# Patient Record
Sex: MALE | Race: WHITE | NOT HISPANIC OR LATINO | Employment: OTHER | URBAN - METROPOLITAN AREA
[De-identification: names, ages, dates, MRNs, and addresses within clinical notes are randomized per-mention and may not be internally consistent; named-entity substitution may affect disease eponyms.]

---

## 2017-03-06 ENCOUNTER — APPOINTMENT (OUTPATIENT)
Dept: CT IMAGING | Facility: HOSPITAL | Age: 72
End: 2017-03-06

## 2017-03-06 ENCOUNTER — APPOINTMENT (OUTPATIENT)
Dept: GENERAL RADIOLOGY | Facility: HOSPITAL | Age: 72
End: 2017-03-06

## 2017-03-06 ENCOUNTER — HOSPITAL ENCOUNTER (EMERGENCY)
Facility: HOSPITAL | Age: 72
Discharge: LEFT BEFORE DISCHARGE | End: 2017-03-06

## 2017-03-06 VITALS
SYSTOLIC BLOOD PRESSURE: 185 MMHG | RESPIRATION RATE: 16 BRPM | BODY MASS INDEX: 30.8 KG/M2 | WEIGHT: 220 LBS | TEMPERATURE: 98.2 F | OXYGEN SATURATION: 95 % | DIASTOLIC BLOOD PRESSURE: 85 MMHG | HEIGHT: 71 IN | HEART RATE: 85 BPM

## 2017-03-06 LAB
ABO GROUP BLD: NORMAL
ALBUMIN SERPL-MCNC: 4.1 G/DL (ref 3.5–5.2)
ALBUMIN/GLOB SERPL: 1.5 G/DL
ALP SERPL-CCNC: 46 U/L (ref 39–117)
ALT SERPL W P-5'-P-CCNC: 27 U/L (ref 1–41)
ANION GAP SERPL CALCULATED.3IONS-SCNC: 16.2 MMOL/L
AST SERPL-CCNC: 19 U/L (ref 1–40)
BASOPHILS # BLD AUTO: 0.06 10*3/MM3 (ref 0–0.2)
BASOPHILS NFR BLD AUTO: 0.5 % (ref 0–1.5)
BILIRUB SERPL-MCNC: 0.4 MG/DL (ref 0.1–1.2)
BLD GP AB SCN SERPL QL: NEGATIVE
BUN BLD-MCNC: 16 MG/DL (ref 8–23)
BUN/CREAT SERPL: 18 (ref 7–25)
CALCIUM SPEC-SCNC: 9.7 MG/DL (ref 8.6–10.5)
CHLORIDE SERPL-SCNC: 95 MMOL/L (ref 98–107)
CO2 SERPL-SCNC: 24.8 MMOL/L (ref 22–29)
CREAT BLD-MCNC: 0.89 MG/DL (ref 0.76–1.27)
DEPRECATED RDW RBC AUTO: 41.4 FL (ref 37–54)
EOSINOPHIL # BLD AUTO: 0.52 10*3/MM3 (ref 0–0.7)
EOSINOPHIL NFR BLD AUTO: 4.5 % (ref 0.3–6.2)
ERYTHROCYTE [DISTWIDTH] IN BLOOD BY AUTOMATED COUNT: 13 % (ref 11.5–14.5)
GFR SERPL CREATININE-BSD FRML MDRD: 84 ML/MIN/1.73
GLOBULIN UR ELPH-MCNC: 2.7 GM/DL
GLUCOSE BLD-MCNC: 248 MG/DL (ref 65–99)
HCT VFR BLD AUTO: 46.5 % (ref 40.4–52.2)
HGB BLD-MCNC: 16 G/DL (ref 13.7–17.6)
HOLD SPECIMEN: NORMAL
IMM GRANULOCYTES # BLD: 0.03 10*3/MM3 (ref 0–0.03)
IMM GRANULOCYTES NFR BLD: 0.3 % (ref 0–0.5)
INR PPP: 1.03 (ref 0.9–1.1)
LYMPHOCYTES # BLD AUTO: 3.85 10*3/MM3 (ref 0.9–4.8)
LYMPHOCYTES NFR BLD AUTO: 33.2 % (ref 19.6–45.3)
MCH RBC QN AUTO: 30 PG (ref 27–32.7)
MCHC RBC AUTO-ENTMCNC: 34.4 G/DL (ref 32.6–36.4)
MCV RBC AUTO: 87.2 FL (ref 79.8–96.2)
MONOCYTES # BLD AUTO: 0.69 10*3/MM3 (ref 0.2–1.2)
MONOCYTES NFR BLD AUTO: 6 % (ref 5–12)
NEUTROPHILS # BLD AUTO: 6.44 10*3/MM3 (ref 1.9–8.1)
NEUTROPHILS NFR BLD AUTO: 55.5 % (ref 42.7–76)
PLATELET # BLD AUTO: 298 10*3/MM3 (ref 140–500)
PMV BLD AUTO: 9.5 FL (ref 6–12)
POTASSIUM BLD-SCNC: 4.4 MMOL/L (ref 3.5–5.2)
PROT SERPL-MCNC: 6.8 G/DL (ref 6–8.5)
PROTHROMBIN TIME: 13.1 SECONDS (ref 11.7–14.2)
RBC # BLD AUTO: 5.33 10*6/MM3 (ref 4.6–6)
RH BLD: POSITIVE
SODIUM BLD-SCNC: 136 MMOL/L (ref 136–145)
WBC NRBC COR # BLD: 11.59 10*3/MM3 (ref 4.5–10.7)

## 2017-03-06 PROCEDURE — 85610 PROTHROMBIN TIME: CPT

## 2017-03-06 PROCEDURE — 71020 HC CHEST PA AND LATERAL: CPT

## 2017-03-06 PROCEDURE — 70450 CT HEAD/BRAIN W/O DYE: CPT

## 2017-03-06 PROCEDURE — 36415 COLL VENOUS BLD VENIPUNCTURE: CPT

## 2017-03-06 PROCEDURE — 93005 ELECTROCARDIOGRAM TRACING: CPT

## 2017-03-06 PROCEDURE — 99211 OFF/OP EST MAY X REQ PHY/QHP: CPT

## 2017-03-06 PROCEDURE — 86850 RBC ANTIBODY SCREEN: CPT

## 2017-03-06 PROCEDURE — 85025 COMPLETE CBC W/AUTO DIFF WBC: CPT

## 2017-03-06 PROCEDURE — 86901 BLOOD TYPING SEROLOGIC RH(D): CPT

## 2017-03-06 PROCEDURE — 86900 BLOOD TYPING SEROLOGIC ABO: CPT

## 2017-03-06 PROCEDURE — 80053 COMPREHEN METABOLIC PANEL: CPT

## 2017-03-06 RX ORDER — SODIUM CHLORIDE 0.9 % (FLUSH) 0.9 %
10 SYRINGE (ML) INJECTION AS NEEDED
Status: DISCONTINUED | OUTPATIENT
Start: 2017-03-06 | End: 2017-03-06 | Stop reason: HOSPADM

## 2017-03-06 NOTE — ED NOTES
"Wife states she noticed yesterday the patient was very agitated, was drooling. Pt states he hasn't felt right for about a week. \"Sometimes left side of face feels funny\".  Pt with strong  bilat. Equal movement of bilat legs. No noted facial asymmetry     Ney Zhu RN  03/06/17 1414       Ney Zhu RN  03/06/17 1427    "

## 2017-03-07 ENCOUNTER — OFFICE VISIT (OUTPATIENT)
Dept: CARDIOLOGY | Facility: CLINIC | Age: 72
End: 2017-03-07

## 2017-03-07 VITALS
HEART RATE: 73 BPM | BODY MASS INDEX: 31.86 KG/M2 | SYSTOLIC BLOOD PRESSURE: 152 MMHG | HEIGHT: 71 IN | WEIGHT: 227.6 LBS | DIASTOLIC BLOOD PRESSURE: 80 MMHG

## 2017-03-07 DIAGNOSIS — I25.10 CORONARY ARTERY DISEASE INVOLVING NATIVE CORONARY ARTERY OF NATIVE HEART WITHOUT ANGINA PECTORIS: Primary | ICD-10-CM

## 2017-03-07 DIAGNOSIS — E11.59 TYPE 2 DIABETES MELLITUS WITH OTHER CIRCULATORY COMPLICATION: ICD-10-CM

## 2017-03-07 DIAGNOSIS — I10 ESSENTIAL HYPERTENSION: ICD-10-CM

## 2017-03-07 DIAGNOSIS — E66.09 NON MORBID OBESITY DUE TO EXCESS CALORIES: ICD-10-CM

## 2017-03-07 DIAGNOSIS — E78.49 OTHER HYPERLIPIDEMIA: ICD-10-CM

## 2017-03-07 PROCEDURE — 99214 OFFICE O/P EST MOD 30 MIN: CPT | Performed by: INTERNAL MEDICINE

## 2017-03-07 PROCEDURE — 93000 ELECTROCARDIOGRAM COMPLETE: CPT | Performed by: INTERNAL MEDICINE

## 2017-03-07 NOTE — PROGRESS NOTES
Date of Office Visit: 2017  Encounter Provider: Anibal Wray MD  Place of Service: [facility] CARDIOLOGY  Patient Name: Anthony Vaughn  :[id number]  [id number]    Chief Complaint   Patient presents with   • Coronary Artery Disease   • Dizziness     left side of face felt funny, drooling   :     HPI: Anthony Vaughn is a 72 y.o. male   He walks in today saying he has an appointment but he did not in reality.  He just was over in the emergency room for seven hours yesterday because he thought he had a stroke.  He had a CT and some blood work done that did not really reveal much.  He saw his primary care doctor today who set him up for an MRI.  I talked with her.  She does not feel like he had a stroke.  He is on aspirin.  She would like to hold off on any further evaluation until she gets an MRI back.  He is having some indigestion.  He has a number of stents in all of his coronaries.  He has terrible risk modification (obesity, diabetes out of control, and hypertension).  He does not smoke.          Past Medical History   Diagnosis Date   • Coronary artery disease    • Hyperlipidemia    • Hypertension    • S/P angioplasty with stent    • Type 2 diabetes mellitus        Past Surgical History   Procedure Laterality Date   • Coronary angioplasty with stent placement       Dr. Wray   • Colonoscopy w/ polypectomy  2013     2 benign polyps.       Social History     Social History   • Marital status:      Spouse name: N/A   • Number of children: N/A   • Years of education: N/A     Occupational History   • Not on file.     Social History Main Topics   • Smoking status: Never Smoker   • Smokeless tobacco: Not on file   • Alcohol use No   • Drug use: No   • Sexual activity: Not on file     Other Topics Concern   • Not on file     Social History Narrative       Family History   Problem Relation Age of Onset   • Diabetes Mother    • Heart disease Sister    • Rheumatic fever  Sister        Review of Systems   Constitution: Negative for decreased appetite, fever, malaise/fatigue and weight loss.   HENT: Negative for nosebleeds.    Eyes: Negative for double vision.   Cardiovascular: Negative for chest pain, claudication, cyanosis, dyspnea on exertion, irregular heartbeat, leg swelling, near-syncope, orthopnea, palpitations, paroxysmal nocturnal dyspnea and syncope.   Respiratory: Negative for cough, hemoptysis and shortness of breath.    Hematologic/Lymphatic: Negative for bleeding problem.   Skin: Negative for rash.   Musculoskeletal: Negative for falls and myalgias.   Gastrointestinal: Negative for hematochezia, jaundice, melena, nausea and vomiting.   Genitourinary: Negative for hematuria.   Neurological: Negative for dizziness and seizures.   Psychiatric/Behavioral: Negative for altered mental status and memory loss.       No Known Allergies      Current Outpatient Prescriptions:   •  aspirin 81 MG EC tablet, Take 81 mg by mouth daily., Disp: , Rfl:   •  CARMEN MICROLET LANCETS lancets, , Disp: , Rfl:   •  brimonidine (ALPHAGAN) 0.15 % ophthalmic solution, Apply 1 drop to eye 2 (two) times a day., Disp: , Rfl:   •  clopidogrel (PLAVIX) 75 MG tablet, Take 75 mg by mouth daily., Disp: , Rfl:   •  Glucose Blood (CARMEN CONTOUR NEXT TEST VI), Carmen Contour Next Test In Vitro Strip; Patient Sig: Carmen Contour Next Test In Vitro Strip TEST 3 TIMES DAILY OR AS NEEDED; 3; 0; 20-Apr-2015; Active, Disp: , Rfl:   •  insulin aspart (NOVOLOG FLEXPEN) 100 UNIT/ML solution pen-injector sc pen, INJECT 65 UNITS IN THE MORNING 65 UNITS AT LUNCH AND 68 UNITS AT DINNER, Disp: , Rfl:   •  insulin detemir (LEVEMIR) 100 UNIT/ML injection, INJECT 95 UNITS AT NIGHT, Disp: , Rfl:   •  isosorbide mononitrate (IMDUR) 30 MG 24 hr tablet, Take 30 mg by mouth daily., Disp: , Rfl:   •  latanoprost (XALATAN) 0.005 % ophthalmic solution, Apply 1 drop to eye daily., Disp: , Rfl:   •  lisinopril (PRINIVIL,ZESTRIL) 20 MG  "tablet, TAKE 1 TABLET DAILY AS DIRECTED., Disp: 90 tablet, Rfl: 3  •  metFORMIN (FORTAMET) 500 MG (OSM) 24 hr tablet, Take 500 mg by mouth 2 (two) times a day with meals., Disp: , Rfl:   •  metoprolol tartrate (LOPRESSOR) 25 MG tablet, TAKE 1 TABLET BY MOUTH 2 TIMES DAILY, Disp: 180 tablet, Rfl: 1  •  nitroglycerin (NITROSTAT) 0.4 MG SL tablet, Place 0.4 mg under the tongue., Disp: , Rfl:   •  omega-3 acid ethyl esters (LOVAZA) 1 G capsule, Take 2 capsules by mouth 2 (two) times a day., Disp: , Rfl:   •  Omeprazole 20 MG tablet delayed-release, Take 1 tablet/day by mouth daily., Disp: , Rfl:   •  simvastatin (ZOCOR) 80 MG tablet, Take 40 mg by mouth every night., Disp: , Rfl:   •  timolol (TIMOPTIC) 0.5 % ophthalmic solution, 1 drop daily. INSTILL 1 DROP IN LEFT EYE EVERY MORNING AS DIRECTED, Disp: , Rfl: 6  No current facility-administered medications for this visit.      Objective:     Vitals:    03/07/17 1312   BP: 152/80   Pulse: 73   Weight: 227 lb 9.6 oz (103 kg)   Height: 71\" (180.3 cm)     Body mass index is 31.74 kg/(m^2).    Physical Exam   Constitutional: He is oriented to person, place, and time. He appears well-developed and well-nourished.   obese   HENT:   Head: Normocephalic.   Eyes: No scleral icterus.   Neck: No JVD present. No thyromegaly present.   Cardiovascular: Normal rate, regular rhythm and normal heart sounds.  Exam reveals no gallop and no friction rub.    No murmur heard.  Pulmonary/Chest: Effort normal and breath sounds normal. He has no wheezes. He has no rales.   Abdominal: Soft. There is no hepatosplenomegaly. There is no tenderness.   Musculoskeletal: Normal range of motion. He exhibits no edema.   Lymphadenopathy:     He has no cervical adenopathy.   Neurological: He is alert and oriented to person, place, and time.   Skin: Skin is warm and dry. No rash noted.   Psychiatric: He has a normal mood and affect.         ECG 12 Lead  Date/Time: 3/7/2017 1:45 PM  Performed by: LISA " NOY  Authorized by: NOY WRAY   Comparison: compared with previous ECG   Rhythm: sinus rhythm  Clinical impression: abnormal ECG  Comments: ns ST-T wave abnormality                 Assessment:       Diagnosis Plan   1. Coronary artery disease involving native coronary artery of native heart without angina pectoris     2. Other hyperlipidemia     3. Essential hypertension     4. Non morbid obesity due to excess calories     5. Type 2 diabetes mellitus with other circulatory complication            Plan:        I am not seeing any acute change on his electrocardiogram.  He has terrible risk modification, and I told him today it is not a question of if he is going to have a problem, it is when, if he does not make some kind of change in his lifestyle.  I have been talking to him about that for years and have seen no change.  I am not going to do any kind of a workup right now on his heart until we know his neurologic status is better.  I am going to have him come back and see me in a month and we will consider a stress test then.          As always, it has been a pleasure to participate in your patient's care.      Sincerely,       Noy Wray MD

## 2017-03-09 ENCOUNTER — TRANSCRIBE ORDERS (OUTPATIENT)
Dept: ADMINISTRATIVE | Facility: HOSPITAL | Age: 72
End: 2017-03-09

## 2017-03-09 DIAGNOSIS — R47.9 SPEECH DISTURBANCE, UNSPECIFIED TYPE: Primary | ICD-10-CM

## 2017-03-14 ENCOUNTER — HOSPITAL ENCOUNTER (OUTPATIENT)
Dept: MRI IMAGING | Facility: HOSPITAL | Age: 72
Discharge: HOME | End: 2017-03-14
Attending: INTERNAL MEDICINE | Admitting: INTERNAL MEDICINE

## 2017-03-14 DIAGNOSIS — R47.9 SPEECH DISTURBANCE, UNSPECIFIED TYPE: ICD-10-CM

## 2017-03-14 PROCEDURE — 0 GADOBENATE DIMEGLUMINE 529 MG/ML SOLUTION: Performed by: INTERNAL MEDICINE

## 2017-03-14 PROCEDURE — 70553 MRI BRAIN STEM W/O & W/DYE: CPT

## 2017-03-14 PROCEDURE — A9577 INJ MULTIHANCE: HCPCS | Performed by: INTERNAL MEDICINE

## 2017-03-14 PROCEDURE — 82565 ASSAY OF CREATININE: CPT

## 2017-03-14 RX ADMIN — GADOBENATE DIMEGLUMINE 20 ML: 529 INJECTION, SOLUTION INTRAVENOUS at 15:00

## 2017-03-15 LAB — CREAT BLDA-MCNC: 0.9 MG/DL (ref 0.6–1.3)

## 2017-03-17 ENCOUNTER — TRANSCRIBE ORDERS (OUTPATIENT)
Dept: ADMINISTRATIVE | Facility: HOSPITAL | Age: 72
End: 2017-03-17

## 2017-03-17 DIAGNOSIS — I63.9 STROKE DETERMINED BY CLINICAL ASSESSMENT (HCC): Primary | ICD-10-CM

## 2017-03-17 DIAGNOSIS — I63.9 ACUTE ISCHEMIC STROKE (HCC): Primary | ICD-10-CM

## 2017-03-21 ENCOUNTER — HOSPITAL ENCOUNTER (OUTPATIENT)
Dept: MRI IMAGING | Facility: HOSPITAL | Age: 72
Discharge: HOME | End: 2017-03-21
Attending: INTERNAL MEDICINE

## 2017-03-21 ENCOUNTER — HOSPITAL ENCOUNTER (OUTPATIENT)
Dept: MRI IMAGING | Facility: HOSPITAL | Age: 72
Discharge: HOME | End: 2017-03-21
Attending: INTERNAL MEDICINE | Admitting: INTERNAL MEDICINE

## 2017-03-21 ENCOUNTER — APPOINTMENT (OUTPATIENT)
Dept: MRI IMAGING | Facility: HOSPITAL | Age: 72
End: 2017-03-21

## 2017-03-21 DIAGNOSIS — I63.9 ACUTE ISCHEMIC STROKE (HCC): ICD-10-CM

## 2017-03-21 PROCEDURE — 0 GADOBENATE DIMEGLUMINE 529 MG/ML SOLUTION: Performed by: INTERNAL MEDICINE

## 2017-03-21 PROCEDURE — 82565 ASSAY OF CREATININE: CPT

## 2017-03-21 PROCEDURE — A9577 INJ MULTIHANCE: HCPCS | Performed by: INTERNAL MEDICINE

## 2017-03-21 PROCEDURE — 70549 MR ANGIOGRAPH NECK W/O&W/DYE: CPT

## 2017-03-21 PROCEDURE — 70544 MR ANGIOGRAPHY HEAD W/O DYE: CPT

## 2017-03-21 RX ADMIN — GADOBENATE DIMEGLUMINE 20 ML: 529 INJECTION, SOLUTION INTRAVENOUS at 11:45

## 2017-03-22 LAB — CREAT BLDA-MCNC: 0.8 MG/DL (ref 0.6–1.3)

## 2017-04-14 ENCOUNTER — HOSPITAL ENCOUNTER (INPATIENT)
Facility: HOSPITAL | Age: 72
LOS: 2 days | Discharge: HOME | End: 2017-04-16
Attending: HOSPITALIST | Admitting: HOSPITALIST

## 2017-04-14 ENCOUNTER — APPOINTMENT (OUTPATIENT)
Dept: GENERAL RADIOLOGY | Facility: HOSPITAL | Age: 72
End: 2017-04-14

## 2017-04-14 PROBLEM — J20.9 ACUTE BRONCHITIS: Status: ACTIVE | Noted: 2017-04-14

## 2017-04-14 PROBLEM — J18.9 PNEUMONIA: Status: ACTIVE | Noted: 2017-04-14

## 2017-04-14 PROBLEM — J96.01 ACUTE HYPOXEMIC RESPIRATORY FAILURE: Status: ACTIVE | Noted: 2017-04-14

## 2017-04-14 LAB
ALBUMIN SERPL-MCNC: 3.7 G/DL (ref 3.5–5.2)
ALBUMIN/GLOB SERPL: 1.2 G/DL
ALP SERPL-CCNC: 44 U/L (ref 39–117)
ALT SERPL W P-5'-P-CCNC: 24 U/L (ref 1–41)
ANION GAP SERPL CALCULATED.3IONS-SCNC: 15.9 MMOL/L
AST SERPL-CCNC: 28 U/L (ref 1–40)
B PERT DNA SPEC QL NAA+PROBE: NOT DETECTED
BASOPHILS # BLD AUTO: 0.03 10*3/MM3 (ref 0–0.2)
BASOPHILS NFR BLD AUTO: 0.3 % (ref 0–1.5)
BILIRUB SERPL-MCNC: 0.8 MG/DL (ref 0.1–1.2)
BUN BLD-MCNC: 10 MG/DL (ref 8–23)
BUN/CREAT SERPL: 11.5 (ref 7–25)
C PNEUM DNA NPH QL NAA+NON-PROBE: NOT DETECTED
CALCIUM SPEC-SCNC: 8.7 MG/DL (ref 8.6–10.5)
CHLORIDE SERPL-SCNC: 96 MMOL/L (ref 98–107)
CO2 SERPL-SCNC: 23.1 MMOL/L (ref 22–29)
CREAT BLD-MCNC: 0.87 MG/DL (ref 0.76–1.27)
D-LACTATE SERPL-SCNC: 1.5 MMOL/L (ref 0.5–2)
DEPRECATED RDW RBC AUTO: 42.4 FL (ref 37–54)
EOSINOPHIL # BLD AUTO: 0.21 10*3/MM3 (ref 0–0.7)
EOSINOPHIL NFR BLD AUTO: 2.4 % (ref 0.3–6.2)
ERYTHROCYTE [DISTWIDTH] IN BLOOD BY AUTOMATED COUNT: 13.1 % (ref 11.5–14.5)
FLUAV H1 2009 PAND RNA NPH QL NAA+PROBE: NOT DETECTED
FLUAV H1 HA GENE NPH QL NAA+PROBE: NOT DETECTED
FLUAV H3 RNA NPH QL NAA+PROBE: NOT DETECTED
FLUAV SUBTYP SPEC NAA+PROBE: NOT DETECTED
FLUBV RNA ISLT QL NAA+PROBE: NOT DETECTED
GFR SERPL CREATININE-BSD FRML MDRD: 86 ML/MIN/1.73
GLOBULIN UR ELPH-MCNC: 3 GM/DL
GLUCOSE BLD-MCNC: 219 MG/DL (ref 65–99)
GLUCOSE BLDC GLUCOMTR-MCNC: 138 MG/DL (ref 70–130)
GLUCOSE BLDC GLUCOMTR-MCNC: 218 MG/DL (ref 70–130)
GLUCOSE BLDC GLUCOMTR-MCNC: 221 MG/DL (ref 70–130)
HADV DNA SPEC NAA+PROBE: NOT DETECTED
HCOV 229E RNA SPEC QL NAA+PROBE: NOT DETECTED
HCOV HKU1 RNA SPEC QL NAA+PROBE: NOT DETECTED
HCOV NL63 RNA SPEC QL NAA+PROBE: NOT DETECTED
HCOV OC43 RNA SPEC QL NAA+PROBE: NOT DETECTED
HCT VFR BLD AUTO: 47 % (ref 40.4–52.2)
HGB BLD-MCNC: 15.9 G/DL (ref 13.7–17.6)
HMPV RNA NPH QL NAA+NON-PROBE: NOT DETECTED
HPIV1 RNA SPEC QL NAA+PROBE: NOT DETECTED
HPIV2 RNA SPEC QL NAA+PROBE: NOT DETECTED
HPIV3 RNA NPH QL NAA+PROBE: DETECTED
HPIV4 P GENE NPH QL NAA+PROBE: NOT DETECTED
IMM GRANULOCYTES # BLD: 0.02 10*3/MM3 (ref 0–0.03)
IMM GRANULOCYTES NFR BLD: 0.2 % (ref 0–0.5)
LYMPHOCYTES # BLD AUTO: 1.07 10*3/MM3 (ref 0.9–4.8)
LYMPHOCYTES NFR BLD AUTO: 12.5 % (ref 19.6–45.3)
M PNEUMO IGG SER IA-ACNC: NOT DETECTED
MCH RBC QN AUTO: 30.1 PG (ref 27–32.7)
MCHC RBC AUTO-ENTMCNC: 33.8 G/DL (ref 32.6–36.4)
MCV RBC AUTO: 88.8 FL (ref 79.8–96.2)
MONOCYTES # BLD AUTO: 0.89 10*3/MM3 (ref 0.2–1.2)
MONOCYTES NFR BLD AUTO: 10.4 % (ref 5–12)
NEUTROPHILS # BLD AUTO: 6.37 10*3/MM3 (ref 1.9–8.1)
NEUTROPHILS NFR BLD AUTO: 74.2 % (ref 42.7–76)
NT-PROBNP SERPL-MCNC: 66.2 PG/ML (ref 0–900)
PLATELET # BLD AUTO: 204 10*3/MM3 (ref 140–500)
PMV BLD AUTO: 9.2 FL (ref 6–12)
POTASSIUM BLD-SCNC: 4.1 MMOL/L (ref 3.5–5.2)
PROCALCITONIN SERPL-MCNC: 0.16 NG/ML (ref 0.1–0.25)
PROT SERPL-MCNC: 6.7 G/DL (ref 6–8.5)
RBC # BLD AUTO: 5.29 10*6/MM3 (ref 4.6–6)
RHINOVIRUS RNA SPEC NAA+PROBE: NOT DETECTED
RSV RNA NPH QL NAA+NON-PROBE: NOT DETECTED
SODIUM BLD-SCNC: 135 MMOL/L (ref 136–145)
TROPONIN T SERPL-MCNC: <0.01 NG/ML (ref 0–0.03)
WBC NRBC COR # BLD: 8.59 10*3/MM3 (ref 4.5–10.7)

## 2017-04-14 PROCEDURE — 71020 HC CHEST PA AND LATERAL: CPT

## 2017-04-14 PROCEDURE — 84484 ASSAY OF TROPONIN QUANT: CPT | Performed by: HOSPITALIST

## 2017-04-14 PROCEDURE — 87070 CULTURE OTHR SPECIMN AEROBIC: CPT | Performed by: HOSPITALIST

## 2017-04-14 PROCEDURE — 94640 AIRWAY INHALATION TREATMENT: CPT

## 2017-04-14 PROCEDURE — 87633 RESP VIRUS 12-25 TARGETS: CPT | Performed by: HOSPITALIST

## 2017-04-14 PROCEDURE — 85025 COMPLETE CBC W/AUTO DIFF WBC: CPT | Performed by: HOSPITALIST

## 2017-04-14 PROCEDURE — 87798 DETECT AGENT NOS DNA AMP: CPT | Performed by: HOSPITALIST

## 2017-04-14 PROCEDURE — 87486 CHLMYD PNEUM DNA AMP PROBE: CPT | Performed by: HOSPITALIST

## 2017-04-14 PROCEDURE — 80053 COMPREHEN METABOLIC PANEL: CPT | Performed by: HOSPITALIST

## 2017-04-14 PROCEDURE — 87581 M.PNEUMON DNA AMP PROBE: CPT | Performed by: HOSPITALIST

## 2017-04-14 PROCEDURE — 63710000001 PREDNISONE PER 1 MG: Performed by: HOSPITALIST

## 2017-04-14 PROCEDURE — 84145 PROCALCITONIN (PCT): CPT | Performed by: HOSPITALIST

## 2017-04-14 PROCEDURE — 93010 ELECTROCARDIOGRAM REPORT: CPT | Performed by: INTERNAL MEDICINE

## 2017-04-14 PROCEDURE — 63710000001 INSULIN ASPART PER 5 UNITS: Performed by: HOSPITALIST

## 2017-04-14 PROCEDURE — 83880 ASSAY OF NATRIURETIC PEPTIDE: CPT | Performed by: HOSPITALIST

## 2017-04-14 PROCEDURE — 25010000003 CEFTRIAXONE PER 250 MG: Performed by: HOSPITALIST

## 2017-04-14 PROCEDURE — 87040 BLOOD CULTURE FOR BACTERIA: CPT | Performed by: HOSPITALIST

## 2017-04-14 PROCEDURE — 82962 GLUCOSE BLOOD TEST: CPT

## 2017-04-14 PROCEDURE — 93005 ELECTROCARDIOGRAM TRACING: CPT | Performed by: HOSPITALIST

## 2017-04-14 PROCEDURE — 63710000001 INSULIN DETEMER PER 5 UNITS: Performed by: HOSPITALIST

## 2017-04-14 PROCEDURE — 83605 ASSAY OF LACTIC ACID: CPT | Performed by: HOSPITALIST

## 2017-04-14 PROCEDURE — 87205 SMEAR GRAM STAIN: CPT | Performed by: HOSPITALIST

## 2017-04-14 RX ORDER — ONDANSETRON 4 MG/1
4 TABLET, ORALLY DISINTEGRATING ORAL EVERY 6 HOURS PRN
Status: DISCONTINUED | OUTPATIENT
Start: 2017-04-14 | End: 2017-04-16 | Stop reason: HOSPADM

## 2017-04-14 RX ORDER — BRIMONIDINE TARTRATE 0.15 %
1 DROPS OPHTHALMIC (EYE) 2 TIMES DAILY
Status: DISCONTINUED | OUTPATIENT
Start: 2017-04-14 | End: 2017-04-16 | Stop reason: HOSPADM

## 2017-04-14 RX ORDER — NICOTINE POLACRILEX 4 MG
15 LOZENGE BUCCAL
Status: DISCONTINUED | OUTPATIENT
Start: 2017-04-14 | End: 2017-04-16 | Stop reason: HOSPADM

## 2017-04-14 RX ORDER — CLOPIDOGREL BISULFATE 75 MG/1
75 TABLET ORAL DAILY
Status: DISCONTINUED | OUTPATIENT
Start: 2017-04-14 | End: 2017-04-16 | Stop reason: HOSPADM

## 2017-04-14 RX ORDER — ASPIRIN 81 MG/1
81 TABLET ORAL DAILY
Status: DISCONTINUED | OUTPATIENT
Start: 2017-04-14 | End: 2017-04-16 | Stop reason: HOSPADM

## 2017-04-14 RX ORDER — ATORVASTATIN CALCIUM 40 MG/1
40 TABLET, FILM COATED ORAL DAILY
Status: DISCONTINUED | OUTPATIENT
Start: 2017-04-14 | End: 2017-04-14

## 2017-04-14 RX ORDER — CLOPIDOGREL BISULFATE 75 MG/1
75 TABLET ORAL DAILY
Status: DISCONTINUED | OUTPATIENT
Start: 2017-04-14 | End: 2017-04-14

## 2017-04-14 RX ORDER — BRIMONIDINE TARTRATE 0.15 %
1 DROPS OPHTHALMIC (EYE) 2 TIMES DAILY
Status: DISCONTINUED | OUTPATIENT
Start: 2017-04-14 | End: 2017-04-14

## 2017-04-14 RX ORDER — LISINOPRIL 20 MG/1
20 TABLET ORAL
Status: DISCONTINUED | OUTPATIENT
Start: 2017-04-14 | End: 2017-04-16 | Stop reason: HOSPADM

## 2017-04-14 RX ORDER — PREDNISONE 20 MG/1
20 TABLET ORAL 2 TIMES DAILY WITH MEALS
Status: DISCONTINUED | OUTPATIENT
Start: 2017-04-14 | End: 2017-04-15

## 2017-04-14 RX ORDER — LISINOPRIL 20 MG/1
20 TABLET ORAL
Status: DISCONTINUED | OUTPATIENT
Start: 2017-04-14 | End: 2017-04-14

## 2017-04-14 RX ORDER — SODIUM CHLORIDE 0.9 % (FLUSH) 0.9 %
1-10 SYRINGE (ML) INJECTION AS NEEDED
Status: DISCONTINUED | OUTPATIENT
Start: 2017-04-14 | End: 2017-04-16 | Stop reason: HOSPADM

## 2017-04-14 RX ORDER — PANTOPRAZOLE SODIUM 40 MG/1
40 TABLET, DELAYED RELEASE ORAL
Status: DISCONTINUED | OUTPATIENT
Start: 2017-04-15 | End: 2017-04-16 | Stop reason: HOSPADM

## 2017-04-14 RX ORDER — ONDANSETRON 4 MG/1
4 TABLET, FILM COATED ORAL EVERY 6 HOURS PRN
Status: DISCONTINUED | OUTPATIENT
Start: 2017-04-14 | End: 2017-04-16 | Stop reason: HOSPADM

## 2017-04-14 RX ORDER — CEFTRIAXONE SODIUM 2 G/50ML
2 INJECTION, SOLUTION INTRAVENOUS EVERY 24 HOURS
Status: DISCONTINUED | OUTPATIENT
Start: 2017-04-14 | End: 2017-04-16 | Stop reason: HOSPADM

## 2017-04-14 RX ORDER — ONDANSETRON 2 MG/ML
4 INJECTION INTRAMUSCULAR; INTRAVENOUS EVERY 6 HOURS PRN
Status: DISCONTINUED | OUTPATIENT
Start: 2017-04-14 | End: 2017-04-16 | Stop reason: HOSPADM

## 2017-04-14 RX ORDER — PILOCARPINE HYDROCHLORIDE 10 MG/ML
1 SOLUTION/ DROPS OPHTHALMIC 2 TIMES DAILY
Status: DISCONTINUED | OUTPATIENT
Start: 2017-04-14 | End: 2017-04-16 | Stop reason: HOSPADM

## 2017-04-14 RX ORDER — PREDNISONE 20 MG/1
20 TABLET ORAL 2 TIMES DAILY WITH MEALS
Status: DISCONTINUED | OUTPATIENT
Start: 2017-04-14 | End: 2017-04-14

## 2017-04-14 RX ORDER — ACETAMINOPHEN 325 MG/1
650 TABLET ORAL EVERY 4 HOURS PRN
Status: DISCONTINUED | OUTPATIENT
Start: 2017-04-14 | End: 2017-04-16 | Stop reason: HOSPADM

## 2017-04-14 RX ORDER — LATANOPROST 50 UG/ML
1 SOLUTION/ DROPS OPHTHALMIC DAILY
Status: DISCONTINUED | OUTPATIENT
Start: 2017-04-14 | End: 2017-04-14

## 2017-04-14 RX ORDER — PILOCARPINE HYDROCHLORIDE 10 MG/ML
1 SOLUTION/ DROPS OPHTHALMIC 2 TIMES DAILY
Status: DISCONTINUED | OUTPATIENT
Start: 2017-04-14 | End: 2017-04-14

## 2017-04-14 RX ORDER — TIMOLOL MALEATE 5 MG/ML
1 SOLUTION/ DROPS OPHTHALMIC DAILY
Status: DISCONTINUED | OUTPATIENT
Start: 2017-04-14 | End: 2017-04-16 | Stop reason: HOSPADM

## 2017-04-14 RX ORDER — ISOSORBIDE MONONITRATE 30 MG/1
30 TABLET, EXTENDED RELEASE ORAL DAILY
Status: DISCONTINUED | OUTPATIENT
Start: 2017-04-14 | End: 2017-04-16 | Stop reason: HOSPADM

## 2017-04-14 RX ORDER — HYDROCODONE BITARTRATE AND ACETAMINOPHEN 5; 325 MG/1; MG/1
1 TABLET ORAL EVERY 4 HOURS PRN
Status: DISCONTINUED | OUTPATIENT
Start: 2017-04-14 | End: 2017-04-16 | Stop reason: HOSPADM

## 2017-04-14 RX ORDER — LATANOPROST 50 UG/ML
1 SOLUTION/ DROPS OPHTHALMIC DAILY
Status: DISCONTINUED | OUTPATIENT
Start: 2017-04-14 | End: 2017-04-15

## 2017-04-14 RX ORDER — IPRATROPIUM BROMIDE AND ALBUTEROL SULFATE 2.5; .5 MG/3ML; MG/3ML
3 SOLUTION RESPIRATORY (INHALATION)
Status: DISCONTINUED | OUTPATIENT
Start: 2017-04-14 | End: 2017-04-16 | Stop reason: HOSPADM

## 2017-04-14 RX ORDER — ATORVASTATIN CALCIUM 40 MG/1
40 TABLET, FILM COATED ORAL DAILY
Status: DISCONTINUED | OUTPATIENT
Start: 2017-04-14 | End: 2017-04-16 | Stop reason: HOSPADM

## 2017-04-14 RX ORDER — DEXTROSE MONOHYDRATE 25 G/50ML
25 INJECTION, SOLUTION INTRAVENOUS
Status: DISCONTINUED | OUTPATIENT
Start: 2017-04-14 | End: 2017-04-16 | Stop reason: HOSPADM

## 2017-04-14 RX ORDER — ISOSORBIDE MONONITRATE 30 MG/1
30 TABLET, EXTENDED RELEASE ORAL DAILY
Status: DISCONTINUED | OUTPATIENT
Start: 2017-04-14 | End: 2017-04-14

## 2017-04-14 RX ORDER — TIMOLOL MALEATE 5 MG/ML
1 SOLUTION/ DROPS OPHTHALMIC DAILY
Status: DISCONTINUED | OUTPATIENT
Start: 2017-04-14 | End: 2017-04-14

## 2017-04-14 RX ORDER — ASPIRIN 81 MG/1
81 TABLET ORAL DAILY
Status: DISCONTINUED | OUTPATIENT
Start: 2017-04-14 | End: 2017-04-14

## 2017-04-14 RX ADMIN — INSULIN ASPART 40 UNITS: 100 INJECTION, SOLUTION INTRAVENOUS; SUBCUTANEOUS at 12:56

## 2017-04-14 RX ADMIN — ASPIRIN 81 MG: 81 TABLET ORAL at 22:16

## 2017-04-14 RX ADMIN — LISINOPRIL 20 MG: 20 TABLET ORAL at 22:14

## 2017-04-14 RX ADMIN — ATORVASTATIN CALCIUM 40 MG: 40 TABLET, FILM COATED ORAL at 22:13

## 2017-04-14 RX ADMIN — PREDNISONE 20 MG: 20 TABLET ORAL at 22:16

## 2017-04-14 RX ADMIN — IPRATROPIUM BROMIDE AND ALBUTEROL SULFATE 3 ML: .5; 3 SOLUTION RESPIRATORY (INHALATION) at 20:05

## 2017-04-14 RX ADMIN — LATANOPROST 1 DROP: 50 SOLUTION/ DROPS OPHTHALMIC at 23:09

## 2017-04-14 RX ADMIN — CEFTRIAXONE SODIUM 2 G: 2 INJECTION, SOLUTION INTRAVENOUS at 16:17

## 2017-04-14 RX ADMIN — PILOCARPINE HYDROCHLORIDE 1 DROP: 10 SOLUTION/ DROPS OPHTHALMIC at 22:14

## 2017-04-14 RX ADMIN — BRIMONIDINE TARTRATE 1 DROP: 1.5 SOLUTION/ DROPS OPHTHALMIC at 22:14

## 2017-04-14 RX ADMIN — METOPROLOL TARTRATE 25 MG: 25 TABLET ORAL at 22:14

## 2017-04-14 RX ADMIN — AZITHROMYCIN DIHYDRATE 500 MG: 500 INJECTION, POWDER, LYOPHILIZED, FOR SOLUTION INTRAVENOUS at 16:58

## 2017-04-14 RX ADMIN — INSULIN DETEMIR 90 UNITS: 100 INJECTION, SOLUTION SUBCUTANEOUS at 23:10

## 2017-04-14 RX ADMIN — ISOSORBIDE MONONITRATE 30 MG: 30 TABLET ORAL at 22:18

## 2017-04-14 NOTE — H&P
Patient Care Team:  Gricel Diaz MD as PCP - General  Gricel Diaz MD as PCP - Family Medicine  Anibal Wray MD as Consulting Physician (Cardiology)  Guillermo Live MD as Consulting Physician    Chief complaint cough, fevers    Subjective     History of Present Illness  This is a 72-year-old gentleman who has a history of coronary artery disease with approximately 10 stents previously as followed by Dr. Wray for this and who has type 2 diabetes and is on high doses of insulin for this.  The patient presented to his primary care doctor's office today and was found to have increased work of breathing respiratory rate approximately 24 as well as crackles on exam.  And the patient was sent to the hospital as a direct admission from the primary care doctor's office for treatment of presumed pneumonia.      Here on the floor patient has had a chest x-ray that shows increased interstitial changes possible pneumonia.  Patient's white blood cell count is normal.  His pro-calcitonin level is in the indeterminate range.  Patient's hypoxemic with O2 sat of approximately 90% on room air.  The patient feels extremely fatigued.  EKG and BNP levels are within normal limits.    The patient states that he had a stroke 3 weeks ago.  Says he came here to [facility] seen in the emergency room and had a head CT scan that was normal.  Symptoms at that time were somewhat vague just wasn't feeling right felt something in his head.  Didn't mention any weakness on either side of his body.  In the ER the wife told the admitting nurse that the patient was agitated and drooling he felt poorly for about a week.  Also the left side of his face felt funny.  Noted good  strength of sides moving his legs without problems and his face was symmetric.  The patient left the emergency room before being seen by physician.    His wife has had stuffy nose and cold like symptoms and required antibiotics just a few days  prior.      Review of Systems   Constitutional: Negative for activity change and appetite change.   HENT: Negative for dental problem, postnasal drip and rhinorrhea.    Respiratory: Negative for apnea and shortness of breath.    Cardiovascular: Negative for chest pain and palpitations.   Gastrointestinal: Negative for abdominal distention and abdominal pain.   Endocrine: Negative for cold intolerance and polydipsia.   Genitourinary: Negative for difficulty urinating and dysuria.   Musculoskeletal: Negative for arthralgias.   Neurological: Negative for dizziness and numbness.   Hematological: Negative for adenopathy.   Psychiatric/Behavioral: Negative for agitation and behavioral problems.        Past Medical History:   Diagnosis Date   • Atherosclerosis of native coronary artery without angina pectoris    • Chest pain    • Coronary artery disease    • Diabetes mellitus    • Difficulty breathing    • ED (erectile dysfunction)    • Hyperlipidemia    • Hypertension    • S/P angioplasty with stent    • Stroke    • Type 2 diabetes mellitus      Past Surgical History:   Procedure Laterality Date   • COLONOSCOPY W/ POLYPECTOMY  2013    2 benign polyps.   • CORONARY ANGIOPLASTY WITH STENT PLACEMENT      Dr. Wray   • CORONARY ANGIOPLASTY WITH STENT PLACEMENT  2015   • EYE SURGERY     • FRACTURE SURGERY       Family History   Problem Relation Age of Onset   • Diabetes Mother    • Heart disease Sister    • Rheumatic fever Sister      Social History   Substance Use Topics   • Smoking status: Never Smoker   • Smokeless tobacco: None   • Alcohol use No     Prescriptions Prior to Admission   Medication Sig Dispense Refill Last Dose   • aspirin 81 MG EC tablet Take 81 mg by mouth daily.   Taking   • SALIMA MICROLET LANCETS lancets    Taking   • brimonidine (ALPHAGAN) 0.15 % ophthalmic solution Apply 1 drop to eye 2 (two) times a day.   Taking   • clopidogrel (PLAVIX) 75 MG tablet Take 75 mg by mouth daily.   Taking   • Glucose  Blood (CARMEN CONTOUR NEXT TEST VI) Carmen Contour Next Test In Vitro Strip; Patient Sig: Carmen Contour Next Test In Vitro Strip TEST 3 TIMES DAILY OR AS NEEDED; 3; 0; 20-Apr-2015; Active   Taking   • glucose blood (CARMEN CONTOUR NEXT TEST) test strip TESTING BS 3 X DAY  DX CODE E11.21 300 each 0    • insulin aspart (NOVOLOG FLEXPEN) 100 UNIT/ML solution pen-injector sc pen INJECT 65 UNITS IN THE MORNING 65 UNITS AT LUNCH AND 68 UNITS AT DINNER      • insulin detemir (LEVEMIR) 100 UNIT/ML injection INJECT 95 UNITS AT NIGHT (Patient taking differently: 90 Units Every Night. INJECT 95 UNITS AT NIGHT )   Taking   • isosorbide mononitrate (IMDUR) 30 MG 24 hr tablet Take 30 mg by mouth daily.   Taking   • latanoprost (XALATAN) 0.005 % ophthalmic solution Apply 1 drop to eye daily.   Taking   • lisinopril (PRINIVIL,ZESTRIL) 20 MG tablet TAKE 1 TABLET DAILY AS DIRECTED. 90 tablet 3 Taking   • metFORMIN (FORTAMET) 500 MG (OSM) 24 hr tablet Take 500 mg by mouth 2 (two) times a day with meals.   Taking   • metoprolol tartrate (LOPRESSOR) 25 MG tablet TAKE 1 TABLET BY MOUTH 2 TIMES DAILY 180 tablet 1 Taking   • nitroglycerin (NITROSTAT) 0.4 MG SL tablet Place 0.4 mg under the tongue.   Taking   • Omeprazole 20 MG tablet delayed-release Take 1 tablet/day by mouth daily.   Taking   • simvastatin (ZOCOR) 80 MG tablet Take 40 mg by mouth every night.   Taking   • timolol (TIMOPTIC) 0.5 % ophthalmic solution 1 drop daily. INSTILL 1 DROP IN LEFT EYE EVERY MORNING AS DIRECTED  6 Taking   • insulin aspart (novoLOG FLEXPEN) 100 UNIT/ML solution pen-injector sc pen Inject 80 Units under the skin 3 (Three) Times a Day With Meals. 80 after breakfast , 80 after lunch , 80 after dinner   4/13/2017 at 1800   • omega-3 acid ethyl esters (LOVAZA) 1 G capsule Take 2 capsules by mouth 2 (two) times a day.   Taking     Allergies:  Review of patient's allergies indicates no known allergies.    Objective      Vital Signs  Temp:  [98.4 °F (36.9  °C)-99.1 °F (37.3 °C)] 98.4 °F (36.9 °C)  Heart Rate:  [85] 85  Resp:  [18] 18  BP: (150-163)/(69-80) 163/80    Physical Exam   Constitutional: He appears well-developed and well-nourished. No distress.   He clearly is not feeling his normal self but answers all questions appropriately. Sitting up and eating   HENT:   Head: Normocephalic and atraumatic.   Nose: Nose normal.   Mouth/Throat: Oropharynx is clear and moist. No oropharyngeal exudate.   Eyes: Conjunctivae and EOM are normal. No scleral icterus.   Neck: No JVD present. No tracheal deviation present. No thyromegaly present.   Cardiovascular: Normal rate, regular rhythm and normal heart sounds.  Exam reveals no gallop and no friction rub.    No murmur heard.  Pulmonary/Chest: Effort normal. No stridor. No respiratory distress. He has wheezes. He has no rales.   Abdominal: Soft. Bowel sounds are normal. He exhibits no distension and no mass. There is no tenderness. There is no rebound and no guarding.   Musculoskeletal: He exhibits no edema, tenderness or deformity.   Lymphadenopathy:     He has no cervical adenopathy.   Neurological: He is alert. No cranial nerve deficit.   Skin: Skin is warm and dry. No rash noted. He is not diaphoretic.   Psychiatric: He has a normal mood and affect. His behavior is normal.       Results Review:   I reviewed the patient's new clinical results.      Assessment/Plan     Principal Problem:    Pneumonia  Active Problems:    Type 2 diabetes with nephropathy    Coronary artery disease involving native coronary artery without angina pectoris    S/P angioplasty with stent    Essential hypertension    DM type 2 with diabetic peripheral neuropathy      Assessment & Plan  I discussed the case with Dr. Gonzalez    Prednisone 20 mg twice a day pulmonary may decide to increase this    Duo nebs    Rocephin and Zithromax    I doubt he has pulmonary embolism based upon fevers, yellow sputum production, severe cough, and likely preceding viral  illness    Addendum:Patient is positive for parainfluenza and likely has a secondary bacterial infection.    I discussed the patients findings and my recommendations with patient    Clive Chi MD  04/14/17  4:33 PM    Time:

## 2017-04-14 NOTE — PLAN OF CARE
Problem: Patient Care Overview (Adult)  Goal: Plan of Care Review  Outcome: Ongoing (interventions implemented as appropriate)    04/14/17 1054   Coping/Psychosocial Response Interventions   Plan Of Care Reviewed With patient;spouse   Patient Care Overview   Progress no change   Outcome Evaluation   Outcome Summary/Follow up Plan pt admit to  620 from Dr. Huitron with pneumonia dx, Dr. Chi to order admitting orders       Goal: Discharge Needs Assessment  Outcome: Ongoing (interventions implemented as appropriate)    04/14/17 1054   Discharge Needs Assessment   Concerns To Be Addressed no discharge needs identified   Equipment Needed After Discharge none   Discharge Disposition still a patient;home or self-care   Current Health   Anticipated Changes Related to Illness none   Self-Care   Equipment Currently Used at Home none         Problem: Pneumonia (Adult)  Goal: Signs and Symptoms of Listed Potential Problems Will be Absent or Manageable (Pneumonia)  Outcome: Ongoing (interventions implemented as appropriate)

## 2017-04-14 NOTE — CONSULTS
Group: [facility]         PULMONARY CONSULT NOTE    Patient Identification:  Anthony Vaughn  72 y.o.  male  1945  [id number]            Requesting physician: Dr. Clive Chi    Reason for Consultation:  Bronchopneumonia, hypoxia, wheezing    CC: Shortness of breath and cough    History of Present Illness:  72-year-old male patient who has been short of breath and coughing for the past 3-4 days.  About 10 days ago his wife had an upper respiratory tract infection and she took antibiotics and steroids.  This gentleman has no history of tobacco abuse.  He has no history of lung disease.  He works 6 days a week and is very active.  Today he could not go to work.  Yesterday he also could not go to work and he felt terrible.  His cough is constant.  He can barely exert himself.  His shortness of breath is exacerbated by any minimal exertion.  He's also had some fever and chills.  Temperature has not been quantified.  The patient went to his primary care physician's office today, Dr. Huitron, and he was referred to the hospital for direct admission.  Upon arrival nurses stated that he required a couple of liters of oxygen, but currently he is not connected to the oxygen saturation meter.  I visualized chest x-ray images and it is showing bronchopneumonia in the left lower lobe.  I discussed the case face-to-face with Dr. Clive Chi.    Review of Systems   Constitutional: Positive for diaphoresis, fatigue and fever.   HENT: Negative for ear pain and sore throat.    Eyes: Negative for pain and visual disturbance.   Respiratory: Positive for cough, shortness of breath and wheezing.    Cardiovascular: Negative for chest pain and leg swelling.   Gastrointestinal: Negative for abdominal pain and diarrhea.   Endocrine: Negative for cold intolerance and polyuria.   Genitourinary: Negative for dysuria and hematuria.   Musculoskeletal: Negative for joint swelling and myalgias.   Skin: Negative for rash and  wound.   Neurological: Positive for weakness. Negative for speech difficulty and numbness.   Hematological: Negative for adenopathy. Does not bruise/bleed easily.   Psychiatric/Behavioral: Negative for agitation and confusion.       Past Medical History:  Past Medical History:   Diagnosis Date   • Atherosclerosis of native coronary artery without angina pectoris    • Chest pain    • Coronary artery disease    • Diabetes mellitus    • Difficulty breathing    • ED (erectile dysfunction)    • Hyperlipidemia    • Hypertension    • S/P angioplasty with stent    • Stroke    • Type 2 diabetes mellitus        Past Surgical History:  Past Surgical History:   Procedure Laterality Date   • COLONOSCOPY W/ POLYPECTOMY  2013    2 benign polyps.   • CORONARY ANGIOPLASTY WITH STENT PLACEMENT      Dr. Wray   • CORONARY ANGIOPLASTY WITH STENT PLACEMENT  2015   • EYE SURGERY     • FRACTURE SURGERY          Home Meds:  Prescriptions Prior to Admission   Medication Sig Dispense Refill Last Dose   • aspirin 81 MG EC tablet Take 81 mg by mouth daily.   Taking   • SALIMA MICROLET LANCETS lancets    Taking   • brimonidine (ALPHAGAN) 0.15 % ophthalmic solution Apply 1 drop to eye 2 (two) times a day.   Taking   • clopidogrel (PLAVIX) 75 MG tablet Take 75 mg by mouth daily.   Taking   • insulin aspart (NOVOLOG FLEXPEN) 100 UNIT/ML solution pen-injector sc pen INJECT 65 UNITS IN THE MORNING 65 UNITS AT LUNCH AND 68 UNITS AT DINNER      • insulin detemir (LEVEMIR) 100 UNIT/ML injection INJECT 95 UNITS AT NIGHT (Patient taking differently: 90 Units Every Night. INJECT 95 UNITS AT NIGHT )   Taking   • isosorbide mononitrate (IMDUR) 30 MG 24 hr tablet Take 30 mg by mouth daily.   Taking   • latanoprost (XALATAN) 0.005 % ophthalmic solution Apply 1 drop to eye daily.   Taking   • lisinopril (PRINIVIL,ZESTRIL) 20 MG tablet TAKE 1 TABLET DAILY AS DIRECTED. 90 tablet 3 Taking   • metoprolol tartrate (LOPRESSOR) 25 MG tablet TAKE 1 TABLET BY MOUTH 2  "TIMES DAILY 180 tablet 1 Taking   • Omeprazole 20 MG tablet delayed-release Take 1 tablet/day by mouth daily.   Taking   • simvastatin (ZOCOR) 80 MG tablet Take 40 mg by mouth every night.   Taking   • timolol (TIMOPTIC) 0.5 % ophthalmic solution 1 drop daily. INSTILL 1 DROP IN LEFT EYE EVERY MORNING AS DIRECTED  6 Taking       Allergies:  No Known Allergies    Social History:   Social History     Social History   • Marital status:      Spouse name: N/A   • Number of children: N/A   • Years of education: N/A     Occupational History   • Not on file.     Social History Main Topics   • Smoking status: Never Smoker   • Smokeless tobacco: Not on file   • Alcohol use No   • Drug use: No   • Sexual activity: Defer     Other Topics Concern   • Not on file     Social History Narrative       Family History:  Family History   Problem Relation Age of Onset   • Diabetes Mother    • Heart disease Sister    • Rheumatic fever Sister        Physical Exam:  /80 (BP Location: Left arm, Patient Position: Lying)  Pulse 85  Temp 98.4 °F (36.9 °C) (Oral)   Resp 18  Ht 61.32\" (155.8 cm)  Wt 218 lb 4.8 oz (99 kg)  SpO2 93%  BMI 40.82 kg/m2 Body mass index is 40.82 kg/(m^2). 93% 218 lb 4.8 oz (99 kg)  Physical Exam   Constitutional: He is oriented to person, place, and time. No distress.   Morbidly obese   HENT:   Head: Normocephalic.   Nose: Nose normal.   Eyes: Conjunctivae are normal. No scleral icterus.   Neck: No tracheal deviation present. No thyromegaly present.   Cardiovascular: Normal rate, regular rhythm and normal heart sounds.    No murmur heard.  Pulmonary/Chest: No respiratory distress. He has wheezes.   Bilateral rhonchi and wheezes, but nonlabored breathing   Abdominal:   Obese, nontender rest of the exam is hampered by large body habitus   Musculoskeletal: He exhibits no deformity.   Neurological: He is alert and oriented to person, place, and time. No cranial nerve deficit. He exhibits normal muscle " tone.   Skin: Skin is warm. No rash noted. He is not diaphoretic. No erythema.   Psychiatric: He has a normal mood and affect. His behavior is normal.   Nursing note and vitals reviewed.      Lab Review:   LABS:  Lab Results   Component Value Date    CALCIUM 8.7 04/14/2017     Results from last 7 days  Lab Units 04/14/17  1210   SODIUM mmol/L 135*   POTASSIUM mmol/L 4.1   CHLORIDE mmol/L 96*   TOTAL CO2 mmol/L 23.1   BUN mg/dL 10   CREATININE mg/dL 0.87   GLUCOSE mg/dL 219*   CALCIUM mg/dL 8.7   WBC 10*3/mm3 8.59   HEMOGLOBIN g/dL 15.9   PLATELETS 10*3/mm3 204   ALT (SGPT) U/L 24   AST (SGOT) U/L 28         Lab Results   Component Value Date    TROPONINT <0.010 04/14/2017     Lab Results   Component Value Date    TSH 1.31 03/04/2016     Estimated Creatinine Clearance: 76.4 mL/min (by C-G formula based on Cr of 0.87).     Imaging: I personally visualized the images of scans/x-rays performed within last 3 days.  I visualized the images and the left lower lobe definitely has infiltrates with air bronchogram    Assessment:  Pneumonia  Hypoxia  Wheezing with bronchospasm    Recommendations:  Agree with hospital admission.  Use oxygen as needed to keep saturations above 90%.  Start intravenous antibiotics in the form of Rocephin and azithromycin.  Send respiratory secretions for bacterial and viral testing.  Provide bronchodilators to help ease the wheezing.  Recommend switching to nebulized steroids within 48 hours, to prevent further systemic effects of hyperglycemia due to steroids in this already diabetic patient with severe coronary disease.  Can add nebulized steroids in the form of Pulmicort.      Robert Gonzalez MD  4/14/2017  4:42 PM    EMR Dragon/Transcription disclaimer:     Much of this encounter note is an electronic transcription/translation of spoken language to printed text. The electronic translation of spoken language may permit erroneous, or at times, nonsensical words or phrases to be inadvertently  transcribed; Although I have reviewed the note for such errors, some may still exist.

## 2017-04-15 PROBLEM — B34.8 INFECTION DUE TO PARAINFLUENZA VIRUS 3: Status: ACTIVE | Noted: 2017-04-15

## 2017-04-15 LAB
ANION GAP SERPL CALCULATED.3IONS-SCNC: 19.9 MMOL/L
BASOPHILS # BLD AUTO: 0.04 10*3/MM3 (ref 0–0.2)
BASOPHILS NFR BLD AUTO: 0.4 % (ref 0–1.5)
BUN BLD-MCNC: 14 MG/DL (ref 8–23)
BUN/CREAT SERPL: 12.1 (ref 7–25)
CALCIUM SPEC-SCNC: 8.8 MG/DL (ref 8.6–10.5)
CHLORIDE SERPL-SCNC: 93 MMOL/L (ref 98–107)
CO2 SERPL-SCNC: 22.1 MMOL/L (ref 22–29)
CREAT BLD-MCNC: 1.16 MG/DL (ref 0.76–1.27)
DEPRECATED RDW RBC AUTO: 42.7 FL (ref 37–54)
EOSINOPHIL # BLD AUTO: 0 10*3/MM3 (ref 0–0.7)
EOSINOPHIL NFR BLD AUTO: 0 % (ref 0.3–6.2)
ERYTHROCYTE [DISTWIDTH] IN BLOOD BY AUTOMATED COUNT: 13.1 % (ref 11.5–14.5)
GFR SERPL CREATININE-BSD FRML MDRD: 62 ML/MIN/1.73
GLUCOSE BLD-MCNC: 316 MG/DL (ref 65–99)
GLUCOSE BLDC GLUCOMTR-MCNC: 277 MG/DL (ref 70–130)
GLUCOSE BLDC GLUCOMTR-MCNC: 330 MG/DL (ref 70–130)
GLUCOSE BLDC GLUCOMTR-MCNC: 425 MG/DL (ref 70–130)
GLUCOSE BLDC GLUCOMTR-MCNC: 467 MG/DL (ref 70–130)
HCT VFR BLD AUTO: 47.3 % (ref 40.4–52.2)
HGB BLD-MCNC: 15.8 G/DL (ref 13.7–17.6)
IMM GRANULOCYTES # BLD: 0.03 10*3/MM3 (ref 0–0.03)
IMM GRANULOCYTES NFR BLD: 0.3 % (ref 0–0.5)
LYMPHOCYTES # BLD AUTO: 1.56 10*3/MM3 (ref 0.9–4.8)
LYMPHOCYTES NFR BLD AUTO: 14.6 % (ref 19.6–45.3)
MCH RBC QN AUTO: 29.8 PG (ref 27–32.7)
MCHC RBC AUTO-ENTMCNC: 33.4 G/DL (ref 32.6–36.4)
MCV RBC AUTO: 89.2 FL (ref 79.8–96.2)
MONOCYTES # BLD AUTO: 0.54 10*3/MM3 (ref 0.2–1.2)
MONOCYTES NFR BLD AUTO: 5.1 % (ref 5–12)
NEUTROPHILS # BLD AUTO: 8.52 10*3/MM3 (ref 1.9–8.1)
NEUTROPHILS NFR BLD AUTO: 79.6 % (ref 42.7–76)
PLATELET # BLD AUTO: 242 10*3/MM3 (ref 140–500)
PMV BLD AUTO: 9.2 FL (ref 6–12)
POTASSIUM BLD-SCNC: 4.3 MMOL/L (ref 3.5–5.2)
RBC # BLD AUTO: 5.3 10*6/MM3 (ref 4.6–6)
S PNEUM AG SPEC QL LA: NEGATIVE
SODIUM BLD-SCNC: 135 MMOL/L (ref 136–145)
WBC NRBC COR # BLD: 10.69 10*3/MM3 (ref 4.5–10.7)

## 2017-04-15 PROCEDURE — 63710000001 INSULIN ASPART PER 5 UNITS: Performed by: HOSPITALIST

## 2017-04-15 PROCEDURE — 94799 UNLISTED PULMONARY SVC/PX: CPT

## 2017-04-15 PROCEDURE — 80048 BASIC METABOLIC PNL TOTAL CA: CPT | Performed by: HOSPITALIST

## 2017-04-15 PROCEDURE — 87899 AGENT NOS ASSAY W/OPTIC: CPT | Performed by: HOSPITALIST

## 2017-04-15 PROCEDURE — 63710000001 INSULIN DETEMER PER 5 UNITS: Performed by: HOSPITALIST

## 2017-04-15 PROCEDURE — 87147 CULTURE TYPE IMMUNOLOGIC: CPT | Performed by: HOSPITALIST

## 2017-04-15 PROCEDURE — 87081 CULTURE SCREEN ONLY: CPT | Performed by: HOSPITALIST

## 2017-04-15 PROCEDURE — 25010000003 CEFTRIAXONE PER 250 MG: Performed by: HOSPITALIST

## 2017-04-15 PROCEDURE — 63710000001 PREDNISONE PER 1 MG: Performed by: HOSPITALIST

## 2017-04-15 PROCEDURE — 87181 SC STD AGAR DILUTION PER AGT: CPT | Performed by: HOSPITALIST

## 2017-04-15 PROCEDURE — 85025 COMPLETE CBC W/AUTO DIFF WBC: CPT | Performed by: HOSPITALIST

## 2017-04-15 PROCEDURE — 82962 GLUCOSE BLOOD TEST: CPT

## 2017-04-15 RX ORDER — LATANOPROST 50 UG/ML
1 SOLUTION/ DROPS OPHTHALMIC NIGHTLY
Status: DISCONTINUED | OUTPATIENT
Start: 2017-04-15 | End: 2017-04-16 | Stop reason: HOSPADM

## 2017-04-15 RX ORDER — BUDESONIDE 0.5 MG/2ML
0.5 INHALANT ORAL
Status: DISCONTINUED | OUTPATIENT
Start: 2017-04-15 | End: 2017-04-16 | Stop reason: HOSPADM

## 2017-04-15 RX ADMIN — IPRATROPIUM BROMIDE AND ALBUTEROL SULFATE 3 ML: .5; 3 SOLUTION RESPIRATORY (INHALATION) at 15:30

## 2017-04-15 RX ADMIN — METOPROLOL TARTRATE 25 MG: 25 TABLET ORAL at 20:55

## 2017-04-15 RX ADMIN — INSULIN ASPART 50 UNITS: 100 INJECTION, SOLUTION INTRAVENOUS; SUBCUTANEOUS at 17:08

## 2017-04-15 RX ADMIN — INSULIN ASPART 7 UNITS: 100 INJECTION, SOLUTION INTRAVENOUS; SUBCUTANEOUS at 17:09

## 2017-04-15 RX ADMIN — METOPROLOL TARTRATE 25 MG: 25 TABLET ORAL at 09:12

## 2017-04-15 RX ADMIN — BUDESONIDE 0.5 MG: 0.5 INHALANT RESPIRATORY (INHALATION) at 20:26

## 2017-04-15 RX ADMIN — ASPIRIN 81 MG: 81 TABLET ORAL at 23:03

## 2017-04-15 RX ADMIN — CLOPIDOGREL 75 MG: 75 TABLET, FILM COATED ORAL at 23:03

## 2017-04-15 RX ADMIN — INSULIN ASPART 4 UNITS: 100 INJECTION, SOLUTION INTRAVENOUS; SUBCUTANEOUS at 09:12

## 2017-04-15 RX ADMIN — BUDESONIDE 0.5 MG: 0.5 INHALANT RESPIRATORY (INHALATION) at 11:32

## 2017-04-15 RX ADMIN — IPRATROPIUM BROMIDE AND ALBUTEROL SULFATE 3 ML: .5; 3 SOLUTION RESPIRATORY (INHALATION) at 11:32

## 2017-04-15 RX ADMIN — INSULIN ASPART 5 UNITS: 100 INJECTION, SOLUTION INTRAVENOUS; SUBCUTANEOUS at 20:58

## 2017-04-15 RX ADMIN — INSULIN ASPART 50 UNITS: 100 INJECTION, SOLUTION INTRAVENOUS; SUBCUTANEOUS at 09:11

## 2017-04-15 RX ADMIN — INSULIN ASPART 7 UNITS: 100 INJECTION, SOLUTION INTRAVENOUS; SUBCUTANEOUS at 18:48

## 2017-04-15 RX ADMIN — CEFTRIAXONE SODIUM 2 G: 2 INJECTION, SOLUTION INTRAVENOUS at 15:53

## 2017-04-15 RX ADMIN — INSULIN ASPART 50 UNITS: 100 INJECTION, SOLUTION INTRAVENOUS; SUBCUTANEOUS at 12:12

## 2017-04-15 RX ADMIN — BRIMONIDINE TARTRATE 1 DROP: 1.5 SOLUTION/ DROPS OPHTHALMIC at 09:12

## 2017-04-15 RX ADMIN — PILOCARPINE HYDROCHLORIDE 1 DROP: 10 SOLUTION/ DROPS OPHTHALMIC at 09:12

## 2017-04-15 RX ADMIN — LATANOPROST 1 DROP: 50 SOLUTION OPHTHALMIC at 20:55

## 2017-04-15 RX ADMIN — PREDNISONE 20 MG: 20 TABLET ORAL at 09:12

## 2017-04-15 RX ADMIN — IPRATROPIUM BROMIDE AND ALBUTEROL SULFATE 3 ML: .5; 3 SOLUTION RESPIRATORY (INHALATION) at 20:26

## 2017-04-15 RX ADMIN — IPRATROPIUM BROMIDE AND ALBUTEROL SULFATE 3 ML: .5; 3 SOLUTION RESPIRATORY (INHALATION) at 08:42

## 2017-04-15 RX ADMIN — PANTOPRAZOLE SODIUM 40 MG: 40 TABLET, DELAYED RELEASE ORAL at 06:15

## 2017-04-15 RX ADMIN — INSULIN ASPART 7 UNITS: 100 INJECTION, SOLUTION INTRAVENOUS; SUBCUTANEOUS at 20:56

## 2017-04-15 RX ADMIN — PILOCARPINE HYDROCHLORIDE 1 DROP: 10 SOLUTION/ DROPS OPHTHALMIC at 17:08

## 2017-04-15 RX ADMIN — ATORVASTATIN CALCIUM 40 MG: 40 TABLET, FILM COATED ORAL at 23:03

## 2017-04-15 RX ADMIN — BRIMONIDINE TARTRATE 1 DROP: 1.5 SOLUTION/ DROPS OPHTHALMIC at 17:08

## 2017-04-15 RX ADMIN — INSULIN DETEMIR 90 UNITS: 100 INJECTION, SOLUTION SUBCUTANEOUS at 20:57

## 2017-04-15 RX ADMIN — AZITHROMYCIN DIHYDRATE 500 MG: 500 INJECTION, POWDER, LYOPHILIZED, FOR SOLUTION INTRAVENOUS at 17:08

## 2017-04-15 RX ADMIN — INSULIN ASPART 5 UNITS: 100 INJECTION, SOLUTION INTRAVENOUS; SUBCUTANEOUS at 12:12

## 2017-04-15 NOTE — PROGRESS NOTES
"DAILY PROGRESS NOTE  [facility]    Patient Identification:  Name: Anthony Vaughn  Age: 72 y.o.  Sex: male  :  1945  MRN: [id number]         Primary Care Physician: Gricel Diaz MD    Subjective:  Interval History:He feels better today. Still coughing up some yellow sputum.    Objective:    Scheduled Meds:  aspirin 81 mg Oral Daily   atorvastatin 40 mg Oral Daily   ceftriaxone 2 g Intravenous Q24H   And      azithromycin 500 mg Intravenous Q24H   brimonidine 1 drop Left Eye BID   budesonide 0.5 mg Nebulization BID - RT   clopidogrel 75 mg Oral Daily   insulin aspart 0-7 Units Subcutaneous 4x Daily With Meals & Nightly   insulin aspart 50 Units Subcutaneous TID With Meals   insulin detemir 90 Units Subcutaneous Nightly   ipratropium-albuterol 3 mL Nebulization 4x Daily - RT   isosorbide mononitrate 30 mg Oral Daily   latanoprost 1 drop Both Eyes Nightly   lisinopril 20 mg Oral Q24H   metoprolol tartrate 25 mg Oral Q12H   pantoprazole 40 mg Oral Q AM   pilocarpine 1 drop Left Eye BID   timolol 1 drop Left Eye Daily     Continuous Infusions:     Vital signs in last 24 hours:  Temp:  [97.7 °F (36.5 °C)-100.3 °F (37.9 °C)] 97.8 °F (36.6 °C)  Heart Rate:  [] 92  Resp:  [18-20] 18  BP: (156-169)/(70-78) 156/77    Intake/Output:    Intake/Output Summary (Last 24 hours) at 04/15/17 1504  Last data filed at 04/15/17 1300   Gross per 24 hour   Intake              300 ml   Output              430 ml   Net             -130 ml       Exam:  /77 (BP Location: Left arm, Patient Position: Lying)  Pulse 92  Temp 97.8 °F (36.6 °C) (Oral)   Resp 18  Ht 61.32\" (155.8 cm)  Wt 218 lb 4.8 oz (99 kg)  SpO2 92%  BMI 40.82 kg/m2    General Appearance:    Alert, cooperative, no distress   Head:    Normocephalic, without obvious abnormality, atraumatic   Eyes:       Throat:   Lips, tongue, gums normal   Neck:   Supple, symmetrical, trachea midline, no JVD   Lungs:     Wheezes and rhonchi " bilaterally, respirations unlabored   Chest Wall:    No tenderness or deformity    Heart:    Regular rate and rhythm, S1 and S2 normal, no murmur,no  Rub or gallop   Abdomen:     Soft, non-tender, bowel sounds active, no masses, no organomegaly    Extremities:   Extremities normal, atraumatic, no cyanosis or edema   Pulses:      Skin:   Skin is warm and dry,  no rashes or palpable lesions   Neurologic:   no focal deficits noted      [unfilled]  Data Review:    Results from last 7 days  Lab Units 04/15/17  0641 04/14/17  1210   SODIUM mmol/L 135* 135*   POTASSIUM mmol/L 4.3 4.1   CHLORIDE mmol/L 93* 96*   TOTAL CO2 mmol/L 22.1 23.1   BUN mg/dL 14 10   CREATININE mg/dL 1.16 0.87   GLUCOSE mg/dL 316* 219*   CALCIUM mg/dL 8.8 8.7       Results from last 7 days  Lab Units 04/15/17  0641 04/14/17  1210   WBC 10*3/mm3 10.69 8.59   HEMOGLOBIN g/dL 15.8 15.9   HEMATOCRIT % 47.3 47.0   PLATELETS 10*3/mm3 242 204               0  Lab Value Date/Time   TROPONINT <0.010 04/14/2017 1210           Results from last 7 days  Lab Units 04/14/17  1210   ALK PHOS U/L 44   BILIRUBIN mg/dL 0.8   ALT (SGPT) U/L 24   AST (SGOT) U/L 28             Glucose   Date/Time Value Ref Range Status   04/15/2017 1156 330 (H) 70 - 130 mg/dL Final   04/15/2017 0724 277 (H) 70 - 130 mg/dL Final   04/14/2017 2127 218 (H) 70 - 130 mg/dL Final   04/14/2017 1703 138 (H) 70 - 130 mg/dL Final   04/14/2017 1016 221 (H) 70 - 130 mg/dL Final           Patient Active Problem List   Diagnosis Code   • Coronary artery disease involving native coronary artery without angina pectoris I25.10   • S/P angioplasty with stent Z95.9   • Hyperlipidemia E78.5   • Essential hypertension I10   • Type 2 diabetes with nephropathy E11.21   • Microalbuminuria R80.9   • DM type 2 with diabetic peripheral neuropathy E11.42   • Non morbid obesity due to excess calories E66.09   • Pneumonia J18.9   • Acute bronchitis J20.9   • Acute hypoxemic respiratory failure J96.01   • Infection  due to parainfluenza virus 3 B34.8       Assessment:  Principal Problem:    Pneumonia  Active Problems:    Coronary artery disease involving native coronary artery without angina pectoris    S/P angioplasty with stent    Essential hypertension    Type 2 diabetes with nephropathy    DM type 2 with diabetic peripheral neuropathy    Acute bronchitis    Acute hypoxemic respiratory failure    Infection due to parainfluenza virus 3      Plan:  Continue antibiotics and , bronchodilators. Off steroids    Max Reyes MD  4/15/2017  3:04 PM

## 2017-04-15 NOTE — PLAN OF CARE
Problem: Patient Care Overview (Adult)  Goal: Plan of Care Review  Outcome: Ongoing (interventions implemented as appropriate)    04/15/17 1844   Coping/Psychosocial Response Interventions   Plan Of Care Reviewed With patient   Patient Care Overview   Progress no change   Outcome Evaluation   Outcome Summary/Follow up Plan pt stable; IV ABX; pt ambulates well; BG running high 425 this afternoon- given extra insulin;        Goal: Adult Individualization and Mutuality  Outcome: Ongoing (interventions implemented as appropriate)  Goal: Discharge Needs Assessment  Outcome: Ongoing (interventions implemented as appropriate)    Problem: Pneumonia (Adult)  Goal: Signs and Symptoms of Listed Potential Problems Will be Absent or Manageable (Pneumonia)  Outcome: Ongoing (interventions implemented as appropriate)    04/15/17 1844   Pneumonia   Problems Assessed (Pneumonia) all   Problems Present (Pneumonia) progression of infection;respiratory compromise

## 2017-04-15 NOTE — PROGRESS NOTES
"  Daily Progress Note.   [address]  4/15/2017    Patient:  Name:  Anthony Vaughn  MRN:  [id number]  1945  72 y.o.  male         Interval History:  Sig hyperglycemia.  Breathing better  Reports still feeling some wheeze  RVP + parainfluenza    Physical Exam:  /70 (BP Location: Left arm, Patient Position: Lying)  Pulse 93  Temp 97.7 °F (36.5 °C) (Oral)   Resp 18  Ht 61.32\" (155.8 cm)  Wt 218 lb 4.8 oz (99 kg)  SpO2 92%  BMI 40.82 kg/m2  Body mass index is 40.82 kg/(m^2).    Intake/Output Summary (Last 24 hours) at 04/15/17 0951  Last data filed at 04/15/17 0600   Gross per 24 hour   Intake              300 ml   Output              430 ml   Net             -130 ml     GENERAL:  NAD, Aox3  HEENT:  EOMI, nonicteric sclera, no JVD  PULMONARY:   Unlabored resp effort, wheeze bilateral,symmetric air entry  CARDIAC:  rrr  ABD: obese SNTND BS+  EXT: no c/c/e, pulses symmetric 2+ bilaterally  NEURO:  CNs II-XII intact, no focal deficits  SKIN: no lesions, no rash  PSYCH: appropriate mood  LYMPH: no cervical LAD    Scheduled meds:    aspirin 81 mg Oral Daily   atorvastatin 40 mg Oral Daily   ceftriaxone 2 g Intravenous Q24H   And      azithromycin 500 mg Intravenous Q24H   brimonidine 1 drop Left Eye BID   budesonide 0.5 mg Nebulization BID - RT   clopidogrel 75 mg Oral Daily   insulin aspart 0-7 Units Subcutaneous 4x Daily With Meals & Nightly   insulin aspart 50 Units Subcutaneous TID With Meals   insulin detemir 90 Units Subcutaneous Nightly   ipratropium-albuterol 3 mL Nebulization 4x Daily - RT   isosorbide mononitrate 30 mg Oral Daily   latanoprost 1 drop Both Eyes Nightly   lisinopril 20 mg Oral Q24H   metoprolol tartrate 25 mg Oral Q12H   pantoprazole 40 mg Oral Q AM   pilocarpine 1 drop Left Eye BID   timolol 1 drop Left Eye Daily       Data Review:   I reviewed the patient's medications, imaging and new clinical results.    Results from last 7 days  Lab Units 04/15/17  0641 " 04/14/17  1210   WBC 10*3/mm3 10.69 8.59   HEMOGLOBIN g/dL 15.8 15.9   PLATELETS 10*3/mm3 242 204     Results from last 7 days  Lab Units 04/15/17  0641 04/14/17  1210   SODIUM mmol/L 135* 135*   POTASSIUM mmol/L 4.3 4.1   CHLORIDE mmol/L 93* 96*   TOTAL CO2 mmol/L 22.1 23.1   BUN mg/dL 14 10   CREATININE mg/dL 1.16 0.87   GLUCOSE mg/dL 316* 219*   CALCIUM mg/dL 8.8 8.7   Estimated Creatinine Clearance: 57.3 mL/min (by C-G formula based on Cr of 1.16).    Imaging Results (most recent)     Procedure Component Value Units Date/Time    XR Chest PA & Lateral [[id number]] Collected:  04/14/17 1304     Updated:  04/14/17 1309    Narrative:       XR CHEST PA AND LATERAL-     HISTORY: Male who is 72 years-old,  pneumonia     TECHNIQUE: Frontal and lateral views of the chest     COMPARISON: 03/06/2017     FINDINGS: Heart, mediastinum and pulmonary vasculature are unremarkable.  Interval increase of interstitial markings in the mid to lower lungs may  suggest developing interstitial infiltrate, follow-up recommended. No  pleural effusion or pneumothorax. No acute osseous process.       Impression:       Possible developing interstitial infiltrates in the mid to  lower lungs, follow-up recommended.     This report was finalized on 4/14/2017 1:06 PM by Dr. Irwin Jacobo MD.           Micro: prainflu +  Procal 0.27  Strep urine antigen neg  bc no growth    ASSESSMENT  /  PLAN:  Viral Pneumonia  Hypoxia  Wheezing with bronchospasm  DMII  CAD    Hyperglycemia overnight with systemic steroids, will transition to inhaled budesonide to avoid systemic side effects.  Would watch cultures for another 24 hours but will likely be able to transition off abx given viral +  Continue karyn Mann MD  [facility]  04/15/17  9:51 AM

## 2017-04-16 VITALS
OXYGEN SATURATION: 92 % | TEMPERATURE: 98.2 F | HEART RATE: 83 BPM | BODY MASS INDEX: 41.22 KG/M2 | DIASTOLIC BLOOD PRESSURE: 76 MMHG | SYSTOLIC BLOOD PRESSURE: 156 MMHG | HEIGHT: 61 IN | RESPIRATION RATE: 20 BRPM | WEIGHT: 218.3 LBS

## 2017-04-16 LAB
ANION GAP SERPL CALCULATED.3IONS-SCNC: 13.2 MMOL/L
BACTERIA SPEC RESP CULT: NORMAL
BASOPHILS # BLD AUTO: 0.04 10*3/MM3 (ref 0–0.2)
BASOPHILS NFR BLD AUTO: 0.4 % (ref 0–1.5)
BUN BLD-MCNC: 16 MG/DL (ref 8–23)
BUN/CREAT SERPL: 15.1 (ref 7–25)
CALCIUM SPEC-SCNC: 8.4 MG/DL (ref 8.6–10.5)
CHLORIDE SERPL-SCNC: 99 MMOL/L (ref 98–107)
CO2 SERPL-SCNC: 25.8 MMOL/L (ref 22–29)
CREAT BLD-MCNC: 1.06 MG/DL (ref 0.76–1.27)
DEPRECATED RDW RBC AUTO: 42 FL (ref 37–54)
EOSINOPHIL # BLD AUTO: 0.24 10*3/MM3 (ref 0–0.7)
EOSINOPHIL NFR BLD AUTO: 2.2 % (ref 0.3–6.2)
ERYTHROCYTE [DISTWIDTH] IN BLOOD BY AUTOMATED COUNT: 13 % (ref 11.5–14.5)
GFR SERPL CREATININE-BSD FRML MDRD: 68 ML/MIN/1.73
GLUCOSE BLD-MCNC: 214 MG/DL (ref 65–99)
GLUCOSE BLDC GLUCOMTR-MCNC: 202 MG/DL (ref 70–130)
GLUCOSE BLDC GLUCOMTR-MCNC: 239 MG/DL (ref 70–130)
GRAM STN SPEC: NORMAL
HCT VFR BLD AUTO: 43.7 % (ref 40.4–52.2)
HGB BLD-MCNC: 14.8 G/DL (ref 13.7–17.6)
IMM GRANULOCYTES # BLD: 0 10*3/MM3 (ref 0–0.03)
IMM GRANULOCYTES NFR BLD: 0 % (ref 0–0.5)
LYMPHOCYTES # BLD AUTO: 3.5 10*3/MM3 (ref 0.9–4.8)
LYMPHOCYTES NFR BLD AUTO: 32.4 % (ref 19.6–45.3)
MCH RBC QN AUTO: 30 PG (ref 27–32.7)
MCHC RBC AUTO-ENTMCNC: 33.9 G/DL (ref 32.6–36.4)
MCV RBC AUTO: 88.6 FL (ref 79.8–96.2)
MONOCYTES # BLD AUTO: 1.61 10*3/MM3 (ref 0.2–1.2)
MONOCYTES NFR BLD AUTO: 14.9 % (ref 5–12)
NEUTROPHILS # BLD AUTO: 5.41 10*3/MM3 (ref 1.9–8.1)
NEUTROPHILS NFR BLD AUTO: 50.1 % (ref 42.7–76)
PLATELET # BLD AUTO: 234 10*3/MM3 (ref 140–500)
PMV BLD AUTO: 9 FL (ref 6–12)
POTASSIUM BLD-SCNC: 3.7 MMOL/L (ref 3.5–5.2)
RBC # BLD AUTO: 4.93 10*6/MM3 (ref 4.6–6)
SODIUM BLD-SCNC: 138 MMOL/L (ref 136–145)
WBC NRBC COR # BLD: 10.8 10*3/MM3 (ref 4.5–10.7)

## 2017-04-16 PROCEDURE — 63710000001 INSULIN ASPART PER 5 UNITS: Performed by: HOSPITALIST

## 2017-04-16 PROCEDURE — 85025 COMPLETE CBC W/AUTO DIFF WBC: CPT | Performed by: HOSPITALIST

## 2017-04-16 PROCEDURE — 94799 UNLISTED PULMONARY SVC/PX: CPT

## 2017-04-16 PROCEDURE — 82962 GLUCOSE BLOOD TEST: CPT

## 2017-04-16 PROCEDURE — 25010000003 CEFTRIAXONE PER 250 MG: Performed by: HOSPITALIST

## 2017-04-16 PROCEDURE — 80048 BASIC METABOLIC PNL TOTAL CA: CPT | Performed by: HOSPITALIST

## 2017-04-16 RX ORDER — BUDESONIDE AND FORMOTEROL FUMARATE DIHYDRATE 80; 4.5 UG/1; UG/1
2 AEROSOL RESPIRATORY (INHALATION) 2 TIMES DAILY
Qty: 1 INHALER | Refills: 0 | Status: SHIPPED | OUTPATIENT
Start: 2017-04-16 | End: 2017-05-31

## 2017-04-16 RX ORDER — ALBUTEROL SULFATE 90 UG/1
2 AEROSOL, METERED RESPIRATORY (INHALATION) EVERY 4 HOURS PRN
Qty: 1 INHALER | Refills: 0 | Status: SHIPPED | OUTPATIENT
Start: 2017-04-16 | End: 2017-05-31

## 2017-04-16 RX ORDER — DOXYCYCLINE HYCLATE 100 MG/1
100 TABLET, DELAYED RELEASE ORAL 2 TIMES DAILY
Qty: 8 TABLET | Refills: 0 | Status: SHIPPED | OUTPATIENT
Start: 2017-04-16 | End: 2017-04-20

## 2017-04-16 RX ADMIN — INSULIN ASPART 3 UNITS: 100 INJECTION, SOLUTION INTRAVENOUS; SUBCUTANEOUS at 08:05

## 2017-04-16 RX ADMIN — METOPROLOL TARTRATE 25 MG: 25 TABLET ORAL at 08:04

## 2017-04-16 RX ADMIN — BRIMONIDINE TARTRATE 1 DROP: 1.5 SOLUTION/ DROPS OPHTHALMIC at 08:05

## 2017-04-16 RX ADMIN — PANTOPRAZOLE SODIUM 40 MG: 40 TABLET, DELAYED RELEASE ORAL at 07:46

## 2017-04-16 RX ADMIN — IPRATROPIUM BROMIDE AND ALBUTEROL SULFATE 3 ML: .5; 3 SOLUTION RESPIRATORY (INHALATION) at 17:04

## 2017-04-16 RX ADMIN — PILOCARPINE HYDROCHLORIDE 1 DROP: 10 SOLUTION/ DROPS OPHTHALMIC at 09:09

## 2017-04-16 RX ADMIN — INSULIN ASPART 3 UNITS: 100 INJECTION, SOLUTION INTRAVENOUS; SUBCUTANEOUS at 12:51

## 2017-04-16 RX ADMIN — INSULIN ASPART 50 UNITS: 100 INJECTION, SOLUTION INTRAVENOUS; SUBCUTANEOUS at 12:51

## 2017-04-16 RX ADMIN — ISOSORBIDE MONONITRATE 30 MG: 30 TABLET ORAL at 08:04

## 2017-04-16 RX ADMIN — INSULIN ASPART 50 UNITS: 100 INJECTION, SOLUTION INTRAVENOUS; SUBCUTANEOUS at 08:05

## 2017-04-16 RX ADMIN — AZITHROMYCIN DIHYDRATE 500 MG: 500 INJECTION, POWDER, LYOPHILIZED, FOR SOLUTION INTRAVENOUS at 15:59

## 2017-04-16 RX ADMIN — BUDESONIDE 0.5 MG: 0.5 INHALANT RESPIRATORY (INHALATION) at 08:16

## 2017-04-16 RX ADMIN — TIMOLOL MALEATE 1 DROP: 5 SOLUTION/ DROPS OPHTHALMIC at 09:09

## 2017-04-16 RX ADMIN — IPRATROPIUM BROMIDE AND ALBUTEROL SULFATE 3 ML: .5; 3 SOLUTION RESPIRATORY (INHALATION) at 12:10

## 2017-04-16 RX ADMIN — CEFTRIAXONE SODIUM 2 G: 2 INJECTION, SOLUTION INTRAVENOUS at 15:24

## 2017-04-16 RX ADMIN — LISINOPRIL 20 MG: 20 TABLET ORAL at 08:04

## 2017-04-16 RX ADMIN — IPRATROPIUM BROMIDE AND ALBUTEROL SULFATE 3 ML: .5; 3 SOLUTION RESPIRATORY (INHALATION) at 08:14

## 2017-04-16 NOTE — PROGRESS NOTES
"DAILY PROGRESS NOTE  [facility]    Patient Identification:  Name: Anthony Vaughn  Age: 72 y.o.  Sex: male  :  1945  MRN: [id number]         Primary Care Physician: Gricel Diaz MD    Subjective:  Interval History:He feels better today. Still coughing up some white sputum.  Had some high sugars last night. Better today.    Objective:    Scheduled Meds:    aspirin 81 mg Oral Daily   atorvastatin 40 mg Oral Daily   ceftriaxone 2 g Intravenous Q24H   And      azithromycin 500 mg Intravenous Q24H   brimonidine 1 drop Left Eye BID   budesonide 0.5 mg Nebulization BID - RT   clopidogrel 75 mg Oral Daily   insulin aspart 0-7 Units Subcutaneous 4x Daily With Meals & Nightly   insulin aspart 50 Units Subcutaneous TID With Meals   insulin detemir 90 Units Subcutaneous Nightly   ipratropium-albuterol 3 mL Nebulization 4x Daily - RT   isosorbide mononitrate 30 mg Oral Daily   latanoprost 1 drop Both Eyes Nightly   lisinopril 20 mg Oral Q24H   metoprolol tartrate 25 mg Oral Q12H   pantoprazole 40 mg Oral Q AM   pilocarpine 1 drop Left Eye BID   timolol 1 drop Left Eye Daily     Continuous Infusions:     Vital signs in last 24 hours:  Temp:  [97.8 °F (36.6 °C)-98.3 °F (36.8 °C)] 98 °F (36.7 °C)  Heart Rate:  [75-94] 75  Resp:  [17-20] 20  BP: (154-177)/(62-90) 169/90    Intake/Output:    Intake/Output Summary (Last 24 hours) at 17 1205  Last data filed at 17 0900   Gross per 24 hour   Intake              690 ml   Output                0 ml   Net              690 ml       Exam:  /90 (BP Location: Left arm, Patient Position: Lying)  Pulse 75  Temp 98 °F (36.7 °C) (Oral)   Resp 20  Ht 61.32\" (155.8 cm)  Wt 218 lb 4.8 oz (99 kg)  SpO2 96%  BMI 40.82 kg/m2    General Appearance:    Alert, cooperative, no distress   Head:    Normocephalic, without obvious abnormality, atraumatic   Eyes:       Throat:   Lips, tongue, gums normal   Neck:   Supple, symmetrical, trachea midline, no JVD "   Lungs:     Wheezes and rhonchi bilaterally, respirations unlabored   Chest Wall:    No tenderness or deformity    Heart:    Regular rate and rhythm, S1 and S2 normal, no murmur,no  Rub or gallop   Abdomen:     Soft, non-tender, bowel sounds active, no masses, no organomegaly    Extremities:   Extremities normal, atraumatic, no cyanosis or edema   Pulses:      Skin:   Skin is warm and dry,  no rashes or palpable lesions   Neurologic:   no focal deficits noted      [unfilled]  Data Review:    Results from last 7 days  Lab Units 04/16/17  0533 04/15/17  0641 04/14/17  1210   SODIUM mmol/L 138 135* 135*   POTASSIUM mmol/L 3.7 4.3 4.1   CHLORIDE mmol/L 99 93* 96*   TOTAL CO2 mmol/L 25.8 22.1 23.1   BUN mg/dL 16 14 10   CREATININE mg/dL 1.06 1.16 0.87   GLUCOSE mg/dL 214* 316* 219*   CALCIUM mg/dL 8.4* 8.8 8.7       Results from last 7 days  Lab Units 04/16/17  0533 04/15/17  0641 04/14/17  1210   WBC 10*3/mm3 10.80* 10.69 8.59   HEMOGLOBIN g/dL 14.8 15.8 15.9   HEMATOCRIT % 43.7 47.3 47.0   PLATELETS 10*3/mm3 234 242 204               0  Lab Value Date/Time   TROPONINT <0.010 04/14/2017 1210           Results from last 7 days  Lab Units 04/14/17  1210   ALK PHOS U/L 44   BILIRUBIN mg/dL 0.8   ALT (SGPT) U/L 24   AST (SGOT) U/L 28             Glucose   Date/Time Value Ref Range Status   04/16/2017 0743 202 (H) 70 - 130 mg/dL Final   04/15/2017 2019 467 (C) 70 - 130 mg/dL Final   04/15/2017 1647 425 (H) 70 - 130 mg/dL Final   04/15/2017 1156 330 (H) 70 - 130 mg/dL Final   04/15/2017 0724 277 (H) 70 - 130 mg/dL Final   04/14/2017 2127 218 (H) 70 - 130 mg/dL Final   04/14/2017 1703 138 (H) 70 - 130 mg/dL Final   04/14/2017 1016 221 (H) 70 - 130 mg/dL Final           Patient Active Problem List   Diagnosis Code   • Coronary artery disease involving native coronary artery without angina pectoris I25.10   • S/P angioplasty with stent Z95.9   • Hyperlipidemia E78.5   • Essential hypertension I10   • Type 2 diabetes with  nephropathy E11.21   • Microalbuminuria R80.9   • DM type 2 with diabetic peripheral neuropathy E11.42   • Non morbid obesity due to excess calories E66.09   • Pneumonia J18.9   • Acute bronchitis J20.9   • Acute hypoxemic respiratory failure J96.01   • Infection due to parainfluenza virus 3 B34.8       Assessment:  Principal Problem:    Pneumonia  Active Problems:    Coronary artery disease involving native coronary artery without angina pectoris    S/P angioplasty with stent    Essential hypertension    Type 2 diabetes with nephropathy    DM type 2 with diabetic peripheral neuropathy    Acute bronchitis    Acute hypoxemic respiratory failure    Infection due to parainfluenza virus 3      Plan:  Continue antibiotics and , bronchodilators. Off systemic steroids. As per pulmonary probably home soon.    Max Reyes MD  4/16/2017  12:05 PM

## 2017-04-16 NOTE — PROGRESS NOTES
"  Daily Progress Note.   [address]  4/16/2017    Patient:  Name:  Anthony Vaughn  MRN:  [id number]  1945  72 y.o.  male         Interval History:  Breathing better.   Energy better.  Less soa.  Feels like he could walk \"a mile.\"  RVP + parainfluenza      Physical Exam:  /76 (BP Location: Left arm)  Pulse 76  Temp 98.2 °F (36.8 °C) (Oral)   Resp 18  Ht 61.32\" (155.8 cm)  Wt 218 lb 4.8 oz (99 kg)  SpO2 92%  BMI 40.82 kg/m2  Body mass index is 40.82 kg/(m^2).    Intake/Output Summary (Last 24 hours) at 04/16/17 1600  Last data filed at 04/16/17 0900   Gross per 24 hour   Intake              450 ml   Output                0 ml   Net              450 ml     GENERAL:  NAD, Aox3  HEENT:  EOMI, nonicteric sclera, no JVD  PULMONARY:   Unlabored resp effort, wheeze gone ,symmetric air entry  CARDIAC:  rrr  ABD: obese SNTND BS+  EXT: no c/c/e, pulses symmetric 2+ bilaterally  NEURO:  CNs II-XII intact, no focal deficits  SKIN: no lesions, no rash  PSYCH: appropriate mood  LYMPH: no cervical LAD    Scheduled meds:      aspirin 81 mg Oral Daily   atorvastatin 40 mg Oral Daily   ceftriaxone 2 g Intravenous Q24H   And      azithromycin 500 mg Intravenous Q24H   brimonidine 1 drop Left Eye BID   budesonide 0.5 mg Nebulization BID - RT   clopidogrel 75 mg Oral Daily   insulin aspart 0-7 Units Subcutaneous 4x Daily With Meals & Nightly   insulin aspart 50 Units Subcutaneous TID With Meals   insulin detemir 90 Units Subcutaneous Nightly   ipratropium-albuterol 3 mL Nebulization 4x Daily - RT   isosorbide mononitrate 30 mg Oral Daily   latanoprost 1 drop Both Eyes Nightly   lisinopril 20 mg Oral Q24H   metoprolol tartrate 25 mg Oral Q12H   pantoprazole 40 mg Oral Q AM   pilocarpine 1 drop Left Eye BID   timolol 1 drop Left Eye Daily       Data Review:   I reviewed the patient's medications, imaging and new clinical results.    Results from last 7 days  Lab Units 04/16/17  0533 04/15/17  0641 " 04/14/17  1210   WBC 10*3/mm3 10.80* 10.69 8.59   HEMOGLOBIN g/dL 14.8 15.8 15.9   PLATELETS 10*3/mm3 234 242 204       Results from last 7 days  Lab Units 04/16/17  0533 04/15/17  0641 04/14/17  1210   SODIUM mmol/L 138 135* 135*   POTASSIUM mmol/L 3.7 4.3 4.1   CHLORIDE mmol/L 99 93* 96*   TOTAL CO2 mmol/L 25.8 22.1 23.1   BUN mg/dL 16 14 10   CREATININE mg/dL 1.06 1.16 0.87   GLUCOSE mg/dL 214* 316* 219*   CALCIUM mg/dL 8.4* 8.8 8.7   Estimated Creatinine Clearance: 62.7 mL/min (by C-G formula based on Cr of 1.06).    Imaging Results (most recent)     Procedure Component Value Units Date/Time    XR Chest PA & Lateral [[id number]] Collected:  04/14/17 1304     Updated:  04/14/17 1309    Narrative:       XR CHEST PA AND LATERAL-     HISTORY: Male who is 72 years-old,  pneumonia     TECHNIQUE: Frontal and lateral views of the chest     COMPARISON: 03/06/2017     FINDINGS: Heart, mediastinum and pulmonary vasculature are unremarkable.  Interval increase of interstitial markings in the mid to lower lungs may  suggest developing interstitial infiltrate, follow-up recommended. No  pleural effusion or pneumothorax. No acute osseous process.       Impression:       Possible developing interstitial infiltrates in the mid to  lower lungs, follow-up recommended.     This report was finalized on 4/14/2017 1:06 PM by Dr. Irwin Jacobo MD.           Micro: prainflu +  Procal 0.27  Strep urine antigen neg  bc no growth    ASSESSMENT  /  PLAN:  Viral Pneumonia  Hypoxia  Wheezing with bronchospasm  DMII  CAD    procal neg  +parainflu  Has had 3 days of abx  Would send home on 4 days doxy 100mg bid  symbicort 80 as he still is fairly tight - ? Underlying asthma that has prediposed him to severity of parainflu ?  Follow up in 6 weeks for PFTs with me card given      Yayo Mann MD  [facility]  04/16/17  9:51 AM

## 2017-04-16 NOTE — DISCHARGE SUMMARY
PHYSICIAN DISCHARGE SUMMARY                                                                        [facility]    Patient Identification:  Name: Anthony Vaughn  Age: 72 y.o.  Sex: male  :  1945  MRN: [id number]  Primary Care Physician: Gricel Diaz MD    Admit date: 2017  Discharge date and time:2017  Discharged Condition: good    Discharge Diagnoses:Principal Problem:    Pneumonia  Active Problems:    Coronary artery disease involving native coronary artery without angina pectoris    S/P angioplasty with stent    Essential hypertension    Type 2 diabetes with nephropathy    DM type 2 with diabetic peripheral neuropathy    Acute bronchitis    Acute hypoxemic respiratory failure    Infection due to parainfluenza virus 3     Patient Active Problem List   Diagnosis Code   • Coronary artery disease involving native coronary artery without angina pectoris I25.10   • S/P angioplasty with stent Z95.9   • Hyperlipidemia E78.5   • Essential hypertension I10   • Type 2 diabetes with nephropathy E11.21   • Microalbuminuria R80.9   • DM type 2 with diabetic peripheral neuropathy E11.42   • Non morbid obesity due to excess calories E66.09   • Pneumonia J18.9   • Acute bronchitis J20.9   • Acute hypoxemic respiratory failure J96.01   • Infection due to parainfluenza virus 3 B34.8       PMHX:   Past Medical History:   Diagnosis Date   • Atherosclerosis of native coronary artery without angina pectoris    • Chest pain    • Coronary artery disease    • Diabetes mellitus    • Difficulty breathing    • ED (erectile dysfunction)    • Hyperlipidemia    • Hypertension    • S/P angioplasty with stent    • Stroke    • Type 2 diabetes mellitus      PSHX:   Past Surgical History:   Procedure Laterality Date   • COLONOSCOPY W/ POLYPECTOMY      2 benign polyps.   • CORONARY ANGIOPLASTY WITH STENT PLACEMENT      Dr. Wray   • CORONARY  ANGIOPLASTY WITH STENT PLACEMENT  2015   • EYE SURGERY     • FRACTURE SURGERY         Hospital Course: Anthony Vaughn  is a 72-year-old gentleman who has a history of coronary artery disease with approximately 10 stents previously as followed by Dr. Wray for this and who has type 2 diabetes and is on high doses of insulin for this.  The patient presented to his primary care doctor's office today and was found to have increased work of breathing respiratory rate approximately 24 as well as crackles on exam.  And the patient was sent to the hospital as a direct admission from the primary care doctor's office for treatment of presumed pneumonia.         The patient was admitted to hospital and treated with IV antibiotics, steroids, bronchodilators and oxygen.  He was seen by pulmonary critical care medicine.  His respiratory PCR was positive for parainfluenza 3 virus.  He was feeling better after a few days and looked well enough to go home.  He'll be on a Symbicort inhaler albuterol inhaler and some oral doxycycline for a few more days.  He'll follow-up his primary care doctor in 1 week for continuing care.  Consults:     Consults     Date and Time Order Name Status Description    4/14/2017 1539 Inpatient Consult to Pulmonology Completed           Results from last 7 days  Lab Units 04/16/17  0533   WBC 10*3/mm3 10.80*   HEMOGLOBIN g/dL 14.8   HEMATOCRIT % 43.7   PLATELETS 10*3/mm3 234       Results from last 7 days  Lab Units 04/16/17  0533   SODIUM mmol/L 138   POTASSIUM mmol/L 3.7   CHLORIDE mmol/L 99   TOTAL CO2 mmol/L 25.8   BUN mg/dL 16   CREATININE mg/dL 1.06   GLUCOSE mg/dL 214*   CALCIUM mg/dL 8.4*     Significant Diagnostic Studies:   Lab Results   Component Value Date    WBC 10.80 (H) 04/16/2017    HGB 14.8 04/16/2017    HCT 43.7 04/16/2017     04/16/2017     Lab Results   Component Value Date     04/16/2017    K 3.7 04/16/2017    CL 99 04/16/2017    CO2 25.8 04/16/2017    BUN 16 04/16/2017     CREATININE 1.06 04/16/2017    GLUCOSE 214 (H) 04/16/2017     Lab Results   Component Value Date    CALCIUM 8.4 (L) 04/16/2017     Lab Results   Component Value Date    AST 28 04/14/2017    ALT 24 04/14/2017    ALKPHOS 44 04/14/2017     No results found for: APTT, INR  No results found for: COLORU, CLARITYU, SPECGRAV, PHUR, PROTEINUR, GLUCOSEU, KETONESU, BLOODU, NITRITE, LEUKOCYTESUR, BILIRUBINUR, UROBILINOGEN, RBCUA, WBCUA, BACTERIA  Lab Results   Component Value Date    TROPONINT <0.010 04/14/2017     No components found for: HGBA1C;2  No components found for: TSH;2  Imaging Results (all)     Procedure Component Value Units Date/Time    XR Chest PA & Lateral [[id number]] Collected:  04/14/17 1304     Updated:  04/14/17 1309    Narrative:       XR CHEST PA AND LATERAL-     HISTORY: Male who is 72 years-old,  pneumonia     TECHNIQUE: Frontal and lateral views of the chest     COMPARISON: 03/06/2017     FINDINGS: Heart, mediastinum and pulmonary vasculature are unremarkable.  Interval increase of interstitial markings in the mid to lower lungs may  suggest developing interstitial infiltrate, follow-up recommended. No  pleural effusion or pneumothorax. No acute osseous process.       Impression:       Possible developing interstitial infiltrates in the mid to  lower lungs, follow-up recommended.     This report was finalized on 4/14/2017 1:06 PM by Dr. Irwin Jacobo MD.           Lab Results (last 7 days)     Procedure Component Value Units Date/Time    POC Glucose Fingerstick [[id number]]  (Abnormal) Collected:  04/14/17 1016    Specimen:  Blood Updated:  04/14/17 1017     Glucose 221 (H) mg/dL     Narrative:       Meter: [id number] : [id number] Melly Gaytan    CBC Auto Differential [[id number]]  (Abnormal) Collected:  04/14/17 1210    Specimen:  Blood Updated:  04/14/17 1232     WBC 8.59 10*3/mm3      RBC 5.29 10*6/mm3      Hemoglobin 15.9 g/dL      Hematocrit 47.0 %      MCV 88.8 fL      MCH 30.1 pg      MCHC  33.8 g/dL      RDW 13.1 %      RDW-SD 42.4 fl      MPV 9.2 fL      Platelets 204 10*3/mm3      Neutrophil % 74.2 %      Lymphocyte % 12.5 (L) %      Monocyte % 10.4 %      Eosinophil % 2.4 %      Basophil % 0.3 %      Immature Grans % 0.2 %      Neutrophils, Absolute 6.37 10*3/mm3      Lymphocytes, Absolute 1.07 10*3/mm3      Monocytes, Absolute 0.89 10*3/mm3      Eosinophils, Absolute 0.21 10*3/mm3      Basophils, Absolute 0.03 10*3/mm3      Immature Grans, Absolute 0.02 10*3/mm3     Lactic Acid, Plasma [[id number]]  (Normal) Collected:  04/14/17 1210    Specimen:  Blood Updated:  04/14/17 1244     Lactate 1.5 mmol/L     Comprehensive Metabolic Panel [[id number]]  (Abnormal) Collected:  04/14/17 1210    Specimen:  Blood Updated:  04/14/17 1258     Glucose 219 (H) mg/dL      BUN 10 mg/dL      Creatinine 0.87 mg/dL      Sodium 135 (L) mmol/L      Potassium 4.1 mmol/L      Chloride 96 (L) mmol/L      CO2 23.1 mmol/L      Calcium 8.7 mg/dL      Total Protein 6.7 g/dL      Albumin 3.70 g/dL      ALT (SGPT) 24 U/L      AST (SGOT) 28 U/L      Alkaline Phosphatase 44 U/L      Total Bilirubin 0.8 mg/dL      eGFR Non African Amer 86 mL/min/1.73      Globulin 3.0 gm/dL      A/G Ratio 1.2 g/dL      BUN/Creatinine Ratio 11.5     Anion Gap 15.9 mmol/L     Narrative:       The MDRD GFR formula is only valid for adults with stable renal function between ages 18 and 70.    Procalcitonin [[id number]]  (Normal) Collected:  04/14/17 1210    Specimen:  Blood Updated:  04/14/17 1303     Procalcitonin 0.16 ng/mL     Narrative:       As a Marker for Sepsis (Non-Neonates):   1. <0.5 ng/mL represents a low risk of severe sepsis and/or septic shock.  1. >2 ng/mL represents a high risk of severe sepsis and/or septic shock.    As a Marker for Lower Respiratory Tract Infections that require antibiotic therapy:  PCT on Admission     Antibiotic Therapy             6-12 Hrs later  > 0.5                Strongly Recommended            >0.25 - <0.5          "Recommended  0.1 - 0.25           Discouraged                   Remeasure/reassess PCT  <0.1                 Strongly Discouraged          Remeasure/reassess PCT      As 28 day mortality risk marker: \"Change in Procalcitonin Result\" (> 80 % or <=80 %) if Day 0 (or Day 1) and Day 4 values are available. Refer to http://[email or web address]pct[facility][email or web address]/   Change in PCT <=80 %   A decrease of PCT levels below or equal to 80 % defines a positive change in PCT test result representing a higher risk for 28-day all-cause mortality of patients diagnosed with severe sepsis or septic shock.  Change in PCT > 80 %   A decrease of PCT levels of more than 80 % defines a negative change in PCT result representing a lower risk for 28-day all-cause mortality of patients diagnosed with severe sepsis or septic shock.                BNP [[id number]]  (Normal) Collected:  04/14/17 1210    Specimen:  Blood Updated:  04/14/17 1303     proBNP 66.2 pg/mL     Narrative:       Among patients with dyspnea, NT-proBNP is highly sensitive for the detection of acute congestive heart failure. In addition NT-proBNP of <300 pg/ml effectively rules out acute congestive heart failure with 99% negative predictive value.    Troponin [[id number]]  (Normal) Collected:  04/14/17 1210    Specimen:  Blood Updated:  04/14/17 1303     Troponin T <0.010 ng/mL     Narrative:       Troponin T Reference Ranges:  Less than 0.03 ng/mL:    Negative for AMI  0.03 to 0.09 ng/mL:      Indeterminant for AMI  Greater than 0.09 ng/mL: Positive for AMI    POC Glucose Fingerstick [[id number]]  (Abnormal) Collected:  04/14/17 1703    Specimen:  Blood Updated:  04/14/17 1704     Glucose 138 (H) mg/dL     Narrative:       Meter: [id number] : [id number] Melly Gaytan    Respiratory Panel, PCR [[id number]]  (Abnormal) Collected:  04/14/17 1742    Specimen:  Swab from Nares Updated:  04/14/17 2054     ADENOVIRUS, PCR Not Detected     Coronavirus 229E Not Detected     Coronavirus HKU1 Not " Detected     Coronavirus NL63 Not Detected     Coronavirus OC43 Not Detected     Human Metapneumovirus Not Detected     Human Rhinovirus/Enterovirus Not Detected     Influenza B PCR Not Detected     Parainfluenza Virus 1 Not Detected     Parainfluenza Virus 2 Not Detected     Parainfluenza Virus 3 Detected (A)     Parainfluenza Virus 4 Not Detected     Bordetella pertussis pcr Not Detected     Influenza 2009 H1N1 by PCR Not Detected     Chlamydophila pneumoniae PCR Not Detected     Mycoplasma pneumo by PCR Not Detected     Influenza A PCR Not Detected     Influenza A H3 Not Detected     Influenza A H1 Not Detected     RSV, PCR Not Detected    POC Glucose Fingerstick [[id number]]  (Abnormal) Collected:  04/14/17 2127    Specimen:  Blood Updated:  04/14/17 2128     Glucose 218 (H) mg/dL     Narrative:       Meter: [id number] : [id number] Anjel Ailyn    CBC & Differential [[id number]] Collected:  04/15/17 0641    Specimen:  Blood Updated:  04/15/17 0707    Narrative:       The following orders were created for panel order CBC & Differential.  Procedure                               Abnormality         Status                     ---------                               -----------         ------                     CBC Auto Differential[[id number]]         Abnormal            Final result                 Please view results for these tests on the individual orders.    CBC Auto Differential [[id number]]  (Abnormal) Collected:  04/15/17 0641    Specimen:  Blood Updated:  04/15/17 0707     WBC 10.69 10*3/mm3      RBC 5.30 10*6/mm3      Hemoglobin 15.8 g/dL      Hematocrit 47.3 %      MCV 89.2 fL      MCH 29.8 pg      MCHC 33.4 g/dL      RDW 13.1 %      RDW-SD 42.7 fl      MPV 9.2 fL      Platelets 242 10*3/mm3      Neutrophil % 79.6 (H) %      Lymphocyte % 14.6 (L) %      Monocyte % 5.1 %      Eosinophil % 0.0 (L) %      Basophil % 0.4 %      Immature Grans % 0.3 %      Neutrophils, Absolute 8.52 (H) 10*3/mm3       Lymphocytes, Absolute 1.56 10*3/mm3      Monocytes, Absolute 0.54 10*3/mm3      Eosinophils, Absolute 0.00 10*3/mm3      Basophils, Absolute 0.04 10*3/mm3      Immature Grans, Absolute 0.03 10*3/mm3     POC Glucose Fingerstick [[id number]]  (Abnormal) Collected:  04/15/17 0724    Specimen:  Blood Updated:  04/15/17 0727     Glucose 277 (H) mg/dL     Narrative:       Meter: [id number] : [id number] Erendira Cheryl    Basic Metabolic Panel [[id number]]  (Abnormal) Collected:  04/15/17 0641    Specimen:  Blood Updated:  04/15/17 0732     Glucose 316 (H) mg/dL      BUN 14 mg/dL      Creatinine 1.16 mg/dL      Sodium 135 (L) mmol/L      Potassium 4.3 mmol/L      Chloride 93 (L) mmol/L      CO2 22.1 mmol/L      Calcium 8.8 mg/dL      eGFR Non African Amer 62 mL/min/1.73      BUN/Creatinine Ratio 12.1     Anion Gap 19.9 mmol/L     Narrative:       The MDRD GFR formula is only valid for adults with stable renal function between ages 18 and 70.    S. Pneumo Ag Urine or CSF [[id number]]  (Normal) Collected:  04/15/17 0740    Specimen:  Urine from Urine, Clean Catch Updated:  04/15/17 0743     Strep Pneumo Ag Negative    POC Glucose Fingerstick [[id number]]  (Abnormal) Collected:  04/15/17 1156    Specimen:  Blood Updated:  04/15/17 1204     Glucose 330 (H) mg/dL     Narrative:       Meter: [id number] : [id number] Erendira Cheryl    POC Glucose Fingerstick [[id number]]  (Abnormal) Collected:  04/15/17 1647    Specimen:  Blood Updated:  04/15/17 1648     Glucose 425 (H) mg/dL     Narrative:       Meter: [id number] : [id number] Gabrielle Rosario    POC Glucose Fingerstick [[id number]]  (Abnormal) Collected:  04/15/17 2019    Specimen:  Blood Updated:  04/15/17 2020     Glucose 467 (C) mg/dL     Narrative:       Meter: [id number] : [id number] Natty Olson    CBC & Differential [[id number]] Collected:  04/16/17 0533    Specimen:  Blood Updated:  04/16/17 0615    Narrative:       The following orders were created for panel  order CBC & Differential.  Procedure                               Abnormality         Status                     ---------                               -----------         ------                     CBC Auto Differential[[id number]]         Abnormal            Final result                 Please view results for these tests on the individual orders.    CBC Auto Differential [[id number]]  (Abnormal) Collected:  04/16/17 0533    Specimen:  Blood Updated:  04/16/17 0615     WBC 10.80 (H) 10*3/mm3      RBC 4.93 10*6/mm3      Hemoglobin 14.8 g/dL      Hematocrit 43.7 %      MCV 88.6 fL      MCH 30.0 pg      MCHC 33.9 g/dL      RDW 13.0 %      RDW-SD 42.0 fl      MPV 9.0 fL      Platelets 234 10*3/mm3      Neutrophil % 50.1 %      Lymphocyte % 32.4 %      Monocyte % 14.9 (H) %      Eosinophil % 2.2 %      Basophil % 0.4 %      Immature Grans % 0.0 %      Neutrophils, Absolute 5.41 10*3/mm3      Lymphocytes, Absolute 3.50 10*3/mm3      Monocytes, Absolute 1.61 (H) 10*3/mm3      Eosinophils, Absolute 0.24 10*3/mm3      Basophils, Absolute 0.04 10*3/mm3      Immature Grans, Absolute 0.00 10*3/mm3     Basic Metabolic Panel [[id number]]  (Abnormal) Collected:  04/16/17 0533    Specimen:  Blood Updated:  04/16/17 0624     Glucose 214 (H) mg/dL      BUN 16 mg/dL      Creatinine 1.06 mg/dL      Sodium 138 mmol/L      Potassium 3.7 mmol/L      Chloride 99 mmol/L      CO2 25.8 mmol/L      Calcium 8.4 (L) mg/dL      eGFR Non African Amer 68 mL/min/1.73      BUN/Creatinine Ratio 15.1     Anion Gap 13.2 mmol/L     Narrative:       The MDRD GFR formula is only valid for adults with stable renal function between ages 18 and 70.    POC Glucose Fingerstick [[id number]]  (Abnormal) Collected:  04/16/17 0743    Specimen:  Blood Updated:  04/16/17 0745     Glucose 202 (H) mg/dL     Narrative:       Meter: [id number] : [id number] Gabrielle Rosario    Respiratory Culture [[id number]] Collected:  04/14/17 1742    Specimen:  Sputum from Cough  "Updated:  04/16/17 0939     Respiratory Culture Light growth (2+) Normal Respiratory Maria Antonia     Gram Stain Result No WBCs seen      Rare (1+) Epithelial cells per low power field      Mixed bacterial morphotypes seen on Gram Stain    MRSA Screen Culture [[id number]]  (Normal) Collected:  04/15/17 0616    Specimen:  Swab from Nares Updated:  04/16/17 0949     MRSA SCREEN CX No Methicillin Resistant Staphylococcus aureus Isolated at 24 hours    POC Glucose Fingerstick [[id number]]  (Abnormal) Collected:  04/16/17 1221    Specimen:  Blood Updated:  04/16/17 1223     Glucose 239 (H) mg/dL     Narrative:       Meter: [id number] : [id number] Gabrielle Rosario    Blood Culture [[id number]]  (Normal) Collected:  04/14/17 1215    Specimen:  Blood from Arm, Left Updated:  04/16/17 1301     Blood Culture No growth at 2 days    Blood Culture [[id number]]  (Normal) Collected:  04/14/17 1401    Specimen:  Blood from Arm, Right Updated:  04/16/17 1501     Blood Culture No growth at 2 days        /76 (BP Location: Left arm)  Pulse 76  Temp 98.2 °F (36.8 °C) (Oral)   Resp 18  Ht 61.32\" (155.8 cm)  Wt 218 lb 4.8 oz (99 kg)  SpO2 92%  BMI 40.82 kg/m2    Discharge Exam:  General Appearance:    Alert, cooperative, no distress                          Head:    Normocephalic, without obvious abnormality, atraumatic                          Eyes:                            Throat:   Lips, tongue, gums normal                          Neck:   Supple, symmetrical, trachea midline, no JVD                        Lungs:     Clear to auscultation bilaterally, respirations unlabored                Chest Wall:    No tenderness or deformity                        Heart:    Regular rate and rhythm, S1 and S2 normal, no murmur,no  Rub or gallop                  Abdomen:     Soft, non-tender, bowel sounds active, no masses, no organomegaly                  Extremities:   Extremities normal, atraumatic, no cyanosis or edema                        "      Skin:   Skin is warm and dry,  no rashes or palpable lesions                  Neurologic:   no focal deficits noted     Disposition:  Home    Patient Instructions:    RoderickAnthony AARON   Home Medication Instructions LESLY:[id number]    Printed on:04/16/17 1640   Medication Information                      albuterol (PROVENTIL HFA;VENTOLIN HFA) 108 (90 BASE) MCG/ACT inhaler  Inhale 2 puffs Every 4 (Four) Hours As Needed for Wheezing.             aspirin 81 MG EC tablet  Take 81 mg by mouth daily.             SALIMA MICROLET LANCETS lancets               brimonidine (ALPHAGAN) 0.15 % ophthalmic solution  Apply 1 drop to eye 2 (two) times a day.             budesonide-formoterol (SYMBICORT) 80-4.5 MCG/ACT inhaler  Inhale 2 puffs 2 (Two) Times a Day.             clopidogrel (PLAVIX) 75 MG tablet  Take 75 mg by mouth daily.             doxycycline (DORYX) 100 MG enteric coated tablet  Take 1 tablet by mouth 2 (Two) Times a Day for 8 doses.             insulin aspart (NOVOLOG FLEXPEN) 100 UNIT/ML solution pen-injector sc pen  INJECT 65 UNITS IN THE MORNING 65 UNITS AT LUNCH AND 68 UNITS AT DINNER             insulin detemir (LEVEMIR) 100 UNIT/ML injection  INJECT 95 UNITS AT NIGHT             isosorbide mononitrate (IMDUR) 30 MG 24 hr tablet  Take 30 mg by mouth daily.             latanoprost (XALATAN) 0.005 % ophthalmic solution  Apply 1 drop to eye daily.             lisinopril (PRINIVIL,ZESTRIL) 20 MG tablet  TAKE 1 TABLET DAILY AS DIRECTED.             metoprolol tartrate (LOPRESSOR) 25 MG tablet  TAKE 1 TABLET BY MOUTH 2 TIMES DAILY             Omeprazole 20 MG tablet delayed-release  Take 1 tablet/day by mouth daily.             simvastatin (ZOCOR) 80 MG tablet  Take 40 mg by mouth every night.             timolol (TIMOPTIC) 0.5 % ophthalmic solution  1 drop daily. INSTILL 1 DROP IN LEFT EYE EVERY MORNING AS DIRECTED               Future Appointments  Date Time Provider Department Center   4/18/2017 2:40 PM  Anibal Wray MD [facility] None   5/31/2017 11:30 AM Cristino Mckeon MD [facility] None     Follow-up Information     Follow up with Yayo Mann MD Follow up in 6 week(s).    Specialty:  Pulmonary Disease    Contact information:    [facility] [facility] [address]  [place] [address]  [phone]          Follow up with Gricel Diaz MD Follow up in 1 week(s).    Specialty:  Internal Medicine    Contact information:    [address] [facility] [address]  [place] [address]  [phone]          Discharge Order     None          Total time spent discharging patient including evaluation,post hospitalization follow up,  medication and post hospitalization instructions and education total time exceeds 30 minutes.    Signed:  Max Reyes MD  4/16/2017  4:40 PM

## 2017-04-16 NOTE — PLAN OF CARE
Problem: Patient Care Overview (Adult)  Goal: Plan of Care Review  Outcome: Ongoing (interventions implemented as appropriate)    04/16/17 7796   Coping/Psychosocial Response Interventions   Plan Of Care Reviewed With patient;spouse   Patient Care Overview   Progress improving   Outcome Evaluation   Outcome Summary/Follow up Plan Mild wheezing this morning, but have resolved so far this afternoon. Still getting IV antibiotics, last doses today. Blood glucose still high, continue to cover per orders. IV fluids, maintain juárez. Continue to monitor.       Goal: Adult Individualization and Mutuality  Outcome: Ongoing (interventions implemented as appropriate)  Goal: Discharge Needs Assessment  Outcome: Ongoing (interventions implemented as appropriate)    Problem: Pneumonia (Adult)  Goal: Signs and Symptoms of Listed Potential Problems Will be Absent or Manageable (Pneumonia)  Outcome: Ongoing (interventions implemented as appropriate)

## 2017-04-17 LAB — MRSA SPEC QL CULT: ABNORMAL

## 2017-04-17 NOTE — PROGRESS NOTES
Continued Stay Note  [facility]     Patient Name: Anthony Vaughn  MRN: [id number]  Today's Date: 4/17/2017    Admit Date: 4/14/2017          Discharge Plan       04/17/17 0816    Case Management/Social Work Plan    Plan Return home    Patient/Family In Agreement With Plan yes    Final Note    Final Note Patient was DC'd home 4/16              Discharge Codes       04/17/17 0816    Discharge Codes    Discharge Codes 01  Discharge to home        Expected Discharge Date and Time     Expected Discharge Date Expected Discharge Time    Apr 16, 2017             Becky S. [place], RN

## 2017-04-18 ENCOUNTER — OFFICE VISIT (OUTPATIENT)
Dept: CARDIOLOGY | Facility: CLINIC | Age: 72
End: 2017-04-18

## 2017-04-18 VITALS
HEART RATE: 78 BPM | SYSTOLIC BLOOD PRESSURE: 142 MMHG | DIASTOLIC BLOOD PRESSURE: 86 MMHG | WEIGHT: 222 LBS | HEIGHT: 61 IN | BODY MASS INDEX: 41.91 KG/M2

## 2017-04-18 DIAGNOSIS — I10 ESSENTIAL HYPERTENSION: ICD-10-CM

## 2017-04-18 DIAGNOSIS — E11.21 TYPE 2 DIABETES WITH NEPHROPATHY (HCC): ICD-10-CM

## 2017-04-18 DIAGNOSIS — E66.09 NON MORBID OBESITY DUE TO EXCESS CALORIES: ICD-10-CM

## 2017-04-18 DIAGNOSIS — E78.49 OTHER HYPERLIPIDEMIA: ICD-10-CM

## 2017-04-18 DIAGNOSIS — I25.10 CORONARY ARTERY DISEASE INVOLVING NATIVE CORONARY ARTERY OF NATIVE HEART WITHOUT ANGINA PECTORIS: Primary | ICD-10-CM

## 2017-04-18 PROCEDURE — 93000 ELECTROCARDIOGRAM COMPLETE: CPT | Performed by: INTERNAL MEDICINE

## 2017-04-18 PROCEDURE — 99213 OFFICE O/P EST LOW 20 MIN: CPT | Performed by: INTERNAL MEDICINE

## 2017-04-18 RX ORDER — NITROGLYCERIN 0.4 MG/1
0.4 TABLET SUBLINGUAL
COMMUNITY
End: 2018-03-03 | Stop reason: HOSPADM

## 2017-04-18 NOTE — PROGRESS NOTES
Date of Office Visit: 2017  Encounter Provider: Anibal Wray MD  Place of Service: [facility] CARDIOLOGY  Patient Name: Anthony Vaughn  :1945  [id number]    Chief Complaint   Patient presents with   • Coronary Artery Disease     1 mnth follow up   :     HPI: Anthony Vaughn is a 72 y.o. male  he is here for follow-up he was having some strokelike symptoms about a month ago we did get an MRI of his head and it didn't really reveal much I think he has a lacunar issue is in no other stent symptoms he's not having chest pain shortness of breath he just did get diagnosed with pneumonia so I guess he is can continue getting over that he has a number of stents in his coronary arteries he has diabetes obesity hypertension he does not smoke is a car  he's not had heart failure symptoms no arrhythmia symptoms    Past Medical History:   Diagnosis Date   • Atherosclerosis of native coronary artery without angina pectoris    • Chest pain    • Coronary artery disease    • Diabetes mellitus    • Difficulty breathing    • Dizziness    • ED (erectile dysfunction)    • Hyperlipidemia    • Hypertension    • Obesity    • S/P angioplasty with stent    • Stroke    • Type 2 diabetes mellitus        Past Surgical History:   Procedure Laterality Date   • COLONOSCOPY W/ POLYPECTOMY  2013    2 benign polyps.   • CORONARY ANGIOPLASTY WITH STENT PLACEMENT      Dr. Wray   • CORONARY ANGIOPLASTY WITH STENT PLACEMENT     • EYE SURGERY     • FRACTURE SURGERY         Social History     Social History   • Marital status:      Spouse name: N/A   • Number of children: N/A   • Years of education: N/A     Occupational History   • Not on file.     Social History Main Topics   • Smoking status: Never Smoker   • Smokeless tobacco: Not on file   • Alcohol use No   • Drug use: No   • Sexual activity: Defer     Other Topics Concern   • Not on file     Social History Narrative       Family  History   Problem Relation Age of Onset   • Diabetes Mother    • Heart disease Sister    • Rheumatic fever Sister        Review of Systems   Constitution: Negative for decreased appetite, fever, malaise/fatigue and weight loss.   HENT: Negative for nosebleeds.    Eyes: Negative for double vision.   Cardiovascular: Negative for chest pain, claudication, cyanosis, dyspnea on exertion, irregular heartbeat, leg swelling, near-syncope, orthopnea, palpitations, paroxysmal nocturnal dyspnea and syncope.   Respiratory: Negative for cough, hemoptysis and shortness of breath.    Hematologic/Lymphatic: Negative for bleeding problem.   Skin: Negative for rash.   Musculoskeletal: Negative for falls and myalgias.   Gastrointestinal: Negative for hematochezia, jaundice, melena, nausea and vomiting.   Genitourinary: Negative for hematuria.   Neurological: Negative for dizziness and seizures.   Psychiatric/Behavioral: Negative for altered mental status and memory loss.       No Known Allergies      Current Outpatient Prescriptions:   •  albuterol (PROVENTIL HFA;VENTOLIN HFA) 108 (90 BASE) MCG/ACT inhaler, Inhale 2 puffs Every 4 (Four) Hours As Needed for Wheezing., Disp: 1 inhaler, Rfl: 0  •  aspirin 81 MG EC tablet, Take 81 mg by mouth daily., Disp: , Rfl:   •  SALIMA MICROLET LANCETS lancets, , Disp: , Rfl:   •  brimonidine (ALPHAGAN) 0.15 % ophthalmic solution, Apply 1 drop to eye 2 (two) times a day., Disp: , Rfl:   •  clopidogrel (PLAVIX) 75 MG tablet, Take 75 mg by mouth daily., Disp: , Rfl:   •  doxycycline (DORYX) 100 MG enteric coated tablet, Take 1 tablet by mouth 2 (Two) Times a Day for 8 doses., Disp: 8 tablet, Rfl: 0  •  insulin aspart (NOVOLOG FLEXPEN) 100 UNIT/ML solution pen-injector sc pen, INJECT 65 UNITS IN THE MORNING 65 UNITS AT LUNCH AND 68 UNITS AT DINNER, Disp: , Rfl:   •  insulin detemir (LEVEMIR) 100 UNIT/ML injection, INJECT 95 UNITS AT NIGHT (Patient taking differently: 90 Units Every Night. INJECT 95  "UNITS AT NIGHT ), Disp: , Rfl:   •  isosorbide mononitrate (IMDUR) 30 MG 24 hr tablet, Take 30 mg by mouth daily., Disp: , Rfl:   •  latanoprost (XALATAN) 0.005 % ophthalmic solution, Apply 1 drop to eye daily., Disp: , Rfl:   •  lisinopril (PRINIVIL,ZESTRIL) 20 MG tablet, TAKE 1 TABLET DAILY AS DIRECTED., Disp: 90 tablet, Rfl: 3  •  metFORMIN (GLUCOPHAGE) 500 MG tablet, Take 500 mg by mouth., Disp: , Rfl:   •  metoprolol tartrate (LOPRESSOR) 25 MG tablet, TAKE 1 TABLET BY MOUTH 2 TIMES DAILY, Disp: 180 tablet, Rfl: 1  •  nitroglycerin (NITROSTAT) 0.4 MG SL tablet, Place 0.4 mg under the tongue., Disp: , Rfl:   •  Omeprazole 20 MG tablet delayed-release, Take 1 tablet/day by mouth daily., Disp: , Rfl:   •  simvastatin (ZOCOR) 80 MG tablet, Take 40 mg by mouth every night., Disp: , Rfl:   •  timolol (TIMOPTIC) 0.5 % ophthalmic solution, 1 drop daily. INSTILL 1 DROP IN LEFT EYE EVERY MORNING AS DIRECTED, Disp: , Rfl: 6  •  budesonide-formoterol (SYMBICORT) 80-4.5 MCG/ACT inhaler, Inhale 2 puffs 2 (Two) Times a Day., Disp: 1 inhaler, Rfl: 0     Objective:     Vitals:    04/18/17 1440   BP: 142/86   Pulse: 78   Weight: 222 lb (101 kg)   Height: 61.3\" (155.7 cm)     Body mass index is 41.54 kg/(m^2).    Physical Exam   Constitutional: He is oriented to person, place, and time. He appears well-developed and well-nourished.   overweight   HENT:   Head: Normocephalic.   Eyes: No scleral icterus.   Neck: No JVD present. No thyromegaly present.   Cardiovascular: Normal rate, regular rhythm and normal heart sounds.  Exam reveals no gallop and no friction rub.    No murmur heard.  Pulmonary/Chest: Effort normal and breath sounds normal. He has no wheezes. He has no rales.   Abdominal: Soft. There is no hepatosplenomegaly. There is no tenderness.   Musculoskeletal: Normal range of motion. He exhibits no edema.   Lymphadenopathy:     He has no cervical adenopathy.   Neurological: He is alert and oriented to person, place, and " time.   Skin: Skin is warm and dry. No rash noted.   Psychiatric: He has a normal mood and affect.         ECG 12 Lead  Date/Time: 4/18/2017 3:46 PM  Performed by: NOY WRAY  Authorized by: NOY WRAY   Comparison: compared with previous ECG   Similar to previous ECG  Rhythm: sinus rhythm  Clinical impression: abnormal ECG  Comments: ns ST-T wave abnormality                 Assessment:       Diagnosis Plan   1. Coronary artery disease involving native coronary artery of native heart without angina pectoris     2. Other hyperlipidemia     3. Essential hypertension     4. Non morbid obesity due to excess calories     5. Type 2 diabetes with nephropathy            Plan:        The patient seems to be stable at this point.  He has lost about five or six pounds and says he is down an inch off of his belt, so that is good and is certainly a positive.   He has not had any more stroke symptoms, so that is good.  His coronary disease seems to be quiescent.   He does not have heart failure symptoms.  He does not have any arrhythmia issues. I am going to have him come back in and see Sienna in six months, and see me in a year.      Coronary Artery Disease  Assessment  • The patient has no angina    Plan  • Lifestyle modifications discussed include adhering to a heart healthy diet, maintenance of a healthy weight, medication compliance, regular exercise and regular monitoring of cholesterol and blood pressure    Subjective - Objective  • There is a history of past MI  • There has been a previous stent procedure using AMARI  • Current antiplatelet therapy includes aspirin 81 mg        As always, it has been a pleasure to participate in your patient's care.      Sincerely,       Noy Wray MD

## 2017-04-19 LAB
BACTERIA SPEC AEROBE CULT: NORMAL
BACTERIA SPEC AEROBE CULT: NORMAL

## 2017-05-31 ENCOUNTER — OFFICE VISIT (OUTPATIENT)
Dept: ENDOCRINOLOGY | Age: 72
End: 2017-05-31

## 2017-05-31 VITALS
DIASTOLIC BLOOD PRESSURE: 64 MMHG | WEIGHT: 223 LBS | HEART RATE: 73 BPM | SYSTOLIC BLOOD PRESSURE: 114 MMHG | OXYGEN SATURATION: 93 % | HEIGHT: 61 IN | BODY MASS INDEX: 42.1 KG/M2

## 2017-05-31 DIAGNOSIS — E11.42 DM TYPE 2 WITH DIABETIC PERIPHERAL NEUROPATHY (HCC): Primary | ICD-10-CM

## 2017-05-31 DIAGNOSIS — R80.9 MICROALBUMINURIA: ICD-10-CM

## 2017-05-31 DIAGNOSIS — E66.01 MORBID OBESITY DUE TO EXCESS CALORIES (HCC): ICD-10-CM

## 2017-05-31 DIAGNOSIS — E78.5 HYPERLIPIDEMIA, UNSPECIFIED HYPERLIPIDEMIA TYPE: ICD-10-CM

## 2017-05-31 DIAGNOSIS — Z95.820 S/P ANGIOPLASTY WITH STENT: ICD-10-CM

## 2017-05-31 DIAGNOSIS — I10 ESSENTIAL HYPERTENSION: ICD-10-CM

## 2017-05-31 DIAGNOSIS — I25.10 CORONARY ARTERY DISEASE INVOLVING NATIVE CORONARY ARTERY OF NATIVE HEART WITHOUT ANGINA PECTORIS: ICD-10-CM

## 2017-05-31 DIAGNOSIS — E11.21 TYPE 2 DIABETES WITH NEPHROPATHY (HCC): ICD-10-CM

## 2017-05-31 PROBLEM — J20.9 ACUTE BRONCHITIS: Status: RESOLVED | Noted: 2017-04-14 | Resolved: 2017-05-31

## 2017-05-31 PROBLEM — B34.8 INFECTION DUE TO PARAINFLUENZA VIRUS 3: Status: RESOLVED | Noted: 2017-04-15 | Resolved: 2017-05-31

## 2017-05-31 PROBLEM — J18.9 PNEUMONIA: Status: RESOLVED | Noted: 2017-04-14 | Resolved: 2017-05-31

## 2017-05-31 PROBLEM — J96.01 ACUTE HYPOXEMIC RESPIRATORY FAILURE: Status: RESOLVED | Noted: 2017-04-14 | Resolved: 2017-05-31

## 2017-05-31 PROCEDURE — 99214 OFFICE O/P EST MOD 30 MIN: CPT | Performed by: INTERNAL MEDICINE

## 2017-05-31 RX ORDER — OMEGA-3 FATTY ACIDS/FISH OIL 300-1000MG
1 CAPSULE ORAL 2 TIMES DAILY
COMMUNITY

## 2017-05-31 RX ORDER — ACYCLOVIR 400 MG/1
400 TABLET ORAL AS NEEDED
Status: ON HOLD | COMMUNITY
End: 2018-03-01

## 2017-05-31 RX ORDER — PILOCARPINE HYDROCHLORIDE 10 MG/ML
1 SOLUTION/ DROPS OPHTHALMIC 2 TIMES DAILY
COMMUNITY

## 2017-06-01 LAB
ALBUMIN/CREAT UR: 42.6 MG/G CREAT (ref 0–30)
CREAT UR-MCNC: 208.7 MG/DL
MICROALBUMIN UR-MCNC: 88.9 UG/ML
T4 FREE SERPL-MCNC: 0.96 NG/DL (ref 0.93–1.7)
TSH SERPL DL<=0.005 MIU/L-ACNC: 1.53 MIU/ML (ref 0.27–4.2)

## 2017-07-10 RX ORDER — LISINOPRIL 20 MG/1
TABLET ORAL
Qty: 90 TABLET | Refills: 3 | Status: SHIPPED | OUTPATIENT
Start: 2017-07-10 | End: 2018-06-29 | Stop reason: SDUPTHER

## 2017-09-28 ENCOUNTER — TRANSCRIBE ORDERS (OUTPATIENT)
Dept: ADMINISTRATIVE | Facility: HOSPITAL | Age: 72
End: 2017-09-28

## 2017-09-28 DIAGNOSIS — R47.02 DYSPHASIA: Primary | ICD-10-CM

## 2017-10-03 ENCOUNTER — HOSPITAL ENCOUNTER (OUTPATIENT)
Dept: GENERAL RADIOLOGY | Facility: HOSPITAL | Age: 72
Discharge: HOME | End: 2017-10-03
Attending: OTOLARYNGOLOGY | Admitting: OTOLARYNGOLOGY

## 2017-10-03 DIAGNOSIS — R47.02 DYSPHASIA: ICD-10-CM

## 2017-10-03 PROCEDURE — 74220 X-RAY XM ESOPHAGUS 1CNTRST: CPT

## 2017-10-09 ENCOUNTER — OFFICE VISIT (OUTPATIENT)
Dept: ENDOCRINOLOGY | Age: 72
End: 2017-10-09

## 2017-10-09 VITALS
HEIGHT: 61 IN | SYSTOLIC BLOOD PRESSURE: 126 MMHG | BODY MASS INDEX: 42.67 KG/M2 | HEART RATE: 69 BPM | DIASTOLIC BLOOD PRESSURE: 62 MMHG | OXYGEN SATURATION: 96 % | WEIGHT: 226 LBS

## 2017-10-09 DIAGNOSIS — E11.21 TYPE 2 DIABETES WITH NEPHROPATHY (HCC): Primary | ICD-10-CM

## 2017-10-09 DIAGNOSIS — Z95.820 S/P ANGIOPLASTY WITH STENT: ICD-10-CM

## 2017-10-09 DIAGNOSIS — I10 ESSENTIAL HYPERTENSION: ICD-10-CM

## 2017-10-09 DIAGNOSIS — I25.10 CORONARY ARTERY DISEASE INVOLVING NATIVE CORONARY ARTERY OF NATIVE HEART WITHOUT ANGINA PECTORIS: ICD-10-CM

## 2017-10-09 DIAGNOSIS — E78.5 HYPERLIPIDEMIA, UNSPECIFIED HYPERLIPIDEMIA TYPE: ICD-10-CM

## 2017-10-09 DIAGNOSIS — R80.9 MICROALBUMINURIA: ICD-10-CM

## 2017-10-09 LAB
ALBUMIN SERPL-MCNC: 4.5 G/DL (ref 3.5–5.2)
ALBUMIN/GLOB SERPL: 1.7 G/DL
ALP SERPL-CCNC: 44 U/L (ref 39–117)
ALT SERPL-CCNC: 19 U/L (ref 1–41)
AST SERPL-CCNC: 12 U/L (ref 1–40)
BILIRUB SERPL-MCNC: 0.4 MG/DL (ref 0.1–1.2)
BUN SERPL-MCNC: 17 MG/DL (ref 8–23)
BUN/CREAT SERPL: 14.8 (ref 7–25)
CALCIUM SERPL-MCNC: 10.3 MG/DL (ref 8.6–10.5)
CHLORIDE SERPL-SCNC: 100 MMOL/L (ref 98–107)
CHOLEST SERPL-MCNC: 130 MG/DL (ref 0–200)
CO2 SERPL-SCNC: 26 MMOL/L (ref 22–29)
CREAT SERPL-MCNC: 1.15 MG/DL (ref 0.76–1.27)
GLOBULIN SER CALC-MCNC: 2.6 GM/DL
GLUCOSE SERPL-MCNC: 86 MG/DL (ref 65–99)
HBA1C MFR BLD: 7.6 % (ref 4.8–5.6)
HDLC SERPL-MCNC: 30 MG/DL (ref 40–60)
LDLC SERPL CALC-MCNC: 29 MG/DL (ref 0–100)
POTASSIUM SERPL-SCNC: 4.8 MMOL/L (ref 3.5–5.2)
PROT SERPL-MCNC: 7.1 G/DL (ref 6–8.5)
SODIUM SERPL-SCNC: 139 MMOL/L (ref 136–145)
TRIGL SERPL-MCNC: 354 MG/DL (ref 0–150)
VLDLC SERPL CALC-MCNC: 70.8 MG/DL (ref 5–40)

## 2017-10-09 PROCEDURE — 99214 OFFICE O/P EST MOD 30 MIN: CPT | Performed by: INTERNAL MEDICINE

## 2017-10-09 NOTE — PROGRESS NOTES
Subjective   Anthony Vaughn is a 72 y.o. male.     HPI Comments: F/u for dm 2,hyperlipidemia, cad/ testing bs 2-3 x day / last dm eye exam 11/2016/ last dm foot exam 531/17    Diabetes     Hyperlipidemia     Coronary Artery Disease   Symptoms include leg swelling ( right ankle ). Risk factors include hyperlipidemia.      Patient has known diabetes mellitus since 2006 and started on insulin in 2010. He is on Levemir 100 units every evening, NovoLog 80 units 3 times a day with meals and metformin 500 mg twice a day.  He has not used metformin 1000 mg before.  He has checking his blood sugar 2-3 times per day. -180. Ac lunch .  Ac supper 150-220. He has few hypoglycemic episodes.  His smallest meal is breakfast.   He has gained 3 pounds since 5/17.  He eats out half of the time.   His last meal was 8 AM      His last eye examination was in 11/16 and there is no retinopathy. He has microalbuminuria on urine sample taken last 5/17. He is on lisinopril. He has occasional numbness and stinging sensation in his toes but is not on medication.      He has known coronary artery disease and had multiple angioplasty and stents in the past. He denies any previous history of myocardial infarction. He has not had a coronary artery bypass surgery. He denies any chest pains or exertional dyspnea or PND.  He follows with Dr. Wray. He is on Imdur, lisinopril, metoprolol tartrate and nitroglycerin sublingual as needed.      He has hyperlipidemia and has been on Zocor 80 mg 1/2 tab once a day and Lovaza 1 capsule BID. He denies any myalgia.    He had a flu vaccine at the pharmacy.      The following portions of the patient's history were reviewed and updated as appropriate: allergies, current medications, past family history, past medical history, past social history, past surgical history and problem list.    Review of Systems   Constitutional: Negative.    HENT: Negative.    Eyes: Negative.    Respiratory: Negative.   "  Cardiovascular: Positive for leg swelling ( right ankle ).   Gastrointestinal: Negative.    Endocrine: Negative.    Genitourinary: Negative.    Musculoskeletal: Negative.    Skin: Negative.    Allergic/Immunologic: Negative.    Neurological: Negative.    Hematological: Bruises/bleeds easily ( on aspirin and plavix ).   Psychiatric/Behavioral: Negative.        Objective      Vitals:    10/09/17 1003   BP: 126/62   BP Location: Right arm   Patient Position: Sitting   Cuff Size: Large Adult   Pulse: 69   SpO2: 96%   Weight: 226 lb (103 kg)   Height: 61.3\" (155.7 cm)     Physical Exam   Constitutional: He is oriented to person, place, and time. He appears well-developed and well-nourished. No distress.   HENT:   Head: Normocephalic.   Nose: Nose normal.   Mouth/Throat: No oropharyngeal exudate.   Eyes: Conjunctivae and EOM are normal. Right eye exhibits no discharge. Left eye exhibits no discharge. No scleral icterus.   Neck: Neck supple. No JVD present. No tracheal deviation present. No thyromegaly present.   Cardiovascular: Normal rate, regular rhythm, normal heart sounds and intact distal pulses.  Exam reveals no gallop and no friction rub.    No murmur heard.  Pulmonary/Chest: Effort normal and breath sounds normal. No respiratory distress. He has no wheezes. He has no rales.   Abdominal: Soft. Bowel sounds are normal. He exhibits no distension and no mass. There is no tenderness.   Musculoskeletal: Normal range of motion. He exhibits no edema, tenderness or deformity.   Lymphadenopathy:     He has no cervical adenopathy.   Neurological: He is alert and oriented to person, place, and time. He has normal reflexes. He displays normal reflexes. Coordination normal.   Skin: Skin is warm and dry. No rash noted. No erythema. No pallor.   Psychiatric: He has a normal mood and affect. His behavior is normal.     Office Visit on 05/31/2017   Component Date Value Ref Range Status   • Creatinine, Urine 05/31/2017 208.7  Not " Estab. mg/dL Final   • Microalbumin, Urine 05/31/2017 88.9  Not Estab. ug/mL Final   • Microalbumin/Creatinine Ratio Urine 05/31/2017 42.6* 0.0 - 30.0 mg/g creat Final   • TSH 05/31/2017 1.530  0.270 - 4.200 mIU/mL Final   • Free T4 05/31/2017 0.96  0.93 - 1.70 ng/dL Final     Assessment/Plan   Anthony was seen today for diabetes, hyperlipidemia and coronary artery disease.    Diagnoses and all orders for this visit:    Type 2 diabetes with nephropathy  -     Comprehensive Metabolic Panel  -     Hemoglobin A1c    Microalbuminuria    Coronary artery disease involving native coronary artery of native heart without angina pectoris    S/P angioplasty with stent    Hyperlipidemia, unspecified hyperlipidemia type  -     Lipid Panel    Essential hypertension      Continue Levemir, NovoLog and metformin.  Continue lisinopril, Imdur, metoprolol tartrate and nitroglycerin as needed.  Continue Zocor 40 mg once a day.  Eye exam next month    Send copy of my notes and labs to Dr. Diaz    RTC 4 mos.

## 2017-10-23 ENCOUNTER — TELEPHONE (OUTPATIENT)
Dept: CARDIOLOGY | Facility: CLINIC | Age: 72
End: 2017-10-23

## 2017-10-23 NOTE — TELEPHONE ENCOUNTER
Jessica calling from Dr. Potter Office for surgery clearance for pt to have EGD performed due to pt having difficulty swallowing. Procedure will be performed on 11/21/17. Jessica requesting pt to hold Plavix and Aspirin 3 days prior to procedure. Please advise. ThanksFelipe

## 2017-11-17 ENCOUNTER — OFFICE VISIT (OUTPATIENT)
Dept: CARDIOLOGY | Facility: CLINIC | Age: 72
End: 2017-11-17

## 2017-11-17 VITALS
HEIGHT: 71 IN | BODY MASS INDEX: 31.64 KG/M2 | SYSTOLIC BLOOD PRESSURE: 146 MMHG | HEART RATE: 70 BPM | WEIGHT: 226 LBS | DIASTOLIC BLOOD PRESSURE: 76 MMHG

## 2017-11-17 DIAGNOSIS — E11.21 TYPE 2 DIABETES WITH NEPHROPATHY (HCC): ICD-10-CM

## 2017-11-17 DIAGNOSIS — I25.10 CORONARY ARTERY DISEASE INVOLVING NATIVE CORONARY ARTERY OF NATIVE HEART WITHOUT ANGINA PECTORIS: Primary | ICD-10-CM

## 2017-11-17 DIAGNOSIS — E78.5 HYPERLIPIDEMIA, UNSPECIFIED HYPERLIPIDEMIA TYPE: ICD-10-CM

## 2017-11-17 DIAGNOSIS — E66.01 MORBID OBESITY DUE TO EXCESS CALORIES (HCC): ICD-10-CM

## 2017-11-17 DIAGNOSIS — I10 ESSENTIAL HYPERTENSION: ICD-10-CM

## 2017-11-17 PROCEDURE — 99214 OFFICE O/P EST MOD 30 MIN: CPT | Performed by: NURSE PRACTITIONER

## 2017-11-17 PROCEDURE — 93000 ELECTROCARDIOGRAM COMPLETE: CPT | Performed by: NURSE PRACTITIONER

## 2017-11-17 NOTE — PROGRESS NOTES
Date of Office Visit: 2017  Encounter Provider: ELBA Phoenix  Place of Service: [facility] CARDIOLOGY  Patient Name: Anthony Vaughn  :1945    Chief Complaint   Patient presents with   • Coronary Artery Disease     6 mos follow up   :     HPI: Anhtony Vaughn is a 72 y.o. male comes in today for 6 month follow-up.  He is a patient of Dr. Wray.  I'm seeing him for the first time today and have reviewed his records.  He has a history of coronary disease,, diabetes, hyperlipidemia, hypertension and stroke.    As documented that he had an inferior MI in 2007.  He has multiple stents placed throughout in the LAD, RCA, proximal circumflex and JADEN.  The patient reports he thinks he has 10-11 stents.  The latest was placed in .    2017, he was admitted to the hospital with respiratory distress.  He had parainfluenza 3 virus.    He reportedly had strokelike symptoms in 2017    Today, he comes in for followup.  He denies palpitations or tachycardia.  He denies shortness of breath, edema or dizziness.  He denies any chest pain, chest pressure or angina-like pain.  He denies any orthopnea or PND.  He has really felt well but he is having some issues with a hiatal hernia and is scheduled to have an EGD next week.  He has also had difficulty swallowing.  He also feels he may have an issue with his rotator cuff.          Past Medical History:   Diagnosis Date   • Atherosclerosis of native coronary artery without angina pectoris    • Chest pain    • Coronary artery disease    • Diabetes mellitus    • Difficulty breathing    • Dizziness    • ED (erectile dysfunction)    • Hyperlipidemia    • Hypertension    • Obesity    • S/P angioplasty with stent    • Stroke    • Type 2 diabetes mellitus        Past Surgical History:   Procedure Laterality Date   • COLONOSCOPY W/ POLYPECTOMY      2 benign polyps.   • CORONARY ANGIOPLASTY WITH STENT PLACEMENT        "[facility]   • CORONARY ANGIOPLASTY WITH STENT PLACEMENT  2015   • EYE SURGERY     • FRACTURE SURGERY             Review of Systems   Constitution: Negative for fever and malaise/fatigue.   HENT: Negative for ear pain, hearing loss, nosebleeds and sore throat.    Eyes: Negative for double vision, pain, vision loss in left eye, vision loss in right eye and visual disturbance.   Cardiovascular: Negative for claudication and leg swelling.   Respiratory: Negative for cough, snoring and wheezing.    Endocrine: Negative for cold intolerance, heat intolerance and polyuria.   Skin: Negative for color change, itching and rash.   Musculoskeletal: Negative for joint pain, joint swelling and muscle cramps.   Gastrointestinal: Negative for abdominal pain, diarrhea, melena, nausea and vomiting.   Genitourinary: Negative for bladder incontinence and hematuria.   Neurological: Negative for excessive daytime sleepiness, dizziness, light-headedness, paresthesias and seizures.   Psychiatric/Behavioral: Negative for depression. The patient is not nervous/anxious.    All other systems reviewed and are negative.    All other systems reviewed and are negative    No Known Allergies    All aspects of family and social history reviewed.          Objective:     Vitals:    11/17/17 0807 11/17/17 0828   BP: 144/92 146/76   BP Location: Left arm Left arm   Pulse: 70    Weight: 226 lb (103 kg)    Height: 71\" (180.3 cm)      Body mass index is 31.52 kg/(m^2).    PHYSICAL EXAM:  Physical Exam   Constitutional: He is oriented to person, place, and time. He appears well-developed and well-nourished.   obese   HENT:   Head: Normocephalic and atraumatic.   Neck: Neck supple. No JVD present.   Cardiovascular: Normal rate, regular rhythm, normal heart sounds and intact distal pulses.    Pulses:       Carotid pulses are 2+ on the right side, and 2+ on the left side.       Radial pulses are 2+ on the right side, and 2+ on the left side.        Dorsalis pedis " pulses are 2+ on the right side, and 2+ on the left side.   Pulmonary/Chest: Effort normal and breath sounds normal. No accessory muscle usage. No respiratory distress. He has no rales.   Abdominal: Soft. Normal appearance and bowel sounds are normal. There is no tenderness.   Musculoskeletal: Normal range of motion. He exhibits no edema.   Neurological: He is alert and oriented to person, place, and time.   Skin: Skin is warm, dry and intact. He is not diaphoretic.   Psychiatric: He has a normal mood and affect. His speech is normal and behavior is normal. Judgment and thought content normal. Cognition and memory are normal.         ECG 12 Lead  Date/Time: 11/17/2017 8:39 AM  Performed by: EVONNE JACOBS  Authorized by: EVONNE JACOBS   Comparison: compared with previous ECG   Rhythm: sinus rhythm  BPM: 70  Conduction: conduction normal  T wave depression noted on lead: Nonspecific T wave changes.  Clinical impression: abnormal ECG  Comments: Indication: Coronary disease                Assessment:       Diagnosis Plan   1. Coronary artery disease involving native coronary artery of native heart without angina pectoris     2. Hyperlipidemia, unspecified hyperlipidemia type     3. Essential hypertension     4. Morbid obesity due to excess calories     5. Type 2 diabetes with nephropathy          Orders Placed This Encounter   Procedures   • ECG 12 Lead     This order was created via procedure documentation       Current Outpatient Prescriptions   Medication Sig Dispense Refill   • acyclovir (ZOVIRAX) 400 MG tablet Take 400 mg by mouth As Needed. Take no more than 5 doses a day.     • aspirin 81 MG EC tablet Take 81 mg by mouth daily.     • SALIMA MICROLET LANCETS lancets      • brimonidine (ALPHAGAN) 0.15 % ophthalmic solution Apply 1 drop to eye 2 (two) times a day.     • clopidogrel (PLAVIX) 75 MG tablet Take 75 mg by mouth daily.     • insulin aspart (NOVOLOG FLEXPEN) 100 UNIT/ML solution pen-injector sc pen  INJECT 65 UNITS IN THE MORNING 65 UNITS AT LUNCH AND 68 UNITS AT DINNER (Patient taking differently: INJECT 80 UNITS IN THE MORNING 80 UNITS AT LUNCH AND 80 UNITS AT DINNER)     • insulin detemir (LEVEMIR) 100 UNIT/ML injection INJECT 110 UNITS AT NIGHT (Patient taking differently: INJECT 100 UNITS AT NIGHT)     • isosorbide mononitrate (IMDUR) 30 MG 24 hr tablet Take 30 mg by mouth daily.     • latanoprost (XALATAN) 0.005 % ophthalmic solution Apply 1 drop to eye daily.     • lisinopril (PRINIVIL,ZESTRIL) 20 MG tablet TAKE 1 TABLET DAILY AS DIRECTED. 90 tablet 3   • metoprolol tartrate (LOPRESSOR) 25 MG tablet TAKE 1 TABLET BY MOUTH 2 TIMES DAILY 180 tablet 1   • nitroglycerin (NITROSTAT) 0.4 MG SL tablet Place 0.4 mg under the tongue.     • Omega 3 1000 MG capsule Take 1 capsule by mouth 2 (Two) Times a Day.     • Omeprazole 20 MG tablet delayed-release Take 1 tablet/day by mouth daily.     • pilocarpine (PILOCAR) 1 % ophthalmic solution 1 drop 4 (Four) Times a Day.     • simvastatin (ZOCOR) 80 MG tablet Take 40 mg by mouth every night.     • timolol (TIMOPTIC) 0.5 % ophthalmic solution 1 drop daily. INSTILL 1 DROP IN LEFT EYE EVERY MORNING AS DIRECTED  6   • metFORMIN (GLUCOPHAGE) 1000 MG tablet Take 1 tablet by mouth 2 (Two) Times a Day With Meals. (Patient taking differently: Take 500 mg by mouth 2 (Two) Times a Day With Meals.) 180 tablet 1     No current facility-administered medications for this visit.             Plan:       1. Coronary disease; status post multiple angioplasties with stenting in the past.  History of myocardial infarction in the past.  He currently remains on aspirin and clopidogrel.  Needs weight loss.  He denies angina at this time.  EKG is stable.    2. Hyperlipidemia; followed by Dr. Diaz.  Last checked in 10/2017 with his total cholesterol 130, HDL 30, and LDL 29.  He currently takes simvastatin.    3. Hypertension; his blood pressure is slightly high here today but his readings  throughout the year have been stable.  The patient is to monitor his blood pressures and call if he has readings greater than 130/80.  4. Morbid obesity.  Advised weight loss.   5. Diabetes; follows with primary care provider.  Hemoglobin A1c is improved.      Followup with Dr. Wray in six months.       As always, it has been a pleasure to participate in this patient's care.      Sincerely,      ELBA Phoenix

## 2017-11-21 ENCOUNTER — ON CAMPUS - OUTPATIENT (OUTPATIENT)
Dept: URBAN - METROPOLITAN AREA HOSPITAL 114 | Facility: HOSPITAL | Age: 72
End: 2017-11-21
Payer: COMMERCIAL

## 2017-11-21 ENCOUNTER — ANESTHESIA (OUTPATIENT)
Dept: GASTROENTEROLOGY | Facility: HOSPITAL | Age: 72
End: 2017-11-21

## 2017-11-21 ENCOUNTER — ANESTHESIA EVENT (OUTPATIENT)
Dept: GASTROENTEROLOGY | Facility: HOSPITAL | Age: 72
End: 2017-11-21

## 2017-11-21 ENCOUNTER — HOSPITAL ENCOUNTER (OUTPATIENT)
Facility: HOSPITAL | Age: 72
Setting detail: HOSPITAL OUTPATIENT SURGERY
Discharge: HOME | End: 2017-11-21
Attending: INTERNAL MEDICINE | Admitting: INTERNAL MEDICINE

## 2017-11-21 VITALS
TEMPERATURE: 98.1 F | SYSTOLIC BLOOD PRESSURE: 155 MMHG | OXYGEN SATURATION: 96 % | WEIGHT: 223.9 LBS | HEIGHT: 71 IN | DIASTOLIC BLOOD PRESSURE: 90 MMHG | RESPIRATION RATE: 14 BRPM | BODY MASS INDEX: 31.35 KG/M2 | HEART RATE: 73 BPM

## 2017-11-21 DIAGNOSIS — R13.14 DYSPHAGIA, PHARYNGOESOPHAGEAL PHASE: ICD-10-CM

## 2017-11-21 DIAGNOSIS — F45.8 OTHER SOMATOFORM DISORDERS: ICD-10-CM

## 2017-11-21 DIAGNOSIS — K22.2 ESOPHAGEAL OBSTRUCTION: ICD-10-CM

## 2017-11-21 DIAGNOSIS — K21.0 GASTRO-ESOPHAGEAL REFLUX DISEASE WITH ESOPHAGITIS: ICD-10-CM

## 2017-11-21 DIAGNOSIS — R09.89 GLOBUS SENSATION: ICD-10-CM

## 2017-11-21 LAB — GLUCOSE BLDC GLUCOMTR-MCNC: 145 MG/DL (ref 70–130)

## 2017-11-21 PROCEDURE — 88305 TISSUE EXAM BY PATHOLOGIST: CPT | Performed by: INTERNAL MEDICINE

## 2017-11-21 PROCEDURE — 25010000002 PROPOFOL 1000 MG/ML EMULSION: Performed by: ANESTHESIOLOGY

## 2017-11-21 PROCEDURE — 88312 SPECIAL STAINS GROUP 1: CPT | Performed by: INTERNAL MEDICINE

## 2017-11-21 PROCEDURE — 43239 EGD BIOPSY SINGLE/MULTIPLE: CPT | Performed by: INTERNAL MEDICINE

## 2017-11-21 PROCEDURE — 43450 DILATE ESOPHAGUS 1/MULT PASS: CPT | Performed by: INTERNAL MEDICINE

## 2017-11-21 PROCEDURE — 25010000002 PROPOFOL 10 MG/ML EMULSION: Performed by: ANESTHESIOLOGY

## 2017-11-21 PROCEDURE — 82962 GLUCOSE BLOOD TEST: CPT

## 2017-11-21 RX ORDER — LIDOCAINE HYDROCHLORIDE 20 MG/ML
INJECTION, SOLUTION INFILTRATION; PERINEURAL AS NEEDED
Status: DISCONTINUED | OUTPATIENT
Start: 2017-11-21 | End: 2017-11-21 | Stop reason: SURG

## 2017-11-21 RX ORDER — SODIUM CHLORIDE, SODIUM LACTATE, POTASSIUM CHLORIDE, CALCIUM CHLORIDE 600; 310; 30; 20 MG/100ML; MG/100ML; MG/100ML; MG/100ML
1000 INJECTION, SOLUTION INTRAVENOUS CONTINUOUS PRN
Status: DISCONTINUED | OUTPATIENT
Start: 2017-11-21 | End: 2017-11-21 | Stop reason: HOSPADM

## 2017-11-21 RX ORDER — PROPOFOL 10 MG/ML
VIAL (ML) INTRAVENOUS AS NEEDED
Status: DISCONTINUED | OUTPATIENT
Start: 2017-11-21 | End: 2017-11-21 | Stop reason: SURG

## 2017-11-21 RX ADMIN — PROPOFOL 160 MG: 10 INJECTION, EMULSION INTRAVENOUS at 09:40

## 2017-11-21 RX ADMIN — LIDOCAINE HYDROCHLORIDE 50 MG: 20 INJECTION, SOLUTION INFILTRATION; PERINEURAL at 09:40

## 2017-11-21 RX ADMIN — PROPOFOL 160 MCG/KG/MIN: 10 INJECTION, EMULSION INTRAVENOUS at 09:40

## 2017-11-21 RX ADMIN — SODIUM CHLORIDE, POTASSIUM CHLORIDE, SODIUM LACTATE AND CALCIUM CHLORIDE 1000 ML: 600; 310; 30; 20 INJECTION, SOLUTION INTRAVENOUS at 09:22

## 2017-11-21 NOTE — ANESTHESIA PREPROCEDURE EVALUATION
Anesthesia Evaluation     Patient summary reviewed   NPO Solid Status: > 8 hours  NPO Liquid Status: > 8 hours     Airway   Mallampati: II  TM distance: >3 FB  Dental      Pulmonary    Cardiovascular     ECG reviewed  PT is on anticoagulation therapy  Rhythm: regular  Rate: normal    (+) hypertension, CAD, cardiac stents hyperlipidemia      Neuro/Psych  (+) CVA, dizziness/light headedness, numbness,    GI/Hepatic/Renal/Endo    (+) obesity,  diabetes mellitus using insulin,     Musculoskeletal     Abdominal    Substance History      OB/GYN          Other                                        Anesthesia Plan    ASA 4     MAC   total IV anesthesia  Anesthetic plan and risks discussed with patient.

## 2017-11-21 NOTE — H&P
Patient Care Team:  Gricel Diaz MD as PCP - General  Gricel Diaz MD as PCP - Family Medicine  Anibal rWay MD as Consulting Physician (Cardiology)  Guillermo Live MD as Consulting Physician  Norman Mahmood MD as Consulting Physician (Ophthalmology)    Chief complaint  Globus ,  dysphagia    Subjective     History of Present Illness  Some food catches, feels lump at times.   Feels like food hesitates in esophagus,  Has to be cautious with meat ingestion, pills sometimes catch in the esophagus   Review of Systems   HENT: Positive for trouble swallowing.    All other systems reviewed and are negative.       Past Medical History:   Diagnosis Date   • Atherosclerosis of native coronary artery without angina pectoris    • Chest pain    • Coronary artery disease    • Diabetes mellitus    • Difficulty breathing    • Dizziness    • ED (erectile dysfunction)    • Hyperlipidemia    • Hypertension    • Obesity    • S/P angioplasty with stent    • Stroke    • Type 2 diabetes mellitus      Past Surgical History:   Procedure Laterality Date   • COLONOSCOPY W/ POLYPECTOMY  2013    2 benign polyps.   • CORONARY ANGIOPLASTY WITH STENT PLACEMENT      Dr. Wray   • CORONARY ANGIOPLASTY WITH STENT PLACEMENT  2015   • EYE SURGERY     • FRACTURE SURGERY       Family History   Problem Relation Age of Onset   • Diabetes Mother    • Heart disease Sister    • Rheumatic fever Sister      Social History   Substance Use Topics   • Smoking status: Never Smoker   • Smokeless tobacco: Never Used      Comment: no caffeine   • Alcohol use No     Prescriptions Prior to Admission   Medication Sig Dispense Refill Last Dose   • aspirin 81 MG EC tablet Take 81 mg by mouth daily.   11/20/2017 at Unknown time   • SALIMA MICROLET LANCETS lancets    11/20/2017 at Unknown time   • brimonidine (ALPHAGAN) 0.15 % ophthalmic solution Apply 1 drop to eye 2 (two) times a day.   11/20/2017 at Unknown time   • insulin aspart (NOVOLOG FLEXPEN)  100 UNIT/ML solution pen-injector sc pen INJECT 65 UNITS IN THE MORNING 65 UNITS AT LUNCH AND 68 UNITS AT DINNER (Patient taking differently: INJECT 80 UNITS IN THE MORNING 80 UNITS AT LUNCH AND 80 UNITS AT DINNER)   11/20/2017 at Unknown time   • insulin detemir (LEVEMIR) 100 UNIT/ML injection INJECT 110 UNITS AT NIGHT (Patient taking differently: INJECT 100 UNITS AT NIGHT)   11/20/2017 at Unknown time   • isosorbide mononitrate (IMDUR) 30 MG 24 hr tablet Take 30 mg by mouth daily.   11/20/2017 at Unknown time   • latanoprost (XALATAN) 0.005 % ophthalmic solution Apply 1 drop to eye daily.   11/20/2017 at Unknown time   • lisinopril (PRINIVIL,ZESTRIL) 20 MG tablet TAKE 1 TABLET DAILY AS DIRECTED. 90 tablet 3 11/20/2017 at Unknown time   • metFORMIN (GLUCOPHAGE) 1000 MG tablet Take 1 tablet by mouth 2 (Two) Times a Day With Meals. (Patient taking differently: Take 500 mg by mouth 2 (Two) Times a Day With Meals.) 180 tablet 1 11/20/2017 at Unknown time   • metoprolol tartrate (LOPRESSOR) 25 MG tablet TAKE 1 TABLET BY MOUTH 2 TIMES DAILY 180 tablet 1 11/20/2017 at Unknown time   • Omega 3 1000 MG capsule Take 1 capsule by mouth 2 (Two) Times a Day.   11/20/2017 at Unknown time   • Omeprazole 20 MG tablet delayed-release Take 1 tablet/day by mouth daily.   11/20/2017 at Unknown time   • pilocarpine (PILOCAR) 1 % ophthalmic solution 1 drop 4 (Four) Times a Day.   11/20/2017 at Unknown time   • simvastatin (ZOCOR) 80 MG tablet Take 40 mg by mouth every night.   11/20/2017 at Unknown time   • timolol (TIMOPTIC) 0.5 % ophthalmic solution 1 drop daily. INSTILL 1 DROP IN LEFT EYE EVERY MORNING AS DIRECTED  6 11/20/2017 at Unknown time   • acyclovir (ZOVIRAX) 400 MG tablet Take 400 mg by mouth As Needed. Take no more than 5 doses a day.   Unknown at Unknown time   • clopidogrel (PLAVIX) 75 MG tablet Take 75 mg by mouth daily.   11/17/2017   • nitroglycerin (NITROSTAT) 0.4 MG SL tablet Place 0.4 mg under the tongue.   Unknown  at Unknown time     Allergies:  Review of patient's allergies indicates no known allergies.    Objective      Vital Signs  Temp:  [98.7 °F (37.1 °C)] 98.7 °F (37.1 °C)  Heart Rate:  [69] 69  Resp:  [18] 18  BP: (166)/(84) 166/84    Physical Exam   Constitutional: He is oriented to person, place, and time. He appears well-developed and well-nourished.   HENT:   Mouth/Throat: Oropharynx is clear and moist.   Eyes: Conjunctivae are normal.   Neck: Neck supple.   Cardiovascular: Normal rate and regular rhythm.    Pulmonary/Chest: Effort normal and breath sounds normal.   Abdominal: Soft. Bowel sounds are normal.   Neurological: He is alert and oriented to person, place, and time.   Skin: Skin is warm and dry.       Results Review:   I reviewed the patient's new clinical results.  I reviewed the patient's other test results and agree with the interpretation      Assessment/Plan     Active Problems:    * No active hospital problems. *      Assessment:  (Globus sensation  Dysphagia   Suspected esophagitis ).     Plan:   (Upper tract endoscopy with dilation , risks, alternatives and benefits discussed with patient and patient is agreeable to proceed).       I discussed the patients findings and my recommendations with patient and nursing staff    Wisam Potter MD  11/21/17  9:39 AM

## 2017-11-21 NOTE — PLAN OF CARE
Problem: Patient Care Overview (Adult)  Goal: Plan of Care Review  Outcome: Ongoing (interventions implemented as appropriate)    11/21/17 0853   Coping/Psychosocial Response Interventions   Plan Of Care Reviewed With patient   Patient Care Overview   Progress no change       Goal: Adult Individualization and Mutuality  Outcome: Ongoing (interventions implemented as appropriate)  Goal: Discharge Needs Assessment  Outcome: Ongoing (interventions implemented as appropriate)    11/21/17 0853   Discharge Needs Assessment   Concerns To Be Addressed no discharge needs identified   Discharge Disposition home or self-care   Living Environment   Transportation Available family or friend will provide;car         Problem: GI Endoscopy (Adult)  Goal: Signs and Symptoms of Listed Potential Problems Will be Absent or Manageable (GI Endoscopy)    11/21/17 0853   GI Endoscopy   Problems Assessed (GI Endoscopy) all   Problems Present (GI Endoscopy) none

## 2017-11-21 NOTE — ANESTHESIA POSTPROCEDURE EVALUATION
"Patient: Anthony Vaughn    Procedure Summary     Date Anesthesia Start Anesthesia Stop Room / Location    11/21/17 0935 0952  [facility]       Procedure Diagnosis Surgeon Provider    ESOPHAGOGASTRODUODENOSCOPY WITH COLD BIOPSIES AND 53 F MICHAEL DILATION (N/A Esophagus) No diagnosis on file. MD Galilea Marroquin MD          Anesthesia Type: MAC  Last vitals  BP   131/66 (11/21/17 0956)   Temp   37.1 °C (98.7 °F) (11/21/17 0912)   Pulse   73 (11/21/17 0956)   Resp   14 (11/21/17 0956)     SpO2   97 % (11/21/17 0956)     Post Anesthesia Care and Evaluation    Patient location during evaluation: bedside  Patient participation: complete - patient participated  Level of consciousness: awake and alert  Pain management: adequate  Airway patency: patent  Anesthetic complications: No anesthetic complications  PONV Status: none  Cardiovascular status: acceptable  Respiratory status: acceptable  Hydration status: acceptable    Comments: /66 (BP Location: Left arm, Patient Position: Lying)  Pulse 73  Temp 37.1 °C (98.7 °F) (Oral)   Resp 14  Ht 71\" (180.3 cm)  Wt 223 lb 14.4 oz (102 kg)  SpO2 97%  BMI 31.23 kg/m2        "

## 2017-11-22 LAB
CYTO UR: NORMAL
LAB AP CASE REPORT: NORMAL
Lab: NORMAL
PATH REPORT.FINAL DX SPEC: NORMAL
PATH REPORT.GROSS SPEC: NORMAL

## 2018-01-14 ENCOUNTER — APPOINTMENT (OUTPATIENT)
Dept: GENERAL RADIOLOGY | Facility: HOSPITAL | Age: 73
End: 2018-01-14

## 2018-01-14 ENCOUNTER — HOSPITAL ENCOUNTER (EMERGENCY)
Facility: HOSPITAL | Age: 73
Discharge: HOME | End: 2018-01-14
Attending: FAMILY MEDICINE | Admitting: FAMILY MEDICINE

## 2018-01-14 VITALS
SYSTOLIC BLOOD PRESSURE: 142 MMHG | WEIGHT: 223 LBS | HEIGHT: 72 IN | DIASTOLIC BLOOD PRESSURE: 80 MMHG | TEMPERATURE: 99.1 F | OXYGEN SATURATION: 93 % | BODY MASS INDEX: 30.2 KG/M2 | RESPIRATION RATE: 18 BRPM | HEART RATE: 94 BPM

## 2018-01-14 DIAGNOSIS — J10.1 INFLUENZA A: Primary | ICD-10-CM

## 2018-01-14 LAB
ALBUMIN SERPL-MCNC: 4.2 G/DL (ref 3.5–5.2)
ALBUMIN/GLOB SERPL: 1.4 G/DL
ALP SERPL-CCNC: 45 U/L (ref 39–117)
ALT SERPL W P-5'-P-CCNC: 16 U/L (ref 1–41)
ANION GAP SERPL CALCULATED.3IONS-SCNC: 20.2 MMOL/L
AST SERPL-CCNC: 15 U/L (ref 1–40)
BASOPHILS # BLD AUTO: 0.05 10*3/MM3 (ref 0–0.2)
BASOPHILS NFR BLD AUTO: 0.3 % (ref 0–1.5)
BILIRUB SERPL-MCNC: 0.8 MG/DL (ref 0.1–1.2)
BUN BLD-MCNC: 14 MG/DL (ref 8–23)
BUN/CREAT SERPL: 13.5 (ref 7–25)
CALCIUM SPEC-SCNC: 8.7 MG/DL (ref 8.6–10.5)
CHLORIDE SERPL-SCNC: 90 MMOL/L (ref 98–107)
CO2 SERPL-SCNC: 20.8 MMOL/L (ref 22–29)
CREAT BLD-MCNC: 1.04 MG/DL (ref 0.76–1.27)
D-LACTATE SERPL-SCNC: 1.6 MMOL/L (ref 0.5–2)
DEPRECATED RDW RBC AUTO: 41.3 FL (ref 37–54)
EOSINOPHIL # BLD AUTO: 0.07 10*3/MM3 (ref 0–0.7)
EOSINOPHIL NFR BLD AUTO: 0.4 % (ref 0.3–6.2)
ERYTHROCYTE [DISTWIDTH] IN BLOOD BY AUTOMATED COUNT: 13 % (ref 11.5–14.5)
FLUAV AG NPH QL: POSITIVE
FLUBV AG NPH QL IA: NEGATIVE
GFR SERPL CREATININE-BSD FRML MDRD: 70 ML/MIN/1.73
GLOBULIN UR ELPH-MCNC: 3 GM/DL
GLUCOSE BLD-MCNC: 297 MG/DL (ref 65–99)
GLUCOSE BLDC GLUCOMTR-MCNC: 283 MG/DL (ref 70–130)
HCT VFR BLD AUTO: 48.4 % (ref 40.4–52.2)
HGB BLD-MCNC: 16.7 G/DL (ref 13.7–17.6)
IMM GRANULOCYTES # BLD: 0.03 10*3/MM3 (ref 0–0.03)
IMM GRANULOCYTES NFR BLD: 0.2 % (ref 0–0.5)
LYMPHOCYTES # BLD AUTO: 0.88 10*3/MM3 (ref 0.9–4.8)
LYMPHOCYTES NFR BLD AUTO: 5.1 % (ref 19.6–45.3)
MCH RBC QN AUTO: 30.6 PG (ref 27–32.7)
MCHC RBC AUTO-ENTMCNC: 34.5 G/DL (ref 32.6–36.4)
MCV RBC AUTO: 88.6 FL (ref 79.8–96.2)
MONOCYTES # BLD AUTO: 1.13 10*3/MM3 (ref 0.2–1.2)
MONOCYTES NFR BLD AUTO: 6.5 % (ref 5–12)
NEUTROPHILS # BLD AUTO: 15.24 10*3/MM3 (ref 1.9–8.1)
NEUTROPHILS NFR BLD AUTO: 87.5 % (ref 42.7–76)
PLATELET # BLD AUTO: 265 10*3/MM3 (ref 140–500)
PMV BLD AUTO: 9.4 FL (ref 6–12)
POTASSIUM BLD-SCNC: 4 MMOL/L (ref 3.5–5.2)
PROCALCITONIN SERPL-MCNC: 0.16 NG/ML (ref 0.1–0.25)
PROT SERPL-MCNC: 7.2 G/DL (ref 6–8.5)
RBC # BLD AUTO: 5.46 10*6/MM3 (ref 4.6–6)
SODIUM BLD-SCNC: 131 MMOL/L (ref 136–145)
WBC NRBC COR # BLD: 17.4 10*3/MM3 (ref 4.5–10.7)

## 2018-01-14 PROCEDURE — 71046 X-RAY EXAM CHEST 2 VIEWS: CPT

## 2018-01-14 PROCEDURE — 84145 PROCALCITONIN (PCT): CPT | Performed by: FAMILY MEDICINE

## 2018-01-14 PROCEDURE — 85025 COMPLETE CBC W/AUTO DIFF WBC: CPT | Performed by: FAMILY MEDICINE

## 2018-01-14 PROCEDURE — 99284 EMERGENCY DEPT VISIT MOD MDM: CPT

## 2018-01-14 PROCEDURE — 87804 INFLUENZA ASSAY W/OPTIC: CPT | Performed by: FAMILY MEDICINE

## 2018-01-14 PROCEDURE — 80053 COMPREHEN METABOLIC PANEL: CPT | Performed by: FAMILY MEDICINE

## 2018-01-14 PROCEDURE — 82962 GLUCOSE BLOOD TEST: CPT

## 2018-01-14 PROCEDURE — 83605 ASSAY OF LACTIC ACID: CPT | Performed by: FAMILY MEDICINE

## 2018-01-14 RX ORDER — ACETAMINOPHEN 500 MG
1000 TABLET ORAL ONCE
Status: DISCONTINUED | OUTPATIENT
Start: 2018-01-14 | End: 2018-01-14

## 2018-01-14 RX ORDER — ACETAMINOPHEN 500 MG
1000 TABLET ORAL ONCE
Status: COMPLETED | OUTPATIENT
Start: 2018-01-14 | End: 2018-01-14

## 2018-01-14 RX ADMIN — ACETAMINOPHEN 1000 MG: 500 TABLET ORAL at 18:46

## 2018-01-14 NOTE — ED NOTES
Pt c/o cough, congestion, fever, and body aches since yesterday. Pt has been taking his wife's cefdinir since last night.      Josseline Oliva RN  01/14/18 5519

## 2018-01-15 NOTE — ED PROVIDER NOTES
EMERGENCY DEPARTMENT ENCOUNTER    CHIEF COMPLAINT  Chief Complaint: Flu-like symptoms  History given by: Patient  History limited by: Nothing  Room Number: 49/49  PMD: Gricel Diaz MD      HPI:  Pt is a 73 y.o. male who presents complaining of flu-like symptoms onset 5 days ago. Pt reports rhinorrhea, sore throat, productive cough with yellow sputum, intermittent SOA, nausea, and vomiting. Pt denies diarrhea and CP.    Duration: 5 days  Onset: Gradual  Timing: Constant  Radiation: None  Quality: Flu-like symptoms  Intensity/Severity: Moderate  Progression: Unchanged  Associated Symptoms: Rhinorrhea, sore throat, productive cough with yellow sputum, intermittent SOA, nausea, and vomiting  Aggravating Factors: None stated  Alleviating Factors: None stated  Previous Episodes: None  Treatment before arrival: Nothing    PAST MEDICAL HISTORY  Active Ambulatory Problems     Diagnosis Date Noted   • Coronary artery disease involving native coronary artery without angina pectoris    • S/P angioplasty with stent    • Hyperlipidemia    • Essential hypertension    • Type 2 diabetes with nephropathy 03/04/2016   • Microalbuminuria 03/04/2016   • DM type 2 with diabetic peripheral neuropathy 03/04/2016   • Morbid obesity due to excess calories 03/07/2017     Resolved Ambulatory Problems     Diagnosis Date Noted   • Pneumonia 04/14/2017   • Acute bronchitis 04/14/2017   • Acute hypoxemic respiratory failure 04/14/2017   • Infection due to parainfluenza virus 3 04/15/2017     Past Medical History:   Diagnosis Date   • Atherosclerosis of native coronary artery without angina pectoris    • Chest pain    • Coronary artery disease    • Diabetes mellitus    • Difficulty breathing    • Dizziness    • ED (erectile dysfunction)    • Hyperlipidemia    • Hypertension    • Obesity    • S/P angioplasty with stent    • Stroke    • Type 2 diabetes mellitus        PAST SURGICAL HISTORY  Past Surgical History:   Procedure Laterality Date   •  COLONOSCOPY W/ POLYPECTOMY  2013    2 benign polyps.   • CORONARY ANGIOPLASTY WITH STENT PLACEMENT      Dr. Wray   • CORONARY ANGIOPLASTY WITH STENT PLACEMENT  2015   • ENDOSCOPY N/A 11/21/2017    Procedure: ESOPHAGOGASTRODUODENOSCOPY WITH COLD BIOPSIES AND 53 F MICHAEL DILATION;  Surgeon: Wisam Potter MD;  Location: [facility] ENDOSCOPY;  Service:    • EYE SURGERY     • FRACTURE SURGERY         FAMILY HISTORY  Family History   Problem Relation Age of Onset   • Diabetes Mother    • Heart disease Sister    • Rheumatic fever Sister        SOCIAL HISTORY  Social History     Social History   • Marital status:      Spouse name: N/A   • Number of children: N/A   • Years of education: N/A     Occupational History   • Not on file.     Social History Main Topics   • Smoking status: Never Smoker   • Smokeless tobacco: Never Used      Comment: no caffeine   • Alcohol use No   • Drug use: No   • Sexual activity: Defer     Other Topics Concern   • Not on file     Social History Narrative       ALLERGIES  Review of patient's allergies indicates no known allergies.    REVIEW OF SYSTEMS  Review of Systems   Constitutional: Negative for activity change, appetite change and fever.   HENT: Positive for rhinorrhea and sore throat. Negative for congestion.    Eyes: Negative.    Respiratory: Positive for cough (Productive with yellow sputum) and shortness of breath (Intermittent).    Cardiovascular: Negative for chest pain and leg swelling.   Gastrointestinal: Positive for nausea and vomiting. Negative for abdominal pain and diarrhea.   Endocrine: Negative.    Genitourinary: Negative for decreased urine volume and dysuria.   Musculoskeletal: Negative for neck pain.   Skin: Negative for rash and wound.   Allergic/Immunologic: Negative.    Neurological: Negative for weakness, numbness and headaches.   Hematological: Negative.    Psychiatric/Behavioral: Negative.    All other systems reviewed and are negative.      PHYSICAL EXAM  ED  Triage Vitals   Temp Heart Rate Resp BP SpO2   01/14/18 1835 01/14/18 1835 01/14/18 1835 01/14/18 1841 01/14/18 1835   101.9 °F (38.8 °C) 114 18 176/80 92 %      Temp src Heart Rate Source Patient Position BP Location FiO2 (%)   01/14/18 1835 01/14/18 1835 01/14/18 1841 01/14/18 1841 --   Tympanic Monitor Sitting Left arm        Physical Exam   Constitutional: He is oriented to person, place, and time and well-developed, well-nourished, and in no distress.   HENT:   Head: Normocephalic and atraumatic.   Eyes: EOM are normal. Pupils are equal, round, and reactive to light.   Neck: Normal range of motion. Neck supple.   Cardiovascular: Normal rate, regular rhythm and normal heart sounds.    Pulmonary/Chest: Effort normal. No respiratory distress. He has rales (Bilateral).   Abdominal: Soft. There is no tenderness. There is no rebound and no guarding.   Musculoskeletal: Normal range of motion. He exhibits no edema.   Neurological: He is alert and oriented to person, place, and time. He has normal sensation and normal strength.   Skin: Skin is warm and dry.   Psychiatric: Mood and affect normal.   Nursing note and vitals reviewed.      LAB RESULTS  Lab Results (last 24 hours)     Procedure Component Value Units Date/Time    Influenza Antigen, Rapid - Swab, Nasopharynx [[id number]]  (Abnormal) Collected:  01/14/18 1850    Specimen:  Swab from Nasopharynx Updated:  01/14/18 1917     Influenza A Ag, EIA Positive (A)     Influenza B Ag, EIA Negative    CBC & Differential [[id number]] Collected:  01/14/18 2059    Specimen:  Blood Updated:  01/14/18 2114    Narrative:       The following orders were created for panel order CBC & Differential.  Procedure                               Abnormality         Status                     ---------                               -----------         ------                     CBC Auto Differential[[id number]]        Abnormal            Final result                 Please view results for  these tests on the individual orders.    Comprehensive Metabolic Panel [[id number]]  (Abnormal) Collected:  01/14/18 2059    Specimen:  Blood Updated:  01/14/18 2138     Glucose 297 (H) mg/dL      BUN 14 mg/dL      Creatinine 1.04 mg/dL      Sodium 131 (L) mmol/L      Potassium 4.0 mmol/L      Chloride 90 (L) mmol/L      CO2 20.8 (L) mmol/L      Calcium 8.7 mg/dL      Total Protein 7.2 g/dL      Albumin 4.20 g/dL      ALT (SGPT) 16 U/L      AST (SGOT) 15 U/L      Alkaline Phosphatase 45 U/L      Total Bilirubin 0.8 mg/dL      eGFR Non African Amer 70 mL/min/1.73      Globulin 3.0 gm/dL      A/G Ratio 1.4 g/dL      BUN/Creatinine Ratio 13.5     Anion Gap 20.2 mmol/L     Narrative:       The MDRD GFR formula is only valid for adults with stable renal function between ages 18 and 70.    CBC Auto Differential [[id number]]  (Abnormal) Collected:  01/14/18 2059    Specimen:  Blood Updated:  01/14/18 2114     WBC 17.40 (H) 10*3/mm3      RBC 5.46 10*6/mm3      Hemoglobin 16.7 g/dL      Hematocrit 48.4 %      MCV 88.6 fL      MCH 30.6 pg      MCHC 34.5 g/dL      RDW 13.0 %      RDW-SD 41.3 fl      MPV 9.4 fL      Platelets 265 10*3/mm3      Neutrophil % 87.5 (H) %      Lymphocyte % 5.1 (L) %      Monocyte % 6.5 %      Eosinophil % 0.4 %      Basophil % 0.3 %      Immature Grans % 0.2 %      Neutrophils, Absolute 15.24 (H) 10*3/mm3      Lymphocytes, Absolute 0.88 (L) 10*3/mm3      Monocytes, Absolute 1.13 10*3/mm3      Eosinophils, Absolute 0.07 10*3/mm3      Basophils, Absolute 0.05 10*3/mm3      Immature Grans, Absolute 0.03 10*3/mm3     Procalcitonin [[id number]]  (Normal) Collected:  01/14/18 2059    Specimen:  Blood Updated:  01/14/18 2204     Procalcitonin 0.16 ng/mL     Narrative:       As a Marker for Sepsis (Non-Neonates):   1. <0.5 ng/mL represents a low risk of severe sepsis and/or septic shock.  1. >2 ng/mL represents a high risk of severe sepsis and/or septic shock.    As a Marker for Lower Respiratory Tract  "Infections that require antibiotic therapy:  PCT on Admission     Antibiotic Therapy             6-12 Hrs later  > 0.5                Strongly Recommended            >0.25 - <0.5         Recommended  0.1 - 0.25           Discouraged                   Remeasure/reassess PCT  <0.1                 Strongly Discouraged          Remeasure/reassess PCT      As 28 day mortality risk marker: \"Change in Procalcitonin Result\" (> 80 % or <=80 %) if Day 0 (or Day 1) and Day 4 values are available. Refer to http://www.[facility]ct[facility]calculator.com/   Change in PCT <=80 %   A decrease of PCT levels below or equal to 80 % defines a positive change in PCT test result representing a higher risk for 28-day all-cause mortality of patients diagnosed with severe sepsis or septic shock.  Change in PCT > 80 %   A decrease of PCT levels of more than 80 % defines a negative change in PCT result representing a lower risk for 28-day all-cause mortality of patients diagnosed with severe sepsis or septic shock.                POC Glucose Once [[id number]]  (Abnormal) Collected:  01/14/18 2121    Specimen:  Blood Updated:  01/14/18 2122     Glucose 283 (H) mg/dL     Narrative:       Meter: [id number] : [id number] uCrt Gerard    Lactic Acid, Plasma [[id number]]  (Normal) Collected:  01/14/18 2145    Specimen:  Blood Updated:  01/14/18 2203     Lactate 1.6 mmol/L           I ordered the above labs and reviewed the results    RADIOLOGY  XR Chest 2 View   Preliminary Result   No acute findings.                   I ordered the above noted radiological studies. Interpreted by radiologist. Reviewed by me in PACS.       PROCEDURES  Procedures      PROGRESS AND CONSULTS  ED Course     1842  Labs ordered for further evaluation. Tylenol ordered.    2017  Upon initial encounter, discussed the lab results with the pt that show he is positive for influenza A. Discussed the plan to order a CXR for further evaluation. The pt understands and agrees with the " plan.    2018  CXR and labs ordered for further evaluation.    2137  BP- 142/80 HR- 94 Temp- 100.5 °F (38.1 °C) (Oral) O2 sat- 92%    Rechecked pt, he is resting comfortably. Discussed the XR results with the pt. Discussed the plan to order additional labs for further evaluation. The pt understands and agree with the plan.  Labs ordered for further evaluation. Tylenol ordered.    2217  BP- 142/80 HR- 94 Temp- 100.5 °F (38.1 °C) (Oral) O2 sat- 92%    Rechecked pt, he is resting comfortably. His WBC was up but his lactate and PCT were negative.  His sats on roomair are 93% and he has no respiratory distress.  I informed the pt that his additional labs were negative. Discussed the plan to discharge the pt. I advised the pt to take it easy over the next several days. The pt understands and agrees with the plan and the absolute need to return if he worsens at all..    MEDICAL DECISION MAKING  Results were reviewed/discussed with the patient and they were also made aware of online access. Pt also made aware that some labs, such as cultures, will not be resulted during ER visit and follow up with PMD is necessary.     MDM  Number of Diagnoses or Management Options  Influenza A:      Amount and/or Complexity of Data Reviewed  Clinical lab tests: ordered and reviewed (Influenza A - Positive  Lactic Acid - 1.6)  Tests in the radiology section of CPT®: ordered and reviewed (CXR - Negative acute)    Patient Progress  Patient progress: stable         DIAGNOSIS  Final diagnoses:   Influenza A       DISPOSITION  DISCHARGE    Patient discharged in stable condition.    Reviewed implications of results, diagnosis, meds, responsibility to follow up, warning signs and symptoms of possible worsening, potential complications and reasons to return to ER.    Patient/Family voiced understanding of above instructions.    Discussed plan for discharge, as there is no emergent indication for admission.  Pt/family is agreeable and understands  need for follow up and repeat testing.  Pt is aware that discharge does not mean that nothing is wrong but it indicates no emergency is present that requires admission and they must continue care with follow-up as given below or physician of their choice.     FOLLOW-UP  Gricel Diaz MD  [id number]  [place] [id number] [phone]      If not clearly improving over 48-72 hours, we'd like you rechecked by Dr Diaz in the office.         Medication List      Changed          insulin aspart 100 UNIT/ML solution pen-injector sc pen   Commonly known as:  novoLOG FLEXPEN   INJECT 65 UNITS IN THE MORNING 65 UNITS AT LUNCH AND 68 UNITS AT DINNER   What changed:  additional instructions       insulin detemir 100 UNIT/ML injection   Commonly known as:  LEVEMIR   INJECT 110 UNITS AT NIGHT   What changed:    - how much to take  - how to take this  - when to take this  - additional instructions       metFORMIN 1000 MG tablet   Commonly known as:  GLUCOPHAGE   Take 1 tablet by mouth 2 (Two) Times a Day With Meals.   What changed:  how much to take               Latest Documented Vital Signs:  As of 10:22 PM  BP- 142/80 HR- 94 Temp- 100.5 °F (38.1 °C) (Oral) O2 sat- 92%    --  Documentation assistance provided by miladys Peters for Dr. Harmon.  Information recorded by the scribe was done at my direction and has been verified and validated by me.       Maged Peters  01/14/18 2223       Isidoro Harmon MD  01/14/18 2223

## 2018-03-01 ENCOUNTER — APPOINTMENT (OUTPATIENT)
Dept: MRI IMAGING | Facility: HOSPITAL | Age: 73
End: 2018-03-01

## 2018-03-01 ENCOUNTER — APPOINTMENT (OUTPATIENT)
Dept: CT IMAGING | Facility: HOSPITAL | Age: 73
End: 2018-03-01

## 2018-03-01 ENCOUNTER — APPOINTMENT (OUTPATIENT)
Dept: GENERAL RADIOLOGY | Facility: HOSPITAL | Age: 73
End: 2018-03-01

## 2018-03-01 ENCOUNTER — HOSPITAL ENCOUNTER (OUTPATIENT)
Facility: HOSPITAL | Age: 73
Setting detail: OBSERVATION
LOS: 2 days | Discharge: HOME | End: 2018-03-03
Attending: EMERGENCY MEDICINE | Admitting: HOSPITALIST

## 2018-03-01 DIAGNOSIS — Z95.820 S/P ANGIOPLASTY WITH STENT: ICD-10-CM

## 2018-03-01 DIAGNOSIS — E78.5 HYPERLIPIDEMIA: ICD-10-CM

## 2018-03-01 DIAGNOSIS — E11.42 DM TYPE 2 WITH DIABETIC PERIPHERAL NEUROPATHY (HCC): ICD-10-CM

## 2018-03-01 DIAGNOSIS — R80.9 MICROALBUMINURIA: ICD-10-CM

## 2018-03-01 DIAGNOSIS — I63.9 ACUTE CVA (CEREBROVASCULAR ACCIDENT) (HCC): Primary | ICD-10-CM

## 2018-03-01 DIAGNOSIS — E11.21 TYPE 2 DIABETES WITH NEPHROPATHY (HCC): ICD-10-CM

## 2018-03-01 LAB
ABO GROUP BLD: NORMAL
ALBUMIN SERPL-MCNC: 4.3 G/DL (ref 3.5–5.2)
ALBUMIN/GLOB SERPL: 1.7 G/DL
ALP SERPL-CCNC: 41 U/L (ref 39–117)
ALT SERPL W P-5'-P-CCNC: 26 U/L (ref 1–41)
ANION GAP SERPL CALCULATED.3IONS-SCNC: 13.1 MMOL/L
APTT PPP: 28.5 SECONDS (ref 22.7–35.4)
AST SERPL-CCNC: 24 U/L (ref 1–40)
BASOPHILS # BLD AUTO: 0.06 10*3/MM3 (ref 0–0.2)
BASOPHILS NFR BLD AUTO: 0.6 % (ref 0–1.5)
BILIRUB SERPL-MCNC: 0.5 MG/DL (ref 0.1–1.2)
BLD GP AB SCN SERPL QL: NEGATIVE
BUN BLD-MCNC: 13 MG/DL (ref 8–23)
BUN/CREAT SERPL: 12.9 (ref 7–25)
CALCIUM SPEC-SCNC: 9.3 MG/DL (ref 8.6–10.5)
CHLORIDE SERPL-SCNC: 101 MMOL/L (ref 98–107)
CO2 SERPL-SCNC: 25.9 MMOL/L (ref 22–29)
CREAT BLD-MCNC: 1.01 MG/DL (ref 0.76–1.27)
DEPRECATED RDW RBC AUTO: 41.7 FL (ref 37–54)
EOSINOPHIL # BLD AUTO: 0.46 10*3/MM3 (ref 0–0.7)
EOSINOPHIL NFR BLD AUTO: 4.2 % (ref 0.3–6.2)
ERYTHROCYTE [DISTWIDTH] IN BLOOD BY AUTOMATED COUNT: 13.1 % (ref 11.5–14.5)
GFR SERPL CREATININE-BSD FRML MDRD: 72 ML/MIN/1.73
GLOBULIN UR ELPH-MCNC: 2.5 GM/DL
GLUCOSE BLD-MCNC: 120 MG/DL (ref 65–99)
GLUCOSE BLDC GLUCOMTR-MCNC: 126 MG/DL (ref 70–130)
GLUCOSE BLDC GLUCOMTR-MCNC: 167 MG/DL (ref 70–130)
HCT VFR BLD AUTO: 45.9 % (ref 40.4–52.2)
HGB BLD-MCNC: 15.8 G/DL (ref 13.7–17.6)
HOLD SPECIMEN: NORMAL
HOLD SPECIMEN: NORMAL
IMM GRANULOCYTES # BLD: 0.03 10*3/MM3 (ref 0–0.03)
IMM GRANULOCYTES NFR BLD: 0.3 % (ref 0–0.5)
INR PPP: 1.03 (ref 0.9–1.1)
LYMPHOCYTES # BLD AUTO: 2.7 10*3/MM3 (ref 0.9–4.8)
LYMPHOCYTES NFR BLD AUTO: 24.9 % (ref 19.6–45.3)
MCH RBC QN AUTO: 30.2 PG (ref 27–32.7)
MCHC RBC AUTO-ENTMCNC: 34.4 G/DL (ref 32.6–36.4)
MCV RBC AUTO: 87.8 FL (ref 79.8–96.2)
MONOCYTES # BLD AUTO: 0.85 10*3/MM3 (ref 0.2–1.2)
MONOCYTES NFR BLD AUTO: 7.8 % (ref 5–12)
NEUTROPHILS # BLD AUTO: 6.75 10*3/MM3 (ref 1.9–8.1)
NEUTROPHILS NFR BLD AUTO: 62.2 % (ref 42.7–76)
PLATELET # BLD AUTO: 319 10*3/MM3 (ref 140–500)
PMV BLD AUTO: 9.3 FL (ref 6–12)
POTASSIUM BLD-SCNC: 4.2 MMOL/L (ref 3.5–5.2)
PROT SERPL-MCNC: 6.8 G/DL (ref 6–8.5)
PROTHROMBIN TIME: 13.3 SECONDS (ref 11.7–14.2)
RBC # BLD AUTO: 5.23 10*6/MM3 (ref 4.6–6)
RH BLD: POSITIVE
SODIUM BLD-SCNC: 140 MMOL/L (ref 136–145)
TROPONIN T SERPL-MCNC: <0.01 NG/ML (ref 0–0.03)
WBC NRBC COR # BLD: 10.85 10*3/MM3 (ref 4.5–10.7)
WHOLE BLOOD HOLD SPECIMEN: NORMAL
WHOLE BLOOD HOLD SPECIMEN: NORMAL

## 2018-03-01 PROCEDURE — 93010 ELECTROCARDIOGRAM REPORT: CPT | Performed by: INTERNAL MEDICINE

## 2018-03-01 PROCEDURE — 63710000001 INSULIN DETEMER PER 5 UNITS: Performed by: HOSPITALIST

## 2018-03-01 PROCEDURE — 70553 MRI BRAIN STEM W/O & W/DYE: CPT

## 2018-03-01 PROCEDURE — 0042T HC CT CEREBRAL PERFUSION W/WO CONTRAST: CPT

## 2018-03-01 PROCEDURE — 63710000001 INSULIN ASPART PER 5 UNITS: Performed by: HOSPITALIST

## 2018-03-01 PROCEDURE — 85025 COMPLETE CBC W/AUTO DIFF WBC: CPT | Performed by: EMERGENCY MEDICINE

## 2018-03-01 PROCEDURE — 0 IOPAMIDOL PER 1 ML: Performed by: EMERGENCY MEDICINE

## 2018-03-01 PROCEDURE — 85610 PROTHROMBIN TIME: CPT | Performed by: EMERGENCY MEDICINE

## 2018-03-01 PROCEDURE — 82962 GLUCOSE BLOOD TEST: CPT

## 2018-03-01 PROCEDURE — 70544 MR ANGIOGRAPHY HEAD W/O DYE: CPT

## 2018-03-01 PROCEDURE — 84484 ASSAY OF TROPONIN QUANT: CPT | Performed by: EMERGENCY MEDICINE

## 2018-03-01 PROCEDURE — A9577 INJ MULTIHANCE: HCPCS | Performed by: HOSPITALIST

## 2018-03-01 PROCEDURE — 80053 COMPREHEN METABOLIC PANEL: CPT | Performed by: EMERGENCY MEDICINE

## 2018-03-01 PROCEDURE — 71045 X-RAY EXAM CHEST 1 VIEW: CPT

## 2018-03-01 PROCEDURE — 82565 ASSAY OF CREATININE: CPT

## 2018-03-01 PROCEDURE — 70549 MR ANGIOGRAPH NECK W/O&W/DYE: CPT

## 2018-03-01 PROCEDURE — 99223 1ST HOSP IP/OBS HIGH 75: CPT | Performed by: NEUROLOGICAL SURGERY

## 2018-03-01 PROCEDURE — 86901 BLOOD TYPING SEROLOGIC RH(D): CPT | Performed by: EMERGENCY MEDICINE

## 2018-03-01 PROCEDURE — 99285 EMERGENCY DEPT VISIT HI MDM: CPT

## 2018-03-01 PROCEDURE — 86850 RBC ANTIBODY SCREEN: CPT | Performed by: EMERGENCY MEDICINE

## 2018-03-01 PROCEDURE — 70496 CT ANGIOGRAPHY HEAD: CPT

## 2018-03-01 PROCEDURE — 86900 BLOOD TYPING SEROLOGIC ABO: CPT | Performed by: EMERGENCY MEDICINE

## 2018-03-01 PROCEDURE — 0 GADOBENATE DIMEGLUMINE 529 MG/ML SOLUTION: Performed by: HOSPITALIST

## 2018-03-01 PROCEDURE — 99223 1ST HOSP IP/OBS HIGH 75: CPT | Performed by: RADIOLOGY

## 2018-03-01 PROCEDURE — 85730 THROMBOPLASTIN TIME PARTIAL: CPT | Performed by: EMERGENCY MEDICINE

## 2018-03-01 PROCEDURE — 70498 CT ANGIOGRAPHY NECK: CPT

## 2018-03-01 PROCEDURE — 93005 ELECTROCARDIOGRAM TRACING: CPT | Performed by: EMERGENCY MEDICINE

## 2018-03-01 RX ORDER — ASPIRIN 81 MG/1
81 TABLET ORAL DAILY
Status: DISCONTINUED | OUTPATIENT
Start: 2018-03-01 | End: 2018-03-01

## 2018-03-01 RX ORDER — SODIUM CHLORIDE 0.9 % (FLUSH) 0.9 %
10 SYRINGE (ML) INJECTION AS NEEDED
Status: DISCONTINUED | OUTPATIENT
Start: 2018-03-01 | End: 2018-03-03 | Stop reason: HOSPADM

## 2018-03-01 RX ORDER — ACETAMINOPHEN 325 MG/1
650 TABLET ORAL EVERY 4 HOURS PRN
Status: DISCONTINUED | OUTPATIENT
Start: 2018-03-01 | End: 2018-03-03 | Stop reason: HOSPADM

## 2018-03-01 RX ORDER — ASPIRIN 300 MG/1
300 SUPPOSITORY RECTAL DAILY
Status: DISCONTINUED | OUTPATIENT
Start: 2018-03-01 | End: 2018-03-01

## 2018-03-01 RX ORDER — SODIUM CHLORIDE 0.9 % (FLUSH) 0.9 %
1-10 SYRINGE (ML) INJECTION AS NEEDED
Status: DISCONTINUED | OUTPATIENT
Start: 2018-03-01 | End: 2018-03-03 | Stop reason: HOSPADM

## 2018-03-01 RX ORDER — CLOPIDOGREL BISULFATE 75 MG/1
75 TABLET ORAL DAILY
Status: DISCONTINUED | OUTPATIENT
Start: 2018-03-01 | End: 2018-03-01

## 2018-03-01 RX ORDER — ASPIRIN 81 MG/1
81 TABLET ORAL DAILY
Status: DISCONTINUED | OUTPATIENT
Start: 2018-03-02 | End: 2018-03-03 | Stop reason: HOSPADM

## 2018-03-01 RX ORDER — HYDROCODONE BITARTRATE AND ACETAMINOPHEN 5; 325 MG/1; MG/1
1 TABLET ORAL EVERY 4 HOURS PRN
Status: DISCONTINUED | OUTPATIENT
Start: 2018-03-01 | End: 2018-03-03 | Stop reason: HOSPADM

## 2018-03-01 RX ORDER — LATANOPROST 50 UG/ML
1 SOLUTION/ DROPS OPHTHALMIC DAILY
Status: DISCONTINUED | OUTPATIENT
Start: 2018-03-01 | End: 2018-03-03 | Stop reason: HOSPADM

## 2018-03-01 RX ORDER — SODIUM CHLORIDE 9 MG/ML
75 INJECTION, SOLUTION INTRAVENOUS CONTINUOUS
Status: DISCONTINUED | OUTPATIENT
Start: 2018-03-01 | End: 2018-03-02

## 2018-03-01 RX ORDER — TIMOLOL MALEATE 5 MG/ML
1 SOLUTION/ DROPS OPHTHALMIC DAILY
Status: DISCONTINUED | OUTPATIENT
Start: 2018-03-01 | End: 2018-03-03 | Stop reason: HOSPADM

## 2018-03-01 RX ORDER — ASPIRIN 81 MG/1
81 TABLET, CHEWABLE ORAL DAILY
Status: DISCONTINUED | OUTPATIENT
Start: 2018-03-01 | End: 2018-03-01 | Stop reason: SDUPTHER

## 2018-03-01 RX ORDER — CLOPIDOGREL BISULFATE 75 MG/1
75 TABLET ORAL DAILY
Status: DISCONTINUED | OUTPATIENT
Start: 2018-03-01 | End: 2018-03-01 | Stop reason: SDUPTHER

## 2018-03-01 RX ORDER — ATORVASTATIN CALCIUM 80 MG/1
80 TABLET, FILM COATED ORAL NIGHTLY
Status: DISCONTINUED | OUTPATIENT
Start: 2018-03-01 | End: 2018-03-03 | Stop reason: HOSPADM

## 2018-03-01 RX ORDER — PILOCARPINE HYDROCHLORIDE 10 MG/ML
1 SOLUTION/ DROPS OPHTHALMIC 4 TIMES DAILY
Status: DISCONTINUED | OUTPATIENT
Start: 2018-03-01 | End: 2018-03-03 | Stop reason: HOSPADM

## 2018-03-01 RX ORDER — PANTOPRAZOLE SODIUM 40 MG/1
40 TABLET, DELAYED RELEASE ORAL EVERY MORNING
Status: DISCONTINUED | OUTPATIENT
Start: 2018-03-02 | End: 2018-03-03

## 2018-03-01 RX ORDER — NICOTINE POLACRILEX 4 MG
15 LOZENGE BUCCAL
Status: DISCONTINUED | OUTPATIENT
Start: 2018-03-01 | End: 2018-03-03 | Stop reason: HOSPADM

## 2018-03-01 RX ORDER — BRIMONIDINE TARTRATE 0.15 %
1 DROPS OPHTHALMIC (EYE) 2 TIMES DAILY
Status: DISCONTINUED | OUTPATIENT
Start: 2018-03-01 | End: 2018-03-03 | Stop reason: HOSPADM

## 2018-03-01 RX ORDER — CLOPIDOGREL BISULFATE 75 MG/1
75 TABLET ORAL DAILY
Status: DISCONTINUED | OUTPATIENT
Start: 2018-03-02 | End: 2018-03-03 | Stop reason: HOSPADM

## 2018-03-01 RX ORDER — DEXTROSE MONOHYDRATE 25 G/50ML
25 INJECTION, SOLUTION INTRAVENOUS
Status: DISCONTINUED | OUTPATIENT
Start: 2018-03-01 | End: 2018-03-03 | Stop reason: HOSPADM

## 2018-03-01 RX ADMIN — IOPAMIDOL 85 ML: 755 INJECTION, SOLUTION INTRAVENOUS at 10:11

## 2018-03-01 RX ADMIN — METOPROLOL TARTRATE 25 MG: 25 TABLET ORAL at 20:28

## 2018-03-01 RX ADMIN — LATANOPROST 1 DROP: 50 SOLUTION OPHTHALMIC at 22:12

## 2018-03-01 RX ADMIN — PILOCARPINE HYDROCHLORIDE 1 DROP: 10 SOLUTION/ DROPS OPHTHALMIC at 22:12

## 2018-03-01 RX ADMIN — INSULIN ASPART 15 UNITS: 100 INJECTION, SOLUTION INTRAVENOUS; SUBCUTANEOUS at 20:29

## 2018-03-01 RX ADMIN — IOPAMIDOL 50 ML: 755 INJECTION, SOLUTION INTRAVENOUS at 10:11

## 2018-03-01 RX ADMIN — TIMOLOL MALEATE 1 DROP: 5 SOLUTION/ DROPS OPHTHALMIC at 22:12

## 2018-03-01 RX ADMIN — GADOBENATE DIMEGLUMINE 20 ML: 529 INJECTION, SOLUTION INTRAVENOUS at 21:46

## 2018-03-01 RX ADMIN — INSULIN ASPART 2 UNITS: 100 INJECTION, SOLUTION INTRAVENOUS; SUBCUTANEOUS at 20:29

## 2018-03-01 RX ADMIN — ATORVASTATIN CALCIUM 80 MG: 80 TABLET, FILM COATED ORAL at 20:28

## 2018-03-01 RX ADMIN — SODIUM CHLORIDE 75 ML/HR: 9 INJECTION, SOLUTION INTRAVENOUS at 22:14

## 2018-03-01 RX ADMIN — INSULIN DETEMIR 30 UNITS: 100 INJECTION, SOLUTION SUBCUTANEOUS at 22:12

## 2018-03-01 RX ADMIN — BRIMONIDINE TARTRATE 1 DROP: 1.5 SOLUTION OPHTHALMIC at 22:12

## 2018-03-02 ENCOUNTER — APPOINTMENT (OUTPATIENT)
Dept: CARDIOLOGY | Facility: HOSPITAL | Age: 73
End: 2018-03-02
Attending: HOSPITALIST

## 2018-03-02 LAB
ANION GAP SERPL CALCULATED.3IONS-SCNC: 16.3 MMOL/L
ASA PLATELET INHIBITION: 474 ARU
ASCENDING AORTA: 3.6 CM
BASOPHILS # BLD AUTO: 0.04 10*3/MM3 (ref 0–0.2)
BASOPHILS NFR BLD AUTO: 0.3 % (ref 0–1.5)
BH CV ECHO MEAS - ACS: 2.3 CM
BH CV ECHO MEAS - AO MAX PG: 4 MMHG
BH CV ECHO MEAS - AO MEAN PG (FULL): 0 MMHG
BH CV ECHO MEAS - AO MEAN PG: 2 MMHG
BH CV ECHO MEAS - AO ROOT AREA (BSA CORRECTED): 1.4
BH CV ECHO MEAS - AO ROOT AREA: 8.6 CM^2
BH CV ECHO MEAS - AO ROOT DIAM: 3.3 CM
BH CV ECHO MEAS - AO V2 MAX: 103 CM/SEC
BH CV ECHO MEAS - AO V2 MEAN: 70.1 CM/SEC
BH CV ECHO MEAS - AO V2 VTI: 23 CM
BH CV ECHO MEAS - ASC AORTA: 3.6 CM
BH CV ECHO MEAS - AVA(I,A): 3.2 CM^2
BH CV ECHO MEAS - AVA(I,D): 3.2 CM^2
BH CV ECHO MEAS - BSA(HAYCOCK): 2.5 M^2
BH CV ECHO MEAS - BSA: 2.3 M^2
BH CV ECHO MEAS - BZI_BMI: 37.6 KILOGRAMS/M^2
BH CV ECHO MEAS - BZI_METRIC_HEIGHT: 177.8 CM
BH CV ECHO MEAS - BZI_METRIC_WEIGHT: 118.8 KG
BH CV ECHO MEAS - CONTRAST EF (2CH): 62.6 ML/M^2
BH CV ECHO MEAS - CONTRAST EF 4CH: 63.1 ML/M^2
BH CV ECHO MEAS - EDV(CUBED): 157.5 ML
BH CV ECHO MEAS - EDV(MOD-SP2): 139 ML
BH CV ECHO MEAS - EDV(MOD-SP4): 141 ML
BH CV ECHO MEAS - EDV(TEICH): 141.3 ML
BH CV ECHO MEAS - EF(CUBED): 77.2 %
BH CV ECHO MEAS - EF(MOD-SP2): 62.6 %
BH CV ECHO MEAS - EF(MOD-SP4): 63.1 %
BH CV ECHO MEAS - EF(TEICH): 68.8 %
BH CV ECHO MEAS - ESV(CUBED): 35.9 ML
BH CV ECHO MEAS - ESV(MOD-SP2): 52 ML
BH CV ECHO MEAS - ESV(MOD-SP4): 52 ML
BH CV ECHO MEAS - ESV(TEICH): 44.1 ML
BH CV ECHO MEAS - FS: 38.9 %
BH CV ECHO MEAS - IVS/LVPW: 1
BH CV ECHO MEAS - IVSD: 1.2 CM
BH CV ECHO MEAS - LAT PEAK E' VEL: 7 CM/SEC
BH CV ECHO MEAS - LV DIASTOLIC VOL/BSA (35-75): 60.2 ML/M^2
BH CV ECHO MEAS - LV MASS(C)D: 264.4 GRAMS
BH CV ECHO MEAS - LV MASS(C)DI: 112.9 GRAMS/M^2
BH CV ECHO MEAS - LV MEAN PG: 2 MMHG
BH CV ECHO MEAS - LV SYSTOLIC VOL/BSA (12-30): 22.2 ML/M^2
BH CV ECHO MEAS - LV V1 MAX: 96 CM/SEC
BH CV ECHO MEAS - LV V1 MEAN: 62.5 CM/SEC
BH CV ECHO MEAS - LV V1 VTI: 21.5 CM
BH CV ECHO MEAS - LVIDD: 5.4 CM
BH CV ECHO MEAS - LVIDS: 3.3 CM
BH CV ECHO MEAS - LVLD AP2: 8.8 CM
BH CV ECHO MEAS - LVLD AP4: 8.6 CM
BH CV ECHO MEAS - LVLS AP2: 7.3 CM
BH CV ECHO MEAS - LVLS AP4: 7.5 CM
BH CV ECHO MEAS - LVOT AREA (M): 3.5 CM^2
BH CV ECHO MEAS - LVOT AREA: 3.5 CM^2
BH CV ECHO MEAS - LVOT DIAM: 2.1 CM
BH CV ECHO MEAS - LVPWD: 1.2 CM
BH CV ECHO MEAS - MED PEAK E' VEL: 6 CM/SEC
BH CV ECHO MEAS - MV A DUR: 0.14 SEC
BH CV ECHO MEAS - MV A MAX VEL: 87.9 CM/SEC
BH CV ECHO MEAS - MV DEC SLOPE: 214 CM/SEC^2
BH CV ECHO MEAS - MV DEC TIME: 0.24 SEC
BH CV ECHO MEAS - MV E MAX VEL: 75 CM/SEC
BH CV ECHO MEAS - MV E/A: 0.85
BH CV ECHO MEAS - MV MEAN PG: 1 MMHG
BH CV ECHO MEAS - MV P1/2T MAX VEL: 63.5 CM/SEC
BH CV ECHO MEAS - MV P1/2T: 86.9 MSEC
BH CV ECHO MEAS - MV V2 MEAN: 44.6 CM/SEC
BH CV ECHO MEAS - MV V2 VTI: 22.1 CM
BH CV ECHO MEAS - MVA P1/2T LCG: 3.5 CM^2
BH CV ECHO MEAS - MVA(P1/2T): 2.5 CM^2
BH CV ECHO MEAS - MVA(VTI): 3.4 CM^2
BH CV ECHO MEAS - PA ACC SLOPE: 0 CM/SEC^2
BH CV ECHO MEAS - PA ACC TIME: 0.11 SEC
BH CV ECHO MEAS - PA MAX PG: 3.1 MMHG
BH CV ECHO MEAS - PA PR(ACCEL): 31.3 MMHG
BH CV ECHO MEAS - PA V2 MAX: 88.2 CM/SEC
BH CV ECHO MEAS - PULM A REVS DUR: 0.14 SEC
BH CV ECHO MEAS - PULM A REVS VEL: 25.1 CM/SEC
BH CV ECHO MEAS - PULM DIAS VEL: 34.6 CM/SEC
BH CV ECHO MEAS - PULM S/D: 1.6
BH CV ECHO MEAS - PULM SYS VEL: 56.3 CM/SEC
BH CV ECHO MEAS - QP/QS: 0.8
BH CV ECHO MEAS - RAP SYSTOLE: 3 MMHG
BH CV ECHO MEAS - RV MEAN PG: 1 MMHG
BH CV ECHO MEAS - RV V1 MEAN: 39.3 CM/SEC
BH CV ECHO MEAS - RV V1 VTI: 14.4 CM
BH CV ECHO MEAS - RVOT AREA: 4.2 CM^2
BH CV ECHO MEAS - RVOT DIAM: 2.3 CM
BH CV ECHO MEAS - SI(AO): 84 ML/M^2
BH CV ECHO MEAS - SI(CUBED): 51.9 ML/M^2
BH CV ECHO MEAS - SI(LVOT): 31.8 ML/M^2
BH CV ECHO MEAS - SI(MOD-SP2): 37.2 ML/M^2
BH CV ECHO MEAS - SI(MOD-SP4): 38 ML/M^2
BH CV ECHO MEAS - SI(TEICH): 41.5 ML/M^2
BH CV ECHO MEAS - SUP REN AO DIAM: 2.3 CM
BH CV ECHO MEAS - SV(AO): 196.7 ML
BH CV ECHO MEAS - SV(CUBED): 121.5 ML
BH CV ECHO MEAS - SV(LVOT): 74.5 ML
BH CV ECHO MEAS - SV(MOD-SP2): 87 ML
BH CV ECHO MEAS - SV(MOD-SP4): 89 ML
BH CV ECHO MEAS - SV(RVOT): 59.8 ML
BH CV ECHO MEAS - SV(TEICH): 97.2 ML
BH CV ECHO MEAS - TAPSE (>1.6): 2.4 CM2
BH CV VAS BP RIGHT ARM: NORMAL MMHG
BH CV XLRA - RV BASE: 2.4 CM
BH CV XLRA - TDI S': 8 CM/SEC
BUN BLD-MCNC: 11 MG/DL (ref 8–23)
BUN/CREAT SERPL: 12.4 (ref 7–25)
CALCIUM SPEC-SCNC: 8.5 MG/DL (ref 8.6–10.5)
CHLORIDE SERPL-SCNC: 102 MMOL/L (ref 98–107)
CHOLEST SERPL-MCNC: 95 MG/DL (ref 0–200)
CO2 SERPL-SCNC: 20.7 MMOL/L (ref 22–29)
CREAT BLD-MCNC: 0.89 MG/DL (ref 0.76–1.27)
CRP SERPL-MCNC: 0.09 MG/DL (ref 0–0.5)
DEPRECATED RDW RBC AUTO: 42.7 FL (ref 37–54)
E/E' RATIO: 12
EOSINOPHIL # BLD AUTO: 0.47 10*3/MM3 (ref 0–0.7)
EOSINOPHIL NFR BLD AUTO: 4 % (ref 0.3–6.2)
ERYTHROCYTE [DISTWIDTH] IN BLOOD BY AUTOMATED COUNT: 13.1 % (ref 11.5–14.5)
ERYTHROCYTE [SEDIMENTATION RATE] IN BLOOD: 1 MM/HR (ref 0–20)
FIBRINOGEN PPP-MCNC: 314 MG/DL (ref 219–464)
GFR SERPL CREATININE-BSD FRML MDRD: 84 ML/MIN/1.73
GLUCOSE BLD-MCNC: 192 MG/DL (ref 65–99)
GLUCOSE BLDC GLUCOMTR-MCNC: 174 MG/DL (ref 70–130)
GLUCOSE BLDC GLUCOMTR-MCNC: 184 MG/DL (ref 70–130)
GLUCOSE BLDC GLUCOMTR-MCNC: 189 MG/DL (ref 70–130)
GLUCOSE BLDC GLUCOMTR-MCNC: 210 MG/DL (ref 70–130)
HBA1C MFR BLD: 6.6 % (ref 4.8–5.6)
HCT VFR BLD AUTO: 46.1 % (ref 40.4–52.2)
HDLC SERPL-MCNC: 27 MG/DL (ref 40–60)
HGB BLD-MCNC: 15 G/DL (ref 13.7–17.6)
IMM GRANULOCYTES # BLD: 0.03 10*3/MM3 (ref 0–0.03)
IMM GRANULOCYTES NFR BLD: 0.3 % (ref 0–0.5)
LDLC SERPL CALC-MCNC: 34 MG/DL (ref 0–100)
LDLC/HDLC SERPL: 1.27 {RATIO}
LEFT ATRIUM VOLUME INDEX: 26 ML/M2
LV EF 2D ECHO EST: 63 %
LYMPHOCYTES # BLD AUTO: 3.86 10*3/MM3 (ref 0.9–4.8)
LYMPHOCYTES NFR BLD AUTO: 33 % (ref 19.6–45.3)
MAXIMAL PREDICTED HEART RATE: 146 BPM
MCH RBC QN AUTO: 29.1 PG (ref 27–32.7)
MCHC RBC AUTO-ENTMCNC: 32.5 G/DL (ref 32.6–36.4)
MCV RBC AUTO: 89.3 FL (ref 79.8–96.2)
MONOCYTES # BLD AUTO: 0.85 10*3/MM3 (ref 0.2–1.2)
MONOCYTES NFR BLD AUTO: 7.3 % (ref 5–12)
NEUTROPHILS # BLD AUTO: 6.43 10*3/MM3 (ref 1.9–8.1)
NEUTROPHILS NFR BLD AUTO: 55.1 % (ref 42.7–76)
PA ADP PRP-ACNC: 124 PRU (ref 194–418)
PLATELET # BLD AUTO: 260 10*3/MM3 (ref 140–500)
PMV BLD AUTO: 9.3 FL (ref 6–12)
POTASSIUM BLD-SCNC: 4.2 MMOL/L (ref 3.5–5.2)
RBC # BLD AUTO: 5.16 10*6/MM3 (ref 4.6–6)
SODIUM BLD-SCNC: 139 MMOL/L (ref 136–145)
STRESS TARGET HR: 124 BPM
TRIGL SERPL-MCNC: 169 MG/DL (ref 0–150)
VLDLC SERPL-MCNC: 33.8 MG/DL (ref 5–40)
WBC NRBC COR # BLD: 11.68 10*3/MM3 (ref 4.5–10.7)

## 2018-03-02 PROCEDURE — 85025 COMPLETE CBC W/AUTO DIFF WBC: CPT | Performed by: HOSPITALIST

## 2018-03-02 PROCEDURE — 85576 BLOOD PLATELET AGGREGATION: CPT | Performed by: RADIOLOGY

## 2018-03-02 PROCEDURE — 93306 TTE W/DOPPLER COMPLETE: CPT | Performed by: INTERNAL MEDICINE

## 2018-03-02 PROCEDURE — 93886 INTRACRANIAL COMPLETE STUDY: CPT

## 2018-03-02 PROCEDURE — 85384 FIBRINOGEN ACTIVITY: CPT | Performed by: RADIOLOGY

## 2018-03-02 PROCEDURE — 25010000002 PERFLUTREN (DEFINITY) 8.476 MG IN SODIUM CHLORIDE 0.9 % 10 ML INJECTION: Performed by: HOSPITALIST

## 2018-03-02 PROCEDURE — 80048 BASIC METABOLIC PNL TOTAL CA: CPT | Performed by: HOSPITALIST

## 2018-03-02 PROCEDURE — 86140 C-REACTIVE PROTEIN: CPT | Performed by: RADIOLOGY

## 2018-03-02 PROCEDURE — 36415 COLL VENOUS BLD VENIPUNCTURE: CPT | Performed by: NURSE PRACTITIONER

## 2018-03-02 PROCEDURE — 63710000001 INSULIN ASPART PER 5 UNITS: Performed by: HOSPITALIST

## 2018-03-02 PROCEDURE — 93306 TTE W/DOPPLER COMPLETE: CPT

## 2018-03-02 PROCEDURE — 82962 GLUCOSE BLOOD TEST: CPT

## 2018-03-02 PROCEDURE — 83036 HEMOGLOBIN GLYCOSYLATED A1C: CPT | Performed by: NURSE PRACTITIONER

## 2018-03-02 PROCEDURE — 85652 RBC SED RATE AUTOMATED: CPT | Performed by: RADIOLOGY

## 2018-03-02 PROCEDURE — 80061 LIPID PANEL: CPT | Performed by: NURSE PRACTITIONER

## 2018-03-02 PROCEDURE — 63710000001 INSULIN DETEMER PER 5 UNITS: Performed by: HOSPITALIST

## 2018-03-02 RX ADMIN — INSULIN ASPART 3 UNITS: 100 INJECTION, SOLUTION INTRAVENOUS; SUBCUTANEOUS at 21:30

## 2018-03-02 RX ADMIN — PERFLUTREN 3 ML: 6.52 INJECTION, SUSPENSION INTRAVENOUS at 08:58

## 2018-03-02 RX ADMIN — INSULIN DETEMIR 30 UNITS: 100 INJECTION, SOLUTION SUBCUTANEOUS at 21:27

## 2018-03-02 RX ADMIN — INSULIN ASPART 2 UNITS: 100 INJECTION, SOLUTION INTRAVENOUS; SUBCUTANEOUS at 13:05

## 2018-03-02 RX ADMIN — ASPIRIN 81 MG: 81 TABLET ORAL at 10:47

## 2018-03-02 RX ADMIN — INSULIN ASPART 15 UNITS: 100 INJECTION, SOLUTION INTRAVENOUS; SUBCUTANEOUS at 18:11

## 2018-03-02 RX ADMIN — METOPROLOL TARTRATE 25 MG: 25 TABLET ORAL at 10:48

## 2018-03-02 RX ADMIN — PILOCARPINE HYDROCHLORIDE 1 DROP: 10 SOLUTION/ DROPS OPHTHALMIC at 18:11

## 2018-03-02 RX ADMIN — PILOCARPINE HYDROCHLORIDE 1 DROP: 10 SOLUTION/ DROPS OPHTHALMIC at 10:48

## 2018-03-02 RX ADMIN — CLOPIDOGREL 75 MG: 75 TABLET, FILM COATED ORAL at 10:47

## 2018-03-02 RX ADMIN — BRIMONIDINE TARTRATE 1 DROP: 1.5 SOLUTION OPHTHALMIC at 13:05

## 2018-03-02 RX ADMIN — BRIMONIDINE TARTRATE 1 DROP: 1.5 SOLUTION OPHTHALMIC at 21:29

## 2018-03-02 RX ADMIN — INSULIN ASPART 15 UNITS: 100 INJECTION, SOLUTION INTRAVENOUS; SUBCUTANEOUS at 13:06

## 2018-03-02 RX ADMIN — PANTOPRAZOLE SODIUM 40 MG: 40 TABLET, DELAYED RELEASE ORAL at 06:36

## 2018-03-02 RX ADMIN — INSULIN ASPART 2 UNITS: 100 INJECTION, SOLUTION INTRAVENOUS; SUBCUTANEOUS at 18:11

## 2018-03-02 RX ADMIN — PILOCARPINE HYDROCHLORIDE 1 DROP: 10 SOLUTION/ DROPS OPHTHALMIC at 21:29

## 2018-03-02 RX ADMIN — METOPROLOL TARTRATE 25 MG: 25 TABLET ORAL at 21:29

## 2018-03-02 RX ADMIN — PILOCARPINE HYDROCHLORIDE 1 DROP: 10 SOLUTION/ DROPS OPHTHALMIC at 13:08

## 2018-03-02 RX ADMIN — ATORVASTATIN CALCIUM 80 MG: 80 TABLET, FILM COATED ORAL at 21:29

## 2018-03-03 ENCOUNTER — APPOINTMENT (OUTPATIENT)
Dept: CARDIOLOGY | Facility: HOSPITAL | Age: 73
End: 2018-03-03

## 2018-03-03 VITALS
HEIGHT: 71 IN | TEMPERATURE: 98.3 F | WEIGHT: 222 LBS | SYSTOLIC BLOOD PRESSURE: 174 MMHG | DIASTOLIC BLOOD PRESSURE: 86 MMHG | RESPIRATION RATE: 16 BRPM | BODY MASS INDEX: 31.08 KG/M2 | OXYGEN SATURATION: 94 % | HEART RATE: 63 BPM

## 2018-03-03 LAB
ANION GAP SERPL CALCULATED.3IONS-SCNC: 12.8 MMOL/L
BASOPHILS # BLD AUTO: 0.05 10*3/MM3 (ref 0–0.2)
BASOPHILS NFR BLD AUTO: 0.4 % (ref 0–1.5)
BUN BLD-MCNC: 10 MG/DL (ref 8–23)
BUN/CREAT SERPL: 12.7 (ref 7–25)
CALCIUM SPEC-SCNC: 8.6 MG/DL (ref 8.6–10.5)
CHLORIDE SERPL-SCNC: 102 MMOL/L (ref 98–107)
CO2 SERPL-SCNC: 23.2 MMOL/L (ref 22–29)
CREAT BLD-MCNC: 0.79 MG/DL (ref 0.76–1.27)
DEPRECATED RDW RBC AUTO: 42.3 FL (ref 37–54)
EOSINOPHIL # BLD AUTO: 0.51 10*3/MM3 (ref 0–0.7)
EOSINOPHIL NFR BLD AUTO: 4.2 % (ref 0.3–6.2)
ERYTHROCYTE [DISTWIDTH] IN BLOOD BY AUTOMATED COUNT: 13 % (ref 11.5–14.5)
GFR SERPL CREATININE-BSD FRML MDRD: 96 ML/MIN/1.73
GLUCOSE BLD-MCNC: 231 MG/DL (ref 65–99)
GLUCOSE BLDC GLUCOMTR-MCNC: 229 MG/DL (ref 70–130)
HCT VFR BLD AUTO: 46.8 % (ref 40.4–52.2)
HGB BLD-MCNC: 15.5 G/DL (ref 13.7–17.6)
IMM GRANULOCYTES # BLD: 0.03 10*3/MM3 (ref 0–0.03)
IMM GRANULOCYTES NFR BLD: 0.2 % (ref 0–0.5)
LYMPHOCYTES # BLD AUTO: 3.92 10*3/MM3 (ref 0.9–4.8)
LYMPHOCYTES NFR BLD AUTO: 31.9 % (ref 19.6–45.3)
MCH RBC QN AUTO: 29.6 PG (ref 27–32.7)
MCHC RBC AUTO-ENTMCNC: 33.1 G/DL (ref 32.6–36.4)
MCV RBC AUTO: 89.5 FL (ref 79.8–96.2)
MONOCYTES # BLD AUTO: 1.03 10*3/MM3 (ref 0.2–1.2)
MONOCYTES NFR BLD AUTO: 8.4 % (ref 5–12)
NEUTROPHILS # BLD AUTO: 6.74 10*3/MM3 (ref 1.9–8.1)
NEUTROPHILS NFR BLD AUTO: 54.9 % (ref 42.7–76)
PLATELET # BLD AUTO: 265 10*3/MM3 (ref 140–500)
PMV BLD AUTO: 9.4 FL (ref 6–12)
POTASSIUM BLD-SCNC: 4 MMOL/L (ref 3.5–5.2)
RBC # BLD AUTO: 5.23 10*6/MM3 (ref 4.6–6)
SODIUM BLD-SCNC: 138 MMOL/L (ref 136–145)
WBC NRBC COR # BLD: 12.28 10*3/MM3 (ref 4.5–10.7)

## 2018-03-03 PROCEDURE — 63710000001 INSULIN ASPART PER 5 UNITS: Performed by: HOSPITALIST

## 2018-03-03 PROCEDURE — 82962 GLUCOSE BLOOD TEST: CPT

## 2018-03-03 PROCEDURE — 85025 COMPLETE CBC W/AUTO DIFF WBC: CPT | Performed by: HOSPITALIST

## 2018-03-03 PROCEDURE — G0378 HOSPITAL OBSERVATION PER HR: HCPCS

## 2018-03-03 PROCEDURE — 99233 SBSQ HOSP IP/OBS HIGH 50: CPT | Performed by: NURSE PRACTITIONER

## 2018-03-03 PROCEDURE — 86592 SYPHILIS TEST NON-TREP QUAL: CPT | Performed by: RADIOLOGY

## 2018-03-03 PROCEDURE — 0296T HC EXT ECG > 48HR TO 21 DAY RCRD W/CONECT INTL RCRD: CPT

## 2018-03-03 PROCEDURE — 80048 BASIC METABOLIC PNL TOTAL CA: CPT | Performed by: HOSPITALIST

## 2018-03-03 RX ORDER — FAMOTIDINE 20 MG/1
20 TABLET, FILM COATED ORAL 2 TIMES DAILY
Status: DISCONTINUED | OUTPATIENT
Start: 2018-03-03 | End: 2018-03-03 | Stop reason: HOSPADM

## 2018-03-03 RX ORDER — ACETAMINOPHEN 325 MG/1
650 TABLET ORAL EVERY 4 HOURS PRN
Start: 2018-03-03 | End: 2019-04-30

## 2018-03-03 RX ORDER — FAMOTIDINE 20 MG/1
20 TABLET, FILM COATED ORAL 2 TIMES DAILY
Start: 2018-03-03 | End: 2018-05-29 | Stop reason: ALTCHOICE

## 2018-03-03 RX ORDER — ATORVASTATIN CALCIUM 80 MG/1
80 TABLET, FILM COATED ORAL NIGHTLY
Qty: 30 TABLET | Refills: 0 | Status: SHIPPED | OUTPATIENT
Start: 2018-03-03 | End: 2018-04-02

## 2018-03-03 RX ADMIN — INSULIN ASPART 3 UNITS: 100 INJECTION, SOLUTION INTRAVENOUS; SUBCUTANEOUS at 08:35

## 2018-03-03 RX ADMIN — METOPROLOL TARTRATE 25 MG: 25 TABLET ORAL at 08:36

## 2018-03-03 RX ADMIN — PILOCARPINE HYDROCHLORIDE 1 DROP: 10 SOLUTION/ DROPS OPHTHALMIC at 08:36

## 2018-03-03 RX ADMIN — INSULIN ASPART 15 UNITS: 100 INJECTION, SOLUTION INTRAVENOUS; SUBCUTANEOUS at 08:35

## 2018-03-03 RX ADMIN — PANTOPRAZOLE SODIUM 40 MG: 40 TABLET, DELAYED RELEASE ORAL at 06:31

## 2018-03-03 RX ADMIN — TIMOLOL MALEATE 1 DROP: 5 SOLUTION/ DROPS OPHTHALMIC at 08:36

## 2018-03-03 RX ADMIN — BRIMONIDINE TARTRATE 1 DROP: 1.5 SOLUTION OPHTHALMIC at 08:36

## 2018-03-03 RX ADMIN — LATANOPROST 1 DROP: 50 SOLUTION OPHTHALMIC at 08:36

## 2018-03-03 RX ADMIN — ASPIRIN 81 MG: 81 TABLET ORAL at 08:36

## 2018-03-03 RX ADMIN — CLOPIDOGREL 75 MG: 75 TABLET, FILM COATED ORAL at 08:36

## 2018-03-05 LAB — RPR SER QL: NORMAL

## 2018-03-06 LAB — CREAT BLDA-MCNC: 1 MG/DL (ref 0.6–1.3)

## 2018-03-22 PROCEDURE — 0298T HOLTER MONITOR - 72 HOUR UP TO 21 DAY: CPT | Performed by: INTERNAL MEDICINE

## 2018-03-23 ENCOUNTER — TRANSCRIBE ORDERS (OUTPATIENT)
Dept: ADMINISTRATIVE | Facility: HOSPITAL | Age: 73
End: 2018-03-23

## 2018-03-23 ENCOUNTER — HOSPITAL ENCOUNTER (OUTPATIENT)
Dept: CT IMAGING | Facility: HOSPITAL | Age: 73
Discharge: HOME | End: 2018-03-23
Attending: INTERNAL MEDICINE | Admitting: INTERNAL MEDICINE

## 2018-03-23 DIAGNOSIS — K57.33 DIVERTICULITIS LARGE INTESTINE W/O PERFORATION OR ABSCESS W/BLEEDING: Primary | ICD-10-CM

## 2018-03-23 DIAGNOSIS — K57.33 DIVERTICULITIS LARGE INTESTINE W/O PERFORATION OR ABSCESS W/BLEEDING: ICD-10-CM

## 2018-03-23 PROCEDURE — 74176 CT ABD & PELVIS W/O CONTRAST: CPT

## 2018-03-26 ENCOUNTER — TELEPHONE (OUTPATIENT)
Dept: NEUROLOGY | Facility: CLINIC | Age: 73
End: 2018-03-26

## 2018-03-26 NOTE — TELEPHONE ENCOUNTER
I called and spoke with Mr. Vaughn.  I let him know the ZIO cardiac monitor patch was normal.  He acknowledged he understood this.  JUAN MIGUEL Gleason RN

## 2018-03-26 NOTE — TELEPHONE ENCOUNTER
----- Message from ELBA Saucedo sent at 3/25/2018  4:01 PM EDT -----  Please notify patient that his ZIO patch results were normal.

## 2018-04-09 DIAGNOSIS — I63.9 ACUTE CVA (CEREBROVASCULAR ACCIDENT) (HCC): ICD-10-CM

## 2018-04-10 RX ORDER — ATORVASTATIN CALCIUM 80 MG/1
80 TABLET, FILM COATED ORAL NIGHTLY
Qty: 30 TABLET | Refills: 0 | OUTPATIENT
Start: 2018-04-10 | End: 2018-05-10

## 2018-04-12 ENCOUNTER — OFFICE VISIT (OUTPATIENT)
Dept: SURGERY | Facility: CLINIC | Age: 73
End: 2018-04-12

## 2018-04-12 VITALS — HEIGHT: 71 IN | HEART RATE: 88 BPM | OXYGEN SATURATION: 96 % | WEIGHT: 208 LBS | BODY MASS INDEX: 29.12 KG/M2

## 2018-04-12 DIAGNOSIS — R10.11 RIGHT UPPER QUADRANT ABDOMINAL PAIN: Primary | ICD-10-CM

## 2018-04-12 PROCEDURE — 99204 OFFICE O/P NEW MOD 45 MIN: CPT | Performed by: SURGERY

## 2018-04-12 RX ORDER — ACYCLOVIR 400 MG/1
400 TABLET ORAL AS NEEDED
COMMUNITY

## 2018-04-12 RX ORDER — SIMVASTATIN 40 MG
40 TABLET ORAL NIGHTLY
COMMUNITY
End: 2021-01-04

## 2018-04-12 RX ORDER — NITROGLYCERIN 0.4 MG/1
0.4 TABLET SUBLINGUAL
COMMUNITY

## 2018-04-12 RX ORDER — OMEPRAZOLE 20 MG/1
20 CAPSULE, DELAYED RELEASE ORAL DAILY
COMMUNITY

## 2018-04-12 NOTE — PROGRESS NOTES
SUMMARY (A/P):    73-year-old gentleman with right upper quadrant abdominal pain and history of diabetes.  I've recommended further workup in the form of ultrasound of the gallbladder to be followed by HIDA scan with Kinevac stimulation if no gallstones are seen on ultrasound.      CC:  Right upper quadrant pain    HPI:  73-year-old gentleman presents with 1 month history of moderate right upper quadrant abdominal pain.  No associated symptoms, specifically negative for nausea, vomiting, fever, chills, weight weight loss.  Pain does appear to be related to position to some degree and there is not appear to be exacerbation with medial intake.    PHYSICAL EXAM:   Constitutional: Well-developed well-nourished, no acute distress  Vital signs: Heart rate 88, weight 208 pounds, height 71 inches, BMI 29  Eyes: Conjunctiva normal, sclera nonicteric  ENMT: Hearing grossly normal, oral mucosa moist  Neck: Supple, no palpable mass, normal thyroid, trachea midline  Respiratory: Clear to auscultation, normal inspiratory effort  Cardiovascular: Regular rate, no murmur, no carotid bruit, no peripheral edema, no jugular venous distention  Gastrointestinal: Soft, nontender, no palpable mass, no hepatosplenomegaly, negative for hernia, bowel sounds normal  Lymphatics (palpable nodes):  cervical-negative, axillary-negative  Skin:  Warm, dry, no rash on visualized skin surfaces  Musculoskeletal: Symmetric strength, normal gait  Psychiatric: Alert and oriented ×3, normal affect     ALLERGIES: reviewed, in Epic    MEDICATIONS: reviewed, in Epic    PMH:    Cerebrovascular accident 2017, mild, no residual deficit  Coronary artery disease  Hypertension  Diabetes mellitus    PSH:    Colonoscopy 2014 Dr. Sanon  EGD 2017 Dr. Potter  Coronary stent 2007    FAMILY HISTORY:    Negative for colorectal cancer  Question of gallbladder disease in mother    SOCIAL HISTORY:   Denies tobacco use  Denies alcohol use    ROS:  No chest pain or shortness of  air.  All other systems reviewed and negative other than presenting complaints.    RADIOLOGY/ENDOSCOPY:    -CT abdomen pelvis 3/23/2018 questioned changes of chronic pancreatitis but otherwise was unremarkable (on my review of images he has 3-4 small pancreatic calcifications but otherwise very normal-appearing pancreas)    LABS:    3/3/2018  BMP with glucose of 231  WBC 12.28, CBC otherwise normal    EDWARD REYNA M.D.

## 2018-04-16 ENCOUNTER — HOSPITAL ENCOUNTER (OUTPATIENT)
Dept: NUCLEAR MEDICINE | Facility: HOSPITAL | Age: 73
Discharge: HOME | End: 2018-04-16
Attending: SURGERY

## 2018-04-16 ENCOUNTER — HOSPITAL ENCOUNTER (OUTPATIENT)
Dept: ULTRASOUND IMAGING | Facility: HOSPITAL | Age: 73
Discharge: HOME | End: 2018-04-16
Attending: SURGERY | Admitting: SURGERY

## 2018-04-16 DIAGNOSIS — R10.11 RIGHT UPPER QUADRANT ABDOMINAL PAIN: ICD-10-CM

## 2018-04-16 PROCEDURE — 76705 ECHO EXAM OF ABDOMEN: CPT

## 2018-04-16 PROCEDURE — 25010000002 SINCALIDE PER 5 MCG: Performed by: SURGERY

## 2018-04-16 PROCEDURE — 0 TECHNETIUM TC 99M MEBROFENIN KIT: Performed by: SURGERY

## 2018-04-16 PROCEDURE — 78227 HEPATOBIL SYST IMAGE W/DRUG: CPT

## 2018-04-16 PROCEDURE — A9537 TC99M MEBROFENIN: HCPCS | Performed by: SURGERY

## 2018-04-16 RX ORDER — KIT FOR THE PREPARATION OF TECHNETIUM TC 99M MEBROFENIN 45 MG/10ML
1 INJECTION, POWDER, LYOPHILIZED, FOR SOLUTION INTRAVENOUS
Status: COMPLETED | OUTPATIENT
Start: 2018-04-16 | End: 2018-04-16

## 2018-04-16 RX ADMIN — SINCALIDE 1.9 MCG: 5 INJECTION, POWDER, LYOPHILIZED, FOR SOLUTION INTRAVENOUS at 09:03

## 2018-04-16 RX ADMIN — MEBROFENIN 1 DOSE: 45 INJECTION, POWDER, LYOPHILIZED, FOR SOLUTION INTRAVENOUS at 07:50

## 2018-04-19 ENCOUNTER — TELEPHONE (OUTPATIENT)
Dept: SURGERY | Facility: CLINIC | Age: 73
End: 2018-04-19

## 2018-04-23 ENCOUNTER — OFFICE VISIT (OUTPATIENT)
Dept: CARDIOLOGY | Facility: CLINIC | Age: 73
End: 2018-04-23

## 2018-04-23 VITALS
WEIGHT: 210.4 LBS | BODY MASS INDEX: 29.46 KG/M2 | HEART RATE: 72 BPM | DIASTOLIC BLOOD PRESSURE: 64 MMHG | SYSTOLIC BLOOD PRESSURE: 110 MMHG | HEIGHT: 71 IN

## 2018-04-23 DIAGNOSIS — I25.10 CORONARY ARTERY DISEASE INVOLVING NATIVE CORONARY ARTERY OF NATIVE HEART WITHOUT ANGINA PECTORIS: Primary | ICD-10-CM

## 2018-04-23 DIAGNOSIS — E11.42 DM TYPE 2 WITH DIABETIC PERIPHERAL NEUROPATHY (HCC): ICD-10-CM

## 2018-04-23 DIAGNOSIS — E78.5 HYPERLIPIDEMIA, UNSPECIFIED HYPERLIPIDEMIA TYPE: ICD-10-CM

## 2018-04-23 DIAGNOSIS — I10 ESSENTIAL HYPERTENSION: ICD-10-CM

## 2018-04-23 PROCEDURE — 93000 ELECTROCARDIOGRAM COMPLETE: CPT | Performed by: INTERNAL MEDICINE

## 2018-04-23 PROCEDURE — 99214 OFFICE O/P EST MOD 30 MIN: CPT | Performed by: INTERNAL MEDICINE

## 2018-04-23 NOTE — PROGRESS NOTES
Date of Office Visit: 2017  Encounter Provider: Anibal Wray MD  Place of Service: [facility] CARDIOLOGY  Patient Name: Anthony Vaughn  :1945  [id number]    Chief Complaint   Patient presents with   • Coronary Artery Disease   :     HPI: Anthony Vaughn is a 73 y.o. male  he is here for follow-up He has a history of coronary disease and angioplasty and stenting in the remote past he had another episode potentially of the TIA in March echo was normal Holter was normal he doesn't really think he had that he's been doing well otherwise no chest pain shortness of breath PND orthopnea edema syncope or palpitations no bleeding difficulty but the great news is he's lost 15 pounds    Past Medical History:   Diagnosis Date   • Atherosclerosis of native coronary artery without angina pectoris    • Chest pain    • Coronary artery disease    • Diabetes mellitus    • Difficulty breathing    • Diverticulosis    • Dizziness    • ED (erectile dysfunction)    • GERD (gastroesophageal reflux disease)    • Hyperlipidemia    • Hypertension    • Kidney stones    • Obesity    • S/P angioplasty with stent    • Stroke    • Type 2 diabetes mellitus        Past Surgical History:   Procedure Laterality Date   • CARDIAC CATHETERIZATION N/A 02/10/2011    Normal LV systolic function; Kind of diffuse, small vessel, distal disease. Most of it is not amenable to PCI, but his JADEN lesion is and it is a large vessel; All of his prior durg-eluting stents are widely patent & look good, Dr. Anibal Oliveira   • CATARACT EXTRACTION Bilateral    • COLONOSCOPY N/A 2004    Normal, Repeat in 5-8 years, Dr. Davion Sanon   • COLONOSCOPY N/A 2009    Mutiple small & large-mouthed diverticual were found in the entire colon; Exam otherwise normal, Dr. Davion Sanon   • COLONOSCOPY W/ POLYPECTOMY N/A [id number]    2 benign polyps, [facility], Dr. Davion Sanon   • CORONARY ANGIOPLASTY WITH STENT  PLACEMENT N/A 11/20/2007    Successful multivessel drug eluting stent implantation for restenosis above his prior stenting, Dr. Anibal Wray   • CORONARY ANGIOPLASTY WITH STENT PLACEMENT N/A 06/05/2007    Successful multivessel drug eluting stent implantation in all 3 of the major coronary arteries, Dr. Anibal Wray   • CORONARY ANGIOPLASTY WITH STENT PLACEMENT N/A 02/10/2011    Successful drug-stenting of the JADEN, Dr. Anibal Oliveira   • ENDOSCOPY N/A 11/21/2017    Procedure: ESOPHAGOGASTRODUODENOSCOPY WITH COLD BIOPSIES AND 53 F MICHAEL DILATION;  Surgeon: Wisam Potter MD;  Location: [facility] ENDOSCOPY;  Service:    • HYDROCELECTOMY Left 08/19/2011    Dr. Ellie Stein   • INCISION AND DRAINAGE PERIRECTAL ABSCESS N/A 11/30/2007    Dr. Davion Sanon   • LUMBAR EPIDURAL INJECTION N/A 2012-2013    x 3   • NEUROMA SURGERY Right 07/19/2005    Excision Neuroma Right Arm; PATH: CONSISTENT W/ LIPOMA,  Dr. Davion Sanon   • UPPER GASTROINTESTINAL ENDOSCOPY N/A 09/06/2012    Normal esophagus; Normal Stomach, Normal examinded duodenum, Dr. Bruno Turk   • WRIST FRACTURE SURGERY Right 06/03/2003    ORIF Right Distal Radius, Dr. Ryan Bravo       Social History     Social History   • Marital status:      Spouse name: N/A   • Number of children: N/A   • Years of education: N/A     Occupational History   • Not on file.     Social History Main Topics   • Smoking status: Never Smoker   • Smokeless tobacco: Never Used      Comment: no caffeine   • Alcohol use No   • Drug use: No   • Sexual activity: Defer     Other Topics Concern   • Not on file     Social History Narrative   • No narrative on file       Family History   Problem Relation Age of Onset   • Heart disease Mother    • Prostate cancer Father    • Heart disease Father        Review of Systems   Constitution: Negative for decreased appetite, fever, malaise/fatigue and weight loss.   HENT: Negative for nosebleeds.    Eyes: Negative for double vision.    Cardiovascular: Negative for chest pain, claudication, cyanosis, dyspnea on exertion, irregular heartbeat, leg swelling, near-syncope, orthopnea, palpitations, paroxysmal nocturnal dyspnea and syncope.   Respiratory: Negative for cough, hemoptysis and shortness of breath.    Hematologic/Lymphatic: Negative for bleeding problem.   Skin: Negative for rash.   Musculoskeletal: Negative for falls and myalgias.   Gastrointestinal: Negative for hematochezia, jaundice, melena, nausea and vomiting.   Genitourinary: Negative for hematuria.   Neurological: Negative for dizziness and seizures.   Psychiatric/Behavioral: Negative for altered mental status and memory loss.       No Known Allergies      Current Outpatient Prescriptions:   •  acetaminophen (TYLENOL) 325 MG tablet, Take 2 tablets by mouth Every 4 (Four) Hours As Needed for Mild Pain ., Disp: , Rfl:   •  acyclovir (ZOVIRAX) 400 MG tablet, Take 400 mg by mouth As Needed. Take no more than 5 doses a day., Disp: , Rfl:   •  aspirin 81 MG EC tablet, Take 81 mg by mouth daily., Disp: , Rfl:   •  [employer] MICROLET LANCETS lancets, , Disp: , Rfl:   •  brimonidine (ALPHAGAN) 0.15 % ophthalmic solution, Apply 1 drop to eye 2 (two) times a day., Disp: , Rfl:   •  clopidogrel (PLAVIX) 75 MG tablet, Take 75 mg by mouth daily., Disp: , Rfl:   •  Dulaglutide (TRULICITY) 0.75 MG/0.5ML solution pen-injector, Inject  under the skin 1 (One) Time., Disp: , Rfl:   •  famotidine (PEPCID) 20 MG tablet, Take 1 tablet by mouth 2 (Two) Times a Day. (Patient taking differently: Take 20 mg by mouth As Needed.), Disp: , Rfl:   •  insulin aspart (novoLOG FLEXPEN) 100 UNIT/ML solution pen-injector sc pen, INJECT 20 UNITS IN THE MORNING 20 UNITS AT LUNCH AND 20 UNITS AT DINNER (Patient taking differently: Inject 80 Units under the skin 3 (Three) Times a Day With Meals. INJECT 20 UNITS IN THE MORNING 20 UNITS AT LUNCH AND 20 UNITS AT DINNER), Disp: , Rfl:   •  insulin detemir (LEVEMIR) 100 UNIT/ML  "injection, INJECT 60 UNITS AT NIGHT (Patient taking differently: Inject 100 Units under the skin Every Night.), Disp: , Rfl: 12  •  isosorbide mononitrate (IMDUR) 30 MG 24 hr tablet, Take 30 mg by mouth daily., Disp: , Rfl:   •  latanoprost (XALATAN) 0.005 % ophthalmic solution, Apply 1 drop to eye daily., Disp: , Rfl:   •  lisinopril (PRINIVIL,ZESTRIL) 20 MG tablet, TAKE 1 TABLET DAILY AS DIRECTED., Disp: 90 tablet, Rfl: 3  •  metFORMIN (GLUCOPHAGE) 500 MG tablet, Take 1 tablet by mouth 2 (Two) Times a Day With Meals., Disp: 180 tablet, Rfl: 0  •  metoprolol tartrate (LOPRESSOR) 25 MG tablet, TAKE 1 TABLET BY MOUTH 2 TIMES DAILY, Disp: 180 tablet, Rfl: 1  •  nitroglycerin (NITROSTAT) 0.4 MG SL tablet, Place 0.4 mg under the tongue Every 5 (Five) Minutes As Needed for Chest Pain. Take no more than 3 doses in 15 minutes., Disp: , Rfl:   •  Omega 3 1000 MG capsule, Take 1 capsule by mouth 2 (Two) Times a Day., Disp: , Rfl:   •  omeprazole (priLOSEC) 20 MG capsule, Take 20 mg by mouth Daily., Disp: , Rfl:   •  pilocarpine (PILOCAR) 1 % ophthalmic solution, 1 drop 4 (Four) Times a Day., Disp: , Rfl:   •  simvastatin (ZOCOR) 40 MG tablet, Take 40 mg by mouth Every Night., Disp: , Rfl:   •  timolol (TIMOPTIC) 0.5 % ophthalmic solution, 1 drop daily. INSTILL 1 DROP IN LEFT EYE EVERY MORNING AS DIRECTED, Disp: , Rfl: 6     Objective:     Vitals:    04/23/18 1102   BP: 110/64   Pulse: 72   Weight: 95.4 kg (210 lb 6.4 oz)   Height: 180.3 cm (71\")     Body mass index is 29.34 kg/m².    Physical Exam   Constitutional: He is oriented to person, place, and time. He appears well-developed and well-nourished.   overweight   HENT:   Head: Normocephalic.   Eyes: No scleral icterus.   Neck: No JVD present. No thyromegaly present.   Cardiovascular: Normal rate, regular rhythm and normal heart sounds.  Exam reveals no gallop and no friction rub.    No murmur heard.  Pulmonary/Chest: Effort normal and breath sounds normal. He has no " wheezes. He has no rales.   Abdominal: Soft. There is no hepatosplenomegaly. There is no tenderness.   Musculoskeletal: Normal range of motion. He exhibits no edema.   Lymphadenopathy:     He has no cervical adenopathy.   Neurological: He is alert and oriented to person, place, and time.   Skin: Skin is warm and dry. No rash noted.   Psychiatric: He has a normal mood and affect.         ECG 12 Lead  Date/Time: 4/23/2018 12:05 PM  Performed by: NOY WRAY  Authorized by: NOY WRAY   Comparison: compared with previous ECG   Similar to previous ECG  Rhythm: sinus rhythm  Clinical impression: abnormal ECG  Comments: ns ST-T wave abnormality                 Assessment:       Diagnosis Plan   1. Coronary artery disease involving native coronary artery of native heart without angina pectoris     2. Hyperlipidemia, unspecified hyperlipidemia type     3. Essential hypertension     4. DM type 2 with diabetic peripheral neuropathy            Plan:        The patient seems to be stable at this point.  He is doing well he has normal LV function he's finally losing weight he doesn't have any symptoms of coronary disease ECG is unchanged blood pressures good he is on good medical therapy I'm not can make any changes to his regimen does gram come back and see me in a year    Coronary Artery Disease  Assessment  • The patient has no angina    Plan  • Lifestyle modifications discussed include adhering to a heart healthy diet, maintenance of a healthy weight, medication compliance, regular exercise and regular monitoring of cholesterol and blood pressure    Subjective - Objective  • There is a history of past MI  • There has been a previous stent procedure using AMARI  • Current antiplatelet therapy includes aspirin 81 mg        As always, it has been a pleasure to participate in your patient's care.      Sincerely,       Noy Wray MD

## 2018-05-14 ENCOUNTER — TELEPHONE (OUTPATIENT)
Dept: NEUROLOGY | Facility: CLINIC | Age: 73
End: 2018-05-14

## 2018-05-14 NOTE — TELEPHONE ENCOUNTER
I called and spoke with Mrs. Vaughn.  Her  was at work and assuming full activities of daily living.  We reviewed signs and symptoms of stroke and to call 911 immediately. I let her know we are making a follow up appointment with Jeannie ARREOLA for 3 months.  Home medications include Lopressor 25 mg 2 times daily, Verapamil 120 mg at night with blood pressure checks, Lisinopril 20 mg daily,Metformin 500 mg 2 times a day.  She said he has been checking his blood glucose regularly.  Novolog Insulin 20 units every morning, Levemir 60 mg at night, Lipitor 80 mg at night, ASA 81 mg daily and Plavix 75 mg daily.  mRS 1.  JUAN MIGUEL Gleason RN

## 2018-05-16 ENCOUNTER — TELEPHONE (OUTPATIENT)
Dept: NEUROLOGY | Facility: CLINIC | Age: 73
End: 2018-05-16

## 2018-05-16 NOTE — TELEPHONE ENCOUNTER
I s/w patient's wife and scheduled his 3 month hospital f/u to see Jeannie on 6/5/18 at 3pm. Mailed packet as well.

## 2018-05-29 ENCOUNTER — OFFICE VISIT (OUTPATIENT)
Dept: ENDOCRINOLOGY | Age: 73
End: 2018-05-29

## 2018-05-29 VITALS
WEIGHT: 213.4 LBS | DIASTOLIC BLOOD PRESSURE: 66 MMHG | SYSTOLIC BLOOD PRESSURE: 132 MMHG | OXYGEN SATURATION: 97 % | HEART RATE: 75 BPM | BODY MASS INDEX: 29.88 KG/M2 | HEIGHT: 71 IN

## 2018-05-29 DIAGNOSIS — I25.10 CORONARY ARTERY DISEASE INVOLVING NATIVE CORONARY ARTERY OF NATIVE HEART WITHOUT ANGINA PECTORIS: ICD-10-CM

## 2018-05-29 DIAGNOSIS — R80.9 MICROALBUMINURIA: ICD-10-CM

## 2018-05-29 DIAGNOSIS — Z95.820 S/P ANGIOPLASTY WITH STENT: ICD-10-CM

## 2018-05-29 DIAGNOSIS — E78.5 HYPERLIPIDEMIA, UNSPECIFIED HYPERLIPIDEMIA TYPE: ICD-10-CM

## 2018-05-29 DIAGNOSIS — I10 ESSENTIAL HYPERTENSION: ICD-10-CM

## 2018-05-29 DIAGNOSIS — E11.21 TYPE 2 DIABETES WITH NEPHROPATHY (HCC): ICD-10-CM

## 2018-05-29 DIAGNOSIS — E11.42 DM TYPE 2 WITH DIABETIC PERIPHERAL NEUROPATHY (HCC): Primary | ICD-10-CM

## 2018-05-29 PROCEDURE — 99214 OFFICE O/P EST MOD 30 MIN: CPT | Performed by: INTERNAL MEDICINE

## 2018-05-29 NOTE — PROGRESS NOTES
Subjective   Anthony Vaughn is a 73 y.o. male.     F/u for dm 2,hyperlipidemia, CAD/ testing bs 2-3 x day / last dm eye exam 11/2016/ last dm foot exam today with dr Elizabeth       Diabetes     Hyperlipidemia     Coronary Artery Disease   Risk factors include hyperlipidemia.      Patient has known diabetes mellitus since 2006 and started on insulin in 2010. He is on Levemir 70 units every evening, NovoLog 60 units 3 times a day with meals and metformin 500 mg twice a day.  [facility]  He has checking his blood sugar 4 times a week because he has to travel for his work.    FBS .  Lunchtime blood sugar runs between 82 142.  Suppertime blood sugar runs between .  Bedtime blood sugar runs between . He has few hypoglycemic episodes. He has lost 13 pounds since 10/17.  He is intermittently noncompliant with his diet. His last meal was 8 AM     His last eye examination was in 4/18 and he has retinopathy. He has microalbuminuria on urine sample taken last 5/17. He is on lisinopril. He has numbness and stinging sensation in his toes but is not on medication.     He has known coronary artery disease and had multiple angioplasty and stents in the past. He denies any previous history of myocardial infarction. He has not had a coronary artery bypass surgery. He denies any chest pains, exertional dyspnea or PND.   He follows with Dr. Wray. He is on Imdur, lisinopril, metoprolol tartrate and nitroglycerin sublingual as needed.     He has hyperlipidemia and has been on Zocor 80 mg 1/2 tab once a day and Lovaza 1 capsule BID. He denies any myalgia.     The following portions of the patient's history were reviewed and updated as appropriate: allergies, current medications, past family history, past medical history, past social history, past surgical history and problem list.    Review of Systems   Constitutional: Negative.    HENT: Negative.    Eyes: Negative.   "  Respiratory: Negative.    Cardiovascular: Negative.    Gastrointestinal: Negative.    Endocrine: Negative.    Genitourinary: Negative.    Musculoskeletal: Negative.    Skin: Negative.    Allergic/Immunologic: Negative.    Neurological: Negative.    Hematological: Bruises/bleeds easily (on aspirin ).   Psychiatric/Behavioral: Negative.        Objective      Vitals:    05/29/18 1103   BP: 132/66   BP Location: Right arm   Patient Position: Sitting   Cuff Size: Large Adult   Pulse: 75   SpO2: 97%   Weight: 96.8 kg (213 lb 6.4 oz)   Height: 180.3 cm (70.98\")     Physical Exam   Constitutional: He is oriented to person, place, and time. He appears well-developed and well-nourished. No distress.   HENT:   Head: Normocephalic.   Nose: Nose normal.   Mouth/Throat: No oropharyngeal exudate.   Eyes: Conjunctivae and EOM are normal. Right eye exhibits no discharge. Left eye exhibits no discharge. No scleral icterus.   Neck: Neck supple. No JVD present. No tracheal deviation present. No thyromegaly present.   Cardiovascular: Normal rate, regular rhythm, normal heart sounds and intact distal pulses.  Exam reveals no gallop and no friction rub.    No murmur heard.  Pulmonary/Chest: Effort normal and breath sounds normal. No respiratory distress. He has no wheezes. He has no rales.   Abdominal: Soft. Bowel sounds are normal. He exhibits no distension and no mass. There is no tenderness. There is no guarding.   Musculoskeletal: Normal range of motion. He exhibits no edema, tenderness or deformity.   Lymphadenopathy:     He has no cervical adenopathy.   Neurological: He is alert and oriented to person, place, and time. No cranial nerve deficit. Coordination normal.   Skin: Skin is warm and dry. No rash noted. No erythema. No pallor.   Psychiatric: He has a normal mood and affect. His behavior is normal.     Admission on 03/01/2018, Discharged on 03/03/2018   No results displayed because visit has over 200 results.    "     Assessment/Plan   Anthony was seen today for diabetes, hyperlipidemia and coronary artery disease.    Diagnoses and all orders for this visit:    DM type 2 with diabetic peripheral neuropathy  -     Comprehensive Metabolic Panel  -     Lipid Panel  -     Hemoglobin A1c  -     Microalbumin / Creatinine Urine Ratio - Urine, Clean Catch    Type 2 diabetes with nephropathy  -     Comprehensive Metabolic Panel  -     Lipid Panel  -     Microalbumin / Creatinine Urine Ratio - Urine, Clean Catch    Microalbuminuria    Coronary artery disease involving native coronary artery of native heart without angina pectoris  -     Comprehensive Metabolic Panel    S/P angioplasty with stent    Hyperlipidemia, unspecified hyperlipidemia type  -     Comprehensive Metabolic Panel  -     Lipid Panel  -     TSH  -     T4, Free    Essential hypertension  -     Comprehensive Metabolic Panel       Continue Levemir, NovoLog, Trulicity and metformin  Check hemoglobin A1c and urine microalbumin.  Continue simvastatin and Lovaza.  Continue lisinopril, metoprolol tartrate, Imdur, and nitroglycerin sublingual as needed.  Follow-up with Dr. Wray as scheduled.    RTC 4 mos.    Send copy of my note to Dr. Diaz and Nils

## 2018-05-30 LAB
ALBUMIN SERPL-MCNC: 4.4 G/DL (ref 3.5–5.2)
ALBUMIN/CREAT UR: 19.1 MG/G CREAT (ref 0–30)
ALBUMIN/GLOB SERPL: 2.2 G/DL
ALP SERPL-CCNC: 41 U/L (ref 39–117)
ALT SERPL-CCNC: 23 U/L (ref 1–41)
AST SERPL-CCNC: 15 U/L (ref 1–40)
BILIRUB SERPL-MCNC: 0.4 MG/DL (ref 0.1–1.2)
BUN SERPL-MCNC: 16 MG/DL (ref 8–23)
BUN/CREAT SERPL: 15.5 (ref 7–25)
CALCIUM SERPL-MCNC: 9.7 MG/DL (ref 8.6–10.5)
CHLORIDE SERPL-SCNC: 100 MMOL/L (ref 98–107)
CHOLEST SERPL-MCNC: 135 MG/DL (ref 0–200)
CO2 SERPL-SCNC: 24.4 MMOL/L (ref 22–29)
CREAT SERPL-MCNC: 1.03 MG/DL (ref 0.76–1.27)
CREAT UR-MCNC: 103.6 MG/DL
GFR SERPLBLD CREATININE-BSD FMLA CKD-EPI: 71 ML/MIN/1.73
GFR SERPLBLD CREATININE-BSD FMLA CKD-EPI: 86 ML/MIN/1.73
GLOBULIN SER CALC-MCNC: 2 GM/DL
GLUCOSE SERPL-MCNC: 107 MG/DL (ref 65–99)
HBA1C MFR BLD: 7.12 % (ref 4.8–5.6)
HDLC SERPL-MCNC: 36 MG/DL (ref 40–60)
INTERPRETATION: NORMAL
LDLC SERPL CALC-MCNC: 55 MG/DL (ref 0–100)
Lab: NORMAL
MICROALBUMIN UR-MCNC: 19.8 UG/ML
POTASSIUM SERPL-SCNC: 4.6 MMOL/L (ref 3.5–5.2)
PROT SERPL-MCNC: 6.4 G/DL (ref 6–8.5)
SODIUM SERPL-SCNC: 140 MMOL/L (ref 136–145)
T4 FREE SERPL-MCNC: 1.14 NG/DL (ref 0.93–1.7)
TRIGL SERPL-MCNC: 222 MG/DL (ref 0–150)
TSH SERPL DL<=0.005 MIU/L-ACNC: 1.97 MIU/ML (ref 0.27–4.2)
VLDLC SERPL CALC-MCNC: 44.4 MG/DL (ref 5–40)

## 2018-06-01 ENCOUNTER — TELEPHONE (OUTPATIENT)
Dept: ENDOCRINOLOGY | Age: 73
End: 2018-06-01

## 2018-06-05 ENCOUNTER — OFFICE VISIT (OUTPATIENT)
Dept: NEUROLOGY | Facility: CLINIC | Age: 73
End: 2018-06-05

## 2018-06-05 VITALS
SYSTOLIC BLOOD PRESSURE: 148 MMHG | BODY MASS INDEX: 29.68 KG/M2 | HEART RATE: 75 BPM | DIASTOLIC BLOOD PRESSURE: 72 MMHG | OXYGEN SATURATION: 94 % | WEIGHT: 212 LBS | HEIGHT: 71 IN

## 2018-06-05 DIAGNOSIS — I10 ESSENTIAL HYPERTENSION: ICD-10-CM

## 2018-06-05 DIAGNOSIS — E11.21 TYPE 2 DIABETES WITH NEPHROPATHY (HCC): ICD-10-CM

## 2018-06-05 DIAGNOSIS — I25.10 CORONARY ARTERY DISEASE INVOLVING NATIVE CORONARY ARTERY OF NATIVE HEART WITHOUT ANGINA PECTORIS: ICD-10-CM

## 2018-06-05 DIAGNOSIS — E78.5 HYPERLIPIDEMIA, UNSPECIFIED HYPERLIPIDEMIA TYPE: ICD-10-CM

## 2018-06-05 DIAGNOSIS — I63.9 ACUTE CVA (CEREBROVASCULAR ACCIDENT) (HCC): Primary | Chronic | ICD-10-CM

## 2018-06-05 PROCEDURE — 99213 OFFICE O/P EST LOW 20 MIN: CPT | Performed by: NURSE PRACTITIONER

## 2018-06-05 RX ORDER — MELOXICAM 15 MG/1
15 TABLET ORAL DAILY
COMMUNITY
Start: 2018-05-29 | End: 2019-04-29

## 2018-06-05 NOTE — PROGRESS NOTES
"DOS: 2018  NAME: Anthony Vaughn   : 1945  PCP: Gricel Diaz MD    Chief Complaint   Patient presents with   • Stroke      Neurological Problem and Interval History:  73 y.o. RHW male with CAD (mulltiple stents), HTN, hypertriglyceridemia, DM and stroke who presents today to follow-up for stroke. He is accompanied by his spouse.    Mr. Vaughn presented on 3/1/18 with Sx of recurrent blurring of vision out of the left eye and unsteadiness of gait.  His imaging was negative for an acute stroke; however he has a history of stroke (2017) and symptoms were concerning for TIA. He was on ASA and Plavix and showed good response to both. However, he was on omeprazole which has a known interaction with Plavix and decreases its effectiveness. Due to concerns for transient retinal ischemia a TCD with emboli monitoring was done that was negative for HITs.  Verapamil XR was added for possible cluster migraine. He was discharged on ASA, Plavix, Lipitor and prolonged cardiac monitoring.     He presents today on ASA, Plavix and statin and is tolerating well. He denies any new vision changes, weakness, headaches or any other stroke/TIA symptoms. He has remained at his neurologic baseline and participates in all of his normal activities. He does have decreased movement in his left shoulder which needs replacement; however, the patient has no desire to have this done. He is a long-standing patient of Dr. Wray and has followed up with him since discharge. He does not take his BP regularly but states it is well controlled; however, today his BP is 148/72 which he states is \"about normal\". He denies smoking or alcohol use.    Review of Systems:        Review of Systems   Constitutional: Positive for activity change and fatigue. Negative for chills and fever.   HENT: Positive for hearing loss. Negative for tinnitus and trouble swallowing.    Eyes: Positive for redness. Negative for pain, itching and visual " disturbance.   Respiratory: Negative for cough, shortness of breath and wheezing.    Cardiovascular: Negative for chest pain, palpitations and leg swelling.   Gastrointestinal: Positive for abdominal pain, constipation and diarrhea. Negative for nausea and vomiting.   Endocrine: Negative for cold intolerance, heat intolerance and polydipsia.   Genitourinary: Negative for decreased urine volume, difficulty urinating, frequency and urgency.   Musculoskeletal: Positive for arthralgias, back pain, myalgias, neck pain and neck stiffness. Negative for gait problem.   Skin: Negative for color change, rash and wound.   Allergic/Immunologic: Negative for environmental allergies, food allergies and immunocompromised state.   Neurological: Positive for numbness (sometimes feet). Negative for dizziness, tremors, seizures, syncope, facial asymmetry, speech difficulty, weakness, light-headedness and headaches.   Hematological: Negative for adenopathy. Bruises/bleeds easily.   Psychiatric/Behavioral: Negative for agitation, behavioral problems, confusion, decreased concentration, dysphoric mood, hallucinations, self-injury, sleep disturbance and suicidal ideas. The patient is not nervous/anxious and is not hyperactive.        Current Outpatient Prescriptions on File Prior to Visit   Medication Sig Dispense Refill   • acetaminophen (TYLENOL) 325 MG tablet Take 2 tablets by mouth Every 4 (Four) Hours As Needed for Mild Pain . (Patient taking differently: Take 650 mg by mouth Daily As Needed for Mild Pain .)     • acyclovir (ZOVIRAX) 400 MG tablet Take 400 mg by mouth As Needed. Take no more than 5 doses a day.     • aspirin 81 MG EC tablet Take 81 mg by mouth daily.     • brimonidine (ALPHAGAN) 0.15 % ophthalmic solution Apply 1 drop to eye 2 (two) times a day.     • clopidogrel (PLAVIX) 75 MG tablet Take 75 mg by mouth daily.     • Dulaglutide (TRULICITY) 0.75 MG/0.5ML solution pen-injector Inject  under the skin 1 (One) Time.    "  • insulin aspart (novoLOG FLEXPEN) 100 UNIT/ML solution pen-injector sc pen INJECT 20 UNITS IN THE MORNING 20 UNITS AT LUNCH AND 20 UNITS AT DINNER (Patient taking differently: Inject 80 Units under the skin 3 (Three) Times a Day With Meals. 60 units tid with meals)     • insulin detemir (LEVEMIR) 100 UNIT/ML injection INJECT 60 UNITS AT NIGHT (Patient taking differently: Inject 70 Units under the skin Every Night.)  12   • isosorbide mononitrate (IMDUR) 30 MG 24 hr tablet Take 30 mg by mouth daily.     • latanoprost (XALATAN) 0.005 % ophthalmic solution Apply 1 drop to eye daily.     • lisinopril (PRINIVIL,ZESTRIL) 20 MG tablet TAKE 1 TABLET DAILY AS DIRECTED. 90 tablet 3   • metFORMIN (GLUCOPHAGE) 500 MG tablet Take 1 tablet by mouth 2 (Two) Times a Day With Meals. 180 tablet 0   • metoprolol tartrate (LOPRESSOR) 25 MG tablet TAKE 1 TABLET BY MOUTH 2 TIMES DAILY 180 tablet 1   • nitroglycerin (NITROSTAT) 0.4 MG SL tablet Place 0.4 mg under the tongue Every 5 (Five) Minutes As Needed for Chest Pain. Take no more than 3 doses in 15 minutes.     • Omega 3 1000 MG capsule Take 1 capsule by mouth 2 (Two) Times a Day.     • omeprazole (priLOSEC) 20 MG capsule Take 20 mg by mouth Daily.     • pilocarpine (PILOCAR) 1 % ophthalmic solution 1 drop 4 (Four) Times a Day.     • simvastatin (ZOCOR) 40 MG tablet Take 40 mg by mouth Every Night.     • timolol (TIMOPTIC) 0.5 % ophthalmic solution 1 drop daily. INSTILL 1 DROP IN LEFT EYE EVERY MORNING AS DIRECTED  6   • SALIMA MICROLET LANCETS lancets        No current facility-administered medications on file prior to visit.       \"The following portions of the patient's history were reviewed and updated as appropriate: allergies, current medications, past family history, past medical history, past social history, past surgical history and problem list.\"  Review and Interpretation of Imaging: Per Dr. Hardy,  MRI brain 3/1/18: DWI negative, flair shows a lesion in the " posterior right basal ganglia, GRE are negative, postcontrast images negative  MRA aortic arch: Image quality is suboptimal but there may be a severe stenosis of the origin of the left vertebral artery which has diminished flow related enhancement, right vertebral artery origin is not well visualized and may have a moderate to severe stenosis, there is a moderate stenosis of the origin of the left internal carotid artery  CTA head and neck 3/1/18: Severe stenosis of the origin of the left vertebral artery, mild stenosis origin right vertebral artery, severe calcification with hypodense plaque origin left internal carotid artery with an approximately 50% eccentric stenosis, mild stenosis right ICA intracranial vessels show severe tandem stenoses of the left V4 segment possibly occluded at the V3 V4 junctions, there is a mild stenosis of the right V4, there is calcification of the intracranial internal carotid arteries, the middle cerebral arteries appear to be patent although irregular, there is a 20% stenosis of the posterior wall of the basilar artery, the right A1 segment is absent     MRI brain 3/14/17:   IMPRESSION  There are findings compatible with a subacute infarct within the right  lentiform nucleus extending into the corona radiata. This is within  right lateral lenticulostriate distribution and probably represents a  lacunar type event.  There is a question of an abnormal flow-void within the intracranial  segment of the left vertebral artery.    TTE: No cardiac source of embolism  EKG: Sinus rhythm  TCD with emboli monitoring 4/2/18: No HITS.    ZIO patch 3/3/2018:The patient was monitored for 13 days 19 hours. A normal study    Laboratory Results:             Lab Results   Component Value Date    HGBA1C 7.12 (H) 05/29/2018         Lab Results   Component Value Date    CHOL 95 03/02/2018         Lab Results   Component Value Date    HDL 36 (L) 05/29/2018    HDL 27 (L) 03/02/2018    HDL 30 (L) 10/09/2017          Lab Results   Component Value Date    LDL 55 05/29/2018    LDL 34 03/02/2018    LDL 29 10/09/2017         Lab Results   Component Value Date    TRIG 222 (H) 05/29/2018    TRIG 169 (H) 03/02/2018    TRIG 354 (H) 10/09/2017     Lab Results   Component Value Date    RPR Non-Reactive 03/03/2018     Lab Results   Component Value Date    TSH 1.970 05/29/2018     No results found for: [id number]    Physical Examination: NIHSS: 0 mRS: 0  General Appearance:   Well developed, well nourished, well groomed, alert, and cooperative.  HEENT: Normocephalic.    Neck and Spine: Normal range of motion.  Normal alignment. No mass or tenderness.   Cardiac: Regular rate and rhythm.  Peripheral Vasculature: Radial pulses are equal and symmetric. No signs of distal embolization.  Extremities:    No edema or deformities. Decreased ROM of left shoulder.  Skin:    No rashes or birth marks. One inch anterior midline wrist surgical scar.     Neurological examination:  Higher Integrative  Function: Oriented to time, place and person. Normal registration, recall, attention span and concentration. Normal language including comprehension, spontaneous speech, repetition, reading, writing, naming and vocabulary. No neglect with normal visual-spatial function and construction. Normal fund of knowledge and higher integrative function.  CN II: Pupils are equal, round, and reactive to light. Normal visual acuity and visual fields.    CN III IV VI: Extraocular movements are full without nystagmus.   CN V: Normal facial sensation and strength of muscles of mastication.  CN VII: Facial movements are symmetric. No weakness.  CN VIII:   Auditory acuity is normal.  CN IX & X:   Symmetric palatal movement.  CN XI: Sternocleidomastoid and trapezius are normal.  No weakness.  CN XII:   The tongue is midline.  No atrophy or fasciculations.  Motor: Normal muscle strength, bulk and tone in upper and lower extremities except for mild left downward drift.  No  fasciculations, rigidity, spasticity, or abnormal movements.  Sensation: Normal to light touch,  temperature, and proprioception in arms and legs. Normal graphesthesia and no extinction on DSS.  Station and Gait: Normal gait and station.    Coordination: Finger to nose test shows no dysmetria. Heel to shin normal.    Diagnoses / Discussion:  Mr. Vaughn is doing well following his admission in March of this year with vision changes. His imaging was negative for acute stroke; however, symptoms were concerning for possible TIA. He has intracranial atherosclerotic disease and is on the appropriate medical management with DAPT and statin. He has been instructed to avoid omeprazole due to its interaction with Plavix. His TTE was unrevealing and his prolonged cardiac monitoring was normal. Recommend continuing current medication regimen. We discussed importance of risk factor control for stroke prevention, including BP, blood sugar and cholesterol control. We also discussed getting regular exercise and following an AHA diet. We discussed importance of calling 911 for any signs/symptoms of stroke. Follow-up in 9 months, sooner if symptoms warrant.     Plan:   Continue current medication regimen   Avoid omeprazole d/t interaction with Plavix   Monitor BP regularly and record   Blood pressure control to <130/80   Goal LDL <70-recommend high dose statins-    Serum glucose < 140     Call 911 for stroke any stroke symptoms   Follow-up in 9 months  Anthony was seen today for stroke.    Diagnoses and all orders for this visit:    Acute CVA (cerebrovascular accident)    Hyperlipidemia, unspecified hyperlipidemia type    Essential hypertension    Type 2 diabetes with nephropathy    Coronary artery disease involving native coronary artery of native heart without angina pectoris        Coding

## 2018-06-29 RX ORDER — LISINOPRIL 20 MG/1
TABLET ORAL
Qty: 90 TABLET | Refills: 3 | Status: SHIPPED | OUTPATIENT
Start: 2018-06-29 | End: 2019-07-10 | Stop reason: SDUPTHER

## 2018-07-05 ENCOUNTER — LAB (OUTPATIENT)
Dept: LAB | Facility: HOSPITAL | Age: 73
End: 2018-07-05

## 2018-07-05 ENCOUNTER — CONSULT (OUTPATIENT)
Dept: ONCOLOGY | Facility: CLINIC | Age: 73
End: 2018-07-05

## 2018-07-05 VITALS
DIASTOLIC BLOOD PRESSURE: 72 MMHG | HEIGHT: 70 IN | HEART RATE: 78 BPM | BODY MASS INDEX: 31.61 KG/M2 | SYSTOLIC BLOOD PRESSURE: 122 MMHG | TEMPERATURE: 98.6 F | OXYGEN SATURATION: 94 % | WEIGHT: 220.8 LBS | RESPIRATION RATE: 16 BRPM

## 2018-07-05 DIAGNOSIS — D75.1 ERYTHROCYTOSIS: ICD-10-CM

## 2018-07-05 DIAGNOSIS — D72.825 BANDEMIA: ICD-10-CM

## 2018-07-05 DIAGNOSIS — R79.89 ABNORMAL CBC: Primary | ICD-10-CM

## 2018-07-05 DIAGNOSIS — D72.825 BANDEMIA: Primary | ICD-10-CM

## 2018-07-05 LAB
ALBUMIN SERPL-MCNC: 4.4 G/DL (ref 3.5–5.2)
ALBUMIN/GLOB SERPL: 1.5 G/DL (ref 1.1–2.4)
ALP SERPL-CCNC: 53 U/L (ref 38–116)
ALT SERPL W P-5'-P-CCNC: 18 U/L (ref 0–41)
ANION GAP SERPL CALCULATED.3IONS-SCNC: 12.6 MMOL/L
AST SERPL-CCNC: 18 U/L (ref 0–40)
BASOPHILS # BLD AUTO: 0.07 10*3/MM3 (ref 0–0.1)
BASOPHILS NFR BLD AUTO: 0.5 % (ref 0–1.1)
BILIRUB SERPL-MCNC: 0.4 MG/DL (ref 0.1–1.2)
BUN BLD-MCNC: 13 MG/DL (ref 6–20)
BUN/CREAT SERPL: 12.1 (ref 7.3–30)
CALCIUM SPEC-SCNC: 9.3 MG/DL (ref 8.5–10.2)
CHLORIDE SERPL-SCNC: 100 MMOL/L (ref 98–107)
CO2 SERPL-SCNC: 28.4 MMOL/L (ref 22–29)
CREAT BLD-MCNC: 1.07 MG/DL (ref 0.7–1.3)
CRP SERPL-MCNC: 0.13 MG/DL (ref 0–0.5)
DEPRECATED RDW RBC AUTO: 38.5 FL (ref 37–49)
EOSINOPHIL # BLD AUTO: 0.24 10*3/MM3 (ref 0–0.36)
EOSINOPHIL NFR BLD AUTO: 1.7 % (ref 1–5)
ERYTHROCYTE [DISTWIDTH] IN BLOOD BY AUTOMATED COUNT: 12.3 % (ref 11.7–14.5)
ERYTHROCYTE [SEDIMENTATION RATE] IN BLOOD: 1 MM/HR (ref 0–20)
GFR SERPL CREATININE-BSD FRML MDRD: 68 ML/MIN/1.73
GLOBULIN UR ELPH-MCNC: 2.9 GM/DL (ref 1.8–3.5)
GLUCOSE BLD-MCNC: 198 MG/DL (ref 74–124)
HCT VFR BLD AUTO: 47.3 % (ref 40–49)
HGB BLD-MCNC: 15.9 G/DL (ref 13.5–16.5)
HGB RETIC QN: 33.3 PG (ref 29.8–36.1)
IMM GRANULOCYTES # BLD: 0.06 10*3/MM3 (ref 0–0.03)
IMM GRANULOCYTES NFR BLD: 0.4 % (ref 0–0.5)
IMM RETICS NFR: 13.2 % (ref 3–15.8)
LDH SERPL-CCNC: 147 U/L (ref 99–259)
LYMPHOCYTES # BLD AUTO: 2.97 10*3/MM3 (ref 1–3.5)
LYMPHOCYTES NFR BLD AUTO: 21.3 % (ref 20–49)
MCH RBC QN AUTO: 29.1 PG (ref 27–33)
MCHC RBC AUTO-ENTMCNC: 33.6 G/DL (ref 32–35)
MCV RBC AUTO: 86.6 FL (ref 83–97)
MONOCYTES # BLD AUTO: 1.05 10*3/MM3 (ref 0.25–0.8)
MONOCYTES NFR BLD AUTO: 7.5 % (ref 4–12)
NEUTROPHILS # BLD AUTO: 9.56 10*3/MM3 (ref 1.5–7)
NEUTROPHILS NFR BLD AUTO: 68.6 % (ref 39–75)
NRBC BLD MANUAL-RTO: 0 /100 WBC (ref 0–0)
PLATELET # BLD AUTO: 309 10*3/MM3 (ref 150–375)
PMV BLD AUTO: 8.7 FL (ref 8.9–12.1)
POTASSIUM BLD-SCNC: 4.5 MMOL/L (ref 3.5–4.7)
PROT SERPL-MCNC: 7.3 G/DL (ref 6.3–8)
RBC # BLD AUTO: 5.46 10*6/MM3 (ref 4.3–5.5)
RETICS/RBC NFR AUTO: 1.45 % (ref 0.6–2)
SODIUM BLD-SCNC: 141 MMOL/L (ref 134–145)
WBC NRBC COR # BLD: 13.95 10*3/MM3 (ref 4–10)

## 2018-07-05 PROCEDURE — 80053 COMPREHEN METABOLIC PANEL: CPT | Performed by: INTERNAL MEDICINE

## 2018-07-05 PROCEDURE — 85046 RETICYTE/HGB CONCENTRATE: CPT | Performed by: INTERNAL MEDICINE

## 2018-07-05 PROCEDURE — 36415 COLL VENOUS BLD VENIPUNCTURE: CPT | Performed by: INTERNAL MEDICINE

## 2018-07-05 PROCEDURE — 86140 C-REACTIVE PROTEIN: CPT | Performed by: INTERNAL MEDICINE

## 2018-07-05 PROCEDURE — 85025 COMPLETE CBC W/AUTO DIFF WBC: CPT | Performed by: INTERNAL MEDICINE

## 2018-07-05 PROCEDURE — 83615 LACTATE (LD) (LDH) ENZYME: CPT | Performed by: INTERNAL MEDICINE

## 2018-07-05 PROCEDURE — 85652 RBC SED RATE AUTOMATED: CPT | Performed by: INTERNAL MEDICINE

## 2018-07-05 PROCEDURE — 99205 OFFICE O/P NEW HI 60 MIN: CPT | Performed by: INTERNAL MEDICINE

## 2018-07-05 NOTE — PROGRESS NOTES
Subjective     REASON FOR CONSULTATION:  Leukocytosis for 4 years, minimal erythrocytosis, normal platelet count, no splenomegaly by physical exam or ct abdomen.  Provide an opinion on any further workup or treatment                             REQUESTING PHYSICIAN:  Dr Gricel Diaz MD    RECORDS OBTAINED:  Records of the patients history including those obtained from the referring provider were reviewed and summarized in detail.        History of Present Illness This patient has been referred to our office by Gricel Diaz MD, because it has been noticed that the patient has a leukocytosis as far as I can go in Epic since 2014. Associated with this is minimal erythrocytosis and over all these 4 years, the numbers have not changed too much. The patient has no symptoms related to this. It has been noticed that he has a normal platelet count and it has been noticed today on examination and by CT scan criteria in a CT scan that was done in 03/2018 that he had no splenomegaly. Obviously raises the question about the nature of this and what needs to be done. Specifically, the patient has no symptoms related to this and actually, he feels terrific. His appetite is very good. He was trying to lose weight to control his diabetes better but he lost track of his calories and he is back into a lot of eating and a lot of weight gaining. His sugar control is not the best. He realizes that this is a problem. He denies any fevers or chills or infections of any nature. No sinus infection. No tooth decay or infection in the oral mucosa. No respiratory infection of any nature. His bowel function is appropriate. No abdominal pain. No distention. No jaundice. No heartburn. No indigestion. No history of liver abnormalities. He denies any difficulty with urination with the exception of decrease in regard to the pressure of the stream. Otherwise, no hematuria or infections in the urinary tract. He denies any alterations in the skin. He  has skeletal pain associated with osteoarthritis, especially shoulders and knees but no obvious synovitis or ongoing inflammatory arthritis. He denies any neurological symptomatology with the exception of some numbness in his feet. He also has minimal decrease of vision given cataract surgery and some complication of this that will require laser surgery by Ophthalmology in the next couple of weeks or so. The patient is not aware of any retinal damage from diabetes.     The patient denies any other problems at this time.        Past Medical History:   Diagnosis Date   • Atherosclerosis of native coronary artery without angina pectoris    • Chest pain    • Coronary artery disease    • Diabetes mellitus (CMS/HCC)    • Difficulty breathing    • Diverticulosis    • Dizziness    • ED (erectile dysfunction)    • GERD (gastroesophageal reflux disease)    • Hyperlipidemia    • Hypertension    • Kidney stones    • Measles    • Mumps    • Obesity    • Polio    • S/P angioplasty with stent    • Stroke (CMS/HCC)    • Type 2 diabetes mellitus (CMS/HCC)         Past Surgical History:   Procedure Laterality Date   • CARDIAC CATHETERIZATION N/A 02/10/2011    Normal LV systolic function; Kind of diffuse, small vessel, distal disease. Most of it is not amenable to PCI, but his JADEN lesion is and it is a large vessel; All of his prior durg-eluting stents are widely patent & look good, Dr. Anibal Oliveira   • CATARACT EXTRACTION Bilateral    • COLONOSCOPY N/A 09/13/2004    Normal, Repeat in 5-8 years, Dr. Davion Sanon   • COLONOSCOPY N/A 09/04/2009    Mutiple small & large-mouthed diverticual were found in the entire colon; Exam otherwise normal, Dr. Davion Sanon   • COLONOSCOPY W/ POLYPECTOMY N/A [id number]    2 benign polyps, [facility], Dr. Davion Sanon   • CORONARY ANGIOPLASTY WITH STENT PLACEMENT N/A 11/20/2007    Successful multivessel drug eluting stent implantation for restenosis above his prior stenting,   Anibal Wray   • CORONARY ANGIOPLASTY WITH STENT PLACEMENT N/A 06/05/2007    Successful multivessel drug eluting stent implantation in all 3 of the major coronary arteries, Dr. Anibal Wray   • CORONARY ANGIOPLASTY WITH STENT PLACEMENT N/A 02/10/2011    Successful drug-stenting of the JADEN, Dr. Anibal Oliveira   • ENDOSCOPY N/A 11/21/2017    Procedure: ESOPHAGOGASTRODUODENOSCOPY WITH COLD BIOPSIES AND 53 F MICHAEL DILATION;  Surgeon: Wisam Potter MD;  Location: [facility] ENDOSCOPY;  Service:    • HYDROCELECTOMY Left 08/19/2011    Dr. Ellie Stein   • INCISION AND DRAINAGE PERIRECTAL ABSCESS N/A 11/30/2007    Dr. Davion Sanon   • LUMBAR EPIDURAL INJECTION N/A 2012-2013    x 3   • NEUROMA SURGERY Right 07/19/2005    Excision Neuroma Right Arm; PATH: CONSISTENT W/ LIPOMA,  Dr. Davion Sanon   • ROTATOR CUFF REPAIR Right 2006   • ROTATOR CUFF REPAIR Left 2000   • UPPER GASTROINTESTINAL ENDOSCOPY N/A 09/06/2012    Normal esophagus; Normal Stomach, Normal examinded duodenum, Dr. Bruno Turk   • WRIST FRACTURE SURGERY Right 06/03/2003    ORIF Right Distal Radius, Dr. Ryan Bravo        Current Outpatient Prescriptions on File Prior to Visit   Medication Sig Dispense Refill   • aspirin 81 MG EC tablet Take 81 mg by mouth daily.     • brimonidine (ALPHAGAN) 0.15 % ophthalmic solution Apply 1 drop to eye 2 (two) times a day.     • clopidogrel (PLAVIX) 75 MG tablet Take 75 mg by mouth daily.     • Dulaglutide (TRULICITY) 0.75 MG/0.5ML solution pen-injector Inject  under the skin 1 (One) Time.     • insulin aspart (novoLOG FLEXPEN) 100 UNIT/ML solution pen-injector sc pen INJECT 20 UNITS IN THE MORNING 20 UNITS AT LUNCH AND 20 UNITS AT DINNER (Patient taking differently: Inject 80 Units under the skin 3 (Three) Times a Day With Meals. 60 units tid with meals)     • insulin detemir (LEVEMIR) 100 UNIT/ML injection INJECT 60 UNITS AT NIGHT (Patient taking differently: Inject 70 Units under the skin Every Night.)  12    • isosorbide mononitrate (IMDUR) 30 MG 24 hr tablet Take 30 mg by mouth daily.     • latanoprost (XALATAN) 0.005 % ophthalmic solution Apply 1 drop to eye daily.     • lisinopril (PRINIVIL,ZESTRIL) 20 MG tablet TAKE 1 TABLET DAILY AS DIRECTED. 90 tablet 3   • metFORMIN (GLUCOPHAGE) 500 MG tablet Take 1 tablet by mouth 2 (Two) Times a Day With Meals. 180 tablet 0   • nitroglycerin (NITROSTAT) 0.4 MG SL tablet Place 0.4 mg under the tongue Every 5 (Five) Minutes As Needed for Chest Pain. Take no more than 3 doses in 15 minutes.     • Omega 3 1000 MG capsule Take 1 capsule by mouth 2 (Two) Times a Day.     • omeprazole (priLOSEC) 20 MG capsule Take 20 mg by mouth Daily.     • pilocarpine (PILOCAR) 1 % ophthalmic solution 1 drop 4 (Four) Times a Day.     • simvastatin (ZOCOR) 40 MG tablet Take 40 mg by mouth Every Night.     • timolol (TIMOPTIC) 0.5 % ophthalmic solution 1 drop daily. INSTILL 1 DROP IN LEFT EYE EVERY MORNING AS DIRECTED  6   • acetaminophen (TYLENOL) 325 MG tablet Take 2 tablets by mouth Every 4 (Four) Hours As Needed for Mild Pain . (Patient taking differently: Take 650 mg by mouth Daily As Needed for Mild Pain .)     • acyclovir (ZOVIRAX) 400 MG tablet Take 400 mg by mouth As Needed. Take no more than 5 doses a day.     • SALIMA MICROLET LANCETS lancets      • meloxicam (MOBIC) 15 MG tablet Take 15 mg by mouth Daily.     • [DISCONTINUED] metoprolol tartrate (LOPRESSOR) 25 MG tablet TAKE 1 TABLET BY MOUTH 2 TIMES DAILY 180 tablet 1     No current facility-administered medications on file prior to visit.         ALLERGIES:  No Known Allergies     Social History     Social History   • Marital status:      Spouse name: Kerry     Occupational History   •       Self-employed     Social History Main Topics   • Smoking status: Never Smoker   • Smokeless tobacco: Never Used      Comment: no caffeine   • Alcohol use No   • Drug use: No   • Sexual activity: Defer     Other Topics Concern   • Not on file         Family History   Problem Relation Age of Onset   • Heart disease Mother    • Prostate cancer Father    • Heart disease Father    • Colon polyps Daughter    • Colon polyps Daughter         Review of Systems   General: no fever, chills, fatigue, weight changes, or lack of appetite.  Eyes: no epiphora, xerophthalmia,conjunctivitis, pain, glaucoma, blurred vision, blindness, secretion, photophobia, proptosis, diplopia.  Ears: no otorrhea, tinnitus, otorrhagia, deafness, pain, vertigo.  Nose: no rhinorrhea, epistaxis, alteration in perception of odors, sinuses pressure.  Mouth: no alteration in gums or teeth,  ulcers, no difficulty with mastication or deglut ion, no odynophagia.  Neck: no masses or pain, no thyroid alterations, no pain in muscles or arteries, no carotid odynia, no crepitation.  Respiratory: no cough, sputum production, dyspnea, trepopnea, pleuritic pain, hemoptysis.  Heart: no syncope, irregularity, palpitations, angina, orthopnea, paroxysmal nocturnal dyspnea.  Vascular Venous: no tenderness,edema, palpable cords, postphlebitic syndrome, skin changes or ulcerations.  Vascular Arterial: no distal ischemia, claudication, gangrene, neuropathic ischemic pain, skin ulcers, paleness or cyanosis.  GI: no dysphagia, odynophagia, no regurgitation, no heartburn,no indigestion,no nausea,no vomiting,no hematemesis ,no melena,no jaundice,no distention, no obstipation,no enterorrhagia,no proctalgia,no anal  lesions, nochanges in bowel habits.  : no frequency, hesitancy, hematuria, discharge, pain.  Musculoskeletal: STATED muscle or tendon pain or inflammation, joint pain, edema,NO functional limitation, fasciculations, mass.  Neurologic: no headache, seizures, alterations on Craneal nerves, no motor deficit, normal coordination, no alteration in memory, orientation, calculation,writting, verbal or written language.NEUROPATHY IN FEET  Skin: no rashes, pruritus or localized lesions.  Psychiatric: no anxiety,  "depression, agitation, delusions, proper insight.    Objective     Vitals:    07/05/18 1504   BP: 122/72   Pulse: 78   Resp: 16   Temp: 98.6 °F (37 °C)   SpO2: 94%  Comment: at rest   Weight: 100 kg (220 lb 12.8 oz)   Height: 177 cm (69.69\")   PainSc: 0-No pain     No flowsheet data found.    Physical Exam    GENERAL:  Well-developed, well-nourished  Patient  in no acute distress.   SKIN:  Warm, dry without rashes, purpura or petechiae.MULTIPLE SEBORRHEIC KERATOSIS IN BACK  HEENT:  Pupils were equal and reactive to light and accomodation, conjunctivas non injected, no pterigion, normal extraocular movements, normal visual acuity.   Mouth mucosa was moist, no exudates in oropharynx, normal gum line, normal roof of the mouth and pillars, normal papillations of the tongue.Ear canals were normal, as well tympanic membranes, normal hearing acuity.No pain in mastoid area or erythema.  NECK:  Supple with good range of motion; no thyromegaly or masses, no JVD or bruits, no cervical adenopathies.No carotid arteries pain, no carotid abnormal pulsation or arterial dance.  LYMPHATICS:  No cervical, supraclavicular, axillary, epitrochlear or inguinal adenopathy.  CHEST:  Normal excursion of both yaquelin thoraces, normal voice fremitus, no subcutaneous emphysema, normal axillas, no rashes or acanthosis nigricans. Lungs clear to percussion and auscultation, normal breath sounds bilaterally, no wheezing, crackles or ronchi, no stridor, no rubs.  CARDIAC AND VASCULAR: PMI not displaced,no thrills, normal rate and regular rhythm, without murmurs, rubs or S3 or S4 right or left sided gallops. Normal femoral, popliteal, pedis, brachial and carotid pulses.  ABDOMEN:  Soft, nontender with no organomegaly or masses, no ascites, no collateral circulation,no distention,no Aquilino sign, no abdominal pain, no inguinal hernias,no umbilical hernias, no abdominal bruits. Normal bowel sounds.  GENITAL: Not  Performed.  EXTREMITIES  AND SPINE:  No " clubbing, cyanosis or edema, no deformities or pain .No kyphosis, scoliosis, deformities or pain in spine, ribs or pelvic bone.  NEUROLOGICAL:  Patient was awake, alert, oriented to time, person and place,normal gait and coordination, negative Romberg; cranial nerves were normal, motor strength in upper and lower extremities was 5/5 proximally and distally, reflexes were symmetric, toes were down going, normal sensory modalities to touch, pinprick, temperature discrimination,LOST  BOTH LE vibratory sensation, no meningeal signs with supple neck.        RECENT LABS:  Hematology WBC   Date Value Ref Range Status   07/05/2018 13.95 (H) 4.00 - 10.00 10*3/mm3 Final     RBC   Date Value Ref Range Status   07/05/2018 5.46 4.30 - 5.50 10*6/mm3 Final     Hemoglobin   Date Value Ref Range Status   07/05/2018 15.9 13.5 - 16.5 g/dL Final     Hematocrit   Date Value Ref Range Status   07/05/2018 47.3 40.0 - 49.0 % Final     Platelets   Date Value Ref Range Status   07/05/2018 309 150 - 375 10*3/mm3 Final      CT ABDOMEN AND PELVIS WITHOUT IV CONTRAST     HISTORY: 73-year-old male with abdominal pain. Evaluate for  diverticulitis.     TECHNIQUE: Radiation dose reduction techniques were utilized, including  automated exposure control and exposure modulation based on body size.   3 mm images were obtained through the abdomen and pelvis without the  administration of IV contrast. Compared with previous CT from 1/8/2016.     FINDINGS: Noncontrasted liver, gallbladder, spleen, adrenals, and  kidneys appear unremarkable other than a 1.1 cm right renal cyst. There  are several pancreatic calcifications which suggests chronic  pancreatitis. There is no evidence for acute pancreatitis. No acute  bowel abnormality is seen. There is mild diverticulosis scattered  throughout the colon without evidence for diverticulitis. The appendix  appears normal. The stomach is nearly completely filled with food  debris. There is an average volume of  formed stool within the colon.     IMPRESSION:  1. There is no evidence for acute gallbladder disease or appendicitis.  No acute abnormality is seen.  2. There are changes of chronic pancreatitis, but there is no evidence  for acute pancreatitis.  3. Mild diverticulosis throughout the colon without evidence for  diverticulitis.     Discussed with Dr. Diaz.      This report was finalized on 3/26/2018 3:56 PM by Dr. Jessica Ley MD.     Component      Latest Ref Rng & Units 12/29/2014 12/30/2014 3/6/2017 4/14/2017           4:50 PM  3:52 PM  3:19 PM 12:10 PM   WBC      4.00 - 10.00 10*3/mm3 13.06 (H) 10.27 11.59 (H) 8.59   RBC      4.30 - 5.50 10*6/mm3 6.19 (H) 5.89 5.33 5.29   Hemoglobin      13.5 - 16.5 g/dL 17.9 (H) 16.7 16.0 15.9   Hematocrit      40.0 - 49.0 % 52.5 (H) 49.0 46.5 47.0   MCV      83.0 - 97.0 fL 84.8 83.2 87.2 88.8   MCH      27.0 - 33.0 pg 28.9 28.4 30.0 30.1   MCHC      32.0 - 35.0 g/dL 34.1 34.1 34.4 33.8   RDW      11.7 - 14.5 % 13.3 13.2 13.0 13.1   RDW-SD      37.0 - 49.0 fl   41.4 42.4   MPV      8.9 - 12.1 fL   9.5 9.2   Platelets      150 - 375 10*3/mm3 226 234 298 204   Neutrophil %      39.0 - 75.0 %   55.5 74.2   Lymphocyte %      20.0 - 49.0 %   33.2 12.5 (L)   Monocyte %      4.0 - 12.0 %   6.0 10.4   Eosinophil %      1.0 - 5.0 %   4.5 2.4   Basophil %      0.0 - 1.1 %   0.5 0.3   Immature Grans %      0.0 - 0.5 %   0.3 0.2   Neutrophils, Absolute      1.50 - 7.00 10*3/mm3   6.44 6.37   Lymphocytes, Absolute      1.00 - 3.50 10*3/mm3   3.85 1.07   Monocytes, Absolute      0.25 - 0.80 10*3/mm3   0.69 0.89   Eosinophils, Absolute      0.00 - 0.36 10*3/mm3   0.52 0.21   Basophils, Absolute      0.00 - 0.10 10*3/mm3   0.06 0.03   Immature Grans, Absolute      0.00 - 0.03 10*3/mm3   0.03 0.02   nRBC      0.0 - 0.0 /100 WBC         Component      Latest Ref Rng & Units 4/15/2017 4/16/2017 1/14/2018 3/1/2018           6:41 AM  5:33 AM  8:59 PM  9:59 AM   WBC      4.00 - 10.00 10*3/mm3 10.69  10.80 (H) 17.40 (H) 10.85 (H)   RBC      4.30 - 5.50 10*6/mm3 5.30 4.93 5.46 5.23   Hemoglobin      13.5 - 16.5 g/dL 15.8 14.8 16.7 15.8   Hematocrit      40.0 - 49.0 % 47.3 43.7 48.4 45.9   MCV      83.0 - 97.0 fL 89.2 88.6 88.6 87.8   MCH      27.0 - 33.0 pg 29.8 30.0 30.6 30.2   MCHC      32.0 - 35.0 g/dL 33.4 33.9 34.5 34.4   RDW      11.7 - 14.5 % 13.1 13.0 13.0 13.1   RDW-SD      37.0 - 49.0 fl 42.7 42.0 41.3 41.7   MPV      8.9 - 12.1 fL 9.2 9.0 9.4 9.3   Platelets      150 - 375 10*3/mm3 242 234 265 319   Neutrophil %      39.0 - 75.0 % 79.6 (H) 50.1 87.5 (H) 62.2   Lymphocyte %      20.0 - 49.0 % 14.6 (L) 32.4 5.1 (L) 24.9   Monocyte %      4.0 - 12.0 % 5.1 14.9 (H) 6.5 7.8   Eosinophil %      1.0 - 5.0 % 0.0 (L) 2.2 0.4 4.2   Basophil %      0.0 - 1.1 % 0.4 0.4 0.3 0.6   Immature Grans %      0.0 - 0.5 % 0.3 0.0 0.2 0.3   Neutrophils, Absolute      1.50 - 7.00 10*3/mm3 8.52 (H) 5.41 15.24 (H) 6.75   Lymphocytes, Absolute      1.00 - 3.50 10*3/mm3 1.56 3.50 0.88 (L) 2.70   Monocytes, Absolute      0.25 - 0.80 10*3/mm3 0.54 1.61 (H) 1.13 0.85   Eosinophils, Absolute      0.00 - 0.36 10*3/mm3 0.00 0.24 0.07 0.46   Basophils, Absolute      0.00 - 0.10 10*3/mm3 0.04 0.04 0.05 0.06   Immature Grans, Absolute      0.00 - 0.03 10*3/mm3 0.03 0.00 0.03 0.03   nRBC      0.0 - 0.0 /100 WBC         Component      Latest Ref Rng & Units 3/2/2018 3/3/2018 7/5/2018           5:34 AM  4:31 AM  2:22 PM   WBC      4.00 - 10.00 10*3/mm3 11.68 (H) 12.28 (H) 13.95 (H)   RBC      4.30 - 5.50 10*6/mm3 5.16 5.23 5.46   Hemoglobin      13.5 - 16.5 g/dL 15.0 15.5 15.9   Hematocrit      40.0 - 49.0 % 46.1 46.8 47.3   MCV      83.0 - 97.0 fL 89.3 89.5 86.6   MCH      27.0 - 33.0 pg 29.1 29.6 29.1   MCHC      32.0 - 35.0 g/dL 32.5 (L) 33.1 33.6   RDW      11.7 - 14.5 % 13.1 13.0 12.3   RDW-SD      37.0 - 49.0 fl 42.7 42.3 38.5   MPV      8.9 - 12.1 fL 9.3 9.4 8.7 (L)   Platelets      150 - 375 10*3/mm3 260 265 309   Neutrophil %       39.0 - 75.0 % 55.1 54.9 68.6   Lymphocyte %      20.0 - 49.0 % 33.0 31.9 21.3   Monocyte %      4.0 - 12.0 % 7.3 8.4 7.5   Eosinophil %      1.0 - 5.0 % 4.0 4.2 1.7   Basophil %      0.0 - 1.1 % 0.3 0.4 0.5   Immature Grans %      0.0 - 0.5 % 0.3 0.2 0.4   Neutrophils, Absolute      1.50 - 7.00 10*3/mm3 6.43 6.74 9.56 (H)   Lymphocytes, Absolute      1.00 - 3.50 10*3/mm3 3.86 3.92 2.97   Monocytes, Absolute      0.25 - 0.80 10*3/mm3 0.85 1.03 1.05 (H)   Eosinophils, Absolute      0.00 - 0.36 10*3/mm3 0.47 0.51 0.24   Basophils, Absolute      0.00 - 0.10 10*3/mm3 0.04 0.05 0.07   Immature Grans, Absolute      0.00 - 0.03 10*3/mm3 0.03 0.03 0.06 (H)   nRBC      0.0 - 0.0 /100 WBC   0.0       Assessment/Plan  In summary, this patient has been referred because he has had leukocytosis for almost 4 years along with minimal erythrocytosis and a normal platelet count, normal white count differential in the background of lack of splenomegaly on clinical examination and lack of splenomegaly radiologically speaking. He has changes in his CT scan of the abdomen that I personally reviewed that suggest an element of chronic pancreatitis. He never has had this issue per se in the past or been aware of this whatsoever. He never has been a drinker. He never has had triglyceride levels that are high enough to trigger pancreatitis and again on clinical grounds, this never has been an issue. In any event, the question that we have is the leukocytosis and the minimal polycythemia evade to a primary bone marrow disorder or evade to a secondary process like chronic inflammation associated with diabetes mellitus. The patient has significant morbid obesity. It is very likely that he has metabolic syndrome and he has had vascular complications of diabetes including stroke and coronary artery disease. He has peripheral neuropathy in his feet and he has very likely an element of retinopathy. Therefore, maybe inflammatory reaction to diabetes  and vascular events is part of the process that we are seeing here. We compared the blood counts since 2014. He has had swinging blood counts but more or less the white count has remained around 13,000. The hemoglobin has remained relatively stable as well. The platelet count again has been normal. Therefore, what we have advised the patient and wife today after a lengthy discussion is that we need to make a differential diagnosis from a reactive phenomenon versus a proliferative phenomenon in the bone marrow. For this reason, I have sent him back to the lab to obtain a complete metabolic profile, sedimentation rate and C-reactive protein and we are going to perform also analysis in peripheral blood for BCR-ABL and JAK2 mutation. I want for the patient to come back and see us in 3 or 4 weeks, review these laboratory parameters with him and make recommendations. Right now, I have not suggested for him to proceed with bone marrow testing.     I discussed all these facts with the patient and his wife and they were ready to proceed.     I also discussed with him the fact that he is too heavy. I think he should be watching his weight much better if he wants to live a longer life. I pointed out to him in regard to his weight control that he is very much aware of and I advised him to follow up with his internal medicine physician as a followup as well in the close future.     I have not prescribed any medication for him and neither given him any new medication related to this possible problem.

## 2018-07-06 LAB — ETHNIC BACKGROUND STATED: 16.7 MIU/ML (ref 2.6–18.5)

## 2018-07-11 LAB — REF LAB TEST METHOD: NORMAL

## 2018-07-24 LAB — REF LAB TEST METHOD: NORMAL

## 2018-07-31 ENCOUNTER — LAB (OUTPATIENT)
Dept: LAB | Facility: HOSPITAL | Age: 73
End: 2018-07-31

## 2018-07-31 ENCOUNTER — OFFICE VISIT (OUTPATIENT)
Dept: ONCOLOGY | Facility: CLINIC | Age: 73
End: 2018-07-31

## 2018-07-31 ENCOUNTER — HOSPITAL ENCOUNTER (OUTPATIENT)
Dept: PET IMAGING | Facility: HOSPITAL | Age: 73
Discharge: HOME | End: 2018-07-31
Admitting: INTERNAL MEDICINE

## 2018-07-31 VITALS
WEIGHT: 214.8 LBS | DIASTOLIC BLOOD PRESSURE: 60 MMHG | TEMPERATURE: 98.2 F | HEART RATE: 78 BPM | HEIGHT: 70 IN | BODY MASS INDEX: 30.75 KG/M2 | RESPIRATION RATE: 16 BRPM | OXYGEN SATURATION: 96 % | SYSTOLIC BLOOD PRESSURE: 132 MMHG

## 2018-07-31 DIAGNOSIS — D75.1 ERYTHROCYTOSIS: ICD-10-CM

## 2018-07-31 DIAGNOSIS — D75.1 ERYTHROCYTOSIS: Primary | ICD-10-CM

## 2018-07-31 DIAGNOSIS — K57.32 DIVERTICULITIS OF LARGE INTESTINE WITHOUT PERFORATION OR ABSCESS WITHOUT BLEEDING: ICD-10-CM

## 2018-07-31 DIAGNOSIS — D72.825 BANDEMIA: Primary | ICD-10-CM

## 2018-07-31 LAB
BASOPHILS # BLD AUTO: 0.08 10*3/MM3 (ref 0–0.1)
BASOPHILS NFR BLD AUTO: 0.5 % (ref 0–1.1)
CREAT BLDA-MCNC: 1 MG/DL (ref 0.6–1.3)
DEPRECATED RDW RBC AUTO: 37.4 FL (ref 37–49)
EOSINOPHIL # BLD AUTO: 0.37 10*3/MM3 (ref 0–0.36)
EOSINOPHIL NFR BLD AUTO: 2.4 % (ref 1–5)
ERYTHROCYTE [DISTWIDTH] IN BLOOD BY AUTOMATED COUNT: 11.9 % (ref 11.7–14.5)
HCT VFR BLD AUTO: 48.3 % (ref 40–49)
HGB BLD-MCNC: 16.2 G/DL (ref 13.5–16.5)
IMM GRANULOCYTES # BLD: 0.08 10*3/MM3 (ref 0–0.03)
IMM GRANULOCYTES NFR BLD: 0.5 % (ref 0–0.5)
LYMPHOCYTES # BLD AUTO: 3.52 10*3/MM3 (ref 1–3.5)
LYMPHOCYTES NFR BLD AUTO: 22.4 % (ref 20–49)
MCH RBC QN AUTO: 29 PG (ref 27–33)
MCHC RBC AUTO-ENTMCNC: 33.5 G/DL (ref 32–35)
MCV RBC AUTO: 86.4 FL (ref 83–97)
MONOCYTES # BLD AUTO: 1.15 10*3/MM3 (ref 0.25–0.8)
MONOCYTES NFR BLD AUTO: 7.3 % (ref 4–12)
NEUTROPHILS # BLD AUTO: 10.49 10*3/MM3 (ref 1.5–7)
NEUTROPHILS NFR BLD AUTO: 66.9 % (ref 39–75)
NRBC BLD MANUAL-RTO: 0 /100 WBC (ref 0–0)
PLATELET # BLD AUTO: 379 10*3/MM3 (ref 150–375)
PMV BLD AUTO: 8.8 FL (ref 8.9–12.1)
RBC # BLD AUTO: 5.59 10*6/MM3 (ref 4.3–5.5)
WBC NRBC COR # BLD: 15.69 10*3/MM3 (ref 4–10)

## 2018-07-31 PROCEDURE — 82565 ASSAY OF CREATININE: CPT

## 2018-07-31 PROCEDURE — 36415 COLL VENOUS BLD VENIPUNCTURE: CPT | Performed by: INTERNAL MEDICINE

## 2018-07-31 PROCEDURE — 85025 COMPLETE CBC W/AUTO DIFF WBC: CPT | Performed by: INTERNAL MEDICINE

## 2018-07-31 PROCEDURE — 74177 CT ABD & PELVIS W/CONTRAST: CPT

## 2018-07-31 PROCEDURE — 25010000002 IOPAMIDOL 61 % SOLUTION: Performed by: INTERNAL MEDICINE

## 2018-07-31 PROCEDURE — 0 DIATRIZOATE MEGLUMINE & SODIUM PER 1 ML: Performed by: INTERNAL MEDICINE

## 2018-07-31 PROCEDURE — 99214 OFFICE O/P EST MOD 30 MIN: CPT | Performed by: INTERNAL MEDICINE

## 2018-07-31 RX ORDER — METRONIDAZOLE 250 MG/1
250 TABLET ORAL 3 TIMES DAILY
Qty: 21 TABLET | Refills: 0 | Status: ON HOLD | OUTPATIENT
Start: 2018-07-31 | End: 2018-12-26

## 2018-07-31 RX ORDER — LEVOFLOXACIN 500 MG/1
500 TABLET, FILM COATED ORAL DAILY
Qty: 7 TABLET | Refills: 0 | Status: SHIPPED | OUTPATIENT
Start: 2018-07-31 | End: 2018-08-07

## 2018-07-31 RX ADMIN — DIATRIZOATE MEGLUMINE AND DIATRIZOATE SODIUM 30 ML: 660; 100 LIQUID ORAL; RECTAL at 13:45

## 2018-07-31 RX ADMIN — IOPAMIDOL 85 ML: 612 INJECTION, SOLUTION INTRAVENOUS at 14:44

## 2018-08-02 ENCOUNTER — TELEPHONE (OUTPATIENT)
Dept: ONCOLOGY | Facility: HOSPITAL | Age: 73
End: 2018-08-02

## 2018-08-02 NOTE — TELEPHONE ENCOUNTER
Patient said Dr. Galaviz told him to call today but he wasn't sure why.  Said he is doing fine.  Message sent to Dr. Galaviz to clarify if he needed anything.     ----- Message from Chery Catherine sent at 8/2/2018  1:11 PM EDT -----  Contact: [phone]  Calling about med starting taking seen Dr. Galaviz recently.

## 2018-08-03 ENCOUNTER — TELEPHONE (OUTPATIENT)
Dept: ONCOLOGY | Facility: HOSPITAL | Age: 73
End: 2018-08-03

## 2018-08-03 NOTE — TELEPHONE ENCOUNTER
PT CALLED AND ASKED HOW HIS ABDOMINAL PAIN WAS DOING. PT REPORTS THAT HE FEELS LIKE IT IS BETTER. DR. PECK UPDATED ON PT'S STATUS.

## 2018-08-03 NOTE — TELEPHONE ENCOUNTER
----- Message from Cesar Galaviz MD sent at 8/3/2018 10:52 AM EDT -----  Ask him how his abdominal pain is doing diverticuliitis

## 2018-08-28 ENCOUNTER — APPOINTMENT (OUTPATIENT)
Dept: LAB | Facility: HOSPITAL | Age: 73
End: 2018-08-28

## 2018-08-28 ENCOUNTER — APPOINTMENT (OUTPATIENT)
Dept: ONCOLOGY | Facility: CLINIC | Age: 73
End: 2018-08-28

## 2018-08-29 ENCOUNTER — LAB (OUTPATIENT)
Dept: LAB | Facility: HOSPITAL | Age: 73
End: 2018-08-29

## 2018-08-29 ENCOUNTER — OFFICE VISIT (OUTPATIENT)
Dept: ONCOLOGY | Facility: CLINIC | Age: 73
End: 2018-08-29

## 2018-08-29 VITALS
HEART RATE: 72 BPM | WEIGHT: 220.4 LBS | OXYGEN SATURATION: 98 % | SYSTOLIC BLOOD PRESSURE: 138 MMHG | RESPIRATION RATE: 12 BRPM | BODY MASS INDEX: 31.55 KG/M2 | DIASTOLIC BLOOD PRESSURE: 68 MMHG | TEMPERATURE: 98.6 F | HEIGHT: 70 IN

## 2018-08-29 DIAGNOSIS — K57.32 DIVERTICULITIS OF LARGE INTESTINE WITHOUT PERFORATION OR ABSCESS WITHOUT BLEEDING: ICD-10-CM

## 2018-08-29 DIAGNOSIS — D75.1 ERYTHROCYTOSIS: Primary | ICD-10-CM

## 2018-08-29 DIAGNOSIS — D75.1 ERYTHROCYTOSIS: ICD-10-CM

## 2018-08-29 DIAGNOSIS — D72.825 BANDEMIA: ICD-10-CM

## 2018-08-29 LAB
ALBUMIN SERPL-MCNC: 4.1 G/DL (ref 3.5–5.2)
ALBUMIN/GLOB SERPL: 1.7 G/DL (ref 1.1–2.4)
ALP SERPL-CCNC: 52 U/L (ref 38–116)
ALT SERPL W P-5'-P-CCNC: 15 U/L (ref 0–41)
ANION GAP SERPL CALCULATED.3IONS-SCNC: 12.5 MMOL/L
AST SERPL-CCNC: 14 U/L (ref 0–40)
BASOPHILS # BLD AUTO: 0.07 10*3/MM3 (ref 0–0.1)
BASOPHILS NFR BLD AUTO: 0.6 % (ref 0–1.1)
BILIRUB SERPL-MCNC: 0.4 MG/DL (ref 0.1–1.2)
BUN BLD-MCNC: 13 MG/DL (ref 6–20)
BUN/CREAT SERPL: 15.7 (ref 7.3–30)
CALCIUM SPEC-SCNC: 8.7 MG/DL (ref 8.5–10.2)
CHLORIDE SERPL-SCNC: 101 MMOL/L (ref 98–107)
CO2 SERPL-SCNC: 23.5 MMOL/L (ref 22–29)
CREAT BLD-MCNC: 0.83 MG/DL (ref 0.7–1.3)
DEPRECATED RDW RBC AUTO: 38.5 FL (ref 37–49)
EOSINOPHIL # BLD AUTO: 0.3 10*3/MM3 (ref 0–0.36)
EOSINOPHIL NFR BLD AUTO: 2.8 % (ref 1–5)
ERYTHROCYTE [DISTWIDTH] IN BLOOD BY AUTOMATED COUNT: 12.1 % (ref 11.7–14.5)
GFR SERPL CREATININE-BSD FRML MDRD: 91 ML/MIN/1.73
GLOBULIN UR ELPH-MCNC: 2.4 GM/DL (ref 1.8–3.5)
GLUCOSE BLD-MCNC: 257 MG/DL (ref 74–124)
HCT VFR BLD AUTO: 50.8 % (ref 40–49)
HGB BLD-MCNC: 16.8 G/DL (ref 13.5–16.5)
IMM GRANULOCYTES # BLD: 0.04 10*3/MM3 (ref 0–0.03)
IMM GRANULOCYTES NFR BLD: 0.4 % (ref 0–0.5)
LYMPHOCYTES # BLD AUTO: 2.62 10*3/MM3 (ref 1–3.5)
LYMPHOCYTES NFR BLD AUTO: 24.3 % (ref 20–49)
MCH RBC QN AUTO: 28.8 PG (ref 27–33)
MCHC RBC AUTO-ENTMCNC: 33.1 G/DL (ref 32–35)
MCV RBC AUTO: 87.1 FL (ref 83–97)
MONOCYTES # BLD AUTO: 0.78 10*3/MM3 (ref 0.25–0.8)
MONOCYTES NFR BLD AUTO: 7.2 % (ref 4–12)
NEUTROPHILS # BLD AUTO: 6.96 10*3/MM3 (ref 1.5–7)
NEUTROPHILS NFR BLD AUTO: 64.7 % (ref 39–75)
NRBC BLD MANUAL-RTO: 0 /100 WBC (ref 0–0)
PLATELET # BLD AUTO: 256 10*3/MM3 (ref 150–375)
PMV BLD AUTO: 9.1 FL (ref 8.9–12.1)
POTASSIUM BLD-SCNC: 4.4 MMOL/L (ref 3.5–4.7)
PROT SERPL-MCNC: 6.5 G/DL (ref 6.3–8)
RBC # BLD AUTO: 5.83 10*6/MM3 (ref 4.3–5.5)
SODIUM BLD-SCNC: 137 MMOL/L (ref 134–145)
WBC NRBC COR # BLD: 10.77 10*3/MM3 (ref 4–10)

## 2018-08-29 PROCEDURE — 99214 OFFICE O/P EST MOD 30 MIN: CPT | Performed by: INTERNAL MEDICINE

## 2018-08-29 PROCEDURE — 36415 COLL VENOUS BLD VENIPUNCTURE: CPT | Performed by: INTERNAL MEDICINE

## 2018-08-29 PROCEDURE — 85025 COMPLETE CBC W/AUTO DIFF WBC: CPT | Performed by: INTERNAL MEDICINE

## 2018-08-29 PROCEDURE — 80053 COMPREHEN METABOLIC PANEL: CPT | Performed by: INTERNAL MEDICINE

## 2018-08-29 NOTE — PROGRESS NOTES
Subjective     REASON FOR CONSULTATION:  Leukocytosis for 4 years, minimal erythrocytosis, normal platelet count, no splenomegaly by physical exam or ct abdomen.  Provide an opinion on any further workup or treatment                             REQUESTING PHYSICIAN:  Dr Gricel Diaz MD    RECORDS OBTAINED:  Records of the patients history including those obtained from the referring provider were reviewed and summarized in detail.        History of Present Illness This patient has been referred to our office by Gricel Diaz MD, because it has been noticed that the patient has a leukocytosis as far as I can go in Epic since 2014. Associated with this is minimal erythrocytosis and over all these 4 years, the numbers have not changed too much. The patient has no symptoms related to this. It has been noticed that he has a normal platelet count and it has been noticed today on examination and by CT scan criteria in a CT scan that was done in 03/2018 that he had no splenomegaly. Obviously raises the question about the nature of this and what needs to be done. Specifically, the patient has no symptoms related to this and actually, he feels terrific. His appetite is very good. He was trying to lose weight to control his diabetes better but he lost track of his calories and he is back into a lot of eating and a lot of weight gaining. His sugar control is not the best. He realizes that this is a problem. He denies any fevers or chills or infections of any nature. No sinus infection. No tooth decay or infection in the oral mucosa. No respiratory infection of any nature. His bowel function is appropriate. No abdominal pain. No distention. No jaundice. No heartburn. No indigestion. No history of liver abnormalities. He denies any difficulty with urination with the exception of decrease in regard to the pressure of the stream. Otherwise, no hematuria or infections in the urinary tract. He denies any alterations in the skin. He  has skeletal pain associated with osteoarthritis, especially shoulders and knees but no obvious synovitis or ongoing inflammatory arthritis. He denies any neurological symptomatology with the exception of some numbness in his feet. He also has minimal decrease of vision given cataract surgery and some complication of this that will require laser surgery by Ophthalmology in the next couple of weeks or so. The patient is not aware of any retinal damage from diabetes.     The patient returned to the office on 08/29/2018 with his wife in order to review his recent episode of diverticulitis to monitor his blood counts.  At this point the patient is feeling perfectly fine, he has not had any further abdominal pain, his bowel activity has resumed being normal, he has no passage of blood in the stool or changes in bowel habits.  Urination is ongoing with no difficulties.  Now he has compromised to work on a diet along with his wife to decrease weight.  This will be started as soon as tomorrow.     Physically otherwise he has no fevers, no chills, no joint pain, no cardiorespiratory symptomatology.  He has no other new problems.          Past Medical History:   Diagnosis Date   • Arthritis    • Atherosclerosis of native coronary artery without angina pectoris    • Chest pain    • Coronary artery disease    • Diabetes mellitus (CMS/HCC)    • Difficulty breathing    • Diverticulosis    • Dizziness    • ED (erectile dysfunction)    • GERD (gastroesophageal reflux disease)    • Hyperlipidemia    • Hypertension    • Kidney stones    • Measles    • Mumps    • Obesity    • Polio    • S/P angioplasty with stent    • Stroke (CMS/HCC)    • Type 2 diabetes mellitus (CMS/HCC)         Past Surgical History:   Procedure Laterality Date   • CARDIAC CATHETERIZATION N/A 02/10/2011    Normal LV systolic function; Kind of diffuse, small vessel, distal disease. Most of it is not amenable to PCI, but his JADEN lesion is and it is a large vessel;  All of his prior durg-eluting stents are widely patent & look good, Dr. Anibal Oliveira   • CATARACT EXTRACTION Bilateral    • COLONOSCOPY N/A 09/13/2004    Normal, Repeat in 5-8 years, Dr. Davion Sanon   • COLONOSCOPY N/A 09/04/2009    Mutiple small & large-mouthed diverticual were found in the entire colon; Exam otherwise normal, Dr. Davion Sanon   • COLONOSCOPY W/ POLYPECTOMY N/A [id number]    2 benign polyps, [facility], Dr. Davion Sanon   • CORONARY ANGIOPLASTY WITH STENT PLACEMENT N/A 11/20/2007    Successful multivessel drug eluting stent implantation for restenosis above his prior stenting, Dr. Anibal Wray   • CORONARY ANGIOPLASTY WITH STENT PLACEMENT N/A 06/05/2007    Successful multivessel drug eluting stent implantation in all 3 of the major coronary arteries, Dr. Anibal rWay   • CORONARY ANGIOPLASTY WITH STENT PLACEMENT N/A 02/10/2011    Successful drug-stenting of the JADEN, Dr. Anibal Oliveira   • ENDOSCOPY N/A 11/21/2017    Procedure: ESOPHAGOGASTRODUODENOSCOPY WITH COLD BIOPSIES AND 53 F MICHAEL DILATION;  Surgeon: Wisam Potter MD;  Location: [facility] ENDOSCOPY;  Service:    • EYE SURGERY Left 2011    Retina   • HYDROCELECTOMY Left 08/19/2011    Dr. Ellie Stein   • INCISION AND DRAINAGE PERIRECTAL ABSCESS N/A 11/30/2007    Dr. Davion Sanon   • LUMBAR EPIDURAL INJECTION N/A 2012-2013    x 3   • NEUROMA SURGERY Right 07/19/2005    Excision Neuroma Right Arm; PATH: CONSISTENT W/ LIPOMA,  Dr. Davion Sanon   • ROTATOR CUFF REPAIR Right 2006   • ROTATOR CUFF REPAIR Left 2000   • UPPER GASTROINTESTINAL ENDOSCOPY N/A 09/06/2012    Normal esophagus; Normal Stomach, Normal examinded duodenum, Dr. Bruno Turk   • WRIST FRACTURE SURGERY Right 06/03/2003    ORIF Right Distal Radius, Dr. Ryan Bravo        Current Outpatient Prescriptions on File Prior to Visit   Medication Sig Dispense Refill   • acetaminophen (TYLENOL) 325 MG tablet Take 2 tablets by mouth Every 4 (Four) Hours As  Needed for Mild Pain . (Patient taking differently: Take 650 mg by mouth Daily As Needed for Mild Pain .)     • acyclovir (ZOVIRAX) 400 MG tablet Take 400 mg by mouth As Needed. Take no more than 5 doses a day.     • aspirin 81 MG EC tablet Take 81 mg by mouth daily.     • SALIMA MICROLET LANCETS lancets      • brimonidine (ALPHAGAN) 0.15 % ophthalmic solution Apply 1 drop to eye 2 (two) times a day.     • clopidogrel (PLAVIX) 75 MG tablet Take 75 mg by mouth daily.     • Dulaglutide (TRULICITY) 0.75 MG/0.5ML solution pen-injector Inject  under the skin 1 (One) Time.     • insulin aspart (novoLOG FLEXPEN) 100 UNIT/ML solution pen-injector sc pen INJECT 20 UNITS IN THE MORNING 20 UNITS AT LUNCH AND 20 UNITS AT DINNER (Patient taking differently: Inject 80 Units under the skin 3 (Three) Times a Day With Meals. 60 units tid with meals)     • insulin detemir (LEVEMIR) 100 UNIT/ML injection INJECT 60 UNITS AT NIGHT (Patient taking differently: Inject 70 Units under the skin Every Night.)  12   • isosorbide mononitrate (IMDUR) 30 MG 24 hr tablet Take 30 mg by mouth daily.     • latanoprost (XALATAN) 0.005 % ophthalmic solution Apply 1 drop to eye daily.     • lisinopril (PRINIVIL,ZESTRIL) 20 MG tablet TAKE 1 TABLET DAILY AS DIRECTED. 90 tablet 3   • metFORMIN (GLUCOPHAGE) 500 MG tablet TAKE 1 TABLET BY MOUTH 2 (TWO) TIMES A DAY WITH MEALS. 180 tablet 1   • metoprolol tartrate (LOPRESSOR) 25 MG tablet TAKE 1 TABLET BY MOUTH 2 TIMES DAILY 180 tablet 1   • nitroglycerin (NITROSTAT) 0.4 MG SL tablet Place 0.4 mg under the tongue Every 5 (Five) Minutes As Needed for Chest Pain. Take no more than 3 doses in 15 minutes.     • Omega 3 1000 MG capsule Take 1 capsule by mouth 2 (Two) Times a Day.     • omeprazole (priLOSEC) 20 MG capsule Take 20 mg by mouth Daily.     • pilocarpine (PILOCAR) 1 % ophthalmic solution 1 drop 4 (Four) Times a Day.     • simvastatin (ZOCOR) 40 MG tablet Take 40 mg by mouth Every Night.     • timolol  (TIMOPTIC) 0.5 % ophthalmic solution 1 drop daily. INSTILL 1 DROP IN LEFT EYE EVERY MORNING AS DIRECTED  6   • meloxicam (MOBIC) 15 MG tablet Take 15 mg by mouth Daily.     • metoprolol tartrate (LOPRESSOR) 25 MG tablet TAKE 1 TABLET BY MOUTH 2 TIMES DAILY 180 tablet 2   • metroNIDAZOLE (FLAGYL) 250 MG tablet Take 1 tablet by mouth 3 (Three) Times a Day. 21 tablet 0     No current facility-administered medications on file prior to visit.         ALLERGIES:  No Known Allergies     Social History     Social History   • Marital status:      Spouse name: Kerry   • Years of education: College     Occupational History   • Auto  Self-Employed     Social History Main Topics   • Smoking status: Never Smoker   • Smokeless tobacco: Never Used      Comment: no caffeine   • Alcohol use No   • Drug use: No   • Sexual activity: Defer     Other Topics Concern   • Not on file        Family History   Problem Relation Age of Onset   • Heart disease Mother    • Prostate cancer Father    • Heart disease Father    • Colon polyps Daughter    • Colon polyps Daughter         Review of Systems   General: no fever, chills, fatigue, weight changes, or lack of appetite.  Eyes: no epiphora, xerophthalmia,conjunctivitis, pain, glaucoma, blurred vision, blindness, secretion, photophobia, proptosis, diplopia.  Ears: no otorrhea, tinnitus, otorrhagia, deafness, pain, vertigo.  Nose: no rhinorrhea, epistaxis, alteration in perception of odors, sinuses pressure.  Mouth: no alteration in gums or teeth,  ulcers, no difficulty with mastication or deglut ion, no odynophagia.  Neck: no masses or pain, no thyroid alterations, no pain in muscles or arteries, no carotid odynia, no crepitation.  Respiratory: no cough, sputum production, dyspnea, trepopnea, pleuritic pain, hemoptysis.  Heart: no syncope, irregularity, palpitations, angina, orthopnea, paroxysmal nocturnal dyspnea.  Vascular Venous: no tenderness,edema, palpable cords,  "postphlebitic syndrome, skin changes or ulcerations.  Vascular Arterial: no distal ischemia, claudication, gangrene, neuropathic ischemic pain, skin ulcers, paleness or cyanosis.  GI: no dysphagia, odynophagia, no regurgitation, no heartburn,no indigestion,no nausea,no vomiting,no hematemesis ,no melena,no jaundice,no distention, no obstipation,no enterorrhagia,no proctalgia,no anal  lesions, nochanges in bowel habits.  : no frequency, hesitancy, hematuria, discharge, pain.  Musculoskeletal: no muscle or tendon pain or inflammation, joint pain, edema, functional limitation, fasciculations, mass.  Neurologic: no headache, seizures, alterations on Craneal nerves, unchanged senssory deficit, normal coordination, no alteration in memory, orientation, calculation,writting, verbal or written language.  Skin: no rashes, pruritus or localized lesions.  Psychiatric: no anxiety, depression, agitation, delusions, proper insight.  Objective     Vitals:    08/29/18 0944   BP: 138/68   Pulse: 72   Resp: 12   Temp: 98.6 °F (37 °C)   TempSrc: Oral   SpO2: 98%   Weight: 100 kg (220 lb 6.4 oz)   Height: 177 cm (69.69\")   PainSc: 3  Comment: right to left side     Current Status 8/29/2018   ECOG score 0       Physical Exam    GENERAL:  Well-developed, well-nourished  Patient  in no acute distress.   SKIN:  Warm, dry ,NO rashes,NO purpura ,NO petechiae.  HEENT:  Pupils were equal and reactive to light and accomodation, conjunctivas non injected, no pterigion, normal extraocular movements, normal visual acuity.   Mouth mucosa was moist, no exudates in oropharynx, normal gum line, normal roof of the mouth and pillars, normal papillations of the tongue.  NECK:  Supple with good range of motion; no thyromegaly or masses, no JVD or bruits, no cervical adenopathies.No carotid arteries pain, no carotid abnormal pulsation , NO arterial dance.  LYMPHATICS:  No cervical, NO supraclavicular, NO axillary,NO epitrochlear , NO inguinal " adenopathy.  CHEST:  Normal excursion of both yaquelin thoraces, normal voice fremitus, no subcutaneous emphysema, normal axillas, no rashes or acanthosis nigricans. Lungs clear to percussion and auscultation, normal breath sounds bilaterally, no wheezing,NO crackles NO ronchi, NO stridor, NO rubs.  CARDIAC AND VASCULAR:  normal rate and regular rhythm, without murmurs,NO rubs NO S3 NO S4 right or left . Normal femoral, popliteal, pedis, brachial and carotid pulses.  ABDOMEN:  Soft, nontender with no organomegaly or masses, no ascites, no collateral circulation,no distention,no Macy sign, no abdominal pain, no inguinal hernias,no umbilical hernia, no abdominal bruits. Normal bowel sounds.  GENITAL: Not  Performed.  EXTREMITIES  AND SPINE:  No clubbing, cyanosis or edema, no deformities or pain .No kyphosis, scoliosis, deformities or pain in spine, ribs or pelvic bone.  NEUROLOGICAL:  Patient was awake, alert, oriented to time, person and place.  Sensory neuropathy in feet        RECENT LABS:  Hematology WBC   Date Value Ref Range Status   08/29/2018 10.77 (H) 4.00 - 10.00 10*3/mm3 Final     RBC   Date Value Ref Range Status   08/29/2018 5.83 (H) 4.30 - 5.50 10*6/mm3 Final     Hemoglobin   Date Value Ref Range Status   08/29/2018 16.8 (H) 13.5 - 16.5 g/dL Final     Hematocrit   Date Value Ref Range Status   08/29/2018 50.8 (H) 40.0 - 49.0 % Final     Platelets   Date Value Ref Range Status   08/29/2018 256 150 - 375 10*3/mm3 Final            Assessment/Plan  In summary, this patient has been referred because he has had leukocytosis for almost 4 years along with minimal erythrocytosis and a normal platelet count, normal white count differential in the background of lack of splenomegaly on clinical examination and lack of splenomegaly radiologically speaking. He has changes in his CT scan of the abdomen that I personally reviewed that suggest an element of chronic pancreatitis. He never has had this issue per se in the  past or been aware of this whatsoever. He never has been a drinker. He never has had triglyceride levels that are high enough to trigger pancreatitis and again on clinical grounds, this never has been an issue. In any event, the question that we have is the leukocytosis and the minimal polycythemia evade to a primary bone marrow disorder or evade to a secondary process like chronic inflammation associated with diabetes mellitus. The patient has significant morbid obesity. It is very likely that he has metabolic syndrome and he has had vascular complications of diabetes including stroke and coronary artery disease. He has peripheral neuropathy in his feet and he has very likely an element of retinopathy. Therefore, maybe inflammatory reaction to diabetes and vascular events is part of the process that we are seeing here. We compared the blood counts since 2014. He has had swinging blood counts but more or less the white count has remained around 13,000. The hemoglobin has remained relatively stable as well. The platelet count again has been normal.     The patient on 08/29/2018 was asymptomatic in regard to infections or inflammatory conditions and now he has compromised to lose weight along with his wife.  The good news is his diverticulitis episode resolved altogether with antibiotic therapy and he is not having further bowel issues.     His white count has returned to normality, hemoglobin is still too high and I believe that his element of sleep apnea and secondary erythrocytosis is related to this.  Platelet count is normal, white count differential is normal. Therefore, I do not believe as stated before that the patient has any myeloproliferative disorder and neither have the need for bone marrow testing.    I suggested to the patient to do a blood donation at [place] at least every 3 months to decrease the hematocrit and suggested for him to go on a diet and lose at least a pound a week and in the long run  this would be tremendously beneficial for his overall health.     The patient is willing to change his lifestyle and do it this way along with his wife.  Otherwise, he will not have any other visits at least for 6 months.     Upon return in 6 months he will have a CBC and CMP.     I discussed all these facts with the patient and his wife today in detail and they were ready to proceed.  I suggested a 17-day diet, on the DASH diet, in order to start his process in the long run.

## 2018-12-26 ENCOUNTER — APPOINTMENT (OUTPATIENT)
Dept: GENERAL RADIOLOGY | Facility: HOSPITAL | Age: 73
End: 2018-12-26
Attending: INTERNAL MEDICINE

## 2018-12-26 ENCOUNTER — HOSPITAL ENCOUNTER (OUTPATIENT)
Facility: HOSPITAL | Age: 73
Setting detail: OBSERVATION
Discharge: HOME | End: 2018-12-28
Attending: INTERNAL MEDICINE | Admitting: INTERNAL MEDICINE

## 2018-12-26 PROBLEM — R06.00 DYSPNEA: Status: ACTIVE | Noted: 2018-12-26

## 2018-12-26 PROBLEM — R05.9 COUGH: Status: ACTIVE | Noted: 2018-12-26

## 2018-12-26 PROBLEM — R53.1 WEAKNESS: Status: ACTIVE | Noted: 2018-12-26

## 2018-12-26 LAB
ALBUMIN SERPL-MCNC: 3.7 G/DL (ref 3.5–5.2)
ALBUMIN/GLOB SERPL: 1.2 G/DL
ALP SERPL-CCNC: 53 U/L (ref 39–117)
ALT SERPL W P-5'-P-CCNC: 14 U/L (ref 1–41)
ANION GAP SERPL CALCULATED.3IONS-SCNC: 15.4 MMOL/L
AST SERPL-CCNC: 16 U/L (ref 1–40)
BASOPHILS # BLD AUTO: 0.04 10*3/MM3 (ref 0–0.2)
BASOPHILS NFR BLD AUTO: 0.3 % (ref 0–1.5)
BILIRUB SERPL-MCNC: 0.5 MG/DL (ref 0.1–1.2)
BUN BLD-MCNC: 15 MG/DL (ref 8–23)
BUN/CREAT SERPL: 14.7 (ref 7–25)
CALCIUM SPEC-SCNC: 8.8 MG/DL (ref 8.6–10.5)
CHLORIDE SERPL-SCNC: 96 MMOL/L (ref 98–107)
CO2 SERPL-SCNC: 22.6 MMOL/L (ref 22–29)
CREAT BLD-MCNC: 1.02 MG/DL (ref 0.76–1.27)
DEPRECATED RDW RBC AUTO: 39.7 FL (ref 37–54)
EOSINOPHIL # BLD AUTO: 0.14 10*3/MM3 (ref 0–0.7)
EOSINOPHIL NFR BLD AUTO: 0.9 % (ref 0.3–6.2)
ERYTHROCYTE [DISTWIDTH] IN BLOOD BY AUTOMATED COUNT: 13.3 % (ref 11.5–14.5)
GFR SERPL CREATININE-BSD FRML MDRD: 71 ML/MIN/1.73
GLOBULIN UR ELPH-MCNC: 3 GM/DL
GLUCOSE BLD-MCNC: 184 MG/DL (ref 65–99)
GLUCOSE BLDC GLUCOMTR-MCNC: 178 MG/DL (ref 70–130)
HCT VFR BLD AUTO: 46.9 % (ref 40.4–52.2)
HGB BLD-MCNC: 16 G/DL (ref 13.7–17.6)
IMM GRANULOCYTES # BLD AUTO: 0.04 10*3/MM3 (ref 0–0.03)
IMM GRANULOCYTES NFR BLD AUTO: 0.3 % (ref 0–0.5)
LYMPHOCYTES # BLD AUTO: 1.98 10*3/MM3 (ref 0.9–4.8)
LYMPHOCYTES NFR BLD AUTO: 13.3 % (ref 19.6–45.3)
MCH RBC QN AUTO: 28.3 PG (ref 27–32.7)
MCHC RBC AUTO-ENTMCNC: 34.1 G/DL (ref 32.6–36.4)
MCV RBC AUTO: 83 FL (ref 79.8–96.2)
MONOCYTES # BLD AUTO: 1.64 10*3/MM3 (ref 0.2–1.2)
MONOCYTES NFR BLD AUTO: 11 % (ref 5–12)
NEUTROPHILS # BLD AUTO: 11.12 10*3/MM3 (ref 1.9–8.1)
NEUTROPHILS NFR BLD AUTO: 74.5 % (ref 42.7–76)
PLATELET # BLD AUTO: 258 10*3/MM3 (ref 140–500)
PMV BLD AUTO: 9.1 FL (ref 6–12)
POTASSIUM BLD-SCNC: 4 MMOL/L (ref 3.5–5.2)
PROCALCITONIN SERPL-MCNC: 0.16 NG/ML (ref 0.1–0.25)
PROT SERPL-MCNC: 6.7 G/DL (ref 6–8.5)
RBC # BLD AUTO: 5.65 10*6/MM3 (ref 4.6–6)
SODIUM BLD-SCNC: 134 MMOL/L (ref 136–145)
TROPONIN T SERPL-MCNC: <0.01 NG/ML (ref 0–0.03)
WBC NRBC COR # BLD: 14.92 10*3/MM3 (ref 4.5–10.7)

## 2018-12-26 PROCEDURE — 82962 GLUCOSE BLOOD TEST: CPT

## 2018-12-26 PROCEDURE — 94640 AIRWAY INHALATION TREATMENT: CPT

## 2018-12-26 PROCEDURE — 96374 THER/PROPH/DIAG INJ IV PUSH: CPT

## 2018-12-26 PROCEDURE — 94799 UNLISTED PULMONARY SVC/PX: CPT

## 2018-12-26 PROCEDURE — 87633 RESP VIRUS 12-25 TARGETS: CPT | Performed by: INTERNAL MEDICINE

## 2018-12-26 PROCEDURE — 63710000001 INSULIN GLARGINE PER 5 UNITS: Performed by: INTERNAL MEDICINE

## 2018-12-26 PROCEDURE — 87581 M.PNEUMON DNA AMP PROBE: CPT | Performed by: INTERNAL MEDICINE

## 2018-12-26 PROCEDURE — 63710000001 INSULIN LISPRO (HUMAN) PER 5 UNITS: Performed by: INTERNAL MEDICINE

## 2018-12-26 PROCEDURE — 80053 COMPREHEN METABOLIC PANEL: CPT | Performed by: INTERNAL MEDICINE

## 2018-12-26 PROCEDURE — 93010 ELECTROCARDIOGRAM REPORT: CPT | Performed by: INTERNAL MEDICINE

## 2018-12-26 PROCEDURE — 25010000002 METHYLPREDNISOLONE PER 125 MG: Performed by: INTERNAL MEDICINE

## 2018-12-26 PROCEDURE — 96372 THER/PROPH/DIAG INJ SC/IM: CPT

## 2018-12-26 PROCEDURE — G0378 HOSPITAL OBSERVATION PER HR: HCPCS

## 2018-12-26 PROCEDURE — 85025 COMPLETE CBC W/AUTO DIFF WBC: CPT | Performed by: INTERNAL MEDICINE

## 2018-12-26 PROCEDURE — 84145 PROCALCITONIN (PCT): CPT | Performed by: INTERNAL MEDICINE

## 2018-12-26 PROCEDURE — 87486 CHLMYD PNEUM DNA AMP PROBE: CPT | Performed by: INTERNAL MEDICINE

## 2018-12-26 PROCEDURE — G0379 DIRECT REFER HOSPITAL OBSERV: HCPCS

## 2018-12-26 PROCEDURE — 87798 DETECT AGENT NOS DNA AMP: CPT | Performed by: INTERNAL MEDICINE

## 2018-12-26 PROCEDURE — 71046 X-RAY EXAM CHEST 2 VIEWS: CPT

## 2018-12-26 PROCEDURE — 25010000002 ENOXAPARIN PER 10 MG: Performed by: INTERNAL MEDICINE

## 2018-12-26 PROCEDURE — 84484 ASSAY OF TROPONIN QUANT: CPT | Performed by: INTERNAL MEDICINE

## 2018-12-26 PROCEDURE — 93005 ELECTROCARDIOGRAM TRACING: CPT | Performed by: INTERNAL MEDICINE

## 2018-12-26 RX ORDER — ASPIRIN 81 MG/1
81 TABLET ORAL DAILY
Status: DISCONTINUED | OUTPATIENT
Start: 2018-12-26 | End: 2018-12-28 | Stop reason: HOSPADM

## 2018-12-26 RX ORDER — INSULIN GLARGINE 100 [IU]/ML
70 INJECTION, SOLUTION SUBCUTANEOUS NIGHTLY
Status: DISCONTINUED | OUTPATIENT
Start: 2018-12-26 | End: 2018-12-27

## 2018-12-26 RX ORDER — SODIUM CHLORIDE 0.9 % (FLUSH) 0.9 %
3 SYRINGE (ML) INJECTION EVERY 12 HOURS SCHEDULED
Status: DISCONTINUED | OUTPATIENT
Start: 2018-12-26 | End: 2018-12-28 | Stop reason: HOSPADM

## 2018-12-26 RX ORDER — LATANOPROST 50 UG/ML
1 SOLUTION/ DROPS OPHTHALMIC DAILY
Status: DISCONTINUED | OUTPATIENT
Start: 2018-12-26 | End: 2018-12-28 | Stop reason: HOSPADM

## 2018-12-26 RX ORDER — DEXTROSE MONOHYDRATE 25 G/50ML
25 INJECTION, SOLUTION INTRAVENOUS
Status: DISCONTINUED | OUTPATIENT
Start: 2018-12-26 | End: 2018-12-28 | Stop reason: HOSPADM

## 2018-12-26 RX ORDER — ATORVASTATIN CALCIUM 20 MG/1
20 TABLET, FILM COATED ORAL DAILY
Status: DISCONTINUED | OUTPATIENT
Start: 2018-12-26 | End: 2018-12-28 | Stop reason: HOSPADM

## 2018-12-26 RX ORDER — BRIMONIDINE TARTRATE 0.15 %
1 DROPS OPHTHALMIC (EYE) EVERY 8 HOURS SCHEDULED
Status: DISCONTINUED | OUTPATIENT
Start: 2018-12-26 | End: 2018-12-28 | Stop reason: HOSPADM

## 2018-12-26 RX ORDER — PANTOPRAZOLE SODIUM 40 MG/1
40 TABLET, DELAYED RELEASE ORAL EVERY MORNING
Status: DISCONTINUED | OUTPATIENT
Start: 2018-12-27 | End: 2018-12-28 | Stop reason: HOSPADM

## 2018-12-26 RX ORDER — ACETAMINOPHEN 325 MG/1
650 TABLET ORAL EVERY 4 HOURS PRN
Status: DISCONTINUED | OUTPATIENT
Start: 2018-12-26 | End: 2018-12-28 | Stop reason: HOSPADM

## 2018-12-26 RX ORDER — TIMOLOL MALEATE 5 MG/ML
1 SOLUTION/ DROPS OPHTHALMIC DAILY
Status: DISCONTINUED | OUTPATIENT
Start: 2018-12-26 | End: 2018-12-28 | Stop reason: HOSPADM

## 2018-12-26 RX ORDER — IPRATROPIUM BROMIDE AND ALBUTEROL SULFATE 2.5; .5 MG/3ML; MG/3ML
3 SOLUTION RESPIRATORY (INHALATION)
Status: DISCONTINUED | OUTPATIENT
Start: 2018-12-26 | End: 2018-12-28 | Stop reason: HOSPADM

## 2018-12-26 RX ORDER — SODIUM CHLORIDE 0.9 % (FLUSH) 0.9 %
3-10 SYRINGE (ML) INJECTION AS NEEDED
Status: DISCONTINUED | OUTPATIENT
Start: 2018-12-26 | End: 2018-12-28 | Stop reason: HOSPADM

## 2018-12-26 RX ORDER — CLOPIDOGREL BISULFATE 75 MG/1
75 TABLET ORAL NIGHTLY
Status: DISCONTINUED | OUTPATIENT
Start: 2018-12-26 | End: 2018-12-28 | Stop reason: HOSPADM

## 2018-12-26 RX ORDER — PILOCARPINE HYDROCHLORIDE 10 MG/ML
1 SOLUTION/ DROPS OPHTHALMIC 4 TIMES DAILY
Status: DISCONTINUED | OUTPATIENT
Start: 2018-12-26 | End: 2018-12-28 | Stop reason: HOSPADM

## 2018-12-26 RX ORDER — METHYLPREDNISOLONE SODIUM SUCCINATE 125 MG/2ML
60 INJECTION, POWDER, LYOPHILIZED, FOR SOLUTION INTRAMUSCULAR; INTRAVENOUS EVERY 8 HOURS
Status: DISCONTINUED | OUTPATIENT
Start: 2018-12-26 | End: 2018-12-27

## 2018-12-26 RX ORDER — LISINOPRIL 20 MG/1
20 TABLET ORAL DAILY
Status: DISCONTINUED | OUTPATIENT
Start: 2018-12-26 | End: 2018-12-28 | Stop reason: HOSPADM

## 2018-12-26 RX ORDER — NICOTINE POLACRILEX 4 MG
15 LOZENGE BUCCAL
Status: DISCONTINUED | OUTPATIENT
Start: 2018-12-26 | End: 2018-12-28 | Stop reason: HOSPADM

## 2018-12-26 RX ORDER — HYDRALAZINE HYDROCHLORIDE 25 MG/1
25 TABLET, FILM COATED ORAL EVERY 6 HOURS PRN
Status: DISCONTINUED | OUTPATIENT
Start: 2018-12-26 | End: 2018-12-28 | Stop reason: HOSPADM

## 2018-12-26 RX ORDER — ONDANSETRON 2 MG/ML
4 INJECTION INTRAMUSCULAR; INTRAVENOUS EVERY 6 HOURS PRN
Status: DISCONTINUED | OUTPATIENT
Start: 2018-12-26 | End: 2018-12-28 | Stop reason: HOSPADM

## 2018-12-26 RX ORDER — ISOSORBIDE MONONITRATE 30 MG/1
30 TABLET, EXTENDED RELEASE ORAL DAILY
Status: DISCONTINUED | OUTPATIENT
Start: 2018-12-26 | End: 2018-12-28 | Stop reason: HOSPADM

## 2018-12-26 RX ADMIN — TIMOLOL MALEATE 1 DROP: 5 SOLUTION/ DROPS OPHTHALMIC at 22:08

## 2018-12-26 RX ADMIN — ENOXAPARIN SODIUM 40 MG: 40 INJECTION SUBCUTANEOUS at 22:07

## 2018-12-26 RX ADMIN — INSULIN GLARGINE 70 UNITS: 100 INJECTION, SOLUTION SUBCUTANEOUS at 22:08

## 2018-12-26 RX ADMIN — METHYLPREDNISOLONE SODIUM SUCCINATE 60 MG: 125 INJECTION, POWDER, FOR SOLUTION INTRAMUSCULAR; INTRAVENOUS at 22:07

## 2018-12-26 RX ADMIN — PILOCARPINE HYDROCHLORIDE 1 DROP: 10 SOLUTION/ DROPS OPHTHALMIC at 22:08

## 2018-12-26 RX ADMIN — BRIMONIDINE TARTRATE 1 DROP: 1.5 SOLUTION OPHTHALMIC at 22:09

## 2018-12-26 RX ADMIN — IPRATROPIUM BROMIDE AND ALBUTEROL SULFATE 3 ML: 2.5; .5 SOLUTION RESPIRATORY (INHALATION) at 21:52

## 2018-12-26 RX ADMIN — Medication 3 ML: at 21:00

## 2018-12-26 RX ADMIN — CLOPIDOGREL 75 MG: 75 TABLET, FILM COATED ORAL at 22:07

## 2018-12-26 RX ADMIN — ATORVASTATIN CALCIUM 20 MG: 20 TABLET, FILM COATED ORAL at 22:07

## 2018-12-26 RX ADMIN — INSULIN LISPRO 2 UNITS: 100 INJECTION, SOLUTION INTRAVENOUS; SUBCUTANEOUS at 22:08

## 2018-12-26 RX ADMIN — METOPROLOL TARTRATE 25 MG: 25 TABLET ORAL at 22:07

## 2018-12-27 PROBLEM — J21.0 RSV (ACUTE BRONCHIOLITIS DUE TO RESPIRATORY SYNCYTIAL VIRUS): Status: ACTIVE | Noted: 2018-12-27

## 2018-12-27 LAB
ANION GAP SERPL CALCULATED.3IONS-SCNC: 19.9 MMOL/L
B PARAPERT DNA SPEC QL NAA+PROBE: NOT DETECTED
B PERT DNA SPEC QL NAA+PROBE: NOT DETECTED
BASOPHILS # BLD AUTO: 0.03 10*3/MM3 (ref 0–0.2)
BASOPHILS NFR BLD AUTO: 0.2 % (ref 0–1.5)
BUN BLD-MCNC: 16 MG/DL (ref 8–23)
BUN/CREAT SERPL: 16.5 (ref 7–25)
C PNEUM DNA NPH QL NAA+NON-PROBE: NOT DETECTED
CALCIUM SPEC-SCNC: 9.1 MG/DL (ref 8.6–10.5)
CHLORIDE SERPL-SCNC: 95 MMOL/L (ref 98–107)
CO2 SERPL-SCNC: 18.1 MMOL/L (ref 22–29)
CREAT BLD-MCNC: 0.97 MG/DL (ref 0.76–1.27)
DEPRECATED RDW RBC AUTO: 39.8 FL (ref 37–54)
EOSINOPHIL # BLD AUTO: 0 10*3/MM3 (ref 0–0.7)
EOSINOPHIL NFR BLD AUTO: 0 % (ref 0.3–6.2)
ERYTHROCYTE [DISTWIDTH] IN BLOOD BY AUTOMATED COUNT: 13.2 % (ref 11.5–14.5)
FLUAV H1 2009 PAND RNA NPH QL NAA+PROBE: NOT DETECTED
FLUAV H1 HA GENE NPH QL NAA+PROBE: NOT DETECTED
FLUAV H3 RNA NPH QL NAA+PROBE: NOT DETECTED
FLUAV SUBTYP SPEC NAA+PROBE: NOT DETECTED
FLUBV RNA ISLT QL NAA+PROBE: NOT DETECTED
GFR SERPL CREATININE-BSD FRML MDRD: 76 ML/MIN/1.73
GLUCOSE BLD-MCNC: 296 MG/DL (ref 65–99)
GLUCOSE BLDC GLUCOMTR-MCNC: 271 MG/DL (ref 70–130)
GLUCOSE BLDC GLUCOMTR-MCNC: 392 MG/DL (ref 70–130)
GLUCOSE BLDC GLUCOMTR-MCNC: 426 MG/DL (ref 70–130)
GLUCOSE BLDC GLUCOMTR-MCNC: 449 MG/DL (ref 70–130)
GLUCOSE BLDC GLUCOMTR-MCNC: 465 MG/DL (ref 70–130)
HADV DNA SPEC NAA+PROBE: NOT DETECTED
HCOV 229E RNA SPEC QL NAA+PROBE: NOT DETECTED
HCOV HKU1 RNA SPEC QL NAA+PROBE: NOT DETECTED
HCOV NL63 RNA SPEC QL NAA+PROBE: NOT DETECTED
HCOV OC43 RNA SPEC QL NAA+PROBE: NOT DETECTED
HCT VFR BLD AUTO: 48.9 % (ref 40.4–52.2)
HGB BLD-MCNC: 16.7 G/DL (ref 13.7–17.6)
HMPV RNA NPH QL NAA+NON-PROBE: NOT DETECTED
HPIV1 RNA SPEC QL NAA+PROBE: NOT DETECTED
HPIV2 RNA SPEC QL NAA+PROBE: NOT DETECTED
HPIV3 RNA NPH QL NAA+PROBE: NOT DETECTED
HPIV4 P GENE NPH QL NAA+PROBE: NOT DETECTED
IMM GRANULOCYTES # BLD AUTO: 0.02 10*3/MM3 (ref 0–0.03)
IMM GRANULOCYTES NFR BLD AUTO: 0.2 % (ref 0–0.5)
LYMPHOCYTES # BLD AUTO: 1.24 10*3/MM3 (ref 0.9–4.8)
LYMPHOCYTES NFR BLD AUTO: 9.4 % (ref 19.6–45.3)
M PNEUMO IGG SER IA-ACNC: NOT DETECTED
MCH RBC QN AUTO: 28.4 PG (ref 27–32.7)
MCHC RBC AUTO-ENTMCNC: 34.2 G/DL (ref 32.6–36.4)
MCV RBC AUTO: 83.3 FL (ref 79.8–96.2)
MONOCYTES # BLD AUTO: 0.28 10*3/MM3 (ref 0.2–1.2)
MONOCYTES NFR BLD AUTO: 2.1 % (ref 5–12)
NEUTROPHILS # BLD AUTO: 11.63 10*3/MM3 (ref 1.9–8.1)
NEUTROPHILS NFR BLD AUTO: 88.3 % (ref 42.7–76)
PLATELET # BLD AUTO: 254 10*3/MM3 (ref 140–500)
PMV BLD AUTO: 9.4 FL (ref 6–12)
POTASSIUM BLD-SCNC: 4.4 MMOL/L (ref 3.5–5.2)
RBC # BLD AUTO: 5.87 10*6/MM3 (ref 4.6–6)
RHINOVIRUS RNA SPEC NAA+PROBE: NOT DETECTED
RSV RNA NPH QL NAA+NON-PROBE: DETECTED
SODIUM BLD-SCNC: 133 MMOL/L (ref 136–145)
WBC NRBC COR # BLD: 13.18 10*3/MM3 (ref 4.5–10.7)

## 2018-12-27 PROCEDURE — 82962 GLUCOSE BLOOD TEST: CPT

## 2018-12-27 PROCEDURE — 96372 THER/PROPH/DIAG INJ SC/IM: CPT

## 2018-12-27 PROCEDURE — 63710000001 INSULIN LISPRO (HUMAN) PER 5 UNITS: Performed by: HOSPITALIST

## 2018-12-27 PROCEDURE — 25010000002 ENOXAPARIN PER 10 MG: Performed by: INTERNAL MEDICINE

## 2018-12-27 PROCEDURE — 80048 BASIC METABOLIC PNL TOTAL CA: CPT | Performed by: INTERNAL MEDICINE

## 2018-12-27 PROCEDURE — G0378 HOSPITAL OBSERVATION PER HR: HCPCS

## 2018-12-27 PROCEDURE — 63710000001 INSULIN LISPRO (HUMAN) PER 5 UNITS: Performed by: INTERNAL MEDICINE

## 2018-12-27 PROCEDURE — 25010000002 METHYLPREDNISOLONE PER 125 MG: Performed by: INTERNAL MEDICINE

## 2018-12-27 PROCEDURE — G8980 MOBILITY D/C STATUS: HCPCS

## 2018-12-27 PROCEDURE — 63710000001 INSULIN GLARGINE PER 5 UNITS: Performed by: HOSPITALIST

## 2018-12-27 PROCEDURE — 85025 COMPLETE CBC W/AUTO DIFF WBC: CPT | Performed by: INTERNAL MEDICINE

## 2018-12-27 PROCEDURE — 94799 UNLISTED PULMONARY SVC/PX: CPT

## 2018-12-27 PROCEDURE — 97161 PT EVAL LOW COMPLEX 20 MIN: CPT

## 2018-12-27 PROCEDURE — 96376 TX/PRO/DX INJ SAME DRUG ADON: CPT

## 2018-12-27 PROCEDURE — G8979 MOBILITY GOAL STATUS: HCPCS

## 2018-12-27 PROCEDURE — 63710000001 INSULIN GLARGINE PER 5 UNITS: Performed by: INTERNAL MEDICINE

## 2018-12-27 PROCEDURE — G8978 MOBILITY CURRENT STATUS: HCPCS

## 2018-12-27 RX ORDER — METHYLPREDNISOLONE SODIUM SUCCINATE 40 MG/ML
40 INJECTION, POWDER, LYOPHILIZED, FOR SOLUTION INTRAMUSCULAR; INTRAVENOUS EVERY 12 HOURS
Status: DISCONTINUED | OUTPATIENT
Start: 2018-12-27 | End: 2018-12-28 | Stop reason: HOSPADM

## 2018-12-27 RX ORDER — INSULIN GLARGINE 100 [IU]/ML
80 INJECTION, SOLUTION SUBCUTANEOUS NIGHTLY
Status: DISCONTINUED | OUTPATIENT
Start: 2018-12-28 | End: 2018-12-28 | Stop reason: HOSPADM

## 2018-12-27 RX ORDER — INSULIN GLARGINE 100 [IU]/ML
10 INJECTION, SOLUTION SUBCUTANEOUS ONCE
Status: COMPLETED | OUTPATIENT
Start: 2018-12-27 | End: 2018-12-27

## 2018-12-27 RX ORDER — BUDESONIDE 0.5 MG/2ML
0.5 INHALANT ORAL
Status: DISCONTINUED | OUTPATIENT
Start: 2018-12-27 | End: 2018-12-28 | Stop reason: HOSPADM

## 2018-12-27 RX ORDER — GUAIFENESIN 600 MG/1
600 TABLET, EXTENDED RELEASE ORAL EVERY 12 HOURS SCHEDULED
Status: DISCONTINUED | OUTPATIENT
Start: 2018-12-27 | End: 2018-12-28 | Stop reason: HOSPADM

## 2018-12-27 RX ADMIN — PILOCARPINE HYDROCHLORIDE 1 DROP: 10 SOLUTION/ DROPS OPHTHALMIC at 17:16

## 2018-12-27 RX ADMIN — PILOCARPINE HYDROCHLORIDE 1 DROP: 10 SOLUTION/ DROPS OPHTHALMIC at 11:41

## 2018-12-27 RX ADMIN — IPRATROPIUM BROMIDE AND ALBUTEROL SULFATE 3 ML: 2.5; .5 SOLUTION RESPIRATORY (INHALATION) at 16:25

## 2018-12-27 RX ADMIN — LISINOPRIL 20 MG: 20 TABLET ORAL at 08:12

## 2018-12-27 RX ADMIN — Medication 3 ML: at 20:00

## 2018-12-27 RX ADMIN — METHYLPREDNISOLONE SODIUM SUCCINATE 60 MG: 125 INJECTION, POWDER, FOR SOLUTION INTRAMUSCULAR; INTRAVENOUS at 11:40

## 2018-12-27 RX ADMIN — INSULIN LISPRO 10 UNITS: 100 INJECTION, SOLUTION INTRAVENOUS; SUBCUTANEOUS at 22:20

## 2018-12-27 RX ADMIN — INSULIN LISPRO 7 UNITS: 100 INJECTION, SOLUTION INTRAVENOUS; SUBCUTANEOUS at 22:20

## 2018-12-27 RX ADMIN — CLOPIDOGREL 75 MG: 75 TABLET, FILM COATED ORAL at 20:03

## 2018-12-27 RX ADMIN — ISOSORBIDE MONONITRATE 30 MG: 30 TABLET ORAL at 08:12

## 2018-12-27 RX ADMIN — PILOCARPINE HYDROCHLORIDE 1 DROP: 10 SOLUTION/ DROPS OPHTHALMIC at 08:18

## 2018-12-27 RX ADMIN — ATORVASTATIN CALCIUM 20 MG: 20 TABLET, FILM COATED ORAL at 08:12

## 2018-12-27 RX ADMIN — LATANOPROST 1 DROP: 50 SOLUTION OPHTHALMIC at 08:25

## 2018-12-27 RX ADMIN — INSULIN LISPRO 4 UNITS: 100 INJECTION, SOLUTION INTRAVENOUS; SUBCUTANEOUS at 08:11

## 2018-12-27 RX ADMIN — GUAIFENESIN 600 MG: 600 TABLET, EXTENDED RELEASE ORAL at 20:03

## 2018-12-27 RX ADMIN — ASPIRIN 81 MG: 81 TABLET, DELAYED RELEASE ORAL at 08:12

## 2018-12-27 RX ADMIN — TIMOLOL MALEATE 1 DROP: 5 SOLUTION/ DROPS OPHTHALMIC at 08:18

## 2018-12-27 RX ADMIN — PILOCARPINE HYDROCHLORIDE 1 DROP: 10 SOLUTION/ DROPS OPHTHALMIC at 20:03

## 2018-12-27 RX ADMIN — BUDESONIDE 0.5 MG: 0.5 INHALANT RESPIRATORY (INHALATION) at 19:34

## 2018-12-27 RX ADMIN — METOPROLOL TARTRATE 25 MG: 25 TABLET ORAL at 20:03

## 2018-12-27 RX ADMIN — IPRATROPIUM BROMIDE AND ALBUTEROL SULFATE 3 ML: 2.5; .5 SOLUTION RESPIRATORY (INHALATION) at 11:54

## 2018-12-27 RX ADMIN — INSULIN LISPRO 6 UNITS: 100 INJECTION, SOLUTION INTRAVENOUS; SUBCUTANEOUS at 11:40

## 2018-12-27 RX ADMIN — BRIMONIDINE TARTRATE 1 DROP: 1.5 SOLUTION OPHTHALMIC at 22:21

## 2018-12-27 RX ADMIN — INSULIN GLARGINE 70 UNITS: 100 INJECTION, SOLUTION SUBCUTANEOUS at 20:05

## 2018-12-27 RX ADMIN — IPRATROPIUM BROMIDE AND ALBUTEROL SULFATE 3 ML: 2.5; .5 SOLUTION RESPIRATORY (INHALATION) at 19:34

## 2018-12-27 RX ADMIN — INSULIN LISPRO 20 UNITS: 100 INJECTION, SOLUTION INTRAVENOUS; SUBCUTANEOUS at 17:15

## 2018-12-27 RX ADMIN — INSULIN LISPRO 20 UNITS: 100 INJECTION, SOLUTION INTRAVENOUS; SUBCUTANEOUS at 08:11

## 2018-12-27 RX ADMIN — BRIMONIDINE TARTRATE 1 DROP: 1.5 SOLUTION OPHTHALMIC at 05:22

## 2018-12-27 RX ADMIN — INSULIN LISPRO 20 UNITS: 100 INJECTION, SOLUTION INTRAVENOUS; SUBCUTANEOUS at 11:40

## 2018-12-27 RX ADMIN — INSULIN GLARGINE 10 UNITS: 100 INJECTION, SOLUTION SUBCUTANEOUS at 22:21

## 2018-12-27 RX ADMIN — BRIMONIDINE TARTRATE 1 DROP: 1.5 SOLUTION OPHTHALMIC at 14:38

## 2018-12-27 RX ADMIN — METOPROLOL TARTRATE 25 MG: 25 TABLET ORAL at 08:12

## 2018-12-27 RX ADMIN — PANTOPRAZOLE SODIUM 40 MG: 40 TABLET, DELAYED RELEASE ORAL at 06:47

## 2018-12-27 RX ADMIN — METHYLPREDNISOLONE SODIUM SUCCINATE 60 MG: 125 INJECTION, POWDER, FOR SOLUTION INTRAMUSCULAR; INTRAVENOUS at 05:22

## 2018-12-27 RX ADMIN — ENOXAPARIN SODIUM 40 MG: 40 INJECTION SUBCUTANEOUS at 20:02

## 2018-12-27 RX ADMIN — INSULIN LISPRO 7 UNITS: 100 INJECTION, SOLUTION INTRAVENOUS; SUBCUTANEOUS at 17:16

## 2018-12-28 VITALS
DIASTOLIC BLOOD PRESSURE: 87 MMHG | TEMPERATURE: 97.7 F | HEART RATE: 82 BPM | WEIGHT: 191.14 LBS | BODY MASS INDEX: 26.76 KG/M2 | RESPIRATION RATE: 16 BRPM | OXYGEN SATURATION: 93 % | SYSTOLIC BLOOD PRESSURE: 164 MMHG | HEIGHT: 71 IN

## 2018-12-28 LAB
GLUCOSE BLDC GLUCOMTR-MCNC: 263 MG/DL (ref 70–130)
GLUCOSE BLDC GLUCOMTR-MCNC: 337 MG/DL (ref 70–130)

## 2018-12-28 PROCEDURE — 25010000002 METHYLPREDNISOLONE PER 125 MG: Performed by: HOSPITALIST

## 2018-12-28 PROCEDURE — 94799 UNLISTED PULMONARY SVC/PX: CPT

## 2018-12-28 PROCEDURE — 96376 TX/PRO/DX INJ SAME DRUG ADON: CPT

## 2018-12-28 PROCEDURE — 63710000001 INSULIN LISPRO (HUMAN) PER 5 UNITS: Performed by: INTERNAL MEDICINE

## 2018-12-28 PROCEDURE — G0378 HOSPITAL OBSERVATION PER HR: HCPCS

## 2018-12-28 PROCEDURE — 25010000002 METHYLPREDNISOLONE PER 40 MG: Performed by: HOSPITALIST

## 2018-12-28 PROCEDURE — 82962 GLUCOSE BLOOD TEST: CPT

## 2018-12-28 RX ORDER — METHYLPREDNISOLONE 4 MG/1
TABLET ORAL
Qty: 21 TABLET | Refills: 0 | Status: SHIPPED | OUTPATIENT
Start: 2018-12-28 | End: 2019-04-29

## 2018-12-28 RX ORDER — ALBUTEROL SULFATE 90 UG/1
2 AEROSOL, METERED RESPIRATORY (INHALATION) EVERY 4 HOURS PRN
Qty: 1 INHALER | Refills: 0 | Status: SHIPPED | OUTPATIENT
Start: 2018-12-28 | End: 2019-04-29

## 2018-12-28 RX ADMIN — PILOCARPINE HYDROCHLORIDE 1 DROP: 10 SOLUTION/ DROPS OPHTHALMIC at 08:25

## 2018-12-28 RX ADMIN — ATORVASTATIN CALCIUM 20 MG: 20 TABLET, FILM COATED ORAL at 08:24

## 2018-12-28 RX ADMIN — BUDESONIDE 0.5 MG: 0.5 INHALANT RESPIRATORY (INHALATION) at 08:12

## 2018-12-28 RX ADMIN — INSULIN LISPRO 5 UNITS: 100 INJECTION, SOLUTION INTRAVENOUS; SUBCUTANEOUS at 11:51

## 2018-12-28 RX ADMIN — INSULIN LISPRO 4 UNITS: 100 INJECTION, SOLUTION INTRAVENOUS; SUBCUTANEOUS at 08:24

## 2018-12-28 RX ADMIN — ISOSORBIDE MONONITRATE 30 MG: 30 TABLET ORAL at 08:24

## 2018-12-28 RX ADMIN — METHYLPREDNISOLONE SODIUM SUCCINATE 40 MG: 125 INJECTION, POWDER, FOR SOLUTION INTRAMUSCULAR; INTRAVENOUS at 11:26

## 2018-12-28 RX ADMIN — PANTOPRAZOLE SODIUM 40 MG: 40 TABLET, DELAYED RELEASE ORAL at 07:29

## 2018-12-28 RX ADMIN — INSULIN LISPRO 20 UNITS: 100 INJECTION, SOLUTION INTRAVENOUS; SUBCUTANEOUS at 11:50

## 2018-12-28 RX ADMIN — GUAIFENESIN 600 MG: 600 TABLET, EXTENDED RELEASE ORAL at 08:24

## 2018-12-28 RX ADMIN — IPRATROPIUM BROMIDE AND ALBUTEROL SULFATE 3 ML: 2.5; .5 SOLUTION RESPIRATORY (INHALATION) at 08:12

## 2018-12-28 RX ADMIN — METOPROLOL TARTRATE 25 MG: 25 TABLET ORAL at 08:24

## 2018-12-28 RX ADMIN — INSULIN LISPRO 20 UNITS: 100 INJECTION, SOLUTION INTRAVENOUS; SUBCUTANEOUS at 08:23

## 2018-12-28 RX ADMIN — BRIMONIDINE TARTRATE 1 DROP: 1.5 SOLUTION OPHTHALMIC at 07:26

## 2018-12-28 RX ADMIN — IPRATROPIUM BROMIDE AND ALBUTEROL SULFATE 3 ML: 2.5; .5 SOLUTION RESPIRATORY (INHALATION) at 11:28

## 2018-12-28 RX ADMIN — TIMOLOL MALEATE 1 DROP: 5 SOLUTION/ DROPS OPHTHALMIC at 08:25

## 2018-12-28 RX ADMIN — LATANOPROST 1 DROP: 50 SOLUTION OPHTHALMIC at 08:25

## 2018-12-28 RX ADMIN — METHYLPREDNISOLONE SODIUM SUCCINATE 40 MG: 125 INJECTION, POWDER, FOR SOLUTION INTRAMUSCULAR; INTRAVENOUS at 03:19

## 2018-12-28 RX ADMIN — LISINOPRIL 20 MG: 20 TABLET ORAL at 08:24

## 2018-12-28 RX ADMIN — ASPIRIN 81 MG: 81 TABLET, DELAYED RELEASE ORAL at 08:24

## 2018-12-28 RX ADMIN — PILOCARPINE HYDROCHLORIDE 1 DROP: 10 SOLUTION/ DROPS OPHTHALMIC at 11:51

## 2018-12-29 ENCOUNTER — READMISSION MANAGEMENT (OUTPATIENT)
Dept: CALL CENTER | Facility: HOSPITAL | Age: 73
End: 2018-12-29

## 2018-12-29 NOTE — OUTREACH NOTE
Prep Survey      Responses   Facility patient discharged from?  [facility]   Is patient eligible?  Yes   Discharge diagnosis  Cough,     RSV    Does the patient have one of the following disease processes/diagnoses(primary or secondary)?  Other   Does the patient have Home health ordered?  No   Is there a DME ordered?  No   Prep survey completed?  Yes          Karen Young RN

## 2019-01-02 ENCOUNTER — READMISSION MANAGEMENT (OUTPATIENT)
Dept: CALL CENTER | Facility: HOSPITAL | Age: 74
End: 2019-01-02

## 2019-01-02 NOTE — OUTREACH NOTE
Medical Week 1 Survey      Responses   Facility patient discharged from?  [facility]   Does the patient have one of the following disease processes/diagnoses(primary or secondary)?  Other   Is there a successful TCM telephone encounter documented?  No   Week 1 attempt successful?  Yes   Call start time  0948   Call end time  0955   Is patient permission given to speak with other caregiver?  Yes   List who call center can speak with  wife, Kerry Caldera reviewed with patient/caregiver?  Yes   Is the patient having any side effects they believe may be caused by any medication additions or changes?  No   Does the patient have all medications ordered at discharge?  Yes   Is the patient taking all medications as directed (includes completed medication regime)?  Yes   Comments regarding appointments  review appointment but no times on AVS, encourage the wife to have the patient /or her call for an appointment   Does the patient have a primary care provider?   Yes   Does the patient have an appointment with their PCP within 7 days of discharge?  N/A   Has the patient kept scheduled appointments due by today?  Yes   Has home health visited the patient within 72 hours of discharge?  N/A   Did the patient receive a copy of their discharge instructions?  Yes   Nursing interventions  Reviewed instructions with patient   What is the patient's perception of their health status since discharge?  Improving   Is the patient/caregiver able to teach back signs and symptoms related to disease process for when to call PCP?  Yes   Is the patient/caregiver able to teach back signs and symptoms related to disease process for when to call 911?  Yes   Is the patient/caregiver able to teach back the hierarchy of who to call/visit for symptoms/problems? PCP, Specialist, Home health nurse, Urgent Care, ED, 911  Yes   Week 1 call completed?  Yes   Wrap up additional comments  wife feels he is improving          Sunshine Austin RN

## 2019-02-01 ENCOUNTER — TRANSCRIBE ORDERS (OUTPATIENT)
Dept: ADMINISTRATIVE | Facility: HOSPITAL | Age: 74
End: 2019-02-01

## 2019-02-01 ENCOUNTER — HOSPITAL ENCOUNTER (OUTPATIENT)
Dept: CT IMAGING | Facility: HOSPITAL | Age: 74
Discharge: HOME | End: 2019-02-01
Attending: INTERNAL MEDICINE | Admitting: INTERNAL MEDICINE

## 2019-02-01 DIAGNOSIS — R10.9 ABDOMINAL PAIN, UNSPECIFIED ABDOMINAL LOCATION: ICD-10-CM

## 2019-02-01 DIAGNOSIS — R10.9 ABDOMINAL PAIN, UNSPECIFIED ABDOMINAL LOCATION: Primary | ICD-10-CM

## 2019-02-01 PROCEDURE — 74176 CT ABD & PELVIS W/O CONTRAST: CPT

## 2019-02-01 NOTE — NURSING NOTE
"\"Nothing acute except a little DiverticuLOSIS\" seen on CT A&P. Dr. Diaz notified of results, she spoke with pt and pt may go. Pt informed and home (ambulatory) per self.   "

## 2019-02-27 ENCOUNTER — APPOINTMENT (OUTPATIENT)
Dept: ONCOLOGY | Facility: CLINIC | Age: 74
End: 2019-02-27

## 2019-02-27 ENCOUNTER — APPOINTMENT (OUTPATIENT)
Dept: LAB | Facility: HOSPITAL | Age: 74
End: 2019-02-27

## 2019-03-18 ENCOUNTER — OFFICE VISIT (OUTPATIENT)
Dept: ONCOLOGY | Facility: CLINIC | Age: 74
End: 2019-03-18

## 2019-03-18 ENCOUNTER — LAB (OUTPATIENT)
Dept: LAB | Facility: HOSPITAL | Age: 74
End: 2019-03-18

## 2019-03-18 VITALS
SYSTOLIC BLOOD PRESSURE: 174 MMHG | OXYGEN SATURATION: 94 % | WEIGHT: 215.9 LBS | DIASTOLIC BLOOD PRESSURE: 80 MMHG | HEART RATE: 92 BPM | TEMPERATURE: 98.3 F | BODY MASS INDEX: 30.91 KG/M2 | RESPIRATION RATE: 16 BRPM | HEIGHT: 70 IN

## 2019-03-18 DIAGNOSIS — J18.9 PNEUMONIA OF LEFT LUNG DUE TO INFECTIOUS ORGANISM, UNSPECIFIED PART OF LUNG: ICD-10-CM

## 2019-03-18 DIAGNOSIS — K57.32 DIVERTICULITIS OF LARGE INTESTINE WITHOUT PERFORATION OR ABSCESS WITHOUT BLEEDING: ICD-10-CM

## 2019-03-18 DIAGNOSIS — D75.1 ERYTHROCYTOSIS: ICD-10-CM

## 2019-03-18 DIAGNOSIS — D75.1 ERYTHROCYTOSIS: Primary | ICD-10-CM

## 2019-03-18 DIAGNOSIS — D72.825 BANDEMIA: ICD-10-CM

## 2019-03-18 LAB
ALBUMIN SERPL-MCNC: 3.9 G/DL (ref 3.5–5.2)
ALBUMIN/GLOB SERPL: 1.3 G/DL (ref 1.1–2.4)
ALP SERPL-CCNC: 68 U/L (ref 38–116)
ALT SERPL W P-5'-P-CCNC: 15 U/L (ref 0–41)
ANION GAP SERPL CALCULATED.3IONS-SCNC: 15.4 MMOL/L
AST SERPL-CCNC: 14 U/L (ref 0–40)
BASOPHILS # BLD AUTO: 0.07 10*3/MM3 (ref 0–0.2)
BASOPHILS NFR BLD AUTO: 0.6 % (ref 0–1.5)
BILIRUB SERPL-MCNC: 0.2 MG/DL (ref 0.2–1.2)
BUN BLD-MCNC: 14 MG/DL (ref 6–20)
BUN/CREAT SERPL: 14.7 (ref 7.3–30)
CALCIUM SPEC-SCNC: 9.5 MG/DL (ref 8.5–10.2)
CHLORIDE SERPL-SCNC: 99 MMOL/L (ref 98–107)
CO2 SERPL-SCNC: 24.6 MMOL/L (ref 22–29)
CREAT BLD-MCNC: 0.95 MG/DL (ref 0.7–1.3)
DEPRECATED RDW RBC AUTO: 39.8 FL (ref 37–54)
EOSINOPHIL # BLD AUTO: 0.38 10*3/MM3 (ref 0–0.4)
EOSINOPHIL NFR BLD AUTO: 3.1 % (ref 0.3–6.2)
ERYTHROCYTE [DISTWIDTH] IN BLOOD BY AUTOMATED COUNT: 12.7 % (ref 12.3–15.4)
GFR SERPL CREATININE-BSD FRML MDRD: 77 ML/MIN/1.73
GLOBULIN UR ELPH-MCNC: 2.9 GM/DL (ref 1.8–3.5)
GLUCOSE BLD-MCNC: 312 MG/DL (ref 74–124)
HCT VFR BLD AUTO: 47.2 % (ref 37.5–51)
HGB BLD-MCNC: 15.4 G/DL (ref 13–17.7)
IMM GRANULOCYTES # BLD AUTO: 0.05 10*3/MM3 (ref 0–0.05)
IMM GRANULOCYTES NFR BLD AUTO: 0.4 % (ref 0–0.5)
LYMPHOCYTES # BLD AUTO: 3.03 10*3/MM3 (ref 0.7–3.1)
LYMPHOCYTES NFR BLD AUTO: 24.4 % (ref 19.6–45.3)
MCH RBC QN AUTO: 27.9 PG (ref 26.6–33)
MCHC RBC AUTO-ENTMCNC: 32.6 G/DL (ref 31.5–35.7)
MCV RBC AUTO: 85.5 FL (ref 79–97)
MONOCYTES # BLD AUTO: 1.03 10*3/MM3 (ref 0.1–0.9)
MONOCYTES NFR BLD AUTO: 8.3 % (ref 5–12)
NEUTROPHILS # BLD AUTO: 7.88 10*3/MM3 (ref 1.4–7)
NEUTROPHILS NFR BLD AUTO: 63.2 % (ref 42.7–76)
NRBC BLD AUTO-RTO: 0 /100 WBC (ref 0–0)
PLATELET # BLD AUTO: 342 10*3/MM3 (ref 140–450)
PMV BLD AUTO: 8.7 FL (ref 6–12)
POTASSIUM BLD-SCNC: 4.4 MMOL/L (ref 3.5–4.7)
PROT SERPL-MCNC: 6.8 G/DL (ref 6.3–8)
RBC # BLD AUTO: 5.52 10*6/MM3 (ref 4.14–5.8)
SODIUM BLD-SCNC: 139 MMOL/L (ref 134–145)
WBC NRBC COR # BLD: 12.44 10*3/MM3 (ref 3.4–10.8)

## 2019-03-18 PROCEDURE — 99214 OFFICE O/P EST MOD 30 MIN: CPT | Performed by: INTERNAL MEDICINE

## 2019-03-18 PROCEDURE — 36415 COLL VENOUS BLD VENIPUNCTURE: CPT | Performed by: INTERNAL MEDICINE

## 2019-03-18 PROCEDURE — 80053 COMPREHEN METABOLIC PANEL: CPT | Performed by: INTERNAL MEDICINE

## 2019-03-18 PROCEDURE — 85025 COMPLETE CBC W/AUTO DIFF WBC: CPT | Performed by: INTERNAL MEDICINE

## 2019-03-18 RX ORDER — DOXYCYCLINE HYCLATE 100 MG
100 TABLET ORAL 2 TIMES DAILY
Qty: 14 TABLET | Refills: 0 | Status: SHIPPED | OUTPATIENT
Start: 2019-03-18 | End: 2019-03-25

## 2019-03-18 NOTE — PROGRESS NOTES
Subjective     REASON FOR FOLLOW UP:  Leukocytosis for 4 years, minimal erythrocytosis, normal platelet count, no splenomegaly by physical exam or ct abdomen.        History of Present Illness This patient returns today to the office for followup. He is here stating that for the last 10 days he has been coughing purulent sputum production associated with shortness of breath. No pleuritic pain. Low-grade temperature for the first 2 days. He has had some wheezing. He has been having some fatigue. His appetite has declined but he is still drinking a copious amount of liquids and eating enough food. His bowel activity is going. Urination is going. He is not checking too much in regard to diabetes control. He feels fatigued and bad.              Past Medical History:   Diagnosis Date   • Arthritis    • Atherosclerosis of native coronary artery without angina pectoris    • Chest pain    • Coronary artery disease    • Diabetes mellitus (CMS/HCC)    • Difficulty breathing    • Diverticulosis    • Dizziness    • ED (erectile dysfunction)    • GERD (gastroesophageal reflux disease)    • Hyperlipidemia    • Hypertension    • Kidney stones    • Measles    • Mumps    • Obesity    • Polio    • RSV (respiratory syncytial virus infection)    • S/P angioplasty with stent    • Stroke (CMS/HCC)    • Type 2 diabetes mellitus (CMS/HCC)         Past Surgical History:   Procedure Laterality Date   • CARDIAC CATHETERIZATION N/A 02/10/2011    Normal LV systolic function; Kind of diffuse, small vessel, distal disease. Most of it is not amenable to PCI, but his JADEN lesion is and it is a large vessel; All of his prior durg-eluting stents are widely patent & look good, Dr. Anibal Oliveira   • CATARACT EXTRACTION Bilateral    • COLONOSCOPY N/A 09/13/2004    Normal, Repeat in 5-8 years, Dr. Davion Sanon   • COLONOSCOPY N/A 09/04/2009    Mutiple small & large-mouthed diverticual were found in the entire colon; Exam otherwise normal, Dr. Ricardo  Fab   • COLONOSCOPY W/ POLYPECTOMY N/A [id number]    2 benign polyps, [facility], Dr. Davion Sanon   • CORONARY ANGIOPLASTY WITH STENT PLACEMENT N/A 11/20/2007    Successful multivessel drug eluting stent implantation for restenosis above his prior stenting, Dr. Anibal Wray   • CORONARY ANGIOPLASTY WITH STENT PLACEMENT N/A 06/05/2007    Successful multivessel drug eluting stent implantation in all 3 of the major coronary arteries, Dr. Anibal Wray   • CORONARY ANGIOPLASTY WITH STENT PLACEMENT N/A 02/10/2011    Successful drug-stenting of the JADEN, Dr. Anibal Oliveira   • ENDOSCOPY N/A 11/21/2017    Procedure: ESOPHAGOGASTRODUODENOSCOPY WITH COLD BIOPSIES AND 53 F MICHAEL DILATION;  Surgeon: Wisam Potter MD;  Location: [facility] ENDOSCOPY;  Service:    • EYE SURGERY Left 2011    Retina   • HYDROCELECTOMY Left 08/19/2011    Dr. Ellie Stein   • INCISION AND DRAINAGE PERIRECTAL ABSCESS N/A 11/30/2007    Dr. Davion Sanon   • LUMBAR EPIDURAL INJECTION N/A 2012-2013    x 3   • NEUROMA SURGERY Right 07/19/2005    Excision Neuroma Right Arm; PATH: CONSISTENT W/ LIPOMA,  Dr. Davion Sanon   • ROTATOR CUFF REPAIR Right 2006   • ROTATOR CUFF REPAIR Left 2000   • UPPER GASTROINTESTINAL ENDOSCOPY N/A 09/06/2012    Normal esophagus; Normal Stomach, Normal examinded duodenum, Dr. Bruno Turk   • WRIST FRACTURE SURGERY Right 06/03/2003    ORIF Right Distal Radius, Dr. Ryan Bravo        Current Outpatient Medications on File Prior to Visit   Medication Sig Dispense Refill   • acetaminophen (TYLENOL) 325 MG tablet Take 2 tablets by mouth Every 4 (Four) Hours As Needed for Mild Pain . (Patient taking differently: Take 650 mg by mouth Daily As Needed for Mild Pain .)     • acyclovir (ZOVIRAX) 400 MG tablet Take 400 mg by mouth As Needed. Take no more than 5 doses a day.     • albuterol sulfate  (90 Base) MCG/ACT inhaler Inhale 2 puffs Every 4 (Four) Hours As Needed for Wheezing. 1 inhaler 0   •  aspirin 81 MG EC tablet Take 81 mg by mouth daily.     • SALIMA MICROLET LANCETS lancets      • brimonidine (ALPHAGAN) 0.15 % ophthalmic solution Apply 1 drop to eye 2 (two) times a day.     • clopidogrel (PLAVIX) 75 MG tablet Take 75 mg by mouth Every Night.     • Dulaglutide (TRULICITY) 0.75 MG/0.5ML solution pen-injector Inject  under the skin 1 (One) Time.     • insulin aspart (novoLOG FLEXPEN) 100 UNIT/ML solution pen-injector sc pen INJECT 20 UNITS IN THE MORNING 20 UNITS AT LUNCH AND 20 UNITS AT DINNER (Patient taking differently: Inject 20 Units under the skin into the appropriate area as directed 3 (Three) Times a Day With Meals. 60 units tid with meals)     • insulin detemir (LEVEMIR) 100 UNIT/ML injection INJECT 60 UNITS AT NIGHT (Patient taking differently: Inject 70 Units under the skin Every Night.)  12   • isosorbide mononitrate (IMDUR) 30 MG 24 hr tablet Take 30 mg by mouth daily.     • latanoprost (XALATAN) 0.005 % ophthalmic solution Apply 1 drop to eye daily.     • lisinopril (PRINIVIL,ZESTRIL) 20 MG tablet TAKE 1 TABLET DAILY AS DIRECTED. 90 tablet 3   • metFORMIN (GLUCOPHAGE) 500 MG tablet TAKE 1 TABLET BY MOUTH TWICE A DAY WITH FOOD 180 tablet 0   • metoprolol tartrate (LOPRESSOR) 25 MG tablet TAKE 1 TABLET BY MOUTH 2 TIMES DAILY 180 tablet 1   • nitroglycerin (NITROSTAT) 0.4 MG SL tablet Place 0.4 mg under the tongue Every 5 (Five) Minutes As Needed for Chest Pain. Take no more than 3 doses in 15 minutes.     • Omega 3 1000 MG capsule Take 1 capsule by mouth 2 (Two) Times a Day.     • omeprazole (priLOSEC) 20 MG capsule Take 20 mg by mouth Daily.     • pilocarpine (PILOCAR) 1 % ophthalmic solution 1 drop 4 (Four) Times a Day.     • simvastatin (ZOCOR) 40 MG tablet Take 40 mg by mouth Every Night.     • timolol (TIMOPTIC) 0.5 % ophthalmic solution 1 drop daily. INSTILL 1 DROP IN LEFT EYE EVERY MORNING AS DIRECTED  6   • meloxicam (MOBIC) 15 MG tablet Take 15 mg by mouth Daily.     •  MethylPREDNISolone (MEDROL, DONALD,) 4 MG tablet Take as directed on package instructions. 21 tablet 0     No current facility-administered medications on file prior to visit.         ALLERGIES:  No Known Allergies     Social History     Socioeconomic History   • Marital status:      Spouse name: Kerry   • Number of children: Not on file   • Years of education: College   • Highest education level: Not on file   Occupational History   • Occupation: Auto      Employer: SELF-EMPLOYED   Tobacco Use   • Smoking status: Never Smoker   • Smokeless tobacco: Never Used   • Tobacco comment: no caffeine   Substance and Sexual Activity   • Alcohol use: No   • Drug use: No   • Sexual activity: Defer     Partners: Female        Family History   Problem Relation Age of Onset   • Heart disease Mother    • Prostate cancer Father    • Heart disease Father    • Colon polyps Daughter    • Colon polyps Daughter         Review of Systems     General: no fever, no chills, STATED fatigue,no weight changes, no lack of appetite.  Eyes: no epiphora, xerophthalmia,conjunctivitis, pain, glaucoma, blurred vision, blindness, secretion, photophobia, proptosis, diplopia.  Ears: no otorrhea, tinnitus, otorrhagia, deafness, pain, vertigo.  Nose: no rhinorrhea, no epistaxis, no alteration in perception of odors, no sinuses pressure.  Mouth: no alteration in gums or teeth,  No ulcers, no difficulty with mastication or deglut ion, no odynophagia.  Neck: no masses or pain, no thyroid alterations, no pain in muscles or arteries, no carotid odynia, no crepitation.  Respiratory:  cough, PURULENT sputum production, dyspnea,no trepopnea, no pleuritic pain,no hemoptysis.  Heart: no syncope, no irregularity, no palpitations, no angina,no orthopnea,no paroxysmal nocturnal dyspnea.  Vascular Venous: no tenderness,no edema,no palpable cords,no postphlebitic syndrome, no skin changes no ulcerations.  Vascular Arterial: no distal ischemia,  "noclaudication, no gangrene, no neuropathic ischemic pain, no skin ulcers, no paleness no cyanosis.  GI: no dysphagia, no odynophagia, no regurgitation, no heartburn,no indigestion,no nausea,no vomiting,no hematemesis ,no melena,no jaundice,no distention, no obstipation,no enterorrhagia,no proctalgia,no anal  lesions, no changes in bowel habits.  : no frequency, no hesitancy, no hematuria, no discharge,no  pain.  Musculoskeletal: no muscle or tendon pain or inflammation,no  joint pain, no edema, no functional limitation,no fasciculations, no mass.  Neurologic: no headache, no seizures, noalterations on Craneal nerves, no motor deficit, no sensory deficit, normal coordination, no alteration in memory,normal orientation, calculation,normal writting, verbal and written language.  Skin: no rashes,no pruritus no localized lesions.  Psychiatric: no anxiety, no depression,no agitation, no delusions, proper insight.    Objective     Vitals:    03/18/19 1600   BP: 174/80   Pulse: 92   Resp: 16   Temp: 98.3 °F (36.8 °C)   TempSrc: Oral   SpO2: 94%   Weight: 97.9 kg (215 lb 14.4 oz)   Height: 177 cm (69.69\")   PainSc: 0-No pain     Current Status 3/18/2019   ECOG score 0       Physical Exam    GENERAL:  Well-developed, well-nourished  Patient  in no acute distress.   SKIN:  Warm, dry ,NO rashes,NO purpura ,NO petechiae.  HEENT:  Pupils were equal and reactive to light and accomodation, conjunctivas non injected, no pterigion, normal extraocular movements, normal visual acuity.   Mouth mucosa was moist, no exudates in oropharynx, normal gum line, normal roof of the mouth and pillars, normal papillations of the tongue.  NECK:  Supple with good range of motion; no thyromegaly or masses, no JVD or bruits, no cervical adenopathies.No carotid arteries pain, no carotid abnormal pulsation , NO arterial dance.  LYMPHATICS:  No cervical, NO supraclavicular, NO axillary,NO epitrochlear , NO inguinal adenopathy.  CHEST:  Normal " excursion of both yaquelin thoraces, normal voice fremitus, no subcutaneous emphysema, normal axillas, no rashes or acanthosis nigricans.   DECREASED breath sounds bilaterally, SOME wheezing,LEFT BASE crackles NO ronchi, NO stridor, NO rubs.  CARDIAC AND VASCULAR:  normal rate and regular rhythm, without murmurs,NO rubs NO S3 NO S4 right or left . Normal femoral, popliteal, pedis, brachial and carotid pulses.  ABDOMEN:  Soft, nontender with no organomegaly or masses, no ascites, no collateral circulation,no distention,no Aquilino sign, no abdominal pain, no inguinal hernias,2 CM umbilical hernia, no abdominal bruits. Normal bowel sounds.  GENITAL: Not  Performed.  EXTREMITIES  AND SPINE:  No clubbing, cyanosis or edema, no deformities or pain .No kyphosis, scoliosis, deformities or pain in spine, ribs or pelvic bone.  NEUROLOGICAL:  Patient was awake, alert, oriented to time, person and place.      PULSE O2 RA AT REST 96%, AFTER 40 FT BRISK EXERCISE 94% RA        RECENT LABS:  Hematology WBC   Date Value Ref Range Status   03/18/2019 12.44 (H) 3.40 - 10.80 10*3/mm3 Final     RBC   Date Value Ref Range Status   03/18/2019 5.52 4.14 - 5.80 10*6/mm3 Final     Hemoglobin   Date Value Ref Range Status   03/18/2019 15.4 13.0 - 17.7 g/dL Final     Hematocrit   Date Value Ref Range Status   03/18/2019 47.2 37.5 - 51.0 % Final     Platelets   Date Value Ref Range Status   03/18/2019 342 140 - 450 10*3/mm3 Final            Assessment/Plan    1. This patient has history of leukocytosis that in my opinion is very likely an element of myeloproliferative disorder. We never have persuaded the patient to proceed with bone marrow testing. We have done BCR-ABL in the past that has been negative, JAK2 mutation that has been negative and we have run laboratory testing looking for inflammatory or infectious etiologies that have been negative. Nevertheless, his white count remains minimally elevated and his hematocrit is elevated at 47.      The patient has had phlebotomy by the records not too long ago in a small amount because it seems that the blood clots off and they could not restart another one.   2. The patient has a respiratory infection today that sounds like pneumonia according to the clinical characteristics. His O2 saturation on room air at rest is fine; after exercise is fine and I do believe that the patient will be fine to be treated with oral antibiotic therapy. For this reason, I have advised him to take doxycycline 100 mg p.o. b.i.d. for 7 days. He had a rhinovirus infection at [place] and he was very sick at that time.     The patient actually has been in [place] doing some business during the last few days and he did not care too much about the symptomatology that he has and now that the symptoms are not improving he finally has decided to say something about it.     Under the present circumstances, I have sent a prescription to his pharmacy. I will review him back in 4 months and I am planning to do a phlebotomy then. If the white count remains elevated and the hematocrit remains elevated, I will further have the need for discussion of bone marrow testing.     I discussed all these facts with the patient and his wife in detail. They were ready to proceed.

## 2019-04-29 ENCOUNTER — OFFICE VISIT (OUTPATIENT)
Dept: CARDIOLOGY | Facility: CLINIC | Age: 74
End: 2019-04-29

## 2019-04-29 VITALS
HEIGHT: 69 IN | HEART RATE: 71 BPM | DIASTOLIC BLOOD PRESSURE: 78 MMHG | OXYGEN SATURATION: 95 % | WEIGHT: 217 LBS | SYSTOLIC BLOOD PRESSURE: 130 MMHG | BODY MASS INDEX: 32.14 KG/M2

## 2019-04-29 DIAGNOSIS — E78.5 HYPERLIPIDEMIA, UNSPECIFIED HYPERLIPIDEMIA TYPE: ICD-10-CM

## 2019-04-29 DIAGNOSIS — I25.10 CORONARY ARTERY DISEASE INVOLVING NATIVE CORONARY ARTERY OF NATIVE HEART WITHOUT ANGINA PECTORIS: Primary | ICD-10-CM

## 2019-04-29 DIAGNOSIS — I10 ESSENTIAL HYPERTENSION: ICD-10-CM

## 2019-04-29 PROBLEM — S61.211A: Status: ACTIVE | Noted: 2017-11-15

## 2019-04-29 PROBLEM — M62.838 TRAPEZIUS MUSCLE SPASM: Status: ACTIVE | Noted: 2018-03-19

## 2019-04-29 PROBLEM — M19.012 OSTEOARTHRITIS OF LEFT ACROMIOCLAVICULAR JOINT: Status: ACTIVE | Noted: 2018-03-19

## 2019-04-29 PROCEDURE — 93000 ELECTROCARDIOGRAM COMPLETE: CPT | Performed by: PHYSICIAN ASSISTANT

## 2019-04-29 PROCEDURE — 99214 OFFICE O/P EST MOD 30 MIN: CPT | Performed by: PHYSICIAN ASSISTANT

## 2019-04-29 NOTE — PROGRESS NOTES
Date of Office Visit: 2019  Encounter Provider: HUONG John  Place of Service: [facility] CARDIOLOGY  Patient Name: Anthony Vaughn  :1945    Chief Complaint   Patient presents with   • Coronary Artery Disease     1 year follow up   :     HPI: Anthony Vaughn is a 74 y.o. male, new to me, who presents today for follow-up.  Old records have been obtained and reviewed by me.  He is a patient of Dr. Blackman with a past cardiac history significant for coronary artery disease.  He had angioplasty and stenting in the remote past.  In 2018 he had a possible TIA, and echocardiogram and a Holter monitor at that time were normal.  He was last in our office to see Dr. Wray on 2018.  At that visit he was doing well without complaints of angina or heart failure.  He had lost 15 pounds.  No changes were made to his medical regimen, and is here today for yearly visit.   Over the past year he has been doing fairly well from a cardiac standpoint.  He denies any chest pain, shortness of breath, palpitations, dizziness, or syncope.  Sometimes he has occasional swelling in his legs.  He does not get much exercise.  He still works about 10 hours a day.  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      Past Medical History:   Diagnosis Date   • Arthritis    • Atherosclerosis of native coronary artery without angina pectoris    • Chest pain    • Coronary artery disease    • Diabetes mellitus (CMS/HCC)    • Difficulty breathing    • Diverticulosis    • Dizziness    • ED (erectile dysfunction)    • GERD (gastroesophageal reflux disease)    • Hyperlipidemia    • Hypertension    • Kidney stones    • Measles    • Mumps    • Obesity    • Polio    • RSV (respiratory syncytial virus infection)    • S/P angioplasty with stent    • Stroke (CMS/HCC)    • Type 2 diabetes mellitus (CMS/HCC)        Past Surgical  History:   Procedure Laterality Date   • CARDIAC CATHETERIZATION N/A 02/10/2011    Normal LV systolic function; Kind of diffuse, small vessel, distal disease. Most of it is not amenable to PCI, but his JADEN lesion is and it is a large vessel; All of his prior durg-eluting stents are widely patent & look good, Dr. Anibal Oliveira   • CATARACT EXTRACTION Bilateral    • COLONOSCOPY N/A 09/13/2004    Normal, Repeat in 5-8 years, Dr. Davion Sanon   • COLONOSCOPY N/A 09/04/2009    Mutiple small & large-mouthed diverticual were found in the entire colon; Exam otherwise normal, Dr. Davion Sanon   • COLONOSCOPY W/ POLYPECTOMY N/A [id number]    2 benign polyps, [facility], Dr. Davion Sanon   • CORONARY ANGIOPLASTY WITH STENT PLACEMENT N/A 11/20/2007    Successful multivessel drug eluting stent implantation for restenosis above his prior stenting, Dr. Anibal Wray   • CORONARY ANGIOPLASTY WITH STENT PLACEMENT N/A 06/05/2007    Successful multivessel drug eluting stent implantation in all 3 of the major coronary arteries, Dr. Anibal Wray   • CORONARY ANGIOPLASTY WITH STENT PLACEMENT N/A 02/10/2011    Successful drug-stenting of the JADEN, Dr. Anibal Oliveira   • ENDOSCOPY N/A 11/21/2017    Procedure: ESOPHAGOGASTRODUODENOSCOPY WITH COLD BIOPSIES AND 53 F MICHAEL DILATION;  Surgeon: Wisam Potter MD;  Location: [facility] ENDOSCOPY;  Service:    • EYE SURGERY Left 2011    Retina   • HYDROCELECTOMY Left 08/19/2011    Dr. Ellie Stein   • INCISION AND DRAINAGE PERIRECTAL ABSCESS N/A 11/30/2007    Dr. Davion Sanon   • LUMBAR EPIDURAL INJECTION N/A 2012-2013    x 3   • NEUROMA SURGERY Right 07/19/2005    Excision Neuroma Right Arm; PATH: CONSISTENT W/ LIPOMA,  Dr. Davion Sanon   • ROTATOR CUFF REPAIR Right 2006   • ROTATOR CUFF REPAIR Left 2000   • UPPER GASTROINTESTINAL ENDOSCOPY N/A 09/06/2012    Normal esophagus; Normal Stomach, Normal examinded duodenum, Dr. Bruno Turk   • WRIST FRACTURE SURGERY Right  06/03/2003    ORIF Right Distal Radius, Dr. Ryan Bravo       Social History     Socioeconomic History   • Marital status:      Spouse name: Kerry   • Number of children: Not on file   • Years of education: College   • Highest education level: Not on file   Occupational History   • Occupation: Auto      Employer: SELF-EMPLOYED   Tobacco Use   • Smoking status: Never Smoker   • Smokeless tobacco: Never Used   • Tobacco comment: no caffeine   Substance and Sexual Activity   • Alcohol use: No   • Drug use: No   • Sexual activity: Defer     Partners: Female       Family History   Problem Relation Age of Onset   • Heart disease Mother    • Prostate cancer Father    • Heart disease Father    • Colon polyps Daughter    • Colon polyps Daughter    • No Known Problems Maternal Grandmother    • No Known Problems Maternal Grandfather    • No Known Problems Paternal Grandmother    • No Known Problems Paternal Grandfather        Review of Systems   Constitution: Negative for chills, fever and malaise/fatigue.   Cardiovascular: Positive for leg swelling. Negative for chest pain, dyspnea on exertion, near-syncope, orthopnea, palpitations, paroxysmal nocturnal dyspnea and syncope.   Respiratory: Negative for cough and shortness of breath.    Musculoskeletal: Negative for joint pain, joint swelling and myalgias.   Gastrointestinal: Negative for abdominal pain, diarrhea, melena, nausea and vomiting.   Genitourinary: Negative for frequency and hematuria.   Neurological: Negative for light-headedness, numbness, paresthesias and seizures.   Allergic/Immunologic: Negative.    All other systems reviewed and are negative.      No Known Allergies      Current Outpatient Medications:   •  acetaminophen (TYLENOL) 325 MG tablet, Take 2 tablets by mouth Every 4 (Four) Hours As Needed for Mild Pain . (Patient taking differently: Take 650 mg by mouth Daily As Needed for Mild Pain .), Disp: , Rfl:   •  acyclovir (ZOVIRAX) 400 MG  tablet, Take 400 mg by mouth As Needed. Take no more than 5 doses a day., Disp: , Rfl:   •  aspirin 81 MG EC tablet, Take 81 mg by mouth daily., Disp: , Rfl:   •  SALIMA MICROLET LANCETS lancets, , Disp: , Rfl:   •  brimonidine (ALPHAGAN) 0.15 % ophthalmic solution, Apply 1 drop to eye 2 (two) times a day., Disp: , Rfl:   •  clopidogrel (PLAVIX) 75 MG tablet, Take 75 mg by mouth Every Night., Disp: , Rfl:   •  Dulaglutide (TRULICITY) 0.75 MG/0.5ML solution pen-injector, Inject  under the skin 1 (One) Time., Disp: , Rfl:   •  insulin aspart (novoLOG FLEXPEN) 100 UNIT/ML solution pen-injector sc pen, INJECT 20 UNITS IN THE MORNING 20 UNITS AT LUNCH AND 20 UNITS AT DINNER (Patient taking differently: Inject 20 Units under the skin into the appropriate area as directed 3 (Three) Times a Day With Meals. 60 units tid with meals), Disp: , Rfl:   •  insulin detemir (LEVEMIR) 100 UNIT/ML injection, INJECT 60 UNITS AT NIGHT (Patient taking differently: Inject 70 Units under the skin Every Night.), Disp: , Rfl: 12  •  isosorbide mononitrate (IMDUR) 30 MG 24 hr tablet, Take 30 mg by mouth daily., Disp: , Rfl:   •  latanoprost (XALATAN) 0.005 % ophthalmic solution, Apply 1 drop to eye daily., Disp: , Rfl:   •  lisinopril (PRINIVIL,ZESTRIL) 20 MG tablet, TAKE 1 TABLET DAILY AS DIRECTED., Disp: 90 tablet, Rfl: 3  •  metFORMIN (GLUCOPHAGE) 500 MG tablet, Take 1 tablet by mouth 2 (Two) Times a Day With Meals. Pt must keep April appt, Disp: 60 tablet, Rfl: 0  •  metoprolol tartrate (LOPRESSOR) 25 MG tablet, TAKE 1 TABLET BY MOUTH 2 TIMES DAILY, Disp: 180 tablet, Rfl: 1  •  nitroglycerin (NITROSTAT) 0.4 MG SL tablet, Place 0.4 mg under the tongue Every 5 (Five) Minutes As Needed for Chest Pain. Take no more than 3 doses in 15 minutes., Disp: , Rfl:   •  Omega 3 1000 MG capsule, Take 1 capsule by mouth 2 (Two) Times a Day., Disp: , Rfl:   •  omeprazole (priLOSEC) 20 MG capsule, Take 20 mg by mouth Daily., Disp: , Rfl:   •  pilocarpine  "(PILOCAR) 1 % ophthalmic solution, 1 drop 4 (Four) Times a Day., Disp: , Rfl:   •  simvastatin (ZOCOR) 40 MG tablet, Take 40 mg by mouth Every Night., Disp: , Rfl:   •  timolol (TIMOPTIC) 0.5 % ophthalmic solution, 1 drop daily. INSTILL 1 DROP IN LEFT EYE EVERY MORNING AS DIRECTED, Disp: , Rfl: 6      Objective:     Vitals:    04/29/19 0952 04/29/19 1002   BP: 122/72 130/78   BP Location: Right arm Left arm   Pulse: 71    SpO2: 95%    Weight: 98.4 kg (217 lb)    Height: 175.3 cm (69\")      Body mass index is 32.05 kg/m².    PHYSICAL EXAM:    Physical Exam   Constitutional: He is oriented to person, place, and time. He appears well-developed and well-nourished. No distress.   HENT:   Head: Normocephalic and atraumatic.   Eyes: Pupils are equal, round, and reactive to light.   Neck: No JVD present. No thyromegaly present.   Cardiovascular: Normal rate, regular rhythm, normal heart sounds and intact distal pulses.   No murmur heard.  Pulmonary/Chest: Effort normal and breath sounds normal. No respiratory distress.   Abdominal: Soft. Bowel sounds are normal. He exhibits no distension. There is no splenomegaly or hepatomegaly. There is no tenderness.   Musculoskeletal: Normal range of motion. He exhibits no edema.   Neurological: He is alert and oriented to person, place, and time.   Skin: Skin is warm and dry. He is not diaphoretic. No erythema.   Psychiatric: He has a normal mood and affect. His behavior is normal. Judgment normal.         ECG 12 Lead  Date/Time: 4/29/2019 10:13 AM  Performed by: Sienna Romeo PA  Authorized by: Sienna Romeo PA   Comparison: compared with previous ECG from 12/26/2018  Similar to previous ECG  Rhythm: sinus rhythm  BPM: 67  T inversion: I and aVL  T flattening: V5 and V6    Clinical impression: abnormal EKG  Comments: Indication: Coronary disease              Assessment:       Diagnosis Plan   1. Coronary artery disease involving native coronary artery of native heart without " angina pectoris  ECG 12 Lead   2. Hyperlipidemia, unspecified hyperlipidemia type     3. Essential hypertension       Orders Placed This Encounter   Procedures   • ECG 12 Lead     This order was created via procedure documentation          Plan:       I think that overall he stable.  His risk factor modification has some room for improvement.  I have recommended that he try to get some exercise every day and to eat a heart healthy diet.  His ECG today is unchanged.  I think probably at some point in the future, may be next year when he sees Dr. Wray, we may want to check a stress test on him.  His last stress test was in 2015.  His blood pressure is well controlled and his primary care physician, who he will be seeing today, manages his cholesterol.  I am not going to make any changes, and he will follow-up with Dr. Wray in 1 year or sooner if needed.    Coronary Artery Disease  Assessment  • The patient has no angina    Plan  • Lifestyle modifications discussed include adhering to a heart healthy diet and regular exercise    Subjective - Objective  • There has been a previous stent procedure using AMARI  • Current antiplatelet therapy includes aspirin 81 mg and clopidogrel 75 mg        As always, it has been a pleasure to participate in your patient's care.      Sincerely,         Sienna Romeo PA-C

## 2019-04-29 NOTE — PROGRESS NOTES
Subjective   Anthony Vaughn is a 74 y.o. male.     F/u for dm 2, hyperlipidemia, CAD/ testing bs 4 x day / last dm eye exam march 2019 / last dm foot exam today  with dr Mckeon           Patient has known diabetes mellitus since 2006 and started on insulin in 2010. He is on Levemir 60-74 units every evening, NovoLog 60 units 3 times a day with meals, Trulicity 0.75 mg once a week and metformin 500 mg twice a day.  He has checking his blood sugar 3-4 times a week.    -180.  Lunchtime blood sugar runs between 120-170.  Suppertime blood sugar runs between 130-190. He has few hypoglycemic episodes. He eats out 3-4 meals per week before due to his work.   His last meal was 12 PM     His last eye examination was in 4/19 and he has retinopathy. He has microalbuminuria on urine sample taken last 5/18. He is on lisinopril. He has numbness and stinging sensation in his toes but is not on medication.     He has known coronary artery disease and had multiple angioplasty and stents in the past. He denies any previous history of myocardial infarction. He has not had a coronary artery bypass surgery. He denies any chest pains, exertional dyspnea or PND.   He follows with Dr. Wray. He is on Imdur, lisinopril, metoprolol tartrate and nitroglycerin sublingual as needed.     He has hyperlipidemia and has been on Zocor 40 every PM and Lovaza 1 capsule BID. He denies any myalgia.    The following portions of the patient's history were reviewed and updated as appropriate: allergies, current medications, past family history, past medical history, past social history, past surgical history and problem list.    Review of Systems   Constitutional: Negative.    HENT: Negative.    Eyes: Negative.    Respiratory: Negative.    Cardiovascular: Negative.    Gastrointestinal: Positive for abdominal pain and diarrhea.   Endocrine: Negative.    Genitourinary: Positive for frequency and urgency.   Musculoskeletal: Negative.    Skin: Negative.  "   Allergic/Immunologic: Negative.    Neurological: Positive for numbness (feet).   Hematological: Bruises/bleeds easily (on plavix and aspirin ).   Psychiatric/Behavioral: Negative.        Objective      Vitals:    04/30/19 1355   BP: 122/70   BP Location: Right arm   Patient Position: Sitting   Cuff Size: Large Adult   Pulse: 72   SpO2: 95%   Weight: 98.2 kg (216 lb 9.6 oz)   Height: 175.3 cm (69.02\")     Physical Exam   Constitutional: He is oriented to person, place, and time. He appears well-developed and well-nourished. No distress.   HENT:   Head: Normocephalic.   Right Ear: External ear normal.   Left Ear: External ear normal.   Nose: Nose normal.   Mouth/Throat: Oropharynx is clear and moist. No oropharyngeal exudate.   Eyes: Conjunctivae and EOM are normal. Right eye exhibits no discharge. Left eye exhibits no discharge. No scleral icterus.   Neck: Neck supple. No JVD present. No tracheal deviation present. No thyromegaly present.   Cardiovascular: Normal rate, regular rhythm, normal heart sounds and intact distal pulses. Exam reveals no gallop and no friction rub.   No murmur heard.  Pulmonary/Chest: Effort normal and breath sounds normal. No stridor. No respiratory distress. He has no wheezes. He has no rales. He exhibits no tenderness.   Abdominal: Soft. Bowel sounds are normal. He exhibits no distension and no mass. There is no tenderness. There is no rebound and no guarding.   Musculoskeletal: Normal range of motion. He exhibits no edema, tenderness or deformity.   Lymphadenopathy:     He has no cervical adenopathy.   Neurological: He is alert and oriented to person, place, and time. He displays normal reflexes. He exhibits normal muscle tone. Coordination normal.   Intact light touch in distal lower extremities   Skin: Skin is warm and dry. No rash noted. He is not diaphoretic. No erythema. No pallor.   Psychiatric: He has a normal mood and affect. His behavior is normal.     Lab on 03/18/2019 "   Component Date Value Ref Range Status   • Glucose 03/18/2019 312* 74 - 124 mg/dL Final   • BUN 03/18/2019 14  6 - 20 mg/dL Final   • Creatinine 03/18/2019 0.95  0.70 - 1.30 mg/dL Final   • Sodium 03/18/2019 139  134 - 145 mmol/L Final   • Potassium 03/18/2019 4.4  3.5 - 4.7 mmol/L Final   • Chloride 03/18/2019 99  98 - 107 mmol/L Final   • CO2 03/18/2019 24.6  22.0 - 29.0 mmol/L Final   • Calcium 03/18/2019 9.5  8.5 - 10.2 mg/dL Final   • Total Protein 03/18/2019 6.8  6.3 - 8.0 g/dL Final   • Albumin 03/18/2019 3.90  3.50 - 5.20 g/dL Final   • ALT (SGPT) 03/18/2019 15  0 - 41 U/L Final   • AST (SGOT) 03/18/2019 14  0 - 40 U/L Final   • Alkaline Phosphatase 03/18/2019 68  38 - 116 U/L Final   • Total Bilirubin 03/18/2019 0.2  0.2 - 1.2 mg/dL Final   • eGFR Non African Amer 03/18/2019 77  >60 mL/min/1.73 Final   • Globulin 03/18/2019 2.9  1.8 - 3.5 gm/dL Final   • A/G Ratio 03/18/2019 1.3  1.1 - 2.4 g/dL Final   • BUN/Creatinine Ratio 03/18/2019 14.7  7.3 - 30.0 Final   • Anion Gap 03/18/2019 15.4  mmol/L Final   • WBC 03/18/2019 12.44* 3.40 - 10.80 10*3/mm3 Final   • RBC 03/18/2019 5.52  4.14 - 5.80 10*6/mm3 Final   • Hemoglobin 03/18/2019 15.4  13.0 - 17.7 g/dL Final   • Hematocrit 03/18/2019 47.2  37.5 - 51.0 % Final   • MCV 03/18/2019 85.5  79.0 - 97.0 fL Final   • MCH 03/18/2019 27.9  26.6 - 33.0 pg Final   • MCHC 03/18/2019 32.6  31.5 - 35.7 g/dL Final   • RDW 03/18/2019 12.7  12.3 - 15.4 % Final   • RDW-SD 03/18/2019 39.8  37.0 - 54.0 fl Final   • MPV 03/18/2019 8.7  6.0 - 12.0 fL Final   • Platelets 03/18/2019 342  140 - 450 10*3/mm3 Final   • Neutrophil % 03/18/2019 63.2  42.7 - 76.0 % Final   • Lymphocyte % 03/18/2019 24.4  19.6 - 45.3 % Final   • Monocyte % 03/18/2019 8.3  5.0 - 12.0 % Final   • Eosinophil % 03/18/2019 3.1  0.3 - 6.2 % Final   • Basophil % 03/18/2019 0.6  0.0 - 1.5 % Final   • Immature Grans % 03/18/2019 0.4  0.0 - 0.5 % Final   • Neutrophils, Absolute 03/18/2019 7.88* 1.40 - 7.00  10*3/mm3 Final   • Lymphocytes, Absolute 03/18/2019 3.03  0.70 - 3.10 10*3/mm3 Final   • Monocytes, Absolute 03/18/2019 1.03* 0.10 - 0.90 10*3/mm3 Final   • Eosinophils, Absolute 03/18/2019 0.38  0.00 - 0.40 10*3/mm3 Final   • Basophils, Absolute 03/18/2019 0.07  0.00 - 0.20 10*3/mm3 Final   • Immature Grans, Absolute 03/18/2019 0.05  0.00 - 0.05 10*3/mm3 Final   • nRBC 03/18/2019 0.0  0.0 - 0.0 /100 WBC Final     Assessment/Plan   Anthony was seen today for diabetes, hyperlipidemia and coronary artery disease.    Diagnoses and all orders for this visit:    Type 2 diabetes with nephropathy (CMS/[facility])  -     Comprehensive Metabolic Panel  -     Hemoglobin A1c  -     TSH  -     Microalbumin / Creatinine Urine Ratio - Urine, Clean Catch    Coronary artery disease involving native coronary artery of native heart without angina pectoris  -     Comprehensive Metabolic Panel  -     Lipid Panel  -     TSH    Hyperlipidemia, unspecified hyperlipidemia type  -     Comprehensive Metabolic Panel    Essential hypertension  -     Comprehensive Metabolic Panel    DM type 2 with diabetic peripheral neuropathy (CMS/[facility])  -     Comprehensive Metabolic Panel  -     Hemoglobin A1c  -     Microalbumin / Creatinine Urine Ratio - Urine, Clean Catch    Microalbuminuria  -     Microalbumin / Creatinine Urine Ratio - Urine, Clean Catch    S/P angioplasty with stent      Continue Levemir, NovoLog, Trulicity, and metformin.  Continue lisinopril.  Continue metoprolol tartrate.  Continue Zocor and Lovaza.    Send copy of my note to Dr. Diaz and Dr. Wray    RTC 4 mos

## 2019-04-30 ENCOUNTER — OFFICE VISIT (OUTPATIENT)
Dept: ENDOCRINOLOGY | Age: 74
End: 2019-04-30

## 2019-04-30 VITALS
HEIGHT: 69 IN | HEART RATE: 72 BPM | WEIGHT: 216.6 LBS | DIASTOLIC BLOOD PRESSURE: 70 MMHG | BODY MASS INDEX: 32.08 KG/M2 | OXYGEN SATURATION: 95 % | SYSTOLIC BLOOD PRESSURE: 122 MMHG

## 2019-04-30 DIAGNOSIS — E11.21 TYPE 2 DIABETES WITH NEPHROPATHY (HCC): Primary | ICD-10-CM

## 2019-04-30 DIAGNOSIS — I25.10 CORONARY ARTERY DISEASE INVOLVING NATIVE CORONARY ARTERY OF NATIVE HEART WITHOUT ANGINA PECTORIS: ICD-10-CM

## 2019-04-30 DIAGNOSIS — R80.9 MICROALBUMINURIA: ICD-10-CM

## 2019-04-30 DIAGNOSIS — E78.5 HYPERLIPIDEMIA, UNSPECIFIED HYPERLIPIDEMIA TYPE: ICD-10-CM

## 2019-04-30 DIAGNOSIS — E11.42 DM TYPE 2 WITH DIABETIC PERIPHERAL NEUROPATHY (HCC): ICD-10-CM

## 2019-04-30 DIAGNOSIS — I10 ESSENTIAL HYPERTENSION: ICD-10-CM

## 2019-04-30 DIAGNOSIS — Z95.820 S/P ANGIOPLASTY WITH STENT: ICD-10-CM

## 2019-04-30 PROCEDURE — 99214 OFFICE O/P EST MOD 30 MIN: CPT | Performed by: INTERNAL MEDICINE

## 2019-05-01 LAB
ALBUMIN SERPL-MCNC: 4.2 G/DL (ref 3.5–5.2)
ALBUMIN/CREAT UR: 32.1 MG/G CREAT (ref 0–30)
ALBUMIN/GLOB SERPL: 1.7 G/DL
ALP SERPL-CCNC: 47 U/L (ref 39–117)
ALT SERPL-CCNC: 21 U/L (ref 1–41)
AST SERPL-CCNC: 18 U/L (ref 1–40)
BILIRUB SERPL-MCNC: 0.3 MG/DL (ref 0.2–1.2)
BUN SERPL-MCNC: 10 MG/DL (ref 8–23)
BUN/CREAT SERPL: 9.8 (ref 7–25)
CALCIUM SERPL-MCNC: 9.7 MG/DL (ref 8.6–10.5)
CHLORIDE SERPL-SCNC: 99 MMOL/L (ref 98–107)
CHOLEST SERPL-MCNC: 97 MG/DL (ref 0–200)
CO2 SERPL-SCNC: 27 MMOL/L (ref 22–29)
CREAT SERPL-MCNC: 1.02 MG/DL (ref 0.76–1.27)
CREAT UR-MCNC: 142.2 MG/DL
GLOBULIN SER CALC-MCNC: 2.5 GM/DL
GLUCOSE SERPL-MCNC: 137 MG/DL (ref 65–99)
HBA1C MFR BLD: 7.38 % (ref 4.8–5.6)
HDLC SERPL-MCNC: 30 MG/DL (ref 40–60)
INTERPRETATION: NORMAL
LDLC SERPL CALC-MCNC: 30 MG/DL (ref 0–100)
Lab: NORMAL
MICROALBUMIN UR-MCNC: 45.7 UG/ML
POTASSIUM SERPL-SCNC: 4.6 MMOL/L (ref 3.5–5.2)
PROT SERPL-MCNC: 6.7 G/DL (ref 6–8.5)
SODIUM SERPL-SCNC: 138 MMOL/L (ref 136–145)
TRIGL SERPL-MCNC: 187 MG/DL (ref 0–150)
TSH SERPL DL<=0.005 MIU/L-ACNC: 1.95 MIU/ML (ref 0.27–4.2)
VLDLC SERPL CALC-MCNC: 37.4 MG/DL

## 2019-05-20 ENCOUNTER — PREP FOR SURGERY (OUTPATIENT)
Dept: OTHER | Facility: HOSPITAL | Age: 74
End: 2019-05-20

## 2019-05-20 DIAGNOSIS — Z86.010 HISTORY OF COLON POLYPS: Primary | ICD-10-CM

## 2019-05-20 DIAGNOSIS — Z80.0 FAMILY HISTORY OF COLON CANCER: ICD-10-CM

## 2019-06-12 PROBLEM — Z80.0 FAMILY HISTORY OF COLON CANCER: Status: ACTIVE | Noted: 2019-06-12

## 2019-06-12 PROBLEM — Z86.0100 HISTORY OF COLON POLYPS: Status: ACTIVE | Noted: 2019-06-12

## 2019-06-12 PROBLEM — Z86.010 HISTORY OF COLON POLYPS: Status: ACTIVE | Noted: 2019-06-12

## 2019-07-11 RX ORDER — LISINOPRIL 20 MG/1
TABLET ORAL
Qty: 90 TABLET | Refills: 3 | Status: SHIPPED | OUTPATIENT
Start: 2019-07-11 | End: 2020-06-29

## 2019-07-23 ENCOUNTER — LAB (OUTPATIENT)
Dept: LAB | Facility: HOSPITAL | Age: 74
End: 2019-07-23

## 2019-07-23 ENCOUNTER — OFFICE VISIT (OUTPATIENT)
Dept: ONCOLOGY | Facility: CLINIC | Age: 74
End: 2019-07-23

## 2019-07-23 ENCOUNTER — APPOINTMENT (OUTPATIENT)
Dept: ONCOLOGY | Facility: HOSPITAL | Age: 74
End: 2019-07-23

## 2019-07-23 ENCOUNTER — DOCUMENTATION (OUTPATIENT)
Dept: ONCOLOGY | Facility: HOSPITAL | Age: 74
End: 2019-07-23

## 2019-07-23 VITALS
OXYGEN SATURATION: 96 % | SYSTOLIC BLOOD PRESSURE: 155 MMHG | RESPIRATION RATE: 16 BRPM | HEIGHT: 69 IN | HEART RATE: 71 BPM | DIASTOLIC BLOOD PRESSURE: 74 MMHG | WEIGHT: 215.1 LBS | BODY MASS INDEX: 31.86 KG/M2 | TEMPERATURE: 98.8 F

## 2019-07-23 DIAGNOSIS — J18.9 PNEUMONIA OF LEFT LUNG DUE TO INFECTIOUS ORGANISM, UNSPECIFIED PART OF LUNG: ICD-10-CM

## 2019-07-23 DIAGNOSIS — D75.1 ERYTHROCYTOSIS: ICD-10-CM

## 2019-07-23 DIAGNOSIS — D75.1 ERYTHROCYTOSIS: Primary | ICD-10-CM

## 2019-07-23 LAB
ALBUMIN SERPL-MCNC: 4.2 G/DL (ref 3.5–5.2)
ALBUMIN/GLOB SERPL: 1.6 G/DL (ref 1.1–2.4)
ALP SERPL-CCNC: 46 U/L (ref 38–116)
ALT SERPL W P-5'-P-CCNC: 14 U/L (ref 0–41)
ANION GAP SERPL CALCULATED.3IONS-SCNC: 13.1 MMOL/L (ref 5–15)
AST SERPL-CCNC: 17 U/L (ref 0–40)
BASOPHILS # BLD AUTO: 0.07 10*3/MM3 (ref 0–0.2)
BASOPHILS NFR BLD AUTO: 0.6 % (ref 0–1.5)
BILIRUB SERPL-MCNC: 0.4 MG/DL (ref 0.2–1.2)
BUN BLD-MCNC: 14 MG/DL (ref 6–20)
BUN/CREAT SERPL: 12.8 (ref 7.3–30)
CALCIUM SPEC-SCNC: 9.2 MG/DL (ref 8.5–10.2)
CHLORIDE SERPL-SCNC: 100 MMOL/L (ref 98–107)
CO2 SERPL-SCNC: 24.9 MMOL/L (ref 22–29)
CREAT BLD-MCNC: 1.09 MG/DL (ref 0.7–1.3)
DEPRECATED RDW RBC AUTO: 43.7 FL (ref 37–54)
EOSINOPHIL # BLD AUTO: 0.33 10*3/MM3 (ref 0–0.4)
EOSINOPHIL NFR BLD AUTO: 3 % (ref 0.3–6.2)
ERYTHROCYTE [DISTWIDTH] IN BLOOD BY AUTOMATED COUNT: 13.8 % (ref 12.3–15.4)
GFR SERPL CREATININE-BSD FRML MDRD: 66 ML/MIN/1.73
GLOBULIN UR ELPH-MCNC: 2.6 GM/DL (ref 1.8–3.5)
GLUCOSE BLD-MCNC: 211 MG/DL (ref 74–124)
HCT VFR BLD AUTO: 49.1 % (ref 37.5–51)
HGB BLD-MCNC: 15.4 G/DL (ref 13–17.7)
IMM GRANULOCYTES # BLD AUTO: 0.07 10*3/MM3 (ref 0–0.05)
IMM GRANULOCYTES NFR BLD AUTO: 0.6 % (ref 0–0.5)
LYMPHOCYTES # BLD AUTO: 2.9 10*3/MM3 (ref 0.7–3.1)
LYMPHOCYTES NFR BLD AUTO: 26.5 % (ref 19.6–45.3)
MCH RBC QN AUTO: 27.1 PG (ref 26.6–33)
MCHC RBC AUTO-ENTMCNC: 31.4 G/DL (ref 31.5–35.7)
MCV RBC AUTO: 86.4 FL (ref 79–97)
MONOCYTES # BLD AUTO: 0.89 10*3/MM3 (ref 0.1–0.9)
MONOCYTES NFR BLD AUTO: 8.1 % (ref 5–12)
NEUTROPHILS # BLD AUTO: 6.68 10*3/MM3 (ref 1.7–7)
NEUTROPHILS NFR BLD AUTO: 61.2 % (ref 42.7–76)
NRBC BLD AUTO-RTO: 0 /100 WBC (ref 0–0.2)
PLATELET # BLD AUTO: 297 10*3/MM3 (ref 140–450)
PMV BLD AUTO: 8.4 FL (ref 6–12)
POTASSIUM BLD-SCNC: 5.9 MMOL/L (ref 3.5–4.7)
PROT SERPL-MCNC: 6.8 G/DL (ref 6.3–8)
RBC # BLD AUTO: 5.68 10*6/MM3 (ref 4.14–5.8)
SODIUM BLD-SCNC: 138 MMOL/L (ref 134–145)
WBC NRBC COR # BLD: 10.94 10*3/MM3 (ref 3.4–10.8)

## 2019-07-23 PROCEDURE — 99214 OFFICE O/P EST MOD 30 MIN: CPT | Performed by: INTERNAL MEDICINE

## 2019-07-23 PROCEDURE — 36415 COLL VENOUS BLD VENIPUNCTURE: CPT | Performed by: INTERNAL MEDICINE

## 2019-07-23 PROCEDURE — 80053 COMPREHEN METABOLIC PANEL: CPT | Performed by: INTERNAL MEDICINE

## 2019-07-23 PROCEDURE — 85025 COMPLETE CBC W/AUTO DIFF WBC: CPT | Performed by: INTERNAL MEDICINE

## 2019-07-23 RX ORDER — AMOXICILLIN AND CLAVULANATE POTASSIUM 500; 125 MG/1; MG/1
1 TABLET, FILM COATED ORAL 3 TIMES DAILY
Qty: 21 TABLET | Refills: 0 | Status: SHIPPED | OUTPATIENT
Start: 2019-07-23 | End: 2019-11-22

## 2019-07-23 NOTE — PROGRESS NOTES
Per Dr Galaviz, pt K+ level 5.9 today  Called patient , he does not take K supplement , reviewed other meds with patient and with Dr Galaviz  Copy of labs faxed to pts PCP , Dr Diaz, per Dr Galaviz  Discussed low potassium diet with patient.   Pt V/U

## 2019-07-23 NOTE — PROGRESS NOTES
Subjective     REASON FOR FOLLOW UP:  Leukocytosis for 4 years, minimal erythrocytosis, normal platelet count, no splenomegaly by physical exam or ct abdomen.        History of Present Illness This patient returns today to the office in company of his wife stating that for the last 3 weeks he has had purulent nasal discharge along with purulent sputum production and significant hoarseness. He has not had any fevers or chills but he has fatigue. His appetite has remained good, his weight is stable, bowel function and urination remain normal. No joint pain, no rash. No neurological symptomatology.                   Past Medical History:   Diagnosis Date   • Arthritis    • Atherosclerosis of native coronary artery without angina pectoris    • Chest pain    • Coronary artery disease    • Diabetes mellitus (CMS/HCC)    • Difficulty breathing    • Diverticulosis    • Dizziness    • ED (erectile dysfunction)    • GERD (gastroesophageal reflux disease)    • Hyperlipidemia    • Hypertension    • Kidney stones    • Measles    • Mumps    • Obesity    • Polio    • RSV (respiratory syncytial virus infection)    • S/P angioplasty with stent    • Stroke (CMS/HCC)    • Type 2 diabetes mellitus (CMS/HCC)         Past Surgical History:   Procedure Laterality Date   • CARDIAC CATHETERIZATION N/A 02/10/2011    Normal LV systolic function; Kind of diffuse, small vessel, distal disease. Most of it is not amenable to PCI, but his JADEN lesion is and it is a large vessel; All of his prior durg-eluting stents are widely patent & look good, Dr. Anibal Oliveira   • CATARACT EXTRACTION Bilateral    • COLONOSCOPY N/A 09/13/2004    Normal, Repeat in 5-8 years, Dr. Davion Sanon   • COLONOSCOPY N/A 09/04/2009    Mutiple small & large-mouthed diverticual were found in the entire colon; Exam otherwise normal, Dr. Davion Sanon   • COLONOSCOPY W/ POLYPECTOMY N/A [id number]    2 benign polyps, [facility], Dr. Davion Sanon   • CORONARY  ANGIOPLASTY WITH STENT PLACEMENT N/A 11/20/2007    Successful multivessel drug eluting stent implantation for restenosis above his prior stenting, Dr. Anibal Wray   • CORONARY ANGIOPLASTY WITH STENT PLACEMENT N/A 06/05/2007    Successful multivessel drug eluting stent implantation in all 3 of the major coronary arteries, Dr. Anibal Wray   • CORONARY ANGIOPLASTY WITH STENT PLACEMENT N/A 02/10/2011    Successful drug-stenting of the JADEN, Dr. Anibal Oliveira   • ENDOSCOPY N/A 11/21/2017    Procedure: ESOPHAGOGASTRODUODENOSCOPY WITH COLD BIOPSIES AND 53 F MICHAEL DILATION;  Surgeon: Wisam Potter MD;  Location: [facility] ENDOSCOPY;  Service:    • EYE SURGERY Left 2011    Retina   • HYDROCELECTOMY Left 08/19/2011    Dr. Ellie Stein   • INCISION AND DRAINAGE PERIRECTAL ABSCESS N/A 11/30/2007    Dr. Davion Sanon   • LUMBAR EPIDURAL INJECTION N/A 2012-2013    x 3   • NEUROMA SURGERY Right 07/19/2005    Excision Neuroma Right Arm; PATH: CONSISTENT W/ LIPOMA,  Dr. Davion Sanon   • ROTATOR CUFF REPAIR Right 2006   • ROTATOR CUFF REPAIR Left 2000   • UPPER GASTROINTESTINAL ENDOSCOPY N/A 09/06/2012    Normal esophagus; Normal Stomach, Normal examinded duodenum, Dr. Bruno Turk   • WRIST FRACTURE SURGERY Right 06/03/2003    ORIF Right Distal Radius, Dr. Ryan Bravo        Current Outpatient Medications on File Prior to Visit   Medication Sig Dispense Refill   • acyclovir (ZOVIRAX) 400 MG tablet Take 400 mg by mouth As Needed. Take no more than 5 doses a day.     • aspirin 81 MG EC tablet Take 81 mg by mouth daily.     • brimonidine (ALPHAGAN) 0.15 % ophthalmic solution Apply 1 drop to eye 2 (two) times a day.     • clopidogrel (PLAVIX) 75 MG tablet Take 75 mg by mouth Every Night.     • Dulaglutide (TRULICITY) 0.75 MG/0.5ML solution pen-injector Inject  under the skin 1 (One) Time.     • insulin aspart (novoLOG FLEXPEN) 100 UNIT/ML solution pen-injector sc pen INJECT 20 UNITS IN THE MORNING 20 UNITS AT LUNCH AND  20 UNITS AT DINNER (Patient taking differently: Inject 50 Units under the skin into the appropriate area as directed 3 (Three) Times a Day With Meals. 60 units tid with meals)     • insulin detemir (LEVEMIR) 100 UNIT/ML injection INJECT 60 UNITS AT NIGHT (Patient taking differently: Inject 74 Units under the skin into the appropriate area as directed Every Night.)  12   • isosorbide mononitrate (IMDUR) 30 MG 24 hr tablet Take 30 mg by mouth daily.     • latanoprost (XALATAN) 0.005 % ophthalmic solution Apply 1 drop to eye daily.     • lisinopril (PRINIVIL,ZESTRIL) 20 MG tablet TAKE 1 TABLET BY MOUTH EVERY DAY 90 tablet 3   • metFORMIN (GLUCOPHAGE) 500 MG tablet Take 1 tablet by mouth 2 (Two) Times a Day With Meals. Take 2 tablets bid 60 tablet 5   • metoprolol tartrate (LOPRESSOR) 25 MG tablet TAKE 1 TABLET BY MOUTH 2 TIMES DAILY 180 tablet 1   • nitroglycerin (NITROSTAT) 0.4 MG SL tablet Place 0.4 mg under the tongue Every 5 (Five) Minutes As Needed for Chest Pain. Take no more than 3 doses in 15 minutes.     • Omega 3 1000 MG capsule Take 1 capsule by mouth 2 (Two) Times a Day.     • omeprazole (priLOSEC) 20 MG capsule Take 20 mg by mouth Daily.     • pilocarpine (PILOCAR) 1 % ophthalmic solution 1 drop 2 (Two) Times a Day.     • simvastatin (ZOCOR) 40 MG tablet Take 40 mg by mouth Every Night.     • timolol (TIMOPTIC) 0.5 % ophthalmic solution 1 drop daily. INSTILL 1 DROP IN LEFT EYE EVERY MORNING AS DIRECTED  6   • [DISCONTINUED] SALIMA MICROLET LANCETS lancets        No current facility-administered medications on file prior to visit.         ALLERGIES:  No Known Allergies     Social History     Socioeconomic History   • Marital status:      Spouse name: Kerry   • Number of children: Not on file   • Years of education: College   • Highest education level: Not on file   Occupational History   • Occupation: Auto      Employer: SELF-EMPLOYED   Tobacco Use   • Smoking status: Never Smoker   •  Smokeless tobacco: Never Used   • Tobacco comment: no caffeine   Substance and Sexual Activity   • Alcohol use: No   • Drug use: No   • Sexual activity: Defer     Partners: Female        Family History   Problem Relation Age of Onset   • Heart disease Mother    • Prostate cancer Father    • Heart disease Father    • Colon polyps Daughter    • Colon polyps Daughter    • No Known Problems Maternal Grandmother    • No Known Problems Maternal Grandfather    • No Known Problems Paternal Grandmother    • No Known Problems Paternal Grandfather         Review of Systems       General: no fever, no chills, no fatigue,no weight changes, no lack of appetite.  Eyes: no epiphora, xerophthalmia,conjunctivitis, pain, glaucoma, blurred vision, blindness, secretion, photophobia, proptosis, diplopia.  Ears: no otorrhea, tinnitus, otorrhagia, deafness, pain, vertigo.  Nose:  rhinorrhea, no epistaxis, no alteration in perception of odors, no sinuses pressure.  Mouth: no alteration in gums or teeth,  No ulcers, no difficulty with mastication or deglut ion, no odynophagia.  Neck: no masses or pain, no thyroid alterations, no pain in muscles or arteries, no carotid odynia, no crepitation.  Respiratory:  cough, purulent sputum production,no dyspnea,no trepopnea, no pleuritic pain,no hemoptysis.  Heart: no syncope, no irregularity, no palpitations, no angina,no orthopnea,no paroxysmal nocturnal dyspnea.  Vascular Venous: no tenderness,no edema,no palpable cords,no postphlebitic syndrome, no skin changes no ulcerations.  Vascular Arterial: no distal ischemia, noclaudication, no gangrene, no neuropathic ischemic pain, no skin ulcers, no paleness no cyanosis.  GI: no dysphagia, no odynophagia, no regurgitation, no heartburn,no indigestion,no nausea,no vomiting,no hematemesis ,no melena,no jaundice,no distention, no obstipation,no enterorrhagia,no proctalgia,no anal  lesions, no changes in bowel habits.  : no frequency, no hesitancy, no  "hematuria, no discharge,no  pain.  Musculoskeletal: no muscle or tendon pain or inflammation,no  joint pain, no edema, no functional limitation,no fasciculations, no mass.  Neurologic: no headache, no seizures, noalterations on Craneal nerves, no motor deficit, no sensory deficit, normal coordination, no alteration in memory,normal orientation, calculation,normal writting, verbal and written language.  Skin: no rashes,no pruritus no localized lesions.  Psychiatric: no anxiety, no depression,no agitation, no delusions, proper insight.      Objective     Vitals:    07/23/19 1111   BP: 155/74   Pulse: 71   Resp: 16   Temp: 98.8 °F (37.1 °C)   TempSrc: Oral   SpO2: 96%   Weight: 97.6 kg (215 lb 1.6 oz)   Height: 176.5 cm (69.49\")  Comment: new height   PainSc: 0-No pain     Current Status 7/23/2019   ECOG score 0       Physical Exam    GENERAL:  Well-developed, well-nourished  Patient  in no acute distress.   SKIN:  Warm, dry ,NO rashes,NO purpura ,NO petechiae.  HEENT:  Pupils were equal and reactive to light and accomodation, conjunctivas non injected, no pterigion, normal extraocular movements, normal visual acuity.   Mouth mucosa was moist, no exudates in oropharynx, normal gum line, normal roof of the mouth and pillars, normal papillations of the tongue.  NECK:  Supple with good range of motion; no thyromegaly or masses, no JVD or bruits, no cervical adenopathies.No carotid arteries pain, no carotid abnormal pulsation , NO arterial dance.  LYMPHATICS:  No cervical, NO supraclavicular, NO axillary,NO epitrochlear , NO inguinal adenopathy.  CHEST:  Normal excursion of both yaquelin thoraces, normal voice fremitus, no subcutaneous emphysema, normal axillas, no rashes or acanthosis nigricans. Lungs clear to percussion and auscultation, normal breath sounds bilaterally, no wheezing,NO crackles NO ronchi, NO stridor, NO rubs.  CARDIAC AND VASCULAR:  normal rate and regular rhythm, without murmurs,NO rubs NO S3 NO S4 right or " left . Normal femoral, popliteal, pedis, brachial and carotid pulses.  ABDOMEN:  Soft, nontender with no organomegaly or masses, no ascites, no collateral circulation,no distention,no [place] sign, no abdominal pain, no inguinal hernias,no umbilical hernia, no abdominal bruits. Normal bowel sounds.  GENITAL: Not  Performed.  EXTREMITIES  AND SPINE:  No clubbing, cyanosis or edema, no deformities or pain .No kyphosis, scoliosis, deformities or pain in spine, ribs or pelvic bone.  NEUROLOGICAL:  Patient was awake, alert, oriented to time, person and place.              RECENT LABS:  Hematology WBC   Date Value Ref Range Status   07/23/2019 10.94 (H) 3.40 - 10.80 10*3/mm3 Final     RBC   Date Value Ref Range Status   07/23/2019 5.68 4.14 - 5.80 10*6/mm3 Final     Hemoglobin   Date Value Ref Range Status   07/23/2019 15.4 13.0 - 17.7 g/dL Final     Hematocrit   Date Value Ref Range Status   07/23/2019 49.1 37.5 - 51.0 % Final     Platelets   Date Value Ref Range Status   07/23/2019 297 140 - 450 10*3/mm3 Final            Assessment/Plan    1. This patient has history of leukocytosis that in my opinion is very likely an element of myeloproliferative disorder. We never have persuaded the patient to proceed with bone marrow testing. We have done BCR-ABL in the past that has been negative, JAK2 mutation that has been negative and we have run laboratory testing looking for inflammatory or infectious etiologies that have been negative. Nevertheless, his white count remains minimally elevated and his hematocrit is elevated at 49.     The patient has had phlebotomy by the records not too long ago in a small amount because it seems that the blood clots off and they could not restart another one.   2. The patient has a respiratory infection today that sounds like bronchitis and sinusitits according to the clinical characteristics. His O2 saturation on room air at rest is fine      RECOMMENDATIONS:  1. I have suggested for his  erythrocytosis to have blood donation of red cells every 3 months on a schedule through the [place]. I will review him back in 6 months with a CBC and a CMP.   2. For his sinusitis and bronchitis I have prescribed Augmentin 500 mg tablets one 3 times a day for 7 days no refills.     The patient should be getting better in the next 10 days or so and thereafter I asked him to go ahead and proceed with his phlebotomy.

## 2019-09-26 ENCOUNTER — HOSPITAL ENCOUNTER (OUTPATIENT)
Facility: HOSPITAL | Age: 74
Setting detail: HOSPITAL OUTPATIENT SURGERY
Discharge: HOME | End: 2019-09-26
Attending: SURGERY | Admitting: SURGERY

## 2019-09-26 ENCOUNTER — ANESTHESIA (OUTPATIENT)
Dept: GASTROENTEROLOGY | Facility: HOSPITAL | Age: 74
End: 2019-09-26

## 2019-09-26 ENCOUNTER — ANESTHESIA EVENT (OUTPATIENT)
Dept: GASTROENTEROLOGY | Facility: HOSPITAL | Age: 74
End: 2019-09-26

## 2019-09-26 VITALS
HEIGHT: 71 IN | BODY MASS INDEX: 29.26 KG/M2 | DIASTOLIC BLOOD PRESSURE: 81 MMHG | OXYGEN SATURATION: 97 % | RESPIRATION RATE: 18 BRPM | WEIGHT: 209 LBS | TEMPERATURE: 98.1 F | HEART RATE: 66 BPM | SYSTOLIC BLOOD PRESSURE: 149 MMHG

## 2019-09-26 DIAGNOSIS — Z80.0 FAMILY HISTORY OF COLON CANCER: ICD-10-CM

## 2019-09-26 DIAGNOSIS — Z86.010 HISTORY OF COLON POLYPS: ICD-10-CM

## 2019-09-26 LAB — GLUCOSE BLDC GLUCOMTR-MCNC: 219 MG/DL (ref 70–130)

## 2019-09-26 PROCEDURE — 88305 TISSUE EXAM BY PATHOLOGIST: CPT | Performed by: SURGERY

## 2019-09-26 PROCEDURE — 82962 GLUCOSE BLOOD TEST: CPT

## 2019-09-26 PROCEDURE — 25010000002 PROPOFOL 10 MG/ML EMULSION: Performed by: NURSE ANESTHETIST, CERTIFIED REGISTERED

## 2019-09-26 PROCEDURE — 45380 COLONOSCOPY AND BIOPSY: CPT | Performed by: SURGERY

## 2019-09-26 RX ORDER — SODIUM CHLORIDE 0.9 % (FLUSH) 0.9 %
10 SYRINGE (ML) INJECTION AS NEEDED
Status: DISCONTINUED | OUTPATIENT
Start: 2019-09-26 | End: 2019-09-26 | Stop reason: HOSPADM

## 2019-09-26 RX ORDER — PROPOFOL 10 MG/ML
VIAL (ML) INTRAVENOUS CONTINUOUS PRN
Status: DISCONTINUED | OUTPATIENT
Start: 2019-09-26 | End: 2019-09-26 | Stop reason: SURG

## 2019-09-26 RX ORDER — LIDOCAINE HYDROCHLORIDE 20 MG/ML
INJECTION, SOLUTION INFILTRATION; PERINEURAL AS NEEDED
Status: DISCONTINUED | OUTPATIENT
Start: 2019-09-26 | End: 2019-09-26 | Stop reason: SURG

## 2019-09-26 RX ORDER — SODIUM CHLORIDE, SODIUM LACTATE, POTASSIUM CHLORIDE, CALCIUM CHLORIDE 600; 310; 30; 20 MG/100ML; MG/100ML; MG/100ML; MG/100ML
30 INJECTION, SOLUTION INTRAVENOUS CONTINUOUS PRN
Status: DISCONTINUED | OUTPATIENT
Start: 2019-09-26 | End: 2019-09-26 | Stop reason: HOSPADM

## 2019-09-26 RX ORDER — PROPOFOL 10 MG/ML
VIAL (ML) INTRAVENOUS AS NEEDED
Status: DISCONTINUED | OUTPATIENT
Start: 2019-09-26 | End: 2019-09-26 | Stop reason: SURG

## 2019-09-26 RX ORDER — SODIUM CHLORIDE 0.9 % (FLUSH) 0.9 %
3 SYRINGE (ML) INJECTION EVERY 12 HOURS SCHEDULED
Status: DISCONTINUED | OUTPATIENT
Start: 2019-09-26 | End: 2019-09-26 | Stop reason: HOSPADM

## 2019-09-26 RX ADMIN — LIDOCAINE HYDROCHLORIDE 60 MG: 20 INJECTION, SOLUTION INFILTRATION; PERINEURAL at 13:25

## 2019-09-26 RX ADMIN — PROPOFOL 200 MCG/KG/MIN: 10 INJECTION, EMULSION INTRAVENOUS at 13:25

## 2019-09-26 RX ADMIN — PROPOFOL 120 MG: 10 INJECTION, EMULSION INTRAVENOUS at 13:25

## 2019-09-26 RX ADMIN — SODIUM CHLORIDE, POTASSIUM CHLORIDE, SODIUM LACTATE AND CALCIUM CHLORIDE: 600; 310; 30; 20 INJECTION, SOLUTION INTRAVENOUS at 13:19

## 2019-09-26 NOTE — ANESTHESIA PREPROCEDURE EVALUATION
Anesthesia Evaluation     Patient summary reviewed and Nursing notes reviewed   NPO Solid Status: > 8 hours  NPO Liquid Status: > 2 hours           Airway   Mallampati: II  Dental      Pulmonary - normal exam    breath sounds clear to auscultation  (+) shortness of breath,   Cardiovascular - normal exam    Patient on routine beta blocker and Beta blocker given within 24 hours of surgery    (+) hypertension poorly controlled 2 medications or greater, CAD, cardiac stents more than 12 months ago hyperlipidemia,       Neuro/Psych  (+) CVA, weakness, numbness,     GI/Hepatic/Renal/Endo    (+)  GERD,  diabetes mellitus,     Musculoskeletal (-) negative ROS    Abdominal   (+) obese,    Substance History - negative use     OB/GYN          Other - negative ROS                       Anesthesia Plan    ASA 3     MAC   total IV anesthesia  intravenous induction   Anesthetic plan, all risks, benefits, and alternatives have been provided, discussed and informed consent has been obtained with: patient.    Plan discussed with CRNA.

## 2019-09-26 NOTE — ANESTHESIA POSTPROCEDURE EVALUATION
"Patient: Anthony Vaughn    Procedure Summary     Date:  09/26/19 Room / Location:  [facility] ENDOSCOPY 7 / [facility] ENDOSCOPY    Anesthesia Start:  1319 Anesthesia Stop:  1346    Procedure:  COLONOSCOPY to  cecum with cold biopsy polypectomy (N/A ) Diagnosis:       History of colon polyps      Family history of colon cancer      (History of colon polyps [Z86.010])      (Family history of colon cancer [Z80.0])    Surgeon:  Haider Weems MD Provider:  Jesus Woodall MD    Anesthesia Type:  MAC ASA Status:  3          Anesthesia Type: MAC  Last vitals  BP   149/81 (09/26/19 1405)   Temp   36.7 °C (98.1 °F) (09/26/19 1237)   Pulse   66 (09/26/19 1405)   Resp   18 (09/26/19 1405)     SpO2   97 % (09/26/19 1405)     Post Anesthesia Care and Evaluation    Patient location during evaluation: PACU  Patient participation: complete - patient participated  Level of consciousness: awake and alert  Pain management: adequate  Airway patency: patent  Anesthetic complications: No anesthetic complications    Cardiovascular status: acceptable  Respiratory status: acceptable  Hydration status: acceptable    Comments: /81 (BP Location: Left arm, Patient Position: Lying)   Pulse 66   Temp 36.7 °C (98.1 °F) (Oral)   Resp 18   Ht 180.3 cm (71\")   Wt 94.8 kg (209 lb)   SpO2 97%   BMI 29.15 kg/m²               "

## 2019-09-27 LAB
CYTO UR: NORMAL
LAB AP CASE REPORT: NORMAL
PATH REPORT.FINAL DX SPEC: NORMAL
PATH REPORT.GROSS SPEC: NORMAL

## 2019-09-30 ENCOUNTER — DOCUMENTATION (OUTPATIENT)
Dept: SURGERY | Facility: CLINIC | Age: 74
End: 2019-09-30

## 2019-09-30 ENCOUNTER — TELEPHONE (OUTPATIENT)
Dept: SURGERY | Facility: CLINIC | Age: 74
End: 2019-09-30

## 2019-09-30 NOTE — TELEPHONE ENCOUNTER
----- Message from Haider Weems MD sent at 9/30/2019  5:48 AM EDT -----  Please let him know that he had a single benign polyp and 5-year surveillance recommended-put in computer for reminder

## 2019-09-30 NOTE — PROGRESS NOTES
ENDOSCOPY FOLLOW UP NOTE    Colonoscopy 9/26/2019    Indication:  Screening    Findings:  Small cecal polyp    Pathology:  Tubular adenoma    Recommendations:  5-year surveillance    Haider Weems M.D.

## 2019-11-21 NOTE — PROGRESS NOTES
Subjective   Anthony Vaughn is a 74 y.o. male.     F/u for dm 2, hyperlipidemia, CAD/ testing bs 4 x day / last dm eye exam Mar 2019/ last dm foot exam 4/30/19 with dr Mckeon / [facility]       Patient has known diabetes mellitus since 2006 and started on insulin in 2010. He is on Levemir 72 units every evening, NovoLog 60 units 3 times a day with meals, Trulicity 0.75 mg once a week and metformin 1000 mg twice a day.  FBS .  Lunch 110-189.  Supper 135-165.  Bedtime 146-165.  He has few hypoglycemic episodes. He eats out 3-4 meals per week due to his work.   His last meal was 9 PM     His last eye examination was in 4/19 and he has retinopathy. He has microalbuminuria on urine sample taken last 4/19. He is on lisinopril. He has less numbness and stinging sensation in his toes and is not on medication.     He has known coronary artery disease and had multiple angioplasty and stents in the past. He denies any previous history of myocardial infarction. He has not had a coronary artery bypass surgery. He denies any chest pains, exertional dyspnea or PND.   He follows with Dr. Wray. He is on Imdur, lisinopril, metoprolol tartrate and nitroglycerin sublingual as needed.  He walks on treadmill 1-2 times a day.     He has hyperlipidemia and has been on Zocor 40 every PM and Lovaza 1 capsule BID. He denies any myalgia.    [facility].  [facility] this [facility].    He had tubular adenoma of colon removed by Dr. Weems by colonoscopy in September 2019.  He denies bowel complaints.  He was advised follow-up colonoscopy in 2024.  The following portions of the patient's history were reviewed and updated as appropriate: allergies, current medications, past family history, past medical history, past social history, past surgical history and problem list.    Review of Systems   Constitutional: Negative.    HENT: Negative.    Eyes: Negative.    Respiratory:  "Negative.    Cardiovascular: Negative.    Gastrointestinal: Negative.    Endocrine: Negative.    Genitourinary: Negative.    Neurological: Negative for numbness.     Objective      Vitals:    11/22/19 1308   BP: 122/72   BP Location: Right arm   Patient Position: Sitting   Cuff Size: Large Adult   Pulse: 70   SpO2: 99%   Weight: 101 kg (221 lb 9.6 oz)   Height: 180.3 cm (70.98\")     Physical Exam   Constitutional: He is oriented to person, place, and time. He appears well-developed and well-nourished. No distress.   HENT:   Head: Normocephalic.   Right Ear: External ear normal.   Left Ear: External ear normal.   Nose: Nose normal.   Mouth/Throat: Oropharynx is clear and moist. No oropharyngeal exudate.   Eyes: Conjunctivae and EOM are normal. Right eye exhibits no discharge. Left eye exhibits no discharge. No scleral icterus.   Neck: Normal range of motion. Neck supple. No JVD present. No tracheal deviation present. No thyromegaly present.   Cardiovascular: Normal rate, regular rhythm, normal heart sounds and intact distal pulses. Exam reveals no gallop and no friction rub.   No murmur heard.  Pulmonary/Chest: Effort normal and breath sounds normal. No stridor. No respiratory distress. He has no wheezes. He has no rales. He exhibits no tenderness.   Abdominal: Soft. Bowel sounds are normal. He exhibits no distension and no mass. There is no tenderness. There is no rebound and no guarding.   Musculoskeletal: Normal range of motion. He exhibits no edema, tenderness or deformity.   Lymphadenopathy:     He has no cervical adenopathy.   Neurological: He is alert and oriented to person, place, and time. He displays normal reflexes.   Intact light touch on lower ext   Skin: Skin is warm and dry. No rash noted. No erythema.   Psychiatric: He has a normal mood and affect. His behavior is normal.     Admission on 09/26/2019, Discharged on 09/26/2019   Component Date Value Ref Range Status   • Glucose 09/26/2019 219* 70 - 130 " mg/dL Final   • Case Report 09/26/2019    Final                    Value:Surgical Pathology Report                         Case: [id number]                                  Authorizing Provider:  Haider Weems MD       Collected:           09/26/2019 01:34 PM          Ordering Location:     [facility]  Received:            09/26/2019 03:07 PM                                 ENDO SUITES                                                                  Pathologist:           Thalia Kramer MD                                                    Specimen:    Large Intestine, Cecum, cecal polyp                                                       • Final Diagnosis 09/26/2019    Final                    Value:This result contains rich text formatting which cannot be displayed here.   • Gross Description 09/26/2019    Final                    Value:This result contains rich text formatting which cannot be displayed here.   • Microscopic Description 09/26/2019    Final                    Value:This result contains rich text formatting which cannot be displayed here.     Assessment/Plan   Anthony was seen today for diabetes, hyperlipidemia and coronary artery disease.    Diagnoses and all orders for this visit:    Type 2 diabetes with nephropathy (CMS/[facility])  -     Comprehensive Metabolic Panel  -     Hemoglobin A1c  -     TSH  -     T4, Free    Coronary artery disease involving native coronary artery of native heart without angina pectoris  -     Comprehensive Metabolic Panel    Hyperlipidemia, unspecified hyperlipidemia type  -     Comprehensive Metabolic Panel  -     Lipid Panel  -     TSH  -     T4, Free    Essential hypertension  -     Comprehensive Metabolic Panel    Microalbuminuria      Continue Levemir, NovoLog, Trulicity, and metformin.  Continue lisinopril.  Continue Imdur, metoprolol tartrate, and nitroglycerin sublingual.  Continue Zocor and Lovaza    Copy of my note sent to   Joe.    RTC 4 mos.

## 2019-11-22 ENCOUNTER — OFFICE VISIT (OUTPATIENT)
Dept: ENDOCRINOLOGY | Age: 74
End: 2019-11-22

## 2019-11-22 VITALS
DIASTOLIC BLOOD PRESSURE: 72 MMHG | BODY MASS INDEX: 31.02 KG/M2 | SYSTOLIC BLOOD PRESSURE: 122 MMHG | HEART RATE: 70 BPM | HEIGHT: 71 IN | OXYGEN SATURATION: 99 % | WEIGHT: 221.6 LBS

## 2019-11-22 DIAGNOSIS — I10 ESSENTIAL HYPERTENSION: ICD-10-CM

## 2019-11-22 DIAGNOSIS — R80.9 MICROALBUMINURIA: ICD-10-CM

## 2019-11-22 DIAGNOSIS — E78.5 HYPERLIPIDEMIA, UNSPECIFIED HYPERLIPIDEMIA TYPE: ICD-10-CM

## 2019-11-22 DIAGNOSIS — I25.10 CORONARY ARTERY DISEASE INVOLVING NATIVE CORONARY ARTERY OF NATIVE HEART WITHOUT ANGINA PECTORIS: ICD-10-CM

## 2019-11-22 DIAGNOSIS — E11.21 TYPE 2 DIABETES WITH NEPHROPATHY (HCC): Primary | ICD-10-CM

## 2019-11-22 PROCEDURE — 99214 OFFICE O/P EST MOD 30 MIN: CPT | Performed by: INTERNAL MEDICINE

## 2019-11-23 LAB
ALBUMIN SERPL-MCNC: 4.6 G/DL (ref 3.5–5.2)
ALBUMIN/GLOB SERPL: 2.3 G/DL
ALP SERPL-CCNC: 48 U/L (ref 39–117)
ALT SERPL-CCNC: 15 U/L (ref 1–41)
AST SERPL-CCNC: 16 U/L (ref 1–40)
BILIRUB SERPL-MCNC: 0.4 MG/DL (ref 0.2–1.2)
BUN SERPL-MCNC: 9 MG/DL (ref 8–23)
BUN/CREAT SERPL: 9.3 (ref 7–25)
CALCIUM SERPL-MCNC: 9.7 MG/DL (ref 8.6–10.5)
CHLORIDE SERPL-SCNC: 101 MMOL/L (ref 98–107)
CHOLEST SERPL-MCNC: 106 MG/DL (ref 0–200)
CO2 SERPL-SCNC: 28 MMOL/L (ref 22–29)
CREAT SERPL-MCNC: 0.97 MG/DL (ref 0.76–1.27)
GLOBULIN SER CALC-MCNC: 2 GM/DL
GLUCOSE SERPL-MCNC: 74 MG/DL (ref 65–99)
HBA1C MFR BLD: 7.5 % (ref 4.8–5.6)
HDLC SERPL-MCNC: 34 MG/DL (ref 40–60)
INTERPRETATION: NORMAL
LDLC SERPL CALC-MCNC: 44 MG/DL (ref 0–100)
Lab: NORMAL
POTASSIUM SERPL-SCNC: 4.7 MMOL/L (ref 3.5–5.2)
PROT SERPL-MCNC: 6.6 G/DL (ref 6–8.5)
SODIUM SERPL-SCNC: 140 MMOL/L (ref 136–145)
T4 FREE SERPL-MCNC: 1.05 NG/DL (ref 0.93–1.7)
TRIGL SERPL-MCNC: 138 MG/DL (ref 0–150)
TSH SERPL DL<=0.005 MIU/L-ACNC: 1.91 UIU/ML (ref 0.27–4.2)
VLDLC SERPL CALC-MCNC: 27.6 MG/DL

## 2020-01-07 ENCOUNTER — APPOINTMENT (OUTPATIENT)
Dept: LAB | Facility: HOSPITAL | Age: 75
End: 2020-01-07

## 2020-01-07 ENCOUNTER — OFFICE VISIT (OUTPATIENT)
Dept: ONCOLOGY | Facility: CLINIC | Age: 75
End: 2020-01-07

## 2020-01-07 ENCOUNTER — INFUSION (OUTPATIENT)
Dept: ONCOLOGY | Facility: HOSPITAL | Age: 75
End: 2020-01-07

## 2020-01-07 ENCOUNTER — LAB (OUTPATIENT)
Dept: LAB | Facility: HOSPITAL | Age: 75
End: 2020-01-07

## 2020-01-07 VITALS
OXYGEN SATURATION: 96 % | BODY MASS INDEX: 30.48 KG/M2 | TEMPERATURE: 97.4 F | WEIGHT: 217.7 LBS | HEIGHT: 71 IN | RESPIRATION RATE: 16 BRPM | SYSTOLIC BLOOD PRESSURE: 128 MMHG | DIASTOLIC BLOOD PRESSURE: 71 MMHG | HEART RATE: 63 BPM

## 2020-01-07 VITALS — DIASTOLIC BLOOD PRESSURE: 76 MMHG | SYSTOLIC BLOOD PRESSURE: 139 MMHG

## 2020-01-07 DIAGNOSIS — D75.1 ERYTHROCYTOSIS: Primary | ICD-10-CM

## 2020-01-07 DIAGNOSIS — D75.1 ERYTHROCYTOSIS: ICD-10-CM

## 2020-01-07 LAB
ALBUMIN SERPL-MCNC: 4.4 G/DL (ref 3.5–5.2)
ALBUMIN/GLOB SERPL: 1.7 G/DL (ref 1.1–2.4)
ALP SERPL-CCNC: 54 U/L (ref 38–116)
ALT SERPL W P-5'-P-CCNC: 17 U/L (ref 0–41)
ANION GAP SERPL CALCULATED.3IONS-SCNC: 14.6 MMOL/L (ref 5–15)
AST SERPL-CCNC: 17 U/L (ref 0–40)
BASOPHILS # BLD AUTO: 0.09 10*3/MM3 (ref 0–0.2)
BASOPHILS NFR BLD AUTO: 0.9 % (ref 0–1.5)
BILIRUB SERPL-MCNC: 0.4 MG/DL (ref 0.2–1.2)
BUN BLD-MCNC: 13 MG/DL (ref 6–20)
BUN/CREAT SERPL: 12.9 (ref 7.3–30)
CALCIUM SPEC-SCNC: 8.9 MG/DL (ref 8.5–10.2)
CHLORIDE SERPL-SCNC: 98 MMOL/L (ref 98–107)
CO2 SERPL-SCNC: 25.4 MMOL/L (ref 22–29)
CREAT BLD-MCNC: 1.01 MG/DL (ref 0.7–1.3)
DEPRECATED RDW RBC AUTO: 41 FL (ref 37–54)
EOSINOPHIL # BLD AUTO: 0.15 10*3/MM3 (ref 0–0.4)
EOSINOPHIL NFR BLD AUTO: 1.5 % (ref 0.3–6.2)
ERYTHROCYTE [DISTWIDTH] IN BLOOD BY AUTOMATED COUNT: 13.2 % (ref 12.3–15.4)
GFR SERPL CREATININE-BSD FRML MDRD: 72 ML/MIN/1.73
GLOBULIN UR ELPH-MCNC: 2.6 GM/DL (ref 1.8–3.5)
GLUCOSE BLD-MCNC: 246 MG/DL (ref 74–124)
HCT VFR BLD AUTO: 51.9 % (ref 37.5–51)
HGB BLD-MCNC: 16.5 G/DL (ref 13–17.7)
IMM GRANULOCYTES # BLD AUTO: 0.06 10*3/MM3 (ref 0–0.05)
IMM GRANULOCYTES NFR BLD AUTO: 0.6 % (ref 0–0.5)
LYMPHOCYTES # BLD AUTO: 2.53 10*3/MM3 (ref 0.7–3.1)
LYMPHOCYTES NFR BLD AUTO: 24.6 % (ref 19.6–45.3)
MCH RBC QN AUTO: 27.5 PG (ref 26.6–33)
MCHC RBC AUTO-ENTMCNC: 31.8 G/DL (ref 31.5–35.7)
MCV RBC AUTO: 86.5 FL (ref 79–97)
MONOCYTES # BLD AUTO: 0.77 10*3/MM3 (ref 0.1–0.9)
MONOCYTES NFR BLD AUTO: 7.5 % (ref 5–12)
NEUTROPHILS # BLD AUTO: 6.7 10*3/MM3 (ref 1.7–7)
NEUTROPHILS NFR BLD AUTO: 64.9 % (ref 42.7–76)
NRBC BLD AUTO-RTO: 0 /100 WBC (ref 0–0.2)
PLATELET # BLD AUTO: 336 10*3/MM3 (ref 140–450)
PMV BLD AUTO: 8.9 FL (ref 6–12)
POTASSIUM BLD-SCNC: 4.5 MMOL/L (ref 3.5–4.7)
PROT SERPL-MCNC: 7 G/DL (ref 6.3–8)
RBC # BLD AUTO: 6 10*6/MM3 (ref 4.14–5.8)
SODIUM BLD-SCNC: 138 MMOL/L (ref 134–145)
WBC NRBC COR # BLD: 10.3 10*3/MM3 (ref 3.4–10.8)

## 2020-01-07 PROCEDURE — 36415 COLL VENOUS BLD VENIPUNCTURE: CPT

## 2020-01-07 PROCEDURE — 99195 PHLEBOTOMY: CPT | Performed by: NURSE PRACTITIONER

## 2020-01-07 PROCEDURE — 85025 COMPLETE CBC W/AUTO DIFF WBC: CPT

## 2020-01-07 PROCEDURE — 80053 COMPREHEN METABOLIC PANEL: CPT

## 2020-01-07 PROCEDURE — 99214 OFFICE O/P EST MOD 30 MIN: CPT | Performed by: NURSE PRACTITIONER

## 2020-01-07 RX ORDER — SODIUM CHLORIDE 9 MG/ML
250 INJECTION, SOLUTION INTRAVENOUS ONCE
Status: CANCELLED | OUTPATIENT
Start: 2020-01-07

## 2020-01-07 NOTE — PROGRESS NOTES
Subjective     REASON FOR FOLLOW UP:  Leukocytosis, minimal erythrocytosis, normal platelet count, no splenomegaly by physical exam or ct abdomen.        History of Present Illness the patient is a 75-year-old male with the above-mentioned history who is here today for follow-up visit on his erythrocytosis.  At his last visit in July was advised to give blood at the [facility] every 3 months, which the patient states that he has been doing.  Although, upon further questioning it is not clear exactly how many times he is gone since he was here last.  He reports he is feeling fine otherwise.  He continues on aspirin.  He denies bleeding issues.    Past Medical History:   Diagnosis Date   • Arthritis    • Atherosclerosis of native coronary artery without angina pectoris    • Chest pain    • Coronary artery disease    • Diabetes mellitus (CMS/HCC)    • Difficulty breathing    • Diverticulosis    • Dizziness    • ED (erectile dysfunction)    • GERD (gastroesophageal reflux disease)    • Hyperlipidemia    • Hypertension    • Kidney stones    • Obesity    • RSV (respiratory syncytial virus infection)    • S/P angioplasty with stent    • Stroke (CMS/HCC)    • Tubular adenoma of colon 09/2019    Removed by colonoscopy by Dr. Weems   • Type 2 diabetes mellitus (CMS/HCC)         Past Surgical History:   Procedure Laterality Date   • CARDIAC CATHETERIZATION N/A 02/10/2011    Normal LV systolic function; Kind of diffuse, small vessel, distal disease. Most of it is not amenable to PCI, but his JADEN lesion is and it is a large vessel; All of his prior durg-eluting stents are widely patent & look good, Dr. Anibal Oliveira   • CATARACT EXTRACTION Bilateral    • COLONOSCOPY N/A 09/13/2004    Normal, Repeat in 5-8 years, Dr. Davion Sanon   • COLONOSCOPY N/A 09/04/2009    Mutiple small & large-mouthed diverticual were found in the entire colon; Exam otherwise normal, Dr. Davion Sanon   • COLONOSCOPY N/A 9/26/2019    Procedure:  COLONOSCOPY to  cecum with cold biopsy polypectomy;  Surgeon: Haider Weems MD;  Location: [facility] ENDOSCOPY;  Service: General   • COLONOSCOPY W/ POLYPECTOMY N/A [id number]    2 benign polyps, [facility], Dr. Davion Sanno   • CORONARY ANGIOPLASTY WITH STENT PLACEMENT N/A 11/20/2007    Successful multivessel drug eluting stent implantation for restenosis above his prior stenting, Dr. Anibal Wray   • CORONARY ANGIOPLASTY WITH STENT PLACEMENT N/A 06/05/2007    Successful multivessel drug eluting stent implantation in all 3 of the major coronary arteries, Dr. Anibal Wray   • CORONARY ANGIOPLASTY WITH STENT PLACEMENT N/A 02/10/2011    Successful drug-stenting of the JADEN, Dr. Anibal Oliveira   • ENDOSCOPY N/A 11/21/2017    Procedure: ESOPHAGOGASTRODUODENOSCOPY WITH COLD BIOPSIES AND 54 F MICHAEL DILATION;  Surgeon: Wisam Potter MD;  Location: [facility] ENDOSCOPY;  Service:    • EYE SURGERY Left 2011    Retina   • HYDROCELECTOMY Left 08/19/2011    Dr. Ellie Stein   • INCISION AND DRAINAGE PERIRECTAL ABSCESS N/A 11/30/2007    Dr. Davion Sanon   • LUMBAR EPIDURAL INJECTION N/A 2012-2013    x 3   • NEUROMA SURGERY Right 07/19/2005    Excision Neuroma Right Arm; PATH: CONSISTENT W/ LIPOMA,  Dr. Davion Sanon   • ROTATOR CUFF REPAIR Right 2006   • ROTATOR CUFF REPAIR Left 2000   • UPPER GASTROINTESTINAL ENDOSCOPY N/A 09/06/2012    Normal esophagus; Normal Stomach, Normal examinded duodenum, Dr. Bruno Turk   • WRIST FRACTURE SURGERY Right 06/03/2003    ORIF Right Distal Radius, Dr. Ryan Bravo        Current Outpatient Medications on File Prior to Visit   Medication Sig Dispense Refill   • acyclovir (ZOVIRAX) 400 MG tablet Take 400 mg by mouth As Needed. Take no more than 5 doses a day.     • aspirin 81 MG EC tablet Take 81 mg by mouth daily.     • brimonidine (ALPHAGAN) 0.15 % ophthalmic solution Apply 1 drop to eye 2 (two) times a day.     • clopidogrel (PLAVIX) 75 MG tablet Take 75 mg by mouth  Every Night.     • Dulaglutide (TRULICITY) 1.5 MG/0.5ML solution pen-injector Inject 1.5 mg under the skin into the appropriate area as directed Every 7 (Seven) Days. 12 pen 1   • insulin aspart (novoLOG FLEXPEN) 100 UNIT/ML solution pen-injector sc pen INJECT 20 UNITS IN THE MORNING 20 UNITS AT LUNCH AND 20 UNITS AT DINNER (Patient taking differently: Inject 60 Units under the skin into the appropriate area as directed 3 (Three) Times a Day With Meals. 60 units tid with meals)     • insulin detemir (LEVEMIR) 100 UNIT/ML injection INJECT 60 UNITS AT NIGHT (Patient taking differently: Inject 74 Units under the skin into the appropriate area as directed Every Night.)  12   • isosorbide mononitrate (IMDUR) 30 MG 24 hr tablet Take 30 mg by mouth daily.     • latanoprost (XALATAN) 0.005 % ophthalmic solution Apply 1 drop to eye daily.     • lisinopril (PRINIVIL,ZESTRIL) 20 MG tablet TAKE 1 TABLET BY MOUTH EVERY DAY 90 tablet 3   • metFORMIN (GLUCOPHAGE) 1000 MG tablet Take 0.5 tablets by mouth 2 (Two) Times a Day With Meals. Take 2 tablets bid     • metoprolol tartrate (LOPRESSOR) 25 MG tablet TAKE 1 TABLET BY MOUTH TWICE A  tablet 2   • nitroglycerin (NITROSTAT) 0.4 MG SL tablet Place 0.4 mg under the tongue Every 5 (Five) Minutes As Needed for Chest Pain. Take no more than 3 doses in 15 minutes.     • Omega 3 1000 MG capsule Take 1 capsule by mouth 2 (Two) Times a Day.     • omeprazole (priLOSEC) 20 MG capsule Take 20 mg by mouth Daily.     • pilocarpine (PILOCAR) 1 % ophthalmic solution 1 drop 2 (Two) Times a Day.     • simvastatin (ZOCOR) 40 MG tablet Take 40 mg by mouth Every Night.     • timolol (TIMOPTIC) 0.5 % ophthalmic solution 1 drop daily. INSTILL 1 DROP IN LEFT EYE EVERY MORNING AS DIRECTED  6     No current facility-administered medications on file prior to visit.         ALLERGIES:  No Known Allergies     Social History     Socioeconomic History   • Marital status:      Spouse name: Kerry   •  "Number of children: Not on file   • Years of education: College   • Highest education level: Not on file   Occupational History   • Occupation: Auto      Employer: SELF-EMPLOYED   Tobacco Use   • Smoking status: Never Smoker   • Smokeless tobacco: Never Used   • Tobacco comment: no caffeine   Substance and Sexual Activity   • Alcohol use: No   • Drug use: No   • Sexual activity: Defer     Partners: Female        Family History   Problem Relation Age of Onset   • Heart disease Mother    • Prostate cancer Father    • Heart disease Father    • Colon polyps Daughter    • Colon polyps Daughter    • No Known Problems Maternal Grandmother    • No Known Problems Maternal Grandfather    • No Known Problems Paternal Grandmother    • No Known Problems Paternal Grandfather         Review of Systems   Constitutional: Negative for activity change, appetite change, chills, fatigue and fever.   HENT: Negative for mouth sores, nosebleeds and trouble swallowing.    Respiratory: Negative for cough and shortness of breath.    Cardiovascular: Negative for chest pain and leg swelling.   Gastrointestinal: Negative for abdominal pain, constipation, diarrhea, nausea and vomiting.   Genitourinary: Negative for difficulty urinating.   Skin: Negative for rash.   Neurological: Negative for dizziness, weakness and numbness.   Hematological: Negative for adenopathy. Does not bruise/bleed easily.   Psychiatric/Behavioral: Negative for sleep disturbance.          Objective     Vitals:    01/07/20 0939   BP: 128/71   Pulse: 63   Resp: 16   Temp: 97.4 °F (36.3 °C)   TempSrc: Oral   SpO2: 96%   Weight: 98.7 kg (217 lb 11.2 oz)   Height: 180.3 cm (70.98\")   PainSc: 0-No pain     Current Status 1/7/2020   ECOG score 0       Physical Exam   Constitutional: He is oriented to person, place, and time. He appears well-developed and well-nourished. No distress.   HENT:   Head: Normocephalic and atraumatic.   Mouth/Throat: Oropharynx is clear and " moist and mucous membranes are normal. No oropharyngeal exudate.   Eyes: Pupils are equal, round, and reactive to light.   Neck: Normal range of motion.   Cardiovascular: Normal rate, regular rhythm and normal heart sounds.   No murmur heard.  Pulmonary/Chest: Effort normal and breath sounds normal. No respiratory distress. He has no wheezes. He has no rhonchi. He has no rales.   Abdominal: Soft. Normal appearance and bowel sounds are normal. He exhibits no distension. There is no hepatosplenomegaly.   Musculoskeletal: Normal range of motion. He exhibits no edema.   Neurological: He is alert and oriented to person, place, and time.   Skin: Skin is warm and dry. No rash noted.   Psychiatric: He has a normal mood and affect.   Vitals reviewed.          RECENT LABS:  Hematology WBC   Date Value Ref Range Status   01/07/2020 10.30 3.40 - 10.80 10*3/mm3 Final     RBC   Date Value Ref Range Status   01/07/2020 6.00 (H) 4.14 - 5.80 10*6/mm3 Final     Hemoglobin   Date Value Ref Range Status   01/07/2020 16.5 13.0 - 17.7 g/dL Final     Hematocrit   Date Value Ref Range Status   01/07/2020 51.9 (H) 37.5 - 51.0 % Final     Platelets   Date Value Ref Range Status   01/07/2020 336 140 - 450 10*3/mm3 Final            Assessment/Plan    1. This patient has history of leukocytosis and erythrocytosis felt to be likely an element of myeloproliferative disorder. We never have persuaded the patient to proceed with bone marrow testing. We have done BCR-ABL in the past that has been negative, JAK2 mutation that has been negative and we have run laboratory testing looking for inflammatory or infectious etiologies that have been negative.   · When patient was evaluated 7/23/2019, his white count was minimally elevated and his hematocrit is elevated at 49.  Patient was advised to undergo blood donation every 3 weeks through the [facility].  · Patient returns 1/7/2020 for follow-up reporting that he has donated blood a few times since his  last visit here.  His hematocrit has climbed today to 51.9.  After review with Dr. Galaviz we will proceed with phlebotomy in the office today.  The patient is to return every 2 weeks for CBC with RN review and possible phlebotomy of 500 cc for hematocrit greater than or equal to 45.  Once we get his hematocrit less than 45 we can schedule him for monthly CBC and possible phlebotomy.  We discussed would prefer to do the phlebotomy in our office at this time is to ensure that we get his hematocrit down.  He will continue on his aspirin.    PLAN:  1. Proceed with phlebotomy of 500 cc today.  2. Return every 2 weeks for CBC with RN review and possible phlebotomy for hematocrit greater than or equal to 45.  3. Once hematocrit less than 45 patient scheduled to be changed to monthly with CBC, iron review, and possible phlebotomy.  4. Return for follow-up visit in about 4 months with Dr. Wright with repeat CBC and CMP, as well as possible phlebotomy.

## 2020-01-21 ENCOUNTER — LAB (OUTPATIENT)
Dept: LAB | Facility: HOSPITAL | Age: 75
End: 2020-01-21

## 2020-01-21 ENCOUNTER — INFUSION (OUTPATIENT)
Dept: ONCOLOGY | Facility: HOSPITAL | Age: 75
End: 2020-01-21

## 2020-01-21 VITALS
WEIGHT: 221 LBS | SYSTOLIC BLOOD PRESSURE: 137 MMHG | OXYGEN SATURATION: 96 % | HEART RATE: 81 BPM | DIASTOLIC BLOOD PRESSURE: 76 MMHG | BODY MASS INDEX: 30.84 KG/M2 | TEMPERATURE: 98.3 F

## 2020-01-21 DIAGNOSIS — D75.1 ERYTHROCYTOSIS: Primary | ICD-10-CM

## 2020-01-21 LAB
BASOPHILS # BLD AUTO: 0.07 10*3/MM3 (ref 0–0.2)
BASOPHILS NFR BLD AUTO: 0.6 % (ref 0–1.5)
DEPRECATED RDW RBC AUTO: 40.1 FL (ref 37–54)
EOSINOPHIL # BLD AUTO: 0.2 10*3/MM3 (ref 0–0.4)
EOSINOPHIL NFR BLD AUTO: 1.7 % (ref 0.3–6.2)
ERYTHROCYTE [DISTWIDTH] IN BLOOD BY AUTOMATED COUNT: 13.1 % (ref 12.3–15.4)
HCT VFR BLD AUTO: 43.9 % (ref 37.5–51)
HGB BLD-MCNC: 14.4 G/DL (ref 13–17.7)
IMM GRANULOCYTES # BLD AUTO: 0.06 10*3/MM3 (ref 0–0.05)
IMM GRANULOCYTES NFR BLD AUTO: 0.5 % (ref 0–0.5)
LYMPHOCYTES # BLD AUTO: 2.96 10*3/MM3 (ref 0.7–3.1)
LYMPHOCYTES NFR BLD AUTO: 24.9 % (ref 19.6–45.3)
MCH RBC QN AUTO: 27.6 PG (ref 26.6–33)
MCHC RBC AUTO-ENTMCNC: 32.8 G/DL (ref 31.5–35.7)
MCV RBC AUTO: 84.3 FL (ref 79–97)
MONOCYTES # BLD AUTO: 1.12 10*3/MM3 (ref 0.1–0.9)
MONOCYTES NFR BLD AUTO: 9.4 % (ref 5–12)
NEUTROPHILS # BLD AUTO: 7.48 10*3/MM3 (ref 1.7–7)
NEUTROPHILS NFR BLD AUTO: 62.9 % (ref 42.7–76)
NRBC BLD AUTO-RTO: 0 /100 WBC (ref 0–0.2)
PLATELET # BLD AUTO: 304 10*3/MM3 (ref 140–450)
PMV BLD AUTO: 8.9 FL (ref 6–12)
RBC # BLD AUTO: 5.21 10*6/MM3 (ref 4.14–5.8)
WBC NRBC COR # BLD: 11.89 10*3/MM3 (ref 3.4–10.8)

## 2020-01-21 PROCEDURE — 36415 COLL VENOUS BLD VENIPUNCTURE: CPT

## 2020-01-21 PROCEDURE — 85025 COMPLETE CBC W/AUTO DIFF WBC: CPT

## 2020-01-21 NOTE — PROGRESS NOTES
This SmartLink has not been configured with any valid records.     Lab Results   Component Value Date    WBC 11.89 (H) 01/21/2020    HGB 14.4 01/21/2020    HCT 43.9 01/21/2020    MCV 84.3 01/21/2020     01/21/2020     Patient is to receive phlebo for HCT greater than 45. Phlebo held today. Per Mary Anne Cortes's note, when HCT is below 45, he will start coming monthly. Patient has appointment scheduled for 02/18/2020.

## 2020-02-18 ENCOUNTER — LAB (OUTPATIENT)
Dept: LAB | Facility: HOSPITAL | Age: 75
End: 2020-02-18

## 2020-02-18 ENCOUNTER — INFUSION (OUTPATIENT)
Dept: ONCOLOGY | Facility: HOSPITAL | Age: 75
End: 2020-02-18

## 2020-02-18 DIAGNOSIS — D75.1 ERYTHROCYTOSIS: Primary | ICD-10-CM

## 2020-02-18 LAB
BASOPHILS # BLD AUTO: 0.04 10*3/MM3 (ref 0–0.2)
BASOPHILS NFR BLD AUTO: 0.3 % (ref 0–1.5)
DEPRECATED RDW RBC AUTO: 38.7 FL (ref 37–54)
EOSINOPHIL # BLD AUTO: 0.16 10*3/MM3 (ref 0–0.4)
EOSINOPHIL NFR BLD AUTO: 1.3 % (ref 0.3–6.2)
ERYTHROCYTE [DISTWIDTH] IN BLOOD BY AUTOMATED COUNT: 12.8 % (ref 12.3–15.4)
HCT VFR BLD AUTO: 39.9 % (ref 37.5–51)
HGB BLD-MCNC: 13 G/DL (ref 13–17.7)
IMM GRANULOCYTES # BLD AUTO: 0.05 10*3/MM3 (ref 0–0.05)
IMM GRANULOCYTES NFR BLD AUTO: 0.4 % (ref 0–0.5)
LYMPHOCYTES # BLD AUTO: 2.93 10*3/MM3 (ref 0.7–3.1)
LYMPHOCYTES NFR BLD AUTO: 23 % (ref 19.6–45.3)
MCH RBC QN AUTO: 27 PG (ref 26.6–33)
MCHC RBC AUTO-ENTMCNC: 32.6 G/DL (ref 31.5–35.7)
MCV RBC AUTO: 83 FL (ref 79–97)
MONOCYTES # BLD AUTO: 0.98 10*3/MM3 (ref 0.1–0.9)
MONOCYTES NFR BLD AUTO: 7.7 % (ref 5–12)
NEUTROPHILS # BLD AUTO: 8.6 10*3/MM3 (ref 1.7–7)
NEUTROPHILS NFR BLD AUTO: 67.3 % (ref 42.7–76)
NRBC BLD AUTO-RTO: 0 /100 WBC (ref 0–0.2)
PLATELET # BLD AUTO: 341 10*3/MM3 (ref 140–450)
PMV BLD AUTO: 9 FL (ref 6–12)
RBC # BLD AUTO: 4.81 10*6/MM3 (ref 4.14–5.8)
WBC NRBC COR # BLD: 12.76 10*3/MM3 (ref 3.4–10.8)

## 2020-02-18 PROCEDURE — 85025 COMPLETE CBC W/AUTO DIFF WBC: CPT

## 2020-02-18 PROCEDURE — 36416 COLLJ CAPILLARY BLOOD SPEC: CPT

## 2020-02-18 NOTE — NURSING NOTE
Lab Results   Component Value Date    WBC 12.76 (H) 02/18/2020    HGB 13.0 02/18/2020    HCT 39.9 02/18/2020    MCV 83.0 02/18/2020     02/18/2020     No phlebo needed today per MD parameters. Copy of labs sent with pt.

## 2020-03-03 ENCOUNTER — INFUSION (OUTPATIENT)
Dept: ONCOLOGY | Facility: HOSPITAL | Age: 75
End: 2020-03-03

## 2020-03-03 ENCOUNTER — LAB (OUTPATIENT)
Dept: LAB | Facility: HOSPITAL | Age: 75
End: 2020-03-03

## 2020-03-03 DIAGNOSIS — D75.1 ERYTHROCYTOSIS: Primary | ICD-10-CM

## 2020-03-03 LAB
BASOPHILS # BLD AUTO: 0.1 10*3/MM3 (ref 0–0.2)
BASOPHILS NFR BLD AUTO: 0.7 % (ref 0–1.5)
DEPRECATED RDW RBC AUTO: 38.1 FL (ref 37–54)
EOSINOPHIL # BLD AUTO: 0.16 10*3/MM3 (ref 0–0.4)
EOSINOPHIL NFR BLD AUTO: 1.2 % (ref 0.3–6.2)
ERYTHROCYTE [DISTWIDTH] IN BLOOD BY AUTOMATED COUNT: 12.7 % (ref 12.3–15.4)
HCT VFR BLD AUTO: 41.3 % (ref 37.5–51)
HGB BLD-MCNC: 13.2 G/DL (ref 13–17.7)
IMM GRANULOCYTES # BLD AUTO: 0.03 10*3/MM3 (ref 0–0.05)
IMM GRANULOCYTES NFR BLD AUTO: 0.2 % (ref 0–0.5)
LYMPHOCYTES # BLD AUTO: 3.34 10*3/MM3 (ref 0.7–3.1)
LYMPHOCYTES NFR BLD AUTO: 24.6 % (ref 19.6–45.3)
MCH RBC QN AUTO: 26.4 PG (ref 26.6–33)
MCHC RBC AUTO-ENTMCNC: 32 G/DL (ref 31.5–35.7)
MCV RBC AUTO: 82.6 FL (ref 79–97)
MONOCYTES # BLD AUTO: 1.04 10*3/MM3 (ref 0.1–0.9)
MONOCYTES NFR BLD AUTO: 7.7 % (ref 5–12)
NEUTROPHILS # BLD AUTO: 8.92 10*3/MM3 (ref 1.7–7)
NEUTROPHILS NFR BLD AUTO: 65.6 % (ref 42.7–76)
NRBC BLD AUTO-RTO: 0 /100 WBC (ref 0–0.2)
PLATELET # BLD AUTO: 383 10*3/MM3 (ref 140–450)
PMV BLD AUTO: 9.1 FL (ref 6–12)
RBC # BLD AUTO: 5 10*6/MM3 (ref 4.14–5.8)
WBC NRBC COR # BLD: 13.59 10*3/MM3 (ref 3.4–10.8)

## 2020-03-03 PROCEDURE — 36416 COLLJ CAPILLARY BLOOD SPEC: CPT

## 2020-03-03 PROCEDURE — 85025 COMPLETE CBC W/AUTO DIFF WBC: CPT

## 2020-03-03 NOTE — PROGRESS NOTES
Lab Results   Component Value Date    WBC 13.59 (H) 03/03/2020    HGB 13.2 03/03/2020    HCT 41.3 03/03/2020    MCV 82.6 03/03/2020     03/03/2020     No phlebo needed today

## 2020-03-17 ENCOUNTER — APPOINTMENT (OUTPATIENT)
Dept: ONCOLOGY | Facility: HOSPITAL | Age: 75
End: 2020-03-17

## 2020-03-17 ENCOUNTER — APPOINTMENT (OUTPATIENT)
Dept: LAB | Facility: HOSPITAL | Age: 75
End: 2020-03-17

## 2020-03-31 ENCOUNTER — APPOINTMENT (OUTPATIENT)
Dept: ONCOLOGY | Facility: HOSPITAL | Age: 75
End: 2020-03-31

## 2020-03-31 ENCOUNTER — APPOINTMENT (OUTPATIENT)
Dept: LAB | Facility: HOSPITAL | Age: 75
End: 2020-03-31

## 2020-04-14 ENCOUNTER — APPOINTMENT (OUTPATIENT)
Dept: ONCOLOGY | Facility: HOSPITAL | Age: 75
End: 2020-04-14

## 2020-04-14 ENCOUNTER — APPOINTMENT (OUTPATIENT)
Dept: LAB | Facility: HOSPITAL | Age: 75
End: 2020-04-14

## 2020-04-28 ENCOUNTER — APPOINTMENT (OUTPATIENT)
Dept: ONCOLOGY | Facility: HOSPITAL | Age: 75
End: 2020-04-28

## 2020-04-28 ENCOUNTER — APPOINTMENT (OUTPATIENT)
Dept: LAB | Facility: HOSPITAL | Age: 75
End: 2020-04-28

## 2020-05-14 ENCOUNTER — TELEPHONE (OUTPATIENT)
Dept: ONCOLOGY | Facility: CLINIC | Age: 75
End: 2020-05-14

## 2020-05-14 NOTE — TELEPHONE ENCOUNTER
Patient needs to move appointments scheduled for 5/19.  He has other appointments that same morning.    He could come later in the day (an hour later or more) or any other day that week.    [phone]

## 2020-05-15 ENCOUNTER — LAB (OUTPATIENT)
Dept: LAB | Facility: HOSPITAL | Age: 75
End: 2020-05-15

## 2020-05-15 ENCOUNTER — INFUSION (OUTPATIENT)
Dept: ONCOLOGY | Facility: HOSPITAL | Age: 75
End: 2020-05-15

## 2020-05-15 DIAGNOSIS — D75.1 ERYTHROCYTOSIS: Primary | ICD-10-CM

## 2020-05-15 LAB
BASOPHILS # BLD AUTO: 0.09 10*3/MM3 (ref 0–0.2)
BASOPHILS NFR BLD AUTO: 0.7 % (ref 0–1.5)
DEPRECATED RDW RBC AUTO: 41.1 FL (ref 37–54)
EOSINOPHIL # BLD AUTO: 0.2 10*3/MM3 (ref 0–0.4)
EOSINOPHIL NFR BLD AUTO: 1.6 % (ref 0.3–6.2)
ERYTHROCYTE [DISTWIDTH] IN BLOOD BY AUTOMATED COUNT: 14.4 % (ref 12.3–15.4)
HCT VFR BLD AUTO: 44.7 % (ref 37.5–51)
HGB BLD-MCNC: 14.3 G/DL (ref 13–17.7)
IMM GRANULOCYTES # BLD AUTO: 0.05 10*3/MM3 (ref 0–0.05)
IMM GRANULOCYTES NFR BLD AUTO: 0.4 % (ref 0–0.5)
LYMPHOCYTES # BLD AUTO: 3.2 10*3/MM3 (ref 0.7–3.1)
LYMPHOCYTES NFR BLD AUTO: 26.2 % (ref 19.6–45.3)
MCH RBC QN AUTO: 25.1 PG (ref 26.6–33)
MCHC RBC AUTO-ENTMCNC: 32 G/DL (ref 31.5–35.7)
MCV RBC AUTO: 78.6 FL (ref 79–97)
MONOCYTES # BLD AUTO: 1.12 10*3/MM3 (ref 0.1–0.9)
MONOCYTES NFR BLD AUTO: 9.2 % (ref 5–12)
NEUTROPHILS # BLD AUTO: 7.54 10*3/MM3 (ref 1.7–7)
NEUTROPHILS NFR BLD AUTO: 61.9 % (ref 42.7–76)
NRBC BLD AUTO-RTO: 0 /100 WBC (ref 0–0.2)
PLATELET # BLD AUTO: 334 10*3/MM3 (ref 140–450)
PMV BLD AUTO: 9.1 FL (ref 6–12)
RBC # BLD AUTO: 5.69 10*6/MM3 (ref 4.14–5.8)
WBC NRBC COR # BLD: 12.2 10*3/MM3 (ref 3.4–10.8)

## 2020-05-15 PROCEDURE — 36415 COLL VENOUS BLD VENIPUNCTURE: CPT

## 2020-05-15 PROCEDURE — 85025 COMPLETE CBC W/AUTO DIFF WBC: CPT

## 2020-05-15 NOTE — NURSING NOTE
Lab Results   Component Value Date    WBC 12.20 (H) 05/15/2020    HGB 14.3 05/15/2020    HCT 44.7 05/15/2020    MCV 78.6 (L) 05/15/2020     05/15/2020   Phlebo held.  No phlebo for Hct less than 45 per MD order.  Copy of labs given to patient.  To return in one month.

## 2020-05-19 ENCOUNTER — APPOINTMENT (OUTPATIENT)
Dept: ONCOLOGY | Facility: HOSPITAL | Age: 75
End: 2020-05-19

## 2020-05-19 ENCOUNTER — APPOINTMENT (OUTPATIENT)
Dept: LAB | Facility: HOSPITAL | Age: 75
End: 2020-05-19

## 2020-05-19 ENCOUNTER — OFFICE VISIT (OUTPATIENT)
Dept: ONCOLOGY | Facility: CLINIC | Age: 75
End: 2020-05-19

## 2020-05-19 DIAGNOSIS — D75.1 ERYTHROCYTOSIS: Primary | ICD-10-CM

## 2020-05-19 PROCEDURE — 99441 PR PHYS/QHP TELEPHONE EVALUATION 5-10 MIN: CPT | Performed by: INTERNAL MEDICINE

## 2020-05-19 NOTE — PROGRESS NOTES
Subjective     REASON FOR FOLLOW UP:  Leukocytosis for 4 years, minimal erythrocytosis, normal platelet count, no splenomegaly by physical exam or ct abdomen.        History of Present Illness   I HAVE CALLED THIS PATIENT ON THE PHONE TODAY AND VERBALLY HAS CONSENTED ABOUT TELEMEDICINE VISIT     This patient has been seen today by Chacorta. Overall he continues doing fine with excellent level of energy, normal appetite, stable weight and no modification in his diabetes management. He has not had any fever, chills, adenopathy or bone pain. He has been extremely busy hunting for mushrooms. The patient otherwise feels well. He has not had any infections, bleeding or cardiovascular or respiratory issues of any nature.               Past Medical History:   Diagnosis Date   • Arthritis    • Atherosclerosis of native coronary artery without angina pectoris    • Chest pain    • Coronary artery disease    • Diabetes mellitus (CMS/HCC)    • Difficulty breathing    • Diverticulosis    • Dizziness    • ED (erectile dysfunction)    • GERD (gastroesophageal reflux disease)    • Hyperlipidemia    • Hypertension    • Kidney stones    • Obesity    • RSV (respiratory syncytial virus infection)    • S/P angioplasty with stent    • Stroke (CMS/HCC)    • Tubular adenoma of colon 09/2019    Removed by colonoscopy by Dr. Weems   • Type 2 diabetes mellitus (CMS/HCC)         Past Surgical History:   Procedure Laterality Date   • CARDIAC CATHETERIZATION N/A 02/10/2011    Normal LV systolic function; Kind of diffuse, small vessel, distal disease. Most of it is not amenable to PCI, but his JADEN lesion is and it is a large vessel; All of his prior durg-eluting stents are widely patent & look good, Dr. Anibal Oliveira   • CATARACT EXTRACTION Bilateral    • COLONOSCOPY N/A 09/13/2004    Normal, Repeat in 5-8 years, Dr. Davion Sanon   • COLONOSCOPY N/A 09/04/2009    Mutiple small & large-mouthed diverticual were found in the entire colon; Exam  otherwise normal, Dr. Davion Sanon   • COLONOSCOPY N/A 9/26/2019    Procedure: COLONOSCOPY to  cecum with cold biopsy polypectomy;  Surgeon: Haider Weems MD;  Location: [facility] ENDOSCOPY;  Service: General   • COLONOSCOPY W/ POLYPECTOMY N/A [id number]    2 benign polyps, [facility], Dr. Davion Sanon   • CORONARY ANGIOPLASTY WITH STENT PLACEMENT N/A 11/20/2007    Successful multivessel drug eluting stent implantation for restenosis above his prior stenting, Dr. Anibal Wray   • CORONARY ANGIOPLASTY WITH STENT PLACEMENT N/A 06/05/2007    Successful multivessel drug eluting stent implantation in all 3 of the major coronary arteries, Dr. Anibal Wray   • CORONARY ANGIOPLASTY WITH STENT PLACEMENT N/A 02/10/2011    Successful drug-stenting of the JADEN, Dr. Anibal Oliveira   • ENDOSCOPY N/A 11/21/2017    Procedure: ESOPHAGOGASTRODUODENOSCOPY WITH COLD BIOPSIES AND 53 F MICHAEL DILATION;  Surgeon: Wisam Potter MD;  Location: [facility] ENDOSCOPY;  Service:    • EYE SURGERY Left 2011    Retina   • HYDROCELECTOMY Left 08/19/2011    Dr. Ellie Stein   • INCISION AND DRAINAGE PERIRECTAL ABSCESS N/A 11/30/2007    Dr. Davion Sanon   • LUMBAR EPIDURAL INJECTION N/A 2012-2013    x 3   • NEUROMA SURGERY Right 07/19/2005    Excision Neuroma Right Arm; PATH: CONSISTENT W/ LIPOMA,  Dr. Davion Sanon   • ROTATOR CUFF REPAIR Right 2006   • ROTATOR CUFF REPAIR Left 2000   • UPPER GASTROINTESTINAL ENDOSCOPY N/A 09/06/2012    Normal esophagus; Normal Stomach, Normal examinded duodenum, Dr. Bruno Turk   • WRIST FRACTURE SURGERY Right 06/03/2003    ORIF Right Distal Radius, Dr. Ryan Bravo        Current Outpatient Medications on File Prior to Visit   Medication Sig Dispense Refill   • acyclovir (ZOVIRAX) 400 MG tablet Take 400 mg by mouth As Needed. Take no more than 5 doses a day.     • aspirin 81 MG EC tablet Take 81 mg by mouth daily.     • brimonidine (ALPHAGAN) 0.15 % ophthalmic solution Apply 1 drop to eye  2 (two) times a day.     • clopidogrel (PLAVIX) 75 MG tablet Take 75 mg by mouth Every Night.     • Dulaglutide (TRULICITY) 1.5 MG/0.5ML solution pen-injector Inject 1.5 mg under the skin into the appropriate area as directed Every 7 (Seven) Days. 12 pen 1   • insulin aspart (novoLOG FLEXPEN) 100 UNIT/ML solution pen-injector sc pen INJECT 20 UNITS IN THE MORNING 20 UNITS AT LUNCH AND 20 UNITS AT DINNER (Patient taking differently: Inject 60 Units under the skin into the appropriate area as directed 3 (Three) Times a Day With Meals. 60 units tid with meals)     • insulin detemir (LEVEMIR) 100 UNIT/ML injection INJECT 60 UNITS AT NIGHT (Patient taking differently: Inject 74 Units under the skin into the appropriate area as directed Every Night.)  12   • isosorbide mononitrate (IMDUR) 30 MG 24 hr tablet Take 30 mg by mouth daily.     • latanoprost (XALATAN) 0.005 % ophthalmic solution Apply 1 drop to eye daily.     • lisinopril (PRINIVIL,ZESTRIL) 20 MG tablet TAKE 1 TABLET BY MOUTH EVERY DAY 90 tablet 3   • metFORMIN (GLUCOPHAGE) 1000 MG tablet Take 0.5 tablets by mouth 2 (Two) Times a Day With Meals. Take 2 tablets bid     • metoprolol tartrate (LOPRESSOR) 25 MG tablet TAKE 1 TABLET BY MOUTH TWICE A  tablet 2   • nitroglycerin (NITROSTAT) 0.4 MG SL tablet Place 0.4 mg under the tongue Every 5 (Five) Minutes As Needed for Chest Pain. Take no more than 3 doses in 15 minutes.     • Omega 3 1000 MG capsule Take 1 capsule by mouth 2 (Two) Times a Day.     • omeprazole (priLOSEC) 20 MG capsule Take 20 mg by mouth Daily.     • pilocarpine (PILOCAR) 1 % ophthalmic solution 1 drop 2 (Two) Times a Day.     • simvastatin (ZOCOR) 40 MG tablet Take 40 mg by mouth Every Night.     • timolol (TIMOPTIC) 0.5 % ophthalmic solution 1 drop daily. INSTILL 1 DROP IN LEFT EYE EVERY MORNING AS DIRECTED  6     No current facility-administered medications on file prior to visit.         ALLERGIES:  No Known Allergies     Social History      Socioeconomic History   • Marital status:      Spouse name: Kerry   • Number of children: Not on file   • Years of education: College   • Highest education level: Not on file   Occupational History   • Occupation: Auto      Employer: SELF-EMPLOYED   Tobacco Use   • Smoking status: Never Smoker   • Smokeless tobacco: Never Used   • Tobacco comment: no caffeine   Substance and Sexual Activity   • Alcohol use: No   • Drug use: No   • Sexual activity: Defer     Partners: Female        Family History   Problem Relation Age of Onset   • Heart disease Mother    • Prostate cancer Father    • Heart disease Father    • Colon polyps Daughter    • Colon polyps Daughter    • No Known Problems Maternal Grandmother    • No Known Problems Maternal Grandfather    • No Known Problems Paternal Grandmother    • No Known Problems Paternal Grandfather         Review of Systems           General: no fever, no chills, no fatigue,no weight changes, no lack of appetite.  Eyes: no epiphora, xerophthalmia,conjunctivitis, pain, glaucoma, blurred vision, blindness, secretion, photophobia, proptosis, diplopia.  Ears: no otorrhea, tinnitus, otorrhagia, deafness, pain, vertigo.  Nose: no rhinorrhea, no epistaxis, no alteration in perception of odors, no sinuses pressure.  Mouth: no alteration in gums or teeth,  No ulcers, no difficulty with mastication or deglut ion, no odynophagia.  Neck: no masses or pain, no thyroid alterations, no pain in muscles or arteries, no carotid odynia, no crepitation.  Respiratory: no cough, no sputum production,no dyspnea,no trepopnea, no pleuritic pain,no hemoptysis.  Heart: no syncope, no irregularity, no palpitations, no angina,no orthopnea,no paroxysmal nocturnal dyspnea.  Vascular Venous: no tenderness,no edema,no palpable cords,no postphlebitic syndrome, no skin changes no ulcerations.  Vascular Arterial: no distal ischemia, noclaudication, no gangrene, no neuropathic ischemic pain, no skin  ulcers, no paleness no cyanosis.  GI: no dysphagia, no odynophagia, no regurgitation, no heartburn,no indigestion,no nausea,no vomiting,no hematemesis ,no melena,no jaundice,no distention, no obstipation,no enterorrhagia,no proctalgia,no anal  lesions, no changes in bowel habits.  : no frequency, no hesitancy, no hematuria, no discharge,no  pain.  Musculoskeletal: no muscle or tendon pain or inflammation,no  joint pain, no edema, no functional limitation,no fasciculations, no mass.  Neurologic: no headache, no seizures, noalterations on Craneal nerves, no motor deficit, no sensory deficit, normal coordination, no alteration in memory,normal orientation, calculation,normal writting, verbal and written language.  Skin: no rashes,no pruritus no localized lesions.  Psychiatric: no anxiety, no depression,no agitation, no delusions, proper insight.      Objective     There were no vitals filed for this visit.  Current Status 1/7/2020   ECOG score 0       Physical Exam  He sounds completely normal on the telephone with no obvious pain, no shortness of breath, normal activity of daily living, hunting for mushrooms and doing all sort of activities with no limitations.                RECENT LABS:  Hematology WBC   Date Value Ref Range Status   05/15/2020 12.20 (H) 3.40 - 10.80 10*3/mm3 Final     RBC   Date Value Ref Range Status   05/15/2020 5.69 4.14 - 5.80 10*6/mm3 Final     Hemoglobin   Date Value Ref Range Status   05/15/2020 14.3 13.0 - 17.7 g/dL Final     Hematocrit   Date Value Ref Range Status   05/15/2020 44.7 37.5 - 51.0 % Final     Platelets   Date Value Ref Range Status   05/15/2020 334 140 - 450 10*3/mm3 Final            Assessment/Plan    1. This patient has history of leukocytosis that in my opinion is very likely an element of myeloproliferative disorder. We never have persuaded the patient to proceed with bone marrow testing. We have done BCR-ABL in the past that has been negative, JAK2 mutation that has  been negative and we have run laboratory testing looking for inflammatory or infectious etiologies that have been negative. Nevertheless, his white count remains minimally elevated.     The patient was reviewed by Chacorta on 05/19/2020 and overall he continues doing fantastic. He has not had any vascular events, infections. His weight remains stable and his diabetes control is marginal, good. The patient is now hunting for mushrooms and he loves to hunt. He has been doing it for most of his life. He knows very well what to get and what not to get. In any event his white count, hemoglobin and platelets done a few days ago documented stability, his hematocrit is very good, his white count is 12,000, his platelet count is normal and his white count differential is normal. Under the present circumstances I do not believe that I need to pursue any other intervention besides follow him up in 6 months. He will have a CBC at that time.     I asked the patient to stay away from crowds of people to minimize potentiality for coronavirus infection.     Duration of the telephonic visit with him 4 minutes 50 seconds.

## 2020-05-29 ENCOUNTER — OFFICE VISIT (OUTPATIENT)
Dept: CARDIOLOGY | Facility: CLINIC | Age: 75
End: 2020-05-29

## 2020-05-29 VITALS
HEART RATE: 67 BPM | DIASTOLIC BLOOD PRESSURE: 80 MMHG | HEIGHT: 71 IN | SYSTOLIC BLOOD PRESSURE: 142 MMHG | BODY MASS INDEX: 30.8 KG/M2 | WEIGHT: 220 LBS

## 2020-05-29 DIAGNOSIS — I25.10 CORONARY ARTERY DISEASE INVOLVING NATIVE CORONARY ARTERY OF NATIVE HEART WITHOUT ANGINA PECTORIS: Primary | ICD-10-CM

## 2020-05-29 DIAGNOSIS — E11.21 TYPE 2 DIABETES WITH NEPHROPATHY (HCC): ICD-10-CM

## 2020-05-29 PROCEDURE — 93000 ELECTROCARDIOGRAM COMPLETE: CPT | Performed by: INTERNAL MEDICINE

## 2020-05-29 PROCEDURE — 99214 OFFICE O/P EST MOD 30 MIN: CPT | Performed by: INTERNAL MEDICINE

## 2020-05-29 NOTE — PROGRESS NOTES
Date of Office Visit: 20  Encounter Provider: Anibal Wray MD  Place of Service: [facility] CARDIOLOGY  Patient Name: Anthony Vaughn  :1945  [id number]    Chief Complaint   Patient presents with   • Coronary Artery Disease   :     HPI: Anthony Vaughn is a 75 y.o. male he had an inferior MI with multiple stents placed in 2007-->2.5x28 Cypher to dLAD; 3.0x28mm Cypher to mLAD; 3.0x33mm Cypher to d circ; 3.0x23mm Cypher mcirc; 3.0x23mm Cypher to c RCA; 3.5x13mm Cypher pRCA; in 2007 restenosis pLAD 3.0x13mm Cypher 2.5x8mm Cypher to mLAD; 3.0x13mm Cypher to p circ. In 2011, had a stent to the JADEN details unavailable.  There was a question of a possible TIA in  that led to an echo which was normal and a Holter that was unremarkable.  Risk factor modifications always been a little bit tough for him.    In general I think he is doing pretty well he has not had any chest pain PND orthopnea edema syncope or palpitations.  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    Past Medical History:   Diagnosis Date   • Arthritis    • Atherosclerosis of native coronary artery without angina pectoris    • Chest pain    • Coronary artery disease    • Diabetes mellitus (CMS/HCC)    • Difficulty breathing    • Diverticulosis    • Dizziness    • ED (erectile dysfunction)    • GERD (gastroesophageal reflux disease)    • Hyperlipidemia    • Hypertension    • Kidney stones    • Obesity    • RSV (respiratory syncytial virus infection)    • S/P angioplasty with stent    • Stroke (CMS/HCC)    • Tubular adenoma of colon 2019    Removed by colonoscopy by Dr. Weems   • Type 2 diabetes mellitus (CMS/HCC)        Past Surgical History:   Procedure Laterality Date   • CARDIAC CATHETERIZATION N/A 02/10/2011    Normal LV systolic function; Kind of diffuse, small vessel, distal disease. Most of it is not amenable to PCI, but his JADEN lesion is and it is a large  vessel; All of his prior durg-eluting stents are widely patent & look good, Dr. Anibal Oliveira   • CATARACT EXTRACTION Bilateral    • COLONOSCOPY N/A 09/13/2004    Normal, Repeat in 5-8 years, Dr. Davion Sanon   • COLONOSCOPY N/A 09/04/2009    Mutiple small & large-mouthed diverticual were found in the entire colon; Exam otherwise normal, Dr. Davion Sanon   • COLONOSCOPY N/A 9/26/2019    Procedure: COLONOSCOPY to  cecum with cold biopsy polypectomy;  Surgeon: Haider Weems MD;  Location: [facility] ENDOSCOPY;  Service: General   • COLONOSCOPY W/ POLYPECTOMY N/A [id number]    2 benign polyps, [facility], Dr. Davion Sanon   • CORONARY ANGIOPLASTY WITH STENT PLACEMENT N/A 11/20/2007    Successful multivessel drug eluting stent implantation for restenosis above his prior stenting, Dr. Anibal Wray   • CORONARY ANGIOPLASTY WITH STENT PLACEMENT N/A 06/05/2007    Successful multivessel drug eluting stent implantation in all 3 of the major coronary arteries, Dr. Anibal Wray   • CORONARY ANGIOPLASTY WITH STENT PLACEMENT N/A 02/10/2011    Successful drug-stenting of the JADEN, Dr. Anibal Oliveira   • ENDOSCOPY N/A 11/21/2017    Procedure: ESOPHAGOGASTRODUODENOSCOPY WITH COLD BIOPSIES AND 53 F MICHAEL DILATION;  Surgeon: Wisam Potter MD;  Location: [facility] ENDOSCOPY;  Service:    • EYE SURGERY Left 2011    Retina   • HYDROCELECTOMY Left 08/19/2011    Dr. Ellie Stein   • INCISION AND DRAINAGE PERIRECTAL ABSCESS N/A 11/30/2007    Dr. Davion Sanon   • LUMBAR EPIDURAL INJECTION N/A 2012-2013    x 3   • NEUROMA SURGERY Right 07/19/2005    Excision Neuroma Right Arm; PATH: CONSISTENT W/ LIPOMA,  Dr. Davion Sanon   • ROTATOR CUFF REPAIR Right 2006   • ROTATOR CUFF REPAIR Left 2000   • UPPER GASTROINTESTINAL ENDOSCOPY N/A 09/06/2012    Normal esophagus; Normal Stomach, Normal examinded duodenum, Dr. Bruno Turk   • WRIST FRACTURE SURGERY Right 06/03/2003    ORIF Right Distal Radius, Dr. Ryan Bravo        Social History     Socioeconomic History   • Marital status:      Spouse name: Kerry   • Number of children: Not on file   • Years of education: College   • Highest education level: Not on file   Occupational History   • Occupation: Auto      Employer: SELF-EMPLOYED   Tobacco Use   • Smoking status: Never Smoker   • Smokeless tobacco: Never Used   • Tobacco comment: no caffeine   Substance and Sexual Activity   • Alcohol use: No   • Drug use: No   • Sexual activity: Defer     Partners: Female       Family History   Problem Relation Age of Onset   • Heart disease Mother    • Prostate cancer Father    • Heart disease Father    • Colon polyps Daughter    • Colon polyps Daughter    • No Known Problems Maternal Grandmother    • No Known Problems Maternal Grandfather    • No Known Problems Paternal Grandmother    • No Known Problems Paternal Grandfather        Review of Systems   Constitution: Negative for decreased appetite, fever, malaise/fatigue and weight loss.   HENT: Negative for nosebleeds.    Eyes: Negative for double vision.   Cardiovascular: Negative for chest pain, claudication, cyanosis, dyspnea on exertion, irregular heartbeat, leg swelling, near-syncope, orthopnea, palpitations, paroxysmal nocturnal dyspnea and syncope.   Respiratory: Negative for cough, hemoptysis and shortness of breath.    Hematologic/Lymphatic: Negative for bleeding problem.   Skin: Negative for rash.   Musculoskeletal: Negative for falls and myalgias.   Gastrointestinal: Negative for hematochezia, jaundice, melena, nausea and vomiting.   Genitourinary: Negative for hematuria.   Neurological: Negative for dizziness and seizures.   Psychiatric/Behavioral: Negative for altered mental status and memory loss.       No Known Allergies      Current Outpatient Medications:   •  acyclovir (ZOVIRAX) 400 MG tablet, Take 400 mg by mouth As Needed. Take no more than 5 doses a day., Disp: , Rfl:   •  aspirin 81 MG EC tablet,  Take 81 mg by mouth daily., Disp: , Rfl:   •  brimonidine (ALPHAGAN) 0.15 % ophthalmic solution, Apply 1 drop to eye 2 (two) times a day., Disp: , Rfl:   •  clopidogrel (PLAVIX) 75 MG tablet, Take 75 mg by mouth Every Night., Disp: , Rfl:   •  Dulaglutide (TRULICITY) 1.5 MG/0.5ML solution pen-injector, Inject 1.5 mg under the skin into the appropriate area as directed Every 7 (Seven) Days., Disp: 12 pen, Rfl: 1  •  insulin aspart (novoLOG FLEXPEN) 100 UNIT/ML solution pen-injector sc pen, INJECT 20 UNITS IN THE MORNING 20 UNITS AT LUNCH AND 20 UNITS AT DINNER (Patient taking differently: Inject 60 Units under the skin into the appropriate area as directed 3 (Three) Times a Day With Meals. 60 units tid with meals), Disp: , Rfl:   •  insulin detemir (LEVEMIR) 100 UNIT/ML injection, INJECT 60 UNITS AT NIGHT (Patient taking differently: Inject 74 Units under the skin into the appropriate area as directed Every Night.), Disp: , Rfl: 12  •  isosorbide mononitrate (IMDUR) 30 MG 24 hr tablet, Take 30 mg by mouth daily., Disp: , Rfl:   •  latanoprost (XALATAN) 0.005 % ophthalmic solution, Apply 1 drop to eye daily., Disp: , Rfl:   •  lisinopril (PRINIVIL,ZESTRIL) 20 MG tablet, TAKE 1 TABLET BY MOUTH EVERY DAY, Disp: 90 tablet, Rfl: 3  •  metFORMIN (GLUCOPHAGE) 1000 MG tablet, Take 0.5 tablets by mouth 2 (Two) Times a Day With Meals. Take 2 tablets bid, Disp: , Rfl:   •  metoprolol tartrate (LOPRESSOR) 25 MG tablet, TAKE 1 TABLET BY MOUTH TWICE A DAY, Disp: 180 tablet, Rfl: 2  •  nitroglycerin (NITROSTAT) 0.4 MG SL tablet, Place 0.4 mg under the tongue Every 5 (Five) Minutes As Needed for Chest Pain. Take no more than 3 doses in 15 minutes., Disp: , Rfl:   •  Omega 3 1000 MG capsule, Take 1 capsule by mouth 2 (Two) Times a Day., Disp: , Rfl:   •  omeprazole (priLOSEC) 20 MG capsule, Take 20 mg by mouth Daily., Disp: , Rfl:   •  pilocarpine (PILOCAR) 1 % ophthalmic solution, 1 drop 2 (Two) Times a Day., Disp: , Rfl:   •   "simvastatin (ZOCOR) 40 MG tablet, Take 40 mg by mouth Every Night., Disp: , Rfl:   •  timolol (TIMOPTIC) 0.5 % ophthalmic solution, 1 drop daily. INSTILL 1 DROP IN LEFT EYE EVERY MORNING AS DIRECTED, Disp: , Rfl: 6      Objective:     Vitals:    05/29/20 1350   BP: 142/80   Pulse: 67   Weight: 99.8 kg (220 lb)   Height: 180.3 cm (71\")     Body mass index is 30.68 kg/m².    Physical Exam   Constitutional: He is oriented to person, place, and time. He appears well-developed and well-nourished.   HENT:   Head: Normocephalic.   Eyes: No scleral icterus.   Neck: No JVD present. No thyromegaly present.   Cardiovascular: Normal rate, regular rhythm and normal heart sounds. Exam reveals no gallop and no friction rub.   No murmur heard.  Pulmonary/Chest: Effort normal and breath sounds normal. He has no wheezes. He has no rales.   Abdominal: Soft. There is no hepatosplenomegaly. There is no tenderness.   Musculoskeletal: Normal range of motion. He exhibits no edema.   Lymphadenopathy:     He has no cervical adenopathy.   Neurological: He is alert and oriented to person, place, and time.   Skin: Skin is warm and dry. No rash noted.   Psychiatric: He has a normal mood and affect.         ECG 12 Lead  Date/Time: 5/29/2020 2:18 PM  Performed by: Anibal Wray MD  Authorized by: Anibal Wray MD   Comparison: compared with previous ECG   Similar to previous ECG  Rhythm: sinus rhythm  Other findings: non-specific ST-T wave changes    Clinical impression: abnormal EKG             Assessment:       Diagnosis Plan   1. Coronary artery disease involving native coronary artery of native heart without angina pectoris     2. Type 2 diabetes with nephropathy (CMS/[facility])            Plan:       He in general he is stable he is not having symptoms he is got several drug-eluting stents implanted 13 years ago and really has not had a problem since so I think he is doing well I would have him just come back and see Laurita or Josesline in a year " and see me in 2 years    As always, it has been a pleasure to participate in your patient's care.      Sincerely,       Anibal Wray MD

## 2020-06-29 RX ORDER — LISINOPRIL 20 MG/1
TABLET ORAL
Qty: 90 TABLET | Refills: 3 | Status: SHIPPED | OUTPATIENT
Start: 2020-06-29 | End: 2021-01-04

## 2020-10-21 ENCOUNTER — TRANSCRIBE ORDERS (OUTPATIENT)
Dept: ADMINISTRATIVE | Facility: HOSPITAL | Age: 75
End: 2020-10-21

## 2020-10-21 DIAGNOSIS — R42 VERTIGO: Primary | ICD-10-CM

## 2020-10-22 ENCOUNTER — TRANSCRIBE ORDERS (OUTPATIENT)
Dept: ADMINISTRATIVE | Facility: HOSPITAL | Age: 75
End: 2020-10-22

## 2020-10-22 DIAGNOSIS — I65.23 CAROTID STENOSIS, BILATERAL: Primary | ICD-10-CM

## 2020-11-17 ENCOUNTER — HOSPITAL ENCOUNTER (OUTPATIENT)
Dept: MRI IMAGING | Facility: HOSPITAL | Age: 75
Discharge: HOME | End: 2020-11-17

## 2020-11-17 ENCOUNTER — HOSPITAL ENCOUNTER (OUTPATIENT)
Dept: CARDIOLOGY | Facility: HOSPITAL | Age: 75
Discharge: HOME | End: 2020-11-17

## 2020-11-17 DIAGNOSIS — I65.23 CAROTID STENOSIS, BILATERAL: ICD-10-CM

## 2020-11-17 DIAGNOSIS — R42 VERTIGO: ICD-10-CM

## 2020-11-17 LAB
BH CV XLRA MEAS LEFT DIST CCA EDV: -13.4 CM/SEC
BH CV XLRA MEAS LEFT DIST CCA PSV: -62.5 CM/SEC
BH CV XLRA MEAS LEFT DIST ICA EDV: -19.4 CM/SEC
BH CV XLRA MEAS LEFT DIST ICA PSV: -74.3 CM/SEC
BH CV XLRA MEAS LEFT ICA/CCA RATIO: 1.5
BH CV XLRA MEAS LEFT MID ICA EDV: -31.1 CM/SEC
BH CV XLRA MEAS LEFT MID ICA PSV: -91.5 CM/SEC
BH CV XLRA MEAS LEFT PROX CCA EDV: 11 CM/SEC
BH CV XLRA MEAS LEFT PROX CCA PSV: 63.6 CM/SEC
BH CV XLRA MEAS LEFT PROX ECA EDV: -7.6 CM/SEC
BH CV XLRA MEAS LEFT PROX ECA PSV: -85 CM/SEC
BH CV XLRA MEAS LEFT PROX ICA EDV: -19.6 CM/SEC
BH CV XLRA MEAS LEFT PROX ICA PSV: -71.1 CM/SEC
BH CV XLRA MEAS LEFT PROX SCLA PSV: 103 CM/SEC
BH CV XLRA MEAS LEFT VERTEBRAL A EDV: 4.8 CM/SEC
BH CV XLRA MEAS LEFT VERTEBRAL A PSV: 28.3 CM/SEC
BH CV XLRA MEAS RIGHT DIST CCA EDV: -7.9 CM/SEC
BH CV XLRA MEAS RIGHT DIST CCA PSV: -71.5 CM/SEC
BH CV XLRA MEAS RIGHT DIST ICA EDV: -10.4 CM/SEC
BH CV XLRA MEAS RIGHT DIST ICA PSV: -45.2 CM/SEC
BH CV XLRA MEAS RIGHT ICA/CCA RATIO: 0.86
BH CV XLRA MEAS RIGHT MID ICA EDV: 9.8 CM/SEC
BH CV XLRA MEAS RIGHT MID ICA PSV: 31.8 CM/SEC
BH CV XLRA MEAS RIGHT PROX CCA EDV: 6.7 CM/SEC
BH CV XLRA MEAS RIGHT PROX CCA PSV: 54.2 CM/SEC
BH CV XLRA MEAS RIGHT PROX ECA EDV: 5.1 CM/SEC
BH CV XLRA MEAS RIGHT PROX ECA PSV: 65.6 CM/SEC
BH CV XLRA MEAS RIGHT PROX ICA EDV: -8.6 CM/SEC
BH CV XLRA MEAS RIGHT PROX ICA PSV: -62.1 CM/SEC
BH CV XLRA MEAS RIGHT PROX SCLA PSV: 150 CM/SEC
BH CV XLRA MEAS RIGHT VERTEBRAL A EDV: 12.2 CM/SEC
BH CV XLRA MEAS RIGHT VERTEBRAL A PSV: 43.6 CM/SEC
CREAT BLDA-MCNC: 1 MG/DL (ref 0.6–1.3)
LEFT ARM BP: NORMAL MMHG
RIGHT ARM BP: NORMAL MMHG

## 2020-11-17 PROCEDURE — 0 GADOBENATE DIMEGLUMINE 529 MG/ML SOLUTION: Performed by: INTERNAL MEDICINE

## 2020-11-17 PROCEDURE — 93880 EXTRACRANIAL BILAT STUDY: CPT

## 2020-11-17 PROCEDURE — 70553 MRI BRAIN STEM W/O & W/DYE: CPT

## 2020-11-17 PROCEDURE — A9577 INJ MULTIHANCE: HCPCS | Performed by: INTERNAL MEDICINE

## 2020-11-17 PROCEDURE — 82565 ASSAY OF CREATININE: CPT

## 2020-11-17 RX ADMIN — GADOBENATE DIMEGLUMINE 20 ML: 529 INJECTION, SOLUTION INTRAVENOUS at 11:06

## 2020-11-30 ENCOUNTER — LAB (OUTPATIENT)
Dept: LAB | Facility: HOSPITAL | Age: 75
End: 2020-11-30

## 2020-11-30 DIAGNOSIS — D75.1 ERYTHROCYTOSIS: ICD-10-CM

## 2020-11-30 LAB
BASOPHILS # BLD AUTO: 0.09 10*3/MM3 (ref 0–0.2)
BASOPHILS NFR BLD AUTO: 0.8 % (ref 0–1.5)
DEPRECATED RDW RBC AUTO: 40.3 FL (ref 37–54)
EOSINOPHIL # BLD AUTO: 0.16 10*3/MM3 (ref 0–0.4)
EOSINOPHIL NFR BLD AUTO: 1.5 % (ref 0.3–6.2)
ERYTHROCYTE [DISTWIDTH] IN BLOOD BY AUTOMATED COUNT: 13.9 % (ref 12.3–15.4)
HCT VFR BLD AUTO: 45.3 % (ref 37.5–51)
HGB BLD-MCNC: 14.7 G/DL (ref 13–17.7)
IMM GRANULOCYTES # BLD AUTO: 0.07 10*3/MM3 (ref 0–0.05)
IMM GRANULOCYTES NFR BLD AUTO: 0.6 % (ref 0–0.5)
LYMPHOCYTES # BLD AUTO: 3.2 10*3/MM3 (ref 0.7–3.1)
LYMPHOCYTES NFR BLD AUTO: 29.4 % (ref 19.6–45.3)
MCH RBC QN AUTO: 26.4 PG (ref 26.6–33)
MCHC RBC AUTO-ENTMCNC: 32.5 G/DL (ref 31.5–35.7)
MCV RBC AUTO: 81.3 FL (ref 79–97)
MONOCYTES # BLD AUTO: 0.87 10*3/MM3 (ref 0.1–0.9)
MONOCYTES NFR BLD AUTO: 8 % (ref 5–12)
NEUTROPHILS NFR BLD AUTO: 59.7 % (ref 42.7–76)
NEUTROPHILS NFR BLD AUTO: 6.51 10*3/MM3 (ref 1.7–7)
NRBC BLD AUTO-RTO: 0 /100 WBC (ref 0–0.2)
PLATELET # BLD AUTO: 317 10*3/MM3 (ref 140–450)
PMV BLD AUTO: 9.1 FL (ref 6–12)
RBC # BLD AUTO: 5.57 10*6/MM3 (ref 4.14–5.8)
WBC # BLD AUTO: 10.9 10*3/MM3 (ref 3.4–10.8)

## 2020-11-30 PROCEDURE — 36415 COLL VENOUS BLD VENIPUNCTURE: CPT

## 2020-11-30 PROCEDURE — 85025 COMPLETE CBC W/AUTO DIFF WBC: CPT

## 2020-12-01 ENCOUNTER — OFFICE VISIT (OUTPATIENT)
Dept: ONCOLOGY | Facility: CLINIC | Age: 75
End: 2020-12-01

## 2020-12-01 ENCOUNTER — APPOINTMENT (OUTPATIENT)
Dept: LAB | Facility: HOSPITAL | Age: 75
End: 2020-12-01

## 2020-12-01 DIAGNOSIS — D75.1 ERYTHROCYTOSIS: Primary | ICD-10-CM

## 2020-12-01 PROCEDURE — 99441 PR PHYS/QHP TELEPHONE EVALUATION 5-10 MIN: CPT | Performed by: INTERNAL MEDICINE

## 2020-12-01 NOTE — PROGRESS NOTES
Subjective     REASON FOR FOLLOW UP:  Leukocytosis for 4 years, minimal erythrocytosis, normal platelet count, no splenomegaly by physical exam or ct abdomen.        History of Present Illness   I HAVE CALLED THIS PATIENT ON THE PHONE TODAY AND VERBALLY HAS CONSENTED ABOUT TELEMEDICINE VISIT       This patient states today that he has been feeling terrific for the last several months. He remains extremely physically active cooking, cleaning the house, helping his wife, going to work on a regular schedule and he has no physical limitations. He has not had any new vascular events. His appetite is good, his weight is stable. Bowel function and urination remain unchanged. As usual because he is the cook in the house diabetes management is not the best and he remains obese. He has not had any other new issues.               Past Medical History:   Diagnosis Date   • Arthritis    • Atherosclerosis of native coronary artery without angina pectoris    • Chest pain    • Coronary artery disease    • Diabetes mellitus (CMS/HCC)    • Difficulty breathing    • Diverticulosis    • Dizziness    • ED (erectile dysfunction)    • GERD (gastroesophageal reflux disease)    • Hyperlipidemia    • Hypertension    • Kidney stones    • Obesity    • RSV (respiratory syncytial virus infection)    • S/P angioplasty with stent    • Stroke (CMS/HCC)    • Tubular adenoma of colon 09/2019    Removed by colonoscopy by Dr. Weems   • Type 2 diabetes mellitus (CMS/HCC)         Past Surgical History:   Procedure Laterality Date   • CARDIAC CATHETERIZATION N/A 02/10/2011    Normal LV systolic function; Kind of diffuse, small vessel, distal disease. Most of it is not amenable to PCI, but his JADEN lesion is and it is a large vessel; All of his prior durg-eluting stents are widely patent & look good, Dr. Anibal Oliveira   • CATARACT EXTRACTION Bilateral    • COLONOSCOPY N/A 09/13/2004    Normal, Repeat in 5-8 years, Dr. Davion Sanon   • COLONOSCOPY N/A  09/04/2009    Mutiple small & large-mouthed diverticual were found in the entire colon; Exam otherwise normal, Dr. Davion Sanon   • COLONOSCOPY N/A 9/26/2019    Procedure: COLONOSCOPY to  cecum with cold biopsy polypectomy;  Surgeon: Haider Weems MD;  Location: [facility] ENDOSCOPY;  Service: General   • COLONOSCOPY W/ POLYPECTOMY N/A [id number]    2 benign polyps, [facility], Dr. Davion Sanon   • CORONARY ANGIOPLASTY WITH STENT PLACEMENT N/A 11/20/2007    Successful multivessel drug eluting stent implantation for restenosis above his prior stenting, Dr. Anibal Wray   • CORONARY ANGIOPLASTY WITH STENT PLACEMENT N/A 06/05/2007    Successful multivessel drug eluting stent implantation in all 3 of the major coronary arteries, Dr. Anibal Wray   • CORONARY ANGIOPLASTY WITH STENT PLACEMENT N/A 02/10/2011    Successful drug-stenting of the JADEN, Dr. Anibal Oliveira   • ENDOSCOPY N/A 11/21/2017    Procedure: ESOPHAGOGASTRODUODENOSCOPY WITH COLD BIOPSIES AND 53 F MICHAEL DILATION;  Surgeon: Wisam Potter MD;  Location: [facility] ENDOSCOPY;  Service:    • EYE SURGERY Left 2011    Retina   • HYDROCELECTOMY Left 08/19/2011    Dr. Ellie Stein   • INCISION AND DRAINAGE PERIRECTAL ABSCESS N/A 11/30/2007    Dr. Davion Sanon   • LUMBAR EPIDURAL INJECTION N/A 2012-2013    x 3   • NEUROMA SURGERY Right 07/19/2005    Excision Neuroma Right Arm; PATH: CONSISTENT W/ LIPOMA,  Dr. Davion Sanon   • ROTATOR CUFF REPAIR Right 2006   • ROTATOR CUFF REPAIR Left 2000   • UPPER GASTROINTESTINAL ENDOSCOPY N/A 09/06/2012    Normal esophagus; Normal Stomach, Normal examinded duodenum, Dr. Bruno Turk   • WRIST FRACTURE SURGERY Right 06/03/2003    ORIF Right Distal Radius, Dr. Ryan Bravo        Current Outpatient Medications on File Prior to Visit   Medication Sig Dispense Refill   • acyclovir (ZOVIRAX) 400 MG tablet Take 400 mg by mouth As Needed. Take no more than 5 doses a day.     • aspirin 81 MG EC tablet Take 81  mg by mouth daily.     • brimonidine (ALPHAGAN) 0.15 % ophthalmic solution Apply 1 drop to eye 2 (two) times a day.     • clopidogrel (PLAVIX) 75 MG tablet Take 75 mg by mouth Every Night.     • Dulaglutide (TRULICITY) 1.5 MG/0.5ML solution pen-injector Inject 1.5 mg under the skin into the appropriate area as directed Every 7 (Seven) Days. 12 pen 1   • insulin aspart (novoLOG FLEXPEN) 100 UNIT/ML solution pen-injector sc pen INJECT 20 UNITS IN THE MORNING 20 UNITS AT LUNCH AND 20 UNITS AT DINNER (Patient taking differently: Inject 60 Units under the skin into the appropriate area as directed 3 (Three) Times a Day With Meals. 60 units tid with meals)     • insulin detemir (LEVEMIR) 100 UNIT/ML injection INJECT 60 UNITS AT NIGHT (Patient taking differently: Inject 74 Units under the skin into the appropriate area as directed Every Night.)  12   • isosorbide mononitrate (IMDUR) 30 MG 24 hr tablet Take 30 mg by mouth daily.     • latanoprost (XALATAN) 0.005 % ophthalmic solution Apply 1 drop to eye daily.     • lisinopril (PRINIVIL,ZESTRIL) 20 MG tablet TAKE 1 TABLET BY MOUTH EVERY DAY 90 tablet 3   • metFORMIN (GLUCOPHAGE) 1000 MG tablet Take 0.5 tablets by mouth 2 (Two) Times a Day With Meals. Take 2 tablets bid     • metoprolol tartrate (LOPRESSOR) 25 MG tablet TAKE 1 TABLET BY MOUTH TWICE DAILY 180 tablet 2   • nitroglycerin (NITROSTAT) 0.4 MG SL tablet Place 0.4 mg under the tongue Every 5 (Five) Minutes As Needed for Chest Pain. Take no more than 3 doses in 15 minutes.     • Omega 3 1000 MG capsule Take 1 capsule by mouth 2 (Two) Times a Day.     • omeprazole (priLOSEC) 20 MG capsule Take 20 mg by mouth Daily.     • pilocarpine (PILOCAR) 1 % ophthalmic solution 1 drop 2 (Two) Times a Day.     • simvastatin (ZOCOR) 40 MG tablet Take 40 mg by mouth Every Night.     • timolol (TIMOPTIC) 0.5 % ophthalmic solution 1 drop daily. INSTILL 1 DROP IN LEFT EYE EVERY MORNING AS DIRECTED  6     No current  facility-administered medications on file prior to visit.         ALLERGIES:  No Known Allergies     Social History     Socioeconomic History   • Marital status:      Spouse name: Kerry   • Number of children: Not on file   • Years of education: College   • Highest education level: Not on file   Occupational History   • Occupation: Auto      Employer: SELF-EMPLOYED   Tobacco Use   • Smoking status: Never Smoker   • Smokeless tobacco: Never Used   • Tobacco comment: no caffeine   Substance and Sexual Activity   • Alcohol use: No   • Drug use: No   • Sexual activity: Defer     Partners: Female        Family History   Problem Relation Age of Onset   • Heart disease Mother    • Prostate cancer Father    • Heart disease Father    • Colon polyps Daughter    • Colon polyps Daughter    • No Known Problems Maternal Grandmother    • No Known Problems Maternal Grandfather    • No Known Problems Paternal Grandmother    • No Known Problems Paternal Grandfather         Review of Systems               General: no fever, no chills, no fatigue,no weight changes, no lack of appetite.  Eyes: no epiphora, xerophthalmia,conjunctivitis, pain, glaucoma, blurred vision, blindness, secretion, photophobia, proptosis, diplopia.  Ears: no otorrhea, tinnitus, otorrhagia, deafness, pain, vertigo.  Nose: no rhinorrhea, no epistaxis, no alteration in perception of odors, no sinuses pressure.  Mouth: no alteration in gums or teeth,  No ulcers, no difficulty with mastication or deglut ion, no odynophagia.  Neck: no masses or pain, no thyroid alterations, no pain in muscles or arteries, no carotid odynia, no crepitation.  Respiratory: no cough, no sputum production,no dyspnea,no trepopnea, no pleuritic pain,no hemoptysis.  Heart: no syncope, no irregularity, no palpitations, no angina,no orthopnea,no paroxysmal nocturnal dyspnea.  Vascular Venous: no tenderness,no edema,no palpable cords,no postphlebitic syndrome, no skin changes no  ulcerations.  Vascular Arterial: no distal ischemia, noclaudication, no gangrene, no neuropathic ischemic pain, no skin ulcers, no paleness no cyanosis.  GI: no dysphagia, no odynophagia, no regurgitation, no heartburn,no indigestion,no nausea,no vomiting,no hematemesis ,no melena,no jaundice,no distention, no obstipation,no enterorrhagia,no proctalgia,no anal  lesions, no changes in bowel habits.  : no frequency, no hesitancy, no hematuria, no discharge,no  pain.  Musculoskeletal: no muscle or tendon pain or inflammation,no  joint pain, no edema, no functional limitation,no fasciculations, no mass.  Neurologic: no headache, no seizures, noalterations on Craneal nerves, no motor deficit, no sensory deficit, normal coordination, no alteration in memory,normal orientation, calculation,normal writting, verbal and written language.  Skin: no rashes,no pruritus no localized lesions.  Psychiatric: no anxiety, no depression,no agitation, no delusions, proper insight.    Objective     There were no vitals filed for this visit.  Current Status 1/7/2020   ECOG score 0       Physical Exam NONE HE SOUND NORMAL ON THE PHONE              RECENT LABS:  Hematology WBC   Date Value Ref Range Status   11/30/2020 10.90 (H) 3.40 - 10.80 10*3/mm3 Final     RBC   Date Value Ref Range Status   11/30/2020 5.57 4.14 - 5.80 10*6/mm3 Final     Hemoglobin   Date Value Ref Range Status   11/30/2020 14.7 13.0 - 17.7 g/dL Final     Hematocrit   Date Value Ref Range Status   11/30/2020 45.3 37.5 - 51.0 % Final     Platelets   Date Value Ref Range Status   11/30/2020 317 140 - 450 10*3/mm3 Final            Assessment/Plan    1. This patient has history of leukocytosis that in my opinion is very likely an element of myeloproliferative disorder. We never have persuaded the patient to proceed with bone marrow testing. We have done BCR-ABL in the past that has been negative, JAK2 mutation that has been negative and we have run laboratory testing  looking for inflammatory or infectious etiologies that have been negative. Nevertheless, his white count remains minimally elevated.       The patient was seen by telephonic visit on 12/01/2020. He is asymptomatic at this time completely in control of his health. His diabetes management is not ideal, he loves to eat but he remains physically very active and his weight remains relatively stable. The good news with his blood work yesterday is that his white count is borderline 10,000. The hemoglobin and hematocrit are good, in fact his hematocrit is 45, his platelet count is normal and the white count differential is normal. I talked with him on the telephone about the good news about these numbers and I find no need for him to have a phlebotomy at this point. What I have recommended for him is to have a blood count every couple of months with nurse visit. If the hematocrit is above 45 he will have a phlebotomy otherwise doctor visit in 6 months. I encouraged him to remain on his aspirin and Plavix.     The duration of the telephonic visit was 6 minutes.     I DISCUSSED WITH PATIENT IN DETAIL FORMS TO DECREASE CHANCES OF CORONAVIRUS INFECTION INCLUDING ISOLATION, PROPER HAND HIGIENE, AVOID PUBLIC PLACES  WITH CROWDS, FOLLOW  CDC RECOMENDATIONS, AND KEEP PERSONAL AND SOCIAL RESPONSIBILITY, WARE A MASK IN PUBLIC PLACES.  PATIENT IS AWARE THIS INFECTION COULD HAVE SEVERE CONSEQUENCES TO PERSONAL HEALTH AND FAMILY RAMIFICATIONS OF THIS.

## 2021-01-03 PROBLEM — I63.9 ACUTE CVA (CEREBROVASCULAR ACCIDENT): Chronic | Status: RESOLVED | Noted: 2018-03-01 | Resolved: 2021-01-03

## 2021-01-03 PROBLEM — E66.01 MORBID OBESITY DUE TO EXCESS CALORIES: Chronic | Status: ACTIVE | Noted: 2017-03-07

## 2021-01-03 PROBLEM — J18.9 PNEUMONIA DUE TO INFECTIOUS ORGANISM: Status: RESOLVED | Noted: 2017-04-14 | Resolved: 2021-01-03

## 2021-01-03 NOTE — PROGRESS NOTES
"Chief Complaint  Hypertension (New patient), Hyperlipidemia, Diabetes, and Obesity    Subjective    History of Present Illness      Anthony Vaughn presents to [facility] for   History of Present Illness     74 yo wm presents as a new patient to establish care and discuss HTN, HLD, DM2 and morbid obesity; former patient of Dr. Diaz.     Eye exam UTD last month with  [facility]. Hasn't seen Dr. Mckeon since onset of pandemic. SMBG 110-140. Notes a couple of lows in the 80s about once a month when he forgets to eat. Currently taking 74 units Levemir at night and Novolog 60 units tid with meals. Denies chest pain or shortness of breath. Has never needed NTG. Exercises about once a month. Stent for CAD in 2011--followed by Dr. Wray.      Eye exam twice yearly with Dr. Valencia--also has glaucoma.     Continues to work about 60-80 hours a week. Feels good.     Intermittent use of Prilosec--once a month.     Prostate checked in the past with Dr. Stein who retired 3 years ago--none since that time.     [facility] flu shot [facility].     Recent MRI brain (11/17/2020) with Dr. Diaz after episode of vertigo; no acute infarct. Carotid doppler revealed normal r-ICA with mild stenosis in L-ICA. Reports it turned out to be an inner ear issue.       Objective   Vital Signs:   /76   Pulse 76   Temp 97.3 °F (36.3 °C)   Resp 16   Ht 180.3 cm (71\")   Wt 98 kg (216 lb)   BMI 30.13 kg/m²     Physical Exam  Vitals signs reviewed.   Constitutional:       Appearance: Normal appearance. He is obese.   HENT:      Head: Normocephalic and atraumatic.   Neck:      Musculoskeletal: Neck supple.      Thyroid: No thyromegaly.      Vascular: No carotid bruit.   Cardiovascular:      Rate and Rhythm: Normal rate and regular rhythm.      Heart sounds: Normal heart sounds.   Pulmonary:      Effort: Pulmonary effort is normal.      Breath sounds: Normal breath sounds. "   Abdominal:      General: Bowel sounds are normal. There is no distension.      Palpations: Abdomen is soft.      Tenderness: There is no abdominal tenderness.   Genitourinary:     Prostate: Normal.      Rectum: Normal.   Musculoskeletal:      Comments: Ambulates without assistance; no clubbing, cyanosis or edema.    Skin:     General: Skin is warm and dry.   Neurological:      Mental Status: He is alert.   Psychiatric:         Mood and Affect: Mood normal.         Behavior: Behavior normal.        Result Review :     CMP    CMP 11/17/20   Creatinine 1.00      Comments are available for some flowsheets but are not being displayed.           CBC    CBC 3/3/20 5/15/20 11/30/20   WBC 13.59 (A) 12.20 (A) 10.90 (A)   RBC 5.00 5.69 5.57   Hemoglobin 13.2 14.3 14.7   Hematocrit 41.3 44.7 45.3   MCV 82.6 78.6 (A) 81.3   MCH 26.4 (A) 25.1 (A) 26.4 (A)   MCHC 32.0 32.0 32.5   RDW 12.7 14.4 13.9   Platelets 383 334 317   (A) Abnormal value                    Data reviewed: Radiologic studies MRI brain and US carotids 11/17/2020          Diagnoses and all orders for this visit:    1. Essential hypertension (Primary)  Comments:  Not controlled. Increase Lisinopril to 40 mg daily. Weight loss advised via dietary changes and 150 minutes exercise weekly. Eye exam UTD.   Orders:  -     Comprehensive Metabolic Panel; Future  -     lisinopril (PRINIVIL,ZESTRIL) 40 MG tablet; Take 1 tablet by mouth Daily.  Dispense: 90 tablet; Refill: 1    2. Hyperlipidemia, unspecified hyperlipidemia type  Comments:  Both atorvastatin and simvastatin listed on med list but he has only brought in atorvastatin--simvastatin removed from list.   Orders:  -     Lipid Panel With / Chol / HDL Ratio; Future    3. Type 2 diabetes with nephropathy (CMS/[facility])  Comments:  Records requested. Labs as below. Last A1C available 7.5%. Eye exam UTD. Weight loss via dietary changes and 150 minutes weekly exercise.   Orders:  -     Hemoglobin A1c; Future  -      Microalbumin / Creatinine Urine Ratio - Urine, Clean Catch; Future    4. Obesity (BMI 30.0-34.9)  Comments:  Weight loss advised via dietary changes and 150 minutes exercise weekly. Encouraged 10# weight loss over the next 6 months.     5. Need for hepatitis C screening test  -     Hepatitis C Antibody; Future    6. Prostate cancer screening  Comments:  With Dr. Stein in the past but none for 3 years.   Orders:  -     PSA Screen; Future    7. Healthcare maintenance  Comments:  [facility].     Follow Up   Return in about 6 weeks (around 2/15/2021) for Lab B4 FUP, Medicare Wellness.    Patient was given instructions and counseling regarding his condition or for health maintenance advice. Please see specific information pulled into the AVS if appropriate.

## 2021-01-04 ENCOUNTER — OFFICE VISIT (OUTPATIENT)
Dept: INTERNAL MEDICINE | Facility: CLINIC | Age: 76
End: 2021-01-04

## 2021-01-04 VITALS
TEMPERATURE: 97.3 F | DIASTOLIC BLOOD PRESSURE: 76 MMHG | SYSTOLIC BLOOD PRESSURE: 160 MMHG | WEIGHT: 216 LBS | HEIGHT: 71 IN | BODY MASS INDEX: 30.24 KG/M2 | HEART RATE: 76 BPM | RESPIRATION RATE: 16 BRPM

## 2021-01-04 DIAGNOSIS — Z11.59 NEED FOR HEPATITIS C SCREENING TEST: ICD-10-CM

## 2021-01-04 DIAGNOSIS — E11.21 TYPE 2 DIABETES WITH NEPHROPATHY (HCC): Chronic | ICD-10-CM

## 2021-01-04 DIAGNOSIS — E66.9 OBESITY (BMI 30.0-34.9): ICD-10-CM

## 2021-01-04 DIAGNOSIS — I10 ESSENTIAL HYPERTENSION: Primary | Chronic | ICD-10-CM

## 2021-01-04 DIAGNOSIS — Z12.5 PROSTATE CANCER SCREENING: ICD-10-CM

## 2021-01-04 DIAGNOSIS — Z00.00 HEALTHCARE MAINTENANCE: ICD-10-CM

## 2021-01-04 DIAGNOSIS — E78.5 HYPERLIPIDEMIA, UNSPECIFIED HYPERLIPIDEMIA TYPE: ICD-10-CM

## 2021-01-04 PROCEDURE — 99214 OFFICE O/P EST MOD 30 MIN: CPT | Performed by: NURSE PRACTITIONER

## 2021-01-04 RX ORDER — ATORVASTATIN CALCIUM 10 MG/1
10 TABLET, FILM COATED ORAL DAILY
COMMUNITY
End: 2021-02-15

## 2021-01-04 RX ORDER — LISINOPRIL 40 MG/1
40 TABLET ORAL DAILY
Qty: 90 TABLET | Refills: 1 | Status: SHIPPED | OUTPATIENT
Start: 2021-01-04 | End: 2021-06-28

## 2021-01-04 NOTE — PATIENT INSTRUCTIONS
Your Lisinopril; has been increased to 40 mg daily because your blood pressure is too high. I will see you back in 6 weeks.

## 2021-01-21 RX ORDER — SODIUM CHLORIDE 9 MG/ML
250 INJECTION, SOLUTION INTRAVENOUS ONCE
Status: CANCELLED | OUTPATIENT
Start: 2021-01-26

## 2021-01-26 ENCOUNTER — INFUSION (OUTPATIENT)
Dept: ONCOLOGY | Facility: HOSPITAL | Age: 76
End: 2021-01-26

## 2021-01-26 ENCOUNTER — LAB (OUTPATIENT)
Dept: LAB | Facility: HOSPITAL | Age: 76
End: 2021-01-26

## 2021-01-26 DIAGNOSIS — D75.1 ERYTHROCYTOSIS: ICD-10-CM

## 2021-01-26 LAB
BASOPHILS # BLD AUTO: 0.09 10*3/MM3 (ref 0–0.2)
BASOPHILS NFR BLD AUTO: 0.8 % (ref 0–1.5)
DEPRECATED RDW RBC AUTO: 39.6 FL (ref 37–54)
EOSINOPHIL # BLD AUTO: 0.23 10*3/MM3 (ref 0–0.4)
EOSINOPHIL NFR BLD AUTO: 1.9 % (ref 0.3–6.2)
ERYTHROCYTE [DISTWIDTH] IN BLOOD BY AUTOMATED COUNT: 13.6 % (ref 12.3–15.4)
HCT VFR BLD AUTO: 45 % (ref 37.5–51)
HGB BLD-MCNC: 14.7 G/DL (ref 13–17.7)
IMM GRANULOCYTES # BLD AUTO: 0.07 10*3/MM3 (ref 0–0.05)
IMM GRANULOCYTES NFR BLD AUTO: 0.6 % (ref 0–0.5)
LYMPHOCYTES # BLD AUTO: 3.31 10*3/MM3 (ref 0.7–3.1)
LYMPHOCYTES NFR BLD AUTO: 27.6 % (ref 19.6–45.3)
MCH RBC QN AUTO: 26.5 PG (ref 26.6–33)
MCHC RBC AUTO-ENTMCNC: 32.7 G/DL (ref 31.5–35.7)
MCV RBC AUTO: 81.1 FL (ref 79–97)
MONOCYTES # BLD AUTO: 1.06 10*3/MM3 (ref 0.1–0.9)
MONOCYTES NFR BLD AUTO: 8.8 % (ref 5–12)
NEUTROPHILS NFR BLD AUTO: 60.3 % (ref 42.7–76)
NEUTROPHILS NFR BLD AUTO: 7.24 10*3/MM3 (ref 1.7–7)
NRBC BLD AUTO-RTO: 0 /100 WBC (ref 0–0.2)
PLATELET # BLD AUTO: 340 10*3/MM3 (ref 140–450)
PMV BLD AUTO: 8.9 FL (ref 6–12)
RBC # BLD AUTO: 5.55 10*6/MM3 (ref 4.14–5.8)
WBC # BLD AUTO: 12 10*3/MM3 (ref 3.4–10.8)

## 2021-01-26 PROCEDURE — 36415 COLL VENOUS BLD VENIPUNCTURE: CPT

## 2021-01-26 PROCEDURE — 85025 COMPLETE CBC W/AUTO DIFF WBC: CPT

## 2021-01-26 NOTE — NURSING NOTE
Pt here for a phlebotomy for hct >45%. Pt's hct is 45% today. Phlebo held. Pt given a copy of labs and v/u.

## 2021-02-08 DIAGNOSIS — I10 ESSENTIAL HYPERTENSION: Primary | ICD-10-CM

## 2021-02-08 LAB
BUN SERPL-MCNC: 14 MG/DL (ref 8–23)
BUN/CREAT SERPL: 13.1 (ref 7–25)
CALCIUM SERPL-MCNC: 9.7 MG/DL (ref 8.6–10.5)
CHLORIDE SERPL-SCNC: 101 MMOL/L (ref 98–107)
CO2 SERPL-SCNC: 27.7 MMOL/L (ref 22–29)
CREAT SERPL-MCNC: 1.07 MG/DL (ref 0.76–1.27)
GLUCOSE SERPL-MCNC: 205 MG/DL (ref 65–99)
POTASSIUM SERPL-SCNC: 4.8 MMOL/L (ref 3.5–5.2)
SODIUM SERPL-SCNC: 137 MMOL/L (ref 136–145)

## 2021-02-14 NOTE — PROGRESS NOTES
The ABCs of the Annual Wellness Visit  Subsequent Medicare Wellness Visit    Chief Complaint   Patient presents with   • Medicare Wellness-subsequent   • Hypertension       Subjective   History of Present Illness:  Anthony Vaughn is a 76 y.o. male who presents for a Subsequent Medicare Wellness Visit.    Also presents for follow up regarding HTN for which Lisinopril was increased at previous visit to 40 mg daily. Denies CP or dyspnea. H/O CAD with stent placement--tolerates Lipitor without complaints. Willing to increase the dose.     HEALTH RISK ASSESSMENT    Recent Hospitalizations:  No hospitalization(s) within the last year.    Current Medical Providers:  Patient Care Team:  Mary Anne Ludwig APRN as PCP - General (Family Medicine)  Gricel Diaz MD as PCP - Family Medicine  Anibal Wray MD as Consulting Physician (Cardiology)  Guillermo Live MD as Consulting Physician  Norman Mahmood MD as Consulting Physician (Ophthalmology)  Cristino Mckeon MD as Consulting Physician (Endocrinology)  Jeannie Wheeler APRN as Nurse Practitioner (Neurology)  Cesar Galaviz MD as Consulting Physician (Hematology and Oncology)    Smoking Status:  Social History     Tobacco Use   Smoking Status Never Smoker   Smokeless Tobacco Never Used   Tobacco Comment    no caffeine       Alcohol Consumption:  Social History     Substance and Sexual Activity   Alcohol Use No       Depression Screen:   PHQ-2/PHQ-9 Depression Screening 2/15/2021   Little interest or pleasure in doing things 0   Feeling down, depressed, or hopeless 0   Total Score 0       Fall Risk Screen:  SHANT Fall Risk Assessment was completed, and patient is at LOW risk for falls.Assessment completed on:2/15/2021    Health Habits and Functional and Cognitive Screening:  Functional & Cognitive Status 2/15/2021   Do you have difficulty preparing food and eating? No   Do you have difficulty bathing yourself, getting dressed or grooming  yourself? No   Do you have difficulty using the toilet? No   Do you have difficulty moving around from place to place? No   Do you have trouble with steps or getting out of a bed or a chair? No   Current Diet Well Balanced Diet   Dental Exam Up to date   Eye Exam Up to date   Exercise (times per week) 4 times per week   Current Exercise Activities Include Walking   Do you need help using the phone?  No   Are you deaf or do you have serious difficulty hearing?  No   Do you need help with transportation? No   Do you need help shopping? No   Do you need help preparing meals?  No   Do you need help with housework?  No   Do you need help with laundry? No   Do you need help taking your medications? No   Do you need help managing money? No   Do you ever drive or ride in a car without wearing a seat belt? No   Have you felt unusual stress, anger or loneliness in the last month? No   Who do you live with? Spouse   If you need help, do you have trouble finding someone available to you? No   Have you been bothered in the last four weeks by sexual problems? No         Does the patient have evidence of cognitive impairment? No    Asprin use counseling:Taking ASA appropriately as indicated    Age-appropriate Screening Schedule:  Refer to the list below for future screening recommendations based on patient's age, sex and/or medical conditions. Orders for these recommended tests are listed in the plan section. The patient has been provided with a written plan.    Health Maintenance   Topic Date Due   • ZOSTER VACCINE (1 of 2) 01/07/1994   • URINE MICROALBUMIN  04/30/2020   • HEMOGLOBIN A1C  05/22/2020   • LIPID PANEL  11/22/2020   • DIABETIC EYE EXAM  04/13/2021   • COLONOSCOPY  09/26/2024   • TDAP/TD VACCINES (2 - Td) 11/12/2027   • INFLUENZA VACCINE  Completed          The following portions of the patient's history were reviewed and updated as appropriate: allergies, current medications, past family history, past medical  history, past social history, past surgical history and problem list.    Outpatient Medications Prior to Visit   Medication Sig Dispense Refill   • acyclovir (ZOVIRAX) 400 MG tablet Take 400 mg by mouth As Needed. Take no more than 5 doses a day.     • aspirin 81 MG EC tablet Take 81 mg by mouth daily.     • brimonidine (ALPHAGAN) 0.15 % ophthalmic solution Apply 1 drop to eye 2 (two) times a day.     • clopidogrel (PLAVIX) 75 MG tablet Take 75 mg by mouth Every Night.     • Dulaglutide (TRULICITY) 1.5 MG/0.5ML solution pen-injector Inject 1.5 mg under the skin into the appropriate area as directed Every 7 (Seven) Days. 12 pen 1   • insulin aspart (novoLOG FLEXPEN) 100 UNIT/ML solution pen-injector sc pen INJECT 20 UNITS IN THE MORNING 20 UNITS AT LUNCH AND 20 UNITS AT DINNER (Patient taking differently: Inject 60 Units under the skin into the appropriate area as directed 3 (Three) Times a Day With Meals. 60 units tid with meals)     • insulin detemir (LEVEMIR) 100 UNIT/ML injection INJECT 60 UNITS AT NIGHT (Patient taking differently: Inject 74 Units under the skin into the appropriate area as directed Every Night.)  12   • isosorbide mononitrate (IMDUR) 30 MG 24 hr tablet Take 30 mg by mouth daily.     • latanoprost (XALATAN) 0.005 % ophthalmic solution Apply 1 drop to eye daily.     • lisinopril (PRINIVIL,ZESTRIL) 40 MG tablet Take 1 tablet by mouth Daily. 90 tablet 1   • metFORMIN (GLUCOPHAGE) 1000 MG tablet Take 0.5 tablets by mouth 2 (Two) Times a Day With Meals. Take 2 tablets bid     • metoprolol tartrate (LOPRESSOR) 25 MG tablet TAKE 1 TABLET BY MOUTH TWICE DAILY 180 tablet 2   • nitroglycerin (NITROSTAT) 0.4 MG SL tablet Place 0.4 mg under the tongue Every 5 (Five) Minutes As Needed for Chest Pain. Take no more than 3 doses in 15 minutes.     • Omega 3 1000 MG capsule Take 1 capsule by mouth 2 (Two) Times a Day.     • omeprazole (priLOSEC) 20 MG capsule Take 20 mg by mouth Daily.     • pilocarpine  "(PILOCAR) 1 % ophthalmic solution 1 drop 2 (Two) Times a Day.     • timolol (TIMOPTIC) 0.5 % ophthalmic solution 1 drop daily. INSTILL 1 DROP IN LEFT EYE EVERY MORNING AS DIRECTED  6   • atorvastatin (LIPITOR) 10 MG tablet Take 10 mg by mouth Daily.       No facility-administered medications prior to visit.        Patient Active Problem List   Diagnosis   • Coronary artery disease involving native coronary artery without angina pectoris   • S/P angioplasty with stent   • Hyperlipidemia   • Essential hypertension   • Type 2 diabetes with nephropathy (CMS/HCC)   • Microalbuminuria   • DM type 2 with diabetic peripheral neuropathy (CMS/HCC)   • Morbid obesity due to excess calories (CMS/[facility])   • Bandemia   • Erythrocytosis   • Diverticulitis of large intestine without perforation or abscess without bleeding   • Osteoarthritis of left acromioclavicular joint   • Trapezius muscle spasm       Advanced Care Planning:  ACP discussion was held with the patient during this visit. Patient has an advance directive in EMR which is still valid.     Review of Systems    Compared to one year ago, the patient feels his physical health is the same.  Compared to one year ago, the patient feels his mental health is the same.    Reviewed chart for potential of high risk medication in the elderly: yes  Reviewed chart for potential of harmful drug interactions in the elderly:yes    Objective         Vitals:    02/15/21 1102   BP: 124/66   Pulse: 80   Resp: 16   Temp: 97.1 °F (36.2 °C)   Weight: 95.3 kg (210 lb)   Height: 180.3 cm (71\")       Body mass index is 29.29 kg/m².  Discussed the patient's BMI with him. The BMI is above average; BMI management plan is completed.    Physical Exam  Vitals signs reviewed.   Constitutional:       Appearance: Normal appearance. He is well-developed.   HENT:      Head: Normocephalic and atraumatic.   Neck:      Musculoskeletal: Neck supple.   Cardiovascular:      Rate and Rhythm: Normal rate and regular " rhythm.      Heart sounds: Normal heart sounds, S1 normal and S2 normal.   Pulmonary:      Effort: Pulmonary effort is normal.      Breath sounds: Normal breath sounds.   Musculoskeletal:      Comments: Ambulates without assistance; no clubbing, cyanosis or edema.    Skin:     General: Skin is warm and dry.   Neurological:      Mental Status: He is alert.   Psychiatric:         Mood and Affect: Mood normal.         Behavior: Behavior normal.         Lab Results   Component Value Date     (H) 02/08/2021        Assessment/Plan   Medicare Risks and Personalized Health Plan  CMS Preventative Services Quick Reference  Cardiovascular risk  Obesity/Overweight     The above risks/problems have been discussed with the patient.  Pertinent information has been shared with the patient in the After Visit Summary.  Follow up plans and orders are seen below in the Assessment/Plan Section.    Diagnoses and all orders for this visit:    1. Medicare annual wellness visit, subsequent (Primary)  Comments:  Weight loss via dietary changes and exercise advised.     2. Overweight (BMI 25.0-29.9)  Comments:  Weight loss via dietary changes and 150 minutes aerobic exercise weekly advised.     3. Essential hypertension  Comments:  Controlled. Continue Lisinopril 40 mg daily.     4. Hyperlipidemia, unspecified hyperlipidemia type  Comments:  H/O angioplasty with stent placed--increase atorvastatin to 20 mg daily and titrate up at follow up visit in April if tolerated.   Orders:  -     atorvastatin (Lipitor) 20 MG tablet; Take 1 tablet by mouth Daily.  Dispense: 90 tablet; Refill: 1      Follow Up:  Return in about 8 weeks (around 4/12/2021) for Lab B4 FUP.     Lab orders previously placed for upcoming visit in April.     Records reviewed include previous OV with myself as well as labs.     An After Visit Summary and PPPS were given to the patient.

## 2021-02-15 ENCOUNTER — OFFICE VISIT (OUTPATIENT)
Dept: INTERNAL MEDICINE | Facility: CLINIC | Age: 76
End: 2021-02-15

## 2021-02-15 VITALS
TEMPERATURE: 97.1 F | WEIGHT: 210 LBS | DIASTOLIC BLOOD PRESSURE: 66 MMHG | SYSTOLIC BLOOD PRESSURE: 124 MMHG | BODY MASS INDEX: 29.4 KG/M2 | HEIGHT: 71 IN | RESPIRATION RATE: 16 BRPM | HEART RATE: 80 BPM

## 2021-02-15 DIAGNOSIS — E78.5 HYPERLIPIDEMIA, UNSPECIFIED HYPERLIPIDEMIA TYPE: Chronic | ICD-10-CM

## 2021-02-15 DIAGNOSIS — E66.3 OVERWEIGHT (BMI 25.0-29.9): ICD-10-CM

## 2021-02-15 DIAGNOSIS — I10 ESSENTIAL HYPERTENSION: Chronic | ICD-10-CM

## 2021-02-15 DIAGNOSIS — Z00.00 MEDICARE ANNUAL WELLNESS VISIT, SUBSEQUENT: Primary | ICD-10-CM

## 2021-02-15 PROCEDURE — G0439 PPPS, SUBSEQ VISIT: HCPCS | Performed by: NURSE PRACTITIONER

## 2021-02-15 PROCEDURE — 99213 OFFICE O/P EST LOW 20 MIN: CPT | Performed by: NURSE PRACTITIONER

## 2021-02-15 RX ORDER — ATORVASTATIN CALCIUM 20 MG/1
20 TABLET, FILM COATED ORAL DAILY
Qty: 90 TABLET | Refills: 1 | Status: SHIPPED | OUTPATIENT
Start: 2021-02-15 | End: 2021-09-20

## 2021-03-15 ENCOUNTER — BULK ORDERING (OUTPATIENT)
Dept: CASE MANAGEMENT | Facility: OTHER | Age: 76
End: 2021-03-15

## 2021-03-15 DIAGNOSIS — Z23 IMMUNIZATION DUE: ICD-10-CM

## 2021-03-23 ENCOUNTER — INFUSION (OUTPATIENT)
Dept: ONCOLOGY | Facility: HOSPITAL | Age: 76
End: 2021-03-23

## 2021-03-23 ENCOUNTER — LAB (OUTPATIENT)
Dept: LAB | Facility: HOSPITAL | Age: 76
End: 2021-03-23

## 2021-03-23 VITALS
BODY MASS INDEX: 30.04 KG/M2 | WEIGHT: 215.4 LBS | SYSTOLIC BLOOD PRESSURE: 127 MMHG | DIASTOLIC BLOOD PRESSURE: 70 MMHG | OXYGEN SATURATION: 94 % | HEART RATE: 67 BPM | TEMPERATURE: 97.2 F | RESPIRATION RATE: 16 BRPM

## 2021-03-23 DIAGNOSIS — D75.1 ERYTHROCYTOSIS: Primary | ICD-10-CM

## 2021-03-23 DIAGNOSIS — D75.1 ERYTHROCYTOSIS: ICD-10-CM

## 2021-03-23 LAB
BASOPHILS # BLD AUTO: 0.09 10*3/MM3 (ref 0–0.2)
BASOPHILS NFR BLD AUTO: 0.7 % (ref 0–1.5)
DEPRECATED RDW RBC AUTO: 41.6 FL (ref 37–54)
EOSINOPHIL # BLD AUTO: 0.21 10*3/MM3 (ref 0–0.4)
EOSINOPHIL NFR BLD AUTO: 1.6 % (ref 0.3–6.2)
ERYTHROCYTE [DISTWIDTH] IN BLOOD BY AUTOMATED COUNT: 13.8 % (ref 12.3–15.4)
HCT VFR BLD AUTO: 46.8 % (ref 37.5–51)
HGB BLD-MCNC: 15.2 G/DL (ref 13–17.7)
IMM GRANULOCYTES # BLD AUTO: 0.06 10*3/MM3 (ref 0–0.05)
IMM GRANULOCYTES NFR BLD AUTO: 0.5 % (ref 0–0.5)
LYMPHOCYTES # BLD AUTO: 3.54 10*3/MM3 (ref 0.7–3.1)
LYMPHOCYTES NFR BLD AUTO: 27.3 % (ref 19.6–45.3)
MCH RBC QN AUTO: 27 PG (ref 26.6–33)
MCHC RBC AUTO-ENTMCNC: 32.5 G/DL (ref 31.5–35.7)
MCV RBC AUTO: 83.1 FL (ref 79–97)
MONOCYTES # BLD AUTO: 1.09 10*3/MM3 (ref 0.1–0.9)
MONOCYTES NFR BLD AUTO: 8.4 % (ref 5–12)
NEUTROPHILS NFR BLD AUTO: 61.5 % (ref 42.7–76)
NEUTROPHILS NFR BLD AUTO: 7.96 10*3/MM3 (ref 1.7–7)
NRBC BLD AUTO-RTO: 0 /100 WBC (ref 0–0.2)
PLATELET # BLD AUTO: 340 10*3/MM3 (ref 140–450)
PMV BLD AUTO: 8.8 FL (ref 6–12)
RBC # BLD AUTO: 5.63 10*6/MM3 (ref 4.14–5.8)
WBC # BLD AUTO: 12.95 10*3/MM3 (ref 3.4–10.8)

## 2021-03-23 PROCEDURE — 36415 COLL VENOUS BLD VENIPUNCTURE: CPT

## 2021-03-23 PROCEDURE — 85025 COMPLETE CBC W/AUTO DIFF WBC: CPT

## 2021-03-23 PROCEDURE — 99195 PHLEBOTOMY: CPT

## 2021-03-23 RX ORDER — SODIUM CHLORIDE 9 MG/ML
250 INJECTION, SOLUTION INTRAVENOUS ONCE
Status: CANCELLED | OUTPATIENT
Start: 2021-03-23

## 2021-04-09 ENCOUNTER — OFFICE VISIT (OUTPATIENT)
Dept: ENDOCRINOLOGY | Age: 76
End: 2021-04-09

## 2021-04-09 VITALS
DIASTOLIC BLOOD PRESSURE: 72 MMHG | OXYGEN SATURATION: 96 % | HEIGHT: 71 IN | HEART RATE: 71 BPM | BODY MASS INDEX: 35.42 KG/M2 | SYSTOLIC BLOOD PRESSURE: 146 MMHG | WEIGHT: 253 LBS

## 2021-04-09 DIAGNOSIS — Z95.820 S/P ANGIOPLASTY WITH STENT: ICD-10-CM

## 2021-04-09 DIAGNOSIS — E11.21 TYPE 2 DIABETES WITH NEPHROPATHY (HCC): Primary | Chronic | ICD-10-CM

## 2021-04-09 DIAGNOSIS — E78.5 HYPERLIPIDEMIA, UNSPECIFIED HYPERLIPIDEMIA TYPE: Chronic | ICD-10-CM

## 2021-04-09 DIAGNOSIS — R80.9 MICROALBUMINURIA: ICD-10-CM

## 2021-04-09 DIAGNOSIS — I10 ESSENTIAL HYPERTENSION: Chronic | ICD-10-CM

## 2021-04-09 DIAGNOSIS — I25.10 CORONARY ARTERY DISEASE INVOLVING NATIVE CORONARY ARTERY OF NATIVE HEART WITHOUT ANGINA PECTORIS: ICD-10-CM

## 2021-04-09 PROCEDURE — 99214 OFFICE O/P EST MOD 30 MIN: CPT | Performed by: INTERNAL MEDICINE

## 2021-04-09 NOTE — PROGRESS NOTES
Subjective   Anthony Vaughn is a 76 y.o. male.     F/u for dm 2, hyperlipidemia, CAD/ testing bs 1-2 x week / last dm eye exam Mar 2019/ last dm foot exam today with dr Mckeon         Patient has known diabetes mellitus since 2006 and started on insulin in 2010. He is on Levemir 74 units every evening, NovoLog 60 units 3 times a day with meals, Trulicity 1.5 mg once a week and metformin 1000 mg twice a day.  BS .  He has few hypoglycemic episodes. He eats out 3-4 meals per week due to his work.   His last meal was 8 AM     His last eye examination was in 10/20 and he has retinopathy. He has microalbuminuria on urine sample taken last 4/19. He is on lisinopril. He denies numbness in his toes     He has known coronary artery disease and had multiple angioplasty and stents in the past. He denies any previous history of myocardial infarction. He has not had a coronary artery bypass surgery. He denies any chest pains, exertional dyspnea or PND.   He follows with Dr. Wray. He is on Imdur, lisinopril, metoprolol tartrate and nitroglycerin sublingual as needed.  He walks on treadmill 1-2 times a day.     He has hyperlipidemia and has been on atorvastatin 20 mg every PM and Lovaza 1 capsule BID. He denies any myalgia.     He had tubular adenoma of colon removed by Dr. Weems by colonoscopy in September 2019.  He denies bowel complaints.  He was advised follow-up colonoscopy in 2024.    He had 2 Moderna Covid vaccines  The following portions of the patient's history were reviewed and updated as appropriate: allergies, current medications, past family history, past medical history, past social history, past surgical history and problem list.    Review of Systems   Constitutional: Negative for fatigue.   HENT: Negative.    Eyes: Negative for visual disturbance.   Respiratory: Negative.    Cardiovascular: Negative for chest pain and palpitations.   Gastrointestinal: Negative.    Genitourinary: Negative.     Musculoskeletal: Negative for myalgias.   Neurological: Negative for numbness.   Hematological: Negative for adenopathy. Does not bruise/bleed easily.     Objective   Physical Exam  Constitutional:       General: He is not in acute distress.     Appearance: Normal appearance. He is not ill-appearing, toxic-appearing or diaphoretic.   Eyes:      General: No scleral icterus.        Right eye: No discharge.         Left eye: No discharge.   Neck:      Vascular: No carotid bruit.   Cardiovascular:      Rate and Rhythm: Normal rate and regular rhythm.      Heart sounds: No murmur heard.   No friction rub. No gallop.    Pulmonary:      Effort: No respiratory distress.      Breath sounds: Normal breath sounds. No stridor. No wheezing, rhonchi or rales.   Chest:      Chest wall: No tenderness.   Abdominal:      General: There is no distension.      Palpations: Abdomen is soft. There is no mass.   Musculoskeletal:         General: No swelling or tenderness. Normal range of motion.      Cervical back: Normal range of motion and neck supple.   Lymphadenopathy:      Cervical: No cervical adenopathy.   Neurological:      General: No focal deficit present.      Mental Status: He is alert and oriented to person, place, and time.   Psychiatric:         Mood and Affect: Mood normal.         Behavior: Behavior normal.       Lab on 03/23/2021   Component Date Value Ref Range Status   • WBC 03/23/2021 12.95* 3.40 - 10.80 10*3/mm3 Final   • RBC 03/23/2021 5.63  4.14 - 5.80 10*6/mm3 Final   • Hemoglobin 03/23/2021 15.2  13.0 - 17.7 g/dL Final   • Hematocrit 03/23/2021 46.8  37.5 - 51.0 % Final   • MCV 03/23/2021 83.1  79.0 - 97.0 fL Final   • MCH 03/23/2021 27.0  26.6 - 33.0 pg Final   • MCHC 03/23/2021 32.5  31.5 - 35.7 g/dL Final   • RDW 03/23/2021 13.8  12.3 - 15.4 % Final   • RDW-SD 03/23/2021 41.6  37.0 - 54.0 fl Final   • MPV 03/23/2021 8.8  6.0 - 12.0 fL Final   • Platelets 03/23/2021 340  140 - 450 10*3/mm3 Final   • Neutrophil  % 03/23/2021 61.5  42.7 - 76.0 % Final   • Lymphocyte % 03/23/2021 27.3  19.6 - 45.3 % Final   • Monocyte % 03/23/2021 8.4  5.0 - 12.0 % Final   • Eosinophil % 03/23/2021 1.6  0.3 - 6.2 % Final   • Basophil % 03/23/2021 0.7  0.0 - 1.5 % Final   • Immature Grans % 03/23/2021 0.5  0.0 - 0.5 % Final   • Neutrophils, Absolute 03/23/2021 7.96* 1.70 - 7.00 10*3/mm3 Final   • Lymphocytes, Absolute 03/23/2021 3.54* 0.70 - 3.10 10*3/mm3 Final   • Monocytes, Absolute 03/23/2021 1.09* 0.10 - 0.90 10*3/mm3 Final   • Eosinophils, Absolute 03/23/2021 0.21  0.00 - 0.40 10*3/mm3 Final   • Basophils, Absolute 03/23/2021 0.09  0.00 - 0.20 10*3/mm3 Final   • Immature Grans, Absolute 03/23/2021 0.06* 0.00 - 0.05 10*3/mm3 Final   • nRBC 03/23/2021 0.0  0.0 - 0.2 /100 WBC Final     Assessment/Plan   Diagnoses and all orders for this visit:    1. Type 2 diabetes with nephropathy (CMS/[facility]) (Primary)  -     Comprehensive Metabolic Panel  -     Lipid Panel  -     Hemoglobin A1c  -     TSH  -     T4, Free  -     Microalbumin / Creatinine Urine Ratio - Urine, Clean Catch    2. Coronary artery disease involving native coronary artery of native heart without angina pectoris  -     Comprehensive Metabolic Panel  -     Lipid Panel    3. S/P angioplasty with stent  -     Comprehensive Metabolic Panel  -     Lipid Panel    4. Microalbuminuria    5. Essential hypertension  -     Comprehensive Metabolic Panel    6. Hyperlipidemia, unspecified hyperlipidemia type  -     Comprehensive Metabolic Panel  -     Lipid Panel      Continue Levemir, NovoLog, and Trulicity.  Continue atorvastatin 20 mg/day and Lovaza 1000 mg twice daily.  Continue Imdur, lisinopril, and metoprolol.    Copy of my note sent to Mary Anne Ludwig NP     Follow-up in 4 months

## 2021-04-10 LAB
ALBUMIN SERPL-MCNC: 4.5 G/DL (ref 3.5–5.2)
ALBUMIN/CREAT UR: 39 MG/G CREAT (ref 0–29)
ALBUMIN/GLOB SERPL: 2.1 G/DL
ALP SERPL-CCNC: 48 U/L (ref 39–117)
ALT SERPL-CCNC: 16 U/L (ref 1–41)
AST SERPL-CCNC: 18 U/L (ref 1–40)
BILIRUB SERPL-MCNC: 0.5 MG/DL (ref 0–1.2)
BUN SERPL-MCNC: 14 MG/DL (ref 8–23)
BUN/CREAT SERPL: 12.5 (ref 7–25)
CALCIUM SERPL-MCNC: 10 MG/DL (ref 8.6–10.5)
CHLORIDE SERPL-SCNC: 102 MMOL/L (ref 98–107)
CHOLEST SERPL-MCNC: 104 MG/DL (ref 0–200)
CO2 SERPL-SCNC: 27.8 MMOL/L (ref 22–29)
CREAT SERPL-MCNC: 1.12 MG/DL (ref 0.76–1.27)
CREAT UR-MCNC: 152.2 MG/DL
GLOBULIN SER CALC-MCNC: 2.1 GM/DL
GLUCOSE SERPL-MCNC: 98 MG/DL (ref 65–99)
HBA1C MFR BLD: 7.5 % (ref 4.8–5.6)
HDLC SERPL-MCNC: 33 MG/DL (ref 40–60)
IMP & REVIEW OF LAB RESULTS: NORMAL
LDLC SERPL CALC-MCNC: 43 MG/DL (ref 0–100)
Lab: NORMAL
MICROALBUMIN UR-MCNC: 59.4 UG/ML
POTASSIUM SERPL-SCNC: 4.8 MMOL/L (ref 3.5–5.2)
PROT SERPL-MCNC: 6.6 G/DL (ref 6–8.5)
SODIUM SERPL-SCNC: 141 MMOL/L (ref 136–145)
T4 FREE SERPL-MCNC: 1.11 NG/DL (ref 0.93–1.7)
TRIGL SERPL-MCNC: 169 MG/DL (ref 0–150)
TSH SERPL DL<=0.005 MIU/L-ACNC: 2.25 UIU/ML (ref 0.27–4.2)
VLDLC SERPL CALC-MCNC: 28 MG/DL (ref 5–40)

## 2021-05-18 ENCOUNTER — LAB (OUTPATIENT)
Dept: LAB | Facility: HOSPITAL | Age: 76
End: 2021-05-18

## 2021-05-18 ENCOUNTER — APPOINTMENT (OUTPATIENT)
Dept: ONCOLOGY | Facility: HOSPITAL | Age: 76
End: 2021-05-18

## 2021-05-18 ENCOUNTER — OFFICE VISIT (OUTPATIENT)
Dept: ONCOLOGY | Facility: CLINIC | Age: 76
End: 2021-05-18

## 2021-05-18 VITALS
OXYGEN SATURATION: 97 % | TEMPERATURE: 97.8 F | SYSTOLIC BLOOD PRESSURE: 127 MMHG | HEIGHT: 71 IN | DIASTOLIC BLOOD PRESSURE: 73 MMHG | RESPIRATION RATE: 20 BRPM | HEART RATE: 80 BPM | WEIGHT: 214.3 LBS | BODY MASS INDEX: 30 KG/M2

## 2021-05-18 DIAGNOSIS — D75.1 ERYTHROCYTOSIS: Primary | ICD-10-CM

## 2021-05-18 DIAGNOSIS — D75.1 ERYTHROCYTOSIS: ICD-10-CM

## 2021-05-18 LAB
BASOPHILS # BLD AUTO: 0.1 10*3/MM3 (ref 0–0.2)
BASOPHILS NFR BLD AUTO: 0.7 % (ref 0–1.5)
DEPRECATED RDW RBC AUTO: 38.1 FL (ref 37–54)
EOSINOPHIL # BLD AUTO: 0.16 10*3/MM3 (ref 0–0.4)
EOSINOPHIL NFR BLD AUTO: 1.2 % (ref 0.3–6.2)
ERYTHROCYTE [DISTWIDTH] IN BLOOD BY AUTOMATED COUNT: 13 % (ref 12.3–15.4)
HCT VFR BLD AUTO: 43.9 % (ref 37.5–51)
HGB BLD-MCNC: 14 G/DL (ref 13–17.7)
IMM GRANULOCYTES # BLD AUTO: 0.06 10*3/MM3 (ref 0–0.05)
IMM GRANULOCYTES NFR BLD AUTO: 0.4 % (ref 0–0.5)
LYMPHOCYTES # BLD AUTO: 3.19 10*3/MM3 (ref 0.7–3.1)
LYMPHOCYTES NFR BLD AUTO: 23.5 % (ref 19.6–45.3)
MCH RBC QN AUTO: 26 PG (ref 26.6–33)
MCHC RBC AUTO-ENTMCNC: 31.9 G/DL (ref 31.5–35.7)
MCV RBC AUTO: 81.6 FL (ref 79–97)
MONOCYTES # BLD AUTO: 1.29 10*3/MM3 (ref 0.1–0.9)
MONOCYTES NFR BLD AUTO: 9.5 % (ref 5–12)
NEUTROPHILS NFR BLD AUTO: 64.7 % (ref 42.7–76)
NEUTROPHILS NFR BLD AUTO: 8.8 10*3/MM3 (ref 1.7–7)
NRBC BLD AUTO-RTO: 0 /100 WBC (ref 0–0.2)
PLATELET # BLD AUTO: 345 10*3/MM3 (ref 140–450)
PMV BLD AUTO: 9.3 FL (ref 6–12)
RBC # BLD AUTO: 5.38 10*6/MM3 (ref 4.14–5.8)
WBC # BLD AUTO: 13.6 10*3/MM3 (ref 3.4–10.8)

## 2021-05-18 PROCEDURE — 85025 COMPLETE CBC W/AUTO DIFF WBC: CPT

## 2021-05-18 PROCEDURE — 99213 OFFICE O/P EST LOW 20 MIN: CPT | Performed by: INTERNAL MEDICINE

## 2021-05-18 PROCEDURE — 36415 COLL VENOUS BLD VENIPUNCTURE: CPT

## 2021-05-18 NOTE — PROGRESS NOTES
Subjective     REASON FOR FOLLOW UP:  Leukocytosis for 4 years, minimal erythrocytosis, normal platelet count, no splenomegaly by physical exam or ct abdomen.        History of Present Illness   DURING THE VISIT WITH THE PATIENT TODAY , PATIENT HAD FACE MASK, I HAD PROPER PROTECTIVE EQUIPMENT, AND I DID HAND HYGIENE WITH SOAP AND WATER BEFORE AND AFTER THE VISIT.  This patient returns today to the office completely asymptomatic in regard to his previous history of erythrocytosis. He was BCR/ABL ad JAK2 mutation negative, no splenomegaly. He also has had minimal leukocytosis. He has not required any phlebotomy in a long time and he is not using any testosterone replacement therapy. Overall he feels very well. 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 His bowel activity and urination are normal. No cardiovascular or respiratory issues. He has had his 2 COVID vaccinations with no difficulty. His family is healthy. His wife is also my patient and she is doing fine.               Past Medical History:   Diagnosis Date   • Acute CVA (cerebrovascular accident) (CMS/HCC) 3/1/2018   • Chest pain    • Dizziness    • ED (erectile dysfunction)    • GERD (gastroesophageal reflux disease)    • Hyperlipidemia    • Kidney stones    • Pneumonia due to infectious organism 4/14/2017   • S/P angioplasty with stent    • Stroke (CMS/[facility])    • Tubular adenoma of colon 09/2019    Removed by colonoscopy by Dr. Weems        Past Surgical History:   Procedure Laterality Date   • CARDIAC CATHETERIZATION N/A 02/10/2011    Normal LV systolic function; Kind of diffuse, small vessel, distal disease. Most of it is not amenable to PCI, but his JADEN lesion is and it is a large vessel; All of his prior durg-eluting stents are widely patent & look good, Dr. Anibal Oliveira   • CATARACT EXTRACTION Bilateral    • COLONOSCOPY N/A 09/13/2004    Normal, Repeat in 5-8 years, Dr. Davion Sanon   • COLONOSCOPY N/A  09/04/2009    Mutiple small & large-mouthed diverticual were found in the entire colon; Exam otherwise normal, Dr. Davion Sanon   • COLONOSCOPY N/A 9/26/2019    Procedure: COLONOSCOPY to  cecum with cold biopsy polypectomy;  Surgeon: Haider Weems MD;  Location: [facility] ENDOSCOPY;  Service: General   • COLONOSCOPY W/ POLYPECTOMY N/A [id number]    2 benign polyps, [facility], Dr. Davion Sanon   • CORONARY ANGIOPLASTY WITH STENT PLACEMENT N/A 11/20/2007    Successful multivessel drug eluting stent implantation for restenosis above his prior stenting, Dr. Anibal Wray   • CORONARY ANGIOPLASTY WITH STENT PLACEMENT N/A 06/05/2007    Successful multivessel drug eluting stent implantation in all 3 of the major coronary arteries, Dr. Anibal Wray   • CORONARY ANGIOPLASTY WITH STENT PLACEMENT N/A 02/10/2011    Successful drug-stenting of the JADEN, Dr. Anibal Oliveira   • ENDOSCOPY N/A 11/21/2017    Procedure: ESOPHAGOGASTRODUODENOSCOPY WITH COLD BIOPSIES AND 53 F MICHAEL DILATION;  Surgeon: Wisam Potter MD;  Location: [facility] ENDOSCOPY;  Service:    • EYE SURGERY Left 2011    Retina   • HYDROCELECTOMY Left 08/19/2011    Dr. Ellie Stein   • INCISION AND DRAINAGE PERIRECTAL ABSCESS N/A 11/30/2007    Dr. Davion Sanon   • LUMBAR EPIDURAL INJECTION N/A 2012-2013    x 3   • NEUROMA SURGERY Right 07/19/2005    Excision Neuroma Right Arm; PATH: CONSISTENT W/ LIPOMA,  Dr. Davion Sanon   • ROTATOR CUFF REPAIR Right 2006   • ROTATOR CUFF REPAIR Left 2000   • UPPER GASTROINTESTINAL ENDOSCOPY N/A 09/06/2012    Normal esophagus; Normal Stomach, Normal examinded duodenum, Dr. Bruno Turk   • WRIST FRACTURE SURGERY Right 06/03/2003    ORIF Right Distal Radius, Dr. Ryan Bravo        Current Outpatient Medications on File Prior to Visit   Medication Sig Dispense Refill   • acyclovir (ZOVIRAX) 400 MG tablet Take 400 mg by mouth As Needed. Take no more than 5 doses a day.     • aspirin 81 MG EC tablet Take 81  mg by mouth daily.     • atorvastatin (Lipitor) 20 MG tablet Take 1 tablet by mouth Daily. 90 tablet 1   • brimonidine (ALPHAGAN) 0.15 % ophthalmic solution Apply 1 drop to eye 2 (two) times a day.     • clopidogrel (PLAVIX) 75 MG tablet Take 75 mg by mouth Every Night.     • Dulaglutide (TRULICITY) 1.5 MG/0.5ML solution pen-injector Inject 1.5 mg under the skin into the appropriate area as directed Every 7 (Seven) Days. 12 pen 1   • insulin aspart (novoLOG FLEXPEN) 100 UNIT/ML solution pen-injector sc pen INJECT 20 UNITS IN THE MORNING 20 UNITS AT LUNCH AND 20 UNITS AT DINNER (Patient taking differently: Inject 60 Units under the skin into the appropriate area as directed 3 (Three) Times a Day With Meals. 60 units tid with meals)     • insulin detemir (LEVEMIR) 100 UNIT/ML injection INJECT 60 UNITS AT NIGHT (Patient taking differently: Inject 74 Units under the skin into the appropriate area as directed Every Night.)  12   • isosorbide mononitrate (IMDUR) 30 MG 24 hr tablet Take 30 mg by mouth daily.     • latanoprost (XALATAN) 0.005 % ophthalmic solution Apply 1 drop to eye daily.     • lisinopril (PRINIVIL,ZESTRIL) 40 MG tablet Take 1 tablet by mouth Daily. 90 tablet 1   • metFORMIN (GLUCOPHAGE) 1000 MG tablet Take 0.5 tablets by mouth 2 (Two) Times a Day With Meals. Take 2 tablets bid     • metoprolol tartrate (LOPRESSOR) 25 MG tablet TAKE 1 TABLET BY MOUTH TWICE DAILY 180 tablet 2   • nitroglycerin (NITROSTAT) 0.4 MG SL tablet Place 0.4 mg under the tongue Every 5 (Five) Minutes As Needed for Chest Pain. Take no more than 3 doses in 15 minutes.     • Omega 3 1000 MG capsule Take 1 capsule by mouth 2 (Two) Times a Day.     • omeprazole (priLOSEC) 20 MG capsule Take 20 mg by mouth Daily.     • pilocarpine (PILOCAR) 1 % ophthalmic solution 1 drop 2 (Two) Times a Day.     • timolol (TIMOPTIC) 0.5 % ophthalmic solution 1 drop daily. INSTILL 1 DROP IN LEFT EYE EVERY MORNING AS DIRECTED  6     No current  "facility-administered medications on file prior to visit.        ALLERGIES:  No Known Allergies     Social History     Socioeconomic History   • Marital status:      Spouse name: Kerry   • Number of children: Not on file   • Years of education: College   • Highest education level: Not on file   Tobacco Use   • Smoking status: Never Smoker   • Smokeless tobacco: Never Used   • Tobacco comment: no caffeine   Substance and Sexual Activity   • Alcohol use: No   • Drug use: No   • Sexual activity: Defer     Partners: Female        Family History   Problem Relation Age of Onset   • Heart disease Mother    • Prostate cancer Father    • Heart disease Father    • Colon polyps Daughter    • Colon polyps Daughter      Objective     Vitals:    05/18/21 1333   BP: 127/73   Pulse: 80   Resp: 20   Temp: 97.8 °F (36.6 °C)   TempSrc: Temporal   SpO2: 97%   Weight: 97.2 kg (214 lb 4.8 oz)   Height: 180.3 cm (70.98\")   PainSc: 0-No pain     Current Status 5/18/2021   ECOG score 0       Physical Exam   I HAVE PERSONALLY REVIEWED THE HISTORY OF THE PRESENT ILLNESS, PAST MEDICAL HISTORY, FAMILY HISTORY, SOCIAL HISTORY, ALLERGIES, MEDICATIONS STATED ABOVE IN THE OFFICE NOTE FROM TODAY.        GENERAL:  Well-developed, well-nourished  Patient  in no acute distress.   SKIN:  Warm, dry ,NO rashes,NO purpura ,NO petechiae.  HEENT:  Pupils were equal and reactive to light and accomodation, conjunctivae noninjected, no pterygium, normal extraocular movements, normal visual acuity.   NECK:  Supple with good range of motion; no thyromegaly or masses, no JVD or bruits, no cervical adenopathies.No carotid artery pain, no carotid abnormal pulsation , NO arterial dance.  LYMPHATICS:  No cervical, NO supraclavicular, NO axillary,NO epitrochlear , NO inguinal adenopathy.  CARDIAC   normal rate and regular rhythm, without murmur,NO rubs NO S3 NO S4 right or left . VASCULAR VENOUS: no cyanosis, collateral circulation, varicosities, edema, palpable " cords, pain, erythema.  ABDOMEN:  Soft, nontender with no hepatomegaly, no splenomegaly,no masses, no ascites, no collateral circulation,no distention,no Sheldahl sign, no abdominal pain, no inguinal hernias,no umbilical hernia, no abdominal bruits. Normal bowel sounds.  GENITAL: Not  Performed.  EXTREMITIES  AND SPINE:  No clubbing, cyanosis or edema, no deformities or pain .No kyphosis, scoliosis, deformities or pain in spine, ribs or pelvic bone.  NEUROLOGICAL:  Patient was awake, alert, oriented to time, person and place.              RECENT LABS:  Hematology WBC   Date Value Ref Range Status   05/18/2021 13.60 (H) 3.40 - 10.80 10*3/mm3 Final     RBC   Date Value Ref Range Status   05/18/2021 5.38 4.14 - 5.80 10*6/mm3 Final     Hemoglobin   Date Value Ref Range Status   05/18/2021 14.0 13.0 - 17.7 g/dL Final     Hematocrit   Date Value Ref Range Status   05/18/2021 43.9 37.5 - 51.0 % Final     Platelets   Date Value Ref Range Status   05/18/2021 345 140 - 450 10*3/mm3 Final            Assessment/Plan    1. This patient has history of leukocytosis that in my opinion is very likely an element of myeloproliferative disorder. We never have persuaded the patient to proceed with bone marrow testing. We have done BCR-ABL in the past that has been negative, JAK2 mutation that has been negative and we have run laboratory testing looking for inflammatory or infectious etiologies that have been negative. Nevertheless, his white count remains minimally elevated.       The patient was seen by telephonic visit on 12/01/2020. He is asymptomatic at this time completely in control of his health. His diabetes management is not ideal, he loves to eat but he remains physically very active and his weight remains relatively stable. The good news with his blood work yesterday is that his white count is borderline 10,000. The hemoglobin and hematocrit are good, in fact his hematocrit is 45, his platelet count is normal and the white  count differential is normal. I talked with him on the telephone about the good news about these numbers and I find no need for him to have a phlebotomy at this point. What I have recommended for him is to have a blood count every couple of months with nurse visit. If the hematocrit is above 45 he will have a phlebotomy otherwise doctor visit in 6 months. I encouraged him to remain on his aspirin and Plavix.       The patient returned to the office on 05/18/2021 with no symptoms or signs of anything directed to his minimal leukocytosis. His hematocrit was 43. His platelet count and white count differential were normal. He had no splenomegaly. I advised him to remain in observation from my point of view returning to see us in 6 months and 1 year. Blood count will be done on each one of those occasions. I remind him that his hemoglobin A1C recently was in the 7 category and he needs to work on better control of his weight and portions of food.

## 2021-06-01 ENCOUNTER — OFFICE VISIT (OUTPATIENT)
Dept: CARDIOLOGY | Facility: CLINIC | Age: 76
End: 2021-06-01

## 2021-06-01 VITALS
HEIGHT: 70 IN | SYSTOLIC BLOOD PRESSURE: 122 MMHG | BODY MASS INDEX: 30.64 KG/M2 | WEIGHT: 214 LBS | DIASTOLIC BLOOD PRESSURE: 70 MMHG | HEART RATE: 60 BPM

## 2021-06-01 DIAGNOSIS — I10 ESSENTIAL HYPERTENSION: Chronic | ICD-10-CM

## 2021-06-01 DIAGNOSIS — I25.10 CORONARY ARTERY DISEASE INVOLVING NATIVE CORONARY ARTERY OF NATIVE HEART WITHOUT ANGINA PECTORIS: Primary | ICD-10-CM

## 2021-06-01 DIAGNOSIS — E78.5 HYPERLIPIDEMIA, UNSPECIFIED HYPERLIPIDEMIA TYPE: Chronic | ICD-10-CM

## 2021-06-01 PROCEDURE — 99214 OFFICE O/P EST MOD 30 MIN: CPT | Performed by: NURSE PRACTITIONER

## 2021-06-01 PROCEDURE — 93000 ELECTROCARDIOGRAM COMPLETE: CPT | Performed by: NURSE PRACTITIONER

## 2021-06-01 NOTE — PROGRESS NOTES
Date of Office Visit: 2021  Encounter Provider: ELBA Hughes  Place of Service: [facility] CARDIOLOGY  Patient Name: Anthony Vaughn  :1945    Chief Complaint   Patient presents with   • Coronary Artery Disease   :     HPI: Anthony Vaughn is a 76 y.o. male who presents today for follow-up.  Old records have been obtained and reviewed by me.  He is a patient of Dr. Wray's with a past cardiac history significant for coronary artery disease.  In , he underwent multivessel stenting of the LAD, RCA, and circumflex.  In , he had a stent to the JADEN.  In , he had a questionable TIA leading to an echocardiogram and Holter which were unremarkable.     He was last seen in the office by Dr. Wray in May 2020 at which time he was overall doing well with no complaints of angina or heart failure.  No changes were made to his medical regimen, and he was advised to follow-up in 1 year.   Overall, he has been doing well.  About 3 weeks ago, he had some soreness in his chest that lasted for about 2 days.  It was constant in nature and not made worse by exertion.  He tells me it felt like a muscle strain.  He denies any shortness of breath, palpitations, edema, dizziness, syncope, bleeding difficulties or melena.  He is able to walk and exert himself without any problem at all.  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    Past Medical History:   Diagnosis Date   • Acute CVA (cerebrovascular accident) (CMS/[facility]) 3/1/2018   • Chest pain    • Dizziness    • ED (erectile dysfunction)    • GERD (gastroesophageal reflux disease)    • Hyperlipidemia    • Kidney stones    • Pneumonia due to infectious organism 2017   • S/P angioplasty with stent    • Stroke (CMS/[facility])    • Tubular adenoma of colon 2019    Removed by colonoscopy by Dr. Weems       Past Surgical History:   Procedure Laterality Date   • CARDIAC CATHETERIZATION N/A 02/10/2011     Normal LV systolic function; Kind of diffuse, small vessel, distal disease. Most of it is not amenable to PCI, but his JADEN lesion is and it is a large vessel; All of his prior durg-eluting stents are widely patent & look good, Dr. Anibal Oliveira   • CATARACT EXTRACTION Bilateral    • COLONOSCOPY N/A 09/13/2004    Normal, Repeat in 5-8 years, Dr. Davion Sanon   • COLONOSCOPY N/A 09/04/2009    Mutiple small & large-mouthed diverticual were found in the entire colon; Exam otherwise normal, Dr. Davion Sanon   • COLONOSCOPY N/A 9/26/2019    Procedure: COLONOSCOPY to  cecum with cold biopsy polypectomy;  Surgeon: Haider Weems MD;  Location: [facility] ENDOSCOPY;  Service: General   • COLONOSCOPY W/ POLYPECTOMY N/A [id number]    2 benign polyps, [facility], Dr. Davion Sanon   • CORONARY ANGIOPLASTY WITH STENT PLACEMENT N/A 11/20/2007    Successful multivessel drug eluting stent implantation for restenosis above his prior stenting, Dr. Anibal Wray   • CORONARY ANGIOPLASTY WITH STENT PLACEMENT N/A 06/05/2007    Successful multivessel drug eluting stent implantation in all 3 of the major coronary arteries, Dr. Anibal Wray   • CORONARY ANGIOPLASTY WITH STENT PLACEMENT N/A 02/10/2011    Successful drug-stenting of the JADEN, Dr. Anibal Oliveira   • ENDOSCOPY N/A 11/21/2017    Procedure: ESOPHAGOGASTRODUODENOSCOPY WITH COLD BIOPSIES AND 53 F MICHAEL DILATION;  Surgeon: Wisam Potter MD;  Location: [facility] ENDOSCOPY;  Service:    • EYE SURGERY Left 2011    Retina   • HYDROCELECTOMY Left 08/19/2011    Dr. Ellie Stein   • INCISION AND DRAINAGE PERIRECTAL ABSCESS N/A 11/30/2007    Dr. Davion Sanon   • LUMBAR EPIDURAL INJECTION N/A 2012-2013    x 3   • NEUROMA SURGERY Right 07/19/2005    Excision Neuroma Right Arm; PATH: CONSISTENT W/ LIPOMA,  Dr. Davion Sanon   • ROTATOR CUFF REPAIR Right 2006   • ROTATOR CUFF REPAIR Left 2000   • UPPER GASTROINTESTINAL ENDOSCOPY N/A 09/06/2012    Normal esophagus;  Normal Stomach, Normal examinded duodenum, Dr. Bruno Turk   • WRIST FRACTURE SURGERY Right 06/03/2003    ORIF Right Distal Radius, Dr. Ryan Bravo       Social History     Socioeconomic History   • Marital status:      Spouse name: Kerry   • Number of children: Not on file   • Years of education: College   • Highest education level: Not on file   Tobacco Use   • Smoking status: Never Smoker   • Smokeless tobacco: Never Used   • Tobacco comment: no caffeine   Substance and Sexual Activity   • Alcohol use: No   • Drug use: No   • Sexual activity: Defer     Partners: Female       Family History   Problem Relation Age of Onset   • Heart disease Mother    • Prostate cancer Father    • Heart disease Father    • Colon polyps Daughter    • Colon polyps Daughter        Review of Systems   Constitutional: Negative.   Cardiovascular: Negative.  Negative for chest pain, dyspnea on exertion, leg swelling, orthopnea, paroxysmal nocturnal dyspnea and syncope.   Respiratory: Negative.    Hematologic/Lymphatic: Negative for bleeding problem.   Musculoskeletal: Negative for falls.   Gastrointestinal: Negative for melena.   Neurological: Negative for dizziness and light-headedness.       No Known Allergies      Current Outpatient Medications:   •  acyclovir (ZOVIRAX) 400 MG tablet, Take 400 mg by mouth As Needed. Take no more than 5 doses a day., Disp: , Rfl:   •  aspirin 81 MG EC tablet, Take 81 mg by mouth daily., Disp: , Rfl:   •  atorvastatin (Lipitor) 20 MG tablet, Take 1 tablet by mouth Daily., Disp: 90 tablet, Rfl: 1  •  brimonidine (ALPHAGAN) 0.15 % ophthalmic solution, Apply 1 drop to eye 2 (two) times a day., Disp: , Rfl:   •  clopidogrel (PLAVIX) 75 MG tablet, Take 75 mg by mouth Every Night., Disp: , Rfl:   •  Dulaglutide (TRULICITY) 1.5 MG/0.5ML solution pen-injector, Inject 1.5 mg under the skin into the appropriate area as directed Every 7 (Seven) Days., Disp: 12 pen, Rfl: 1  •  insulin aspart (novoLOG  "FLEXPEN) 100 UNIT/ML solution pen-injector sc pen, INJECT 20 UNITS IN THE MORNING 20 UNITS AT LUNCH AND 20 UNITS AT DINNER (Patient taking differently: Inject 60 Units under the skin into the appropriate area as directed 3 (Three) Times a Day With Meals. 60 units tid with meals), Disp: , Rfl:   •  insulin detemir (LEVEMIR) 100 UNIT/ML injection, INJECT 60 UNITS AT NIGHT (Patient taking differently: Inject 74 Units under the skin into the appropriate area as directed Every Night.), Disp: , Rfl: 12  •  isosorbide mononitrate (IMDUR) 30 MG 24 hr tablet, Take 30 mg by mouth daily., Disp: , Rfl:   •  latanoprost (XALATAN) 0.005 % ophthalmic solution, Apply 1 drop to eye daily., Disp: , Rfl:   •  lisinopril (PRINIVIL,ZESTRIL) 40 MG tablet, Take 1 tablet by mouth Daily., Disp: 90 tablet, Rfl: 1  •  metFORMIN (GLUCOPHAGE) 1000 MG tablet, Take 0.5 tablets by mouth 2 (Two) Times a Day With Meals. Take 2 tablets bid, Disp: , Rfl:   •  metoprolol tartrate (LOPRESSOR) 25 MG tablet, TAKE 1 TABLET BY MOUTH TWICE DAILY, Disp: 180 tablet, Rfl: 2  •  nitroglycerin (NITROSTAT) 0.4 MG SL tablet, Place 0.4 mg under the tongue Every 5 (Five) Minutes As Needed for Chest Pain. Take no more than 3 doses in 15 minutes., Disp: , Rfl:   •  Omega 3 1000 MG capsule, Take 1 capsule by mouth 2 (Two) Times a Day., Disp: , Rfl:   •  omeprazole (priLOSEC) 20 MG capsule, Take 20 mg by mouth Daily., Disp: , Rfl:   •  pilocarpine (PILOCAR) 1 % ophthalmic solution, 1 drop 2 (Two) Times a Day., Disp: , Rfl:   •  timolol (TIMOPTIC) 0.5 % ophthalmic solution, 1 drop daily. INSTILL 1 DROP IN LEFT EYE EVERY MORNING AS DIRECTED, Disp: , Rfl: 6      Objective:     Vitals:    06/01/21 0914   BP: 122/70   Pulse: 60   Weight: 97.1 kg (214 lb)   Height: 177.8 cm (70\")     Body mass index is 30.71 kg/m².    PHYSICAL EXAM:    Neck:      Vascular: No JVD.   Pulmonary:      Effort: Pulmonary effort is normal.      Breath sounds: Normal breath sounds.   Cardiovascular: "      Normal rate. Regular rhythm.      Murmurs: There is no murmur.      No gallop. No click. No rub.   Pulses:     Intact distal pulses.           ECG 12 Lead    Date/Time: 6/1/2021 9:29 AM  Performed by: Josseline Garrett APRN  Authorized by: Josseline Garrett APRN   Comparison: compared with previous ECG from 5/29/2020  Similar to previous ECG  Rhythm: sinus rhythm  Rate: normal  BPM: 60  T inversion: I, aVL and V6    Clinical impression: abnormal EKG  Comments: Indication: CAD              Assessment:       Diagnosis Plan   1. Coronary artery disease involving native coronary artery of native heart without angina pectoris     2. Essential hypertension     3. Hyperlipidemia, unspecified hyperlipidemia type       No orders of the defined types were placed in this encounter.         Plan:       1.  Coronary artery disease.  History of multivessel stenting.  He remains on aspirin and clopidogrel.  He is also on atorvastatin, isosorbide, and metoprolol.  His EKG is unchanged.  His reports of discomfort in his chest sound very atypical for angina.  It sounds more musculoskeletal.  I told him if it were to recur or he developed any new symptoms to call me.      2.  Hypertension.  His blood pressure looks great.  Continue current regimen with lisinopril, metoprolol.      3.  Hyperlipidemia.  He had his lipids checked in April and they looked great.  Continue atorvastatin.      I think he is stable and doing well.  He denies any symptoms of angina or heart failure.  I am not going to make any changes.  He will follow-up with Dr. Wray in 1 year or sooner if needed.  .    As always, it has been a pleasure to participate in your patient's care.      Sincerely,         ELBA Olmos

## 2021-06-21 ENCOUNTER — TELEPHONE (OUTPATIENT)
Dept: ONCOLOGY | Facility: CLINIC | Age: 76
End: 2021-06-21

## 2021-06-21 NOTE — TELEPHONE ENCOUNTER
CALLED SPOUSE BACK ABOUT THE APPT THAT THE PT HAD AN APPT ON 5/18/21 AND NOT 6/18/21 AS THE PT WAS SCHEDULED TO COME BACK IN 6 MTHS - PT SPOUSE IS AWARE THAT I AM MAILING APPT CARD OUT

## 2021-06-21 NOTE — TELEPHONE ENCOUNTER
Caller: Kerry Vaughn    Relationship to patient: Emergency Contact    Best call back number: [phone]    Chief complaint: SPOUSE CALLED STATING THAT THEY TOTALLY FORGOT ABOUT APPT LAST WEEK AND WANT TO RESCHEDULE. CHART INDICATES ACTIVE INFUSION PATIENT    Type of visit: LAB/FU    Requested date: FIRST AVAILABLE    If rescheduling, when is the original appointment: 6/18/21     Additional notes: PLEASE CONTACT Caitlin NOEL TO ADVISE    THANKS

## 2021-06-28 DIAGNOSIS — I10 ESSENTIAL HYPERTENSION: Chronic | ICD-10-CM

## 2021-06-28 RX ORDER — LISINOPRIL 40 MG/1
40 TABLET ORAL DAILY
Qty: 90 TABLET | Refills: 1 | Status: SHIPPED | OUTPATIENT
Start: 2021-06-28 | End: 2022-03-24

## 2021-09-07 NOTE — PROGRESS NOTES
Subjective   Chief Complaint   Patient presents with   • Blood in Urine       History of Present Illness   76 y.o. male presents with cc as above ongoing times one week. Strained right groin a week ago while exercising or stretching. Stayed sore a couple of days. A few days later he had blood in his urine which has since cleared up. Notes frequency but this is not new. Denies fever or chills. No pain now or blood in his urine. Currently on B-ASA and Plavix given history of CVA and CAD with stenting. He has a history of kidney stones greater than 10 years. No history of tobacco use.      Patient Active Problem List   Diagnosis   • Coronary artery disease involving native coronary artery without angina pectoris   • S/P angioplasty with stent   • Hyperlipidemia   • Essential hypertension   • Type 2 diabetes with nephropathy (CMS/[facility])   • Microalbuminuria   • DM type 2 with diabetic peripheral neuropathy (CMS/[facility])   • Morbid obesity due to excess calories (CMS/[facility])   • Bandemia   • Erythrocytosis   • Diverticulitis of large intestine without perforation or abscess without bleeding   • Osteoarthritis of left acromioclavicular joint   • Trapezius muscle spasm       No Known Allergies    Current Outpatient Medications on File Prior to Visit   Medication Sig Dispense Refill   • acyclovir (ZOVIRAX) 400 MG tablet Take 400 mg by mouth As Needed. Take no more than 5 doses a day.     • aspirin 81 MG EC tablet Take 81 mg by mouth daily.     • atorvastatin (Lipitor) 20 MG tablet Take 1 tablet by mouth Daily. 90 tablet 1   • brimonidine (ALPHAGAN) 0.15 % ophthalmic solution Apply 1 drop to eye 2 (two) times a day.     • clopidogrel (PLAVIX) 75 MG tablet Take 75 mg by mouth Every Night.     • Dulaglutide (TRULICITY) 1.5 MG/0.5ML solution pen-injector Inject 1.5 mg under the skin into the appropriate area as directed Every 7 (Seven) Days. 12 pen 1   • insulin aspart (novoLOG FLEXPEN) 100 UNIT/ML solution pen-injector sc pen INJECT 20  UNITS IN THE MORNING 20 UNITS AT LUNCH AND 20 UNITS AT DINNER (Patient taking differently: Inject 60 Units under the skin into the appropriate area as directed 3 (Three) Times a Day With Meals. 60 units tid with meals)     • insulin detemir (LEVEMIR) 100 UNIT/ML injection INJECT 60 UNITS AT NIGHT (Patient taking differently: Inject 74 Units under the skin into the appropriate area as directed Every Night.)  12   • isosorbide mononitrate (IMDUR) 30 MG 24 hr tablet Take 30 mg by mouth daily.     • latanoprost (XALATAN) 0.005 % ophthalmic solution Apply 1 drop to eye daily.     • lisinopril (PRINIVIL,ZESTRIL) 40 MG tablet TAKE 1 TABLET BY MOUTH DAILY 90 tablet 1   • metFORMIN (GLUCOPHAGE) 1000 MG tablet Take 0.5 tablets by mouth 2 (Two) Times a Day With Meals. Take 2 tablets bid     • metoprolol tartrate (LOPRESSOR) 25 MG tablet TAKE 1 TABLET BY MOUTH TWICE DAILY 180 tablet 2   • nitroglycerin (NITROSTAT) 0.4 MG SL tablet Place 0.4 mg under the tongue Every 5 (Five) Minutes As Needed for Chest Pain. Take no more than 3 doses in 15 minutes.     • Omega 3 1000 MG capsule Take 1 capsule by mouth 2 (Two) Times a Day.     • omeprazole (priLOSEC) 20 MG capsule Take 20 mg by mouth Daily.     • pilocarpine (PILOCAR) 1 % ophthalmic solution 1 drop 2 (Two) Times a Day.     • timolol (TIMOPTIC) 0.5 % ophthalmic solution 1 drop daily. INSTILL 1 DROP IN LEFT EYE EVERY MORNING AS DIRECTED  6     No current facility-administered medications on file prior to visit.       Past Medical History:   Diagnosis Date   • Acute CVA (cerebrovascular accident) (CMS/[facility]) 3/1/2018   • Chest pain    • Dizziness    • ED (erectile dysfunction)    • GERD (gastroesophageal reflux disease)    • Hyperlipidemia    • Kidney stones    • Pneumonia due to infectious organism 4/14/2017   • S/P angioplasty with stent    • Stroke (CMS/[facility])    • Tubular adenoma of colon 09/2019    Removed by colonoscopy by Dr. Weems       Family History   Problem Relation Age  of Onset   • Heart disease Mother    • Prostate cancer Father    • Heart disease Father    • Colon polyps Daughter    • Colon polyps Daughter        Social History     Socioeconomic History   • Marital status:      Spouse name: Kerry   • Number of children: Not on file   • Years of education: College   • Highest education level: Not on file   Tobacco Use   • Smoking status: Never Smoker   • Smokeless tobacco: Never Used   • Tobacco comment: no caffeine   Substance and Sexual Activity   • Alcohol use: No   • Drug use: No   • Sexual activity: Defer     Partners: Female       Past Surgical History:   Procedure Laterality Date   • CARDIAC CATHETERIZATION N/A 02/10/2011    Normal LV systolic function; Kind of diffuse, small vessel, distal disease. Most of it is not amenable to PCI, but his JADEN lesion is and it is a large vessel; All of his prior durg-eluting stents are widely patent & look good, Dr. Anibal Oliveira   • CATARACT EXTRACTION Bilateral    • COLONOSCOPY N/A 09/13/2004    Normal, Repeat in 5-8 years, Dr. Davion Sanon   • COLONOSCOPY N/A 09/04/2009    Mutiple small & large-mouthed diverticual were found in the entire colon; Exam otherwise normal, Dr. Davion Sanon   • COLONOSCOPY N/A 9/26/2019    Procedure: COLONOSCOPY to  cecum with cold biopsy polypectomy;  Surgeon: Haider Weems MD;  Location: [facility] ENDOSCOPY;  Service: General   • COLONOSCOPY W/ POLYPECTOMY N/A [id number]    2 benign polyps, [facility], Dr. Davion Sanon   • CORONARY ANGIOPLASTY WITH STENT PLACEMENT N/A 11/20/2007    Successful multivessel drug eluting stent implantation for restenosis above his prior stenting, Dr. Anibal Wray   • CORONARY ANGIOPLASTY WITH STENT PLACEMENT N/A 06/05/2007    Successful multivessel drug eluting stent implantation in all 3 of the major coronary arteries, Dr. Ainbal Wray   • CORONARY ANGIOPLASTY WITH STENT PLACEMENT N/A 02/10/2011    Successful drug-stenting of the JADEN, Dr. Barnett  "Tee   • ENDOSCOPY N/A 11/21/2017    Procedure: ESOPHAGOGASTRODUODENOSCOPY WITH COLD BIOPSIES AND 53 F MICHAEL DILATION;  Surgeon: Wisam Potter MD;  Location: [facility] ENDOSCOPY;  Service:    • EYE SURGERY Left 2011    Retina   • HYDROCELECTOMY Left 08/19/2011    Dr. Ellie Stein   • INCISION AND DRAINAGE PERIRECTAL ABSCESS N/A 11/30/2007    Dr. Davion Sanon   • LUMBAR EPIDURAL INJECTION N/A 2012-2013    x 3   • NEUROMA SURGERY Right 07/19/2005    Excision Neuroma Right Arm; PATH: CONSISTENT W/ LIPOMA,  Dr. Davion Sanon   • ROTATOR CUFF REPAIR Right 2006   • ROTATOR CUFF REPAIR Left 2000   • UPPER GASTROINTESTINAL ENDOSCOPY N/A 09/06/2012    Normal esophagus; Normal Stomach, Normal examinded duodenum, Dr. Bruno Turk   • WRIST FRACTURE SURGERY Right 06/03/2003    ORIF Right Distal Radius, Dr. Ryan Bravo       The following portions of the patient's history were reviewed and updated as appropriate: problem list, allergies, current medications, past medical history, past social history and past surgical history.    Review of Systems    Immunization History   Administered Date(s) Administered   • Fluzone High Dose =>65 Years ([employer] ONLY) 10/03/2016, 09/26/2017, 10/20/2018   • Hepatitis A 10/01/2019   • Pneumococcal Conjugate 13-Valent (PCV13) 11/22/2016   • Pneumococcal Polysaccharide (PPSV23) 11/22/2010   • Shingrix 01/01/2011, 01/01/2019   • Tdap 11/12/2017       Objective   Vitals:    09/09/21 0846   BP: 114/72   Pulse: 64   Resp: 16   Temp: 97.5 °F (36.4 °C)   Weight: 98 kg (216 lb)   Height: 177.8 cm (70\")     Body mass index is 30.99 kg/m².  Physical Exam  Vitals reviewed.   Constitutional:       Appearance: Normal appearance. He is well-developed. He is obese.   HENT:      Head: Normocephalic and atraumatic.   Cardiovascular:      Rate and Rhythm: Normal rate and regular rhythm.      Heart sounds: Normal heart sounds, S1 normal and S2 normal.   Pulmonary:      Effort: Pulmonary effort is " normal.      Breath sounds: Normal breath sounds.   Abdominal:      General: Bowel sounds are normal. There is no distension.      Palpations: Abdomen is soft.      Tenderness: There is no right CVA tenderness or left CVA tenderness.      Hernia: A hernia is present.      Comments: Mild LLQ tenderness--reports he always has this.    Genitourinary:     Prostate: Normal.      Rectum: Normal.   Skin:     General: Skin is warm and dry.   Neurological:      Mental Status: He is alert.   Psychiatric:         Mood and Affect: Mood normal.         Behavior: Behavior normal.         Procedures    Assessment/Plan   Diagnoses and all orders for this visit:    1. Painless hematuria (Primary)  Comments:  New problem. UA today negative. Imaging as below and consider cystoscopy. No S/SX of infection. RODRIGO unremarkable.    Orders:  -     POCT urinalysis dipstick, automated  -     CT Abdomen Pelvis With Contrast  -     Urinalysis With Microscopic - Urine, Clean Catch    2. History of kidney stones  Comments:  Greater than 10 years ago.     Records reviewed include previous OV with myself as well as labs.     No follow-ups on file.

## 2021-09-09 ENCOUNTER — OFFICE VISIT (OUTPATIENT)
Dept: INTERNAL MEDICINE | Facility: CLINIC | Age: 76
End: 2021-09-09

## 2021-09-09 VITALS
DIASTOLIC BLOOD PRESSURE: 72 MMHG | TEMPERATURE: 97.5 F | WEIGHT: 216 LBS | BODY MASS INDEX: 30.92 KG/M2 | SYSTOLIC BLOOD PRESSURE: 114 MMHG | RESPIRATION RATE: 16 BRPM | HEIGHT: 70 IN | HEART RATE: 64 BPM

## 2021-09-09 DIAGNOSIS — R31.9 PAINLESS HEMATURIA: Primary | Chronic | ICD-10-CM

## 2021-09-09 DIAGNOSIS — Z87.442 HISTORY OF KIDNEY STONES: ICD-10-CM

## 2021-09-09 LAB
BILIRUB BLD-MCNC: NEGATIVE MG/DL
CLARITY, POC: CLEAR
COLOR UR: YELLOW
GLUCOSE UR STRIP-MCNC: NEGATIVE MG/DL
KETONES UR QL: NEGATIVE
LEUKOCYTE EST, POC: NEGATIVE
NITRITE UR-MCNC: NEGATIVE MG/ML
PH UR: 5.5 [PH] (ref 5–8)
PROT UR STRIP-MCNC: NEGATIVE MG/DL
RBC # UR STRIP: NEGATIVE /UL
SP GR UR: 1.03 (ref 1–1.03)
UROBILINOGEN UR QL: NORMAL

## 2021-09-09 PROCEDURE — 81003 URINALYSIS AUTO W/O SCOPE: CPT | Performed by: NURSE PRACTITIONER

## 2021-09-09 PROCEDURE — 99214 OFFICE O/P EST MOD 30 MIN: CPT | Performed by: NURSE PRACTITIONER

## 2021-09-10 LAB
APPEARANCE UR: CLEAR
BACTERIA #/AREA URNS HPF: NORMAL /HPF
BILIRUB UR QL STRIP: NEGATIVE
CASTS URNS MICRO: NORMAL
COLOR UR: YELLOW
EPI CELLS #/AREA URNS HPF: NORMAL /HPF
GLUCOSE UR QL: ABNORMAL
HGB UR QL STRIP: NEGATIVE
KETONES UR QL STRIP: NEGATIVE
LEUKOCYTE ESTERASE UR QL STRIP: NEGATIVE
NITRITE UR QL STRIP: NEGATIVE
PH UR STRIP: 5.5 [PH] (ref 5–8)
PROT UR QL STRIP: NEGATIVE
RBC #/AREA URNS HPF: NORMAL /HPF
SP GR UR: 1.02 (ref 1–1.03)
UROBILINOGEN UR STRIP-MCNC: ABNORMAL MG/DL
WBC #/AREA URNS HPF: NORMAL /HPF

## 2021-09-20 DIAGNOSIS — E78.5 HYPERLIPIDEMIA, UNSPECIFIED HYPERLIPIDEMIA TYPE: Chronic | ICD-10-CM

## 2021-09-20 RX ORDER — ATORVASTATIN CALCIUM 20 MG/1
20 TABLET, FILM COATED ORAL DAILY
Qty: 90 TABLET | Refills: 1 | Status: SHIPPED | OUTPATIENT
Start: 2021-09-20 | End: 2022-03-24

## 2021-09-21 RX ORDER — LISINOPRIL 20 MG/1
TABLET ORAL
Qty: 90 TABLET | Refills: 3 | OUTPATIENT
Start: 2021-09-21

## 2021-09-27 ENCOUNTER — HOSPITAL ENCOUNTER (OUTPATIENT)
Dept: CT IMAGING | Facility: HOSPITAL | Age: 76
Discharge: HOME | End: 2021-09-27
Admitting: NURSE PRACTITIONER

## 2021-09-27 PROCEDURE — 0 DIATRIZOATE MEGLUMINE & SODIUM PER 1 ML: Performed by: NURSE PRACTITIONER

## 2021-09-27 PROCEDURE — 25010000002 IOPAMIDOL 61 % SOLUTION: Performed by: NURSE PRACTITIONER

## 2021-09-27 PROCEDURE — 74177 CT ABD & PELVIS W/CONTRAST: CPT

## 2021-09-27 PROCEDURE — 82565 ASSAY OF CREATININE: CPT

## 2021-09-27 RX ADMIN — DIATRIZOATE MEGLUMINE AND DIATRIZOATE SODIUM 30 ML: 660; 100 LIQUID ORAL; RECTAL at 13:55

## 2021-09-27 RX ADMIN — IOPAMIDOL 85 ML: 612 INJECTION, SOLUTION INTRAVENOUS at 15:18

## 2021-09-28 DIAGNOSIS — R31.9 PAINLESS HEMATURIA: Primary | ICD-10-CM

## 2021-09-28 LAB — CREAT BLDA-MCNC: 1.1 MG/DL (ref 0.6–1.3)

## 2021-09-29 ENCOUNTER — TELEPHONE (OUTPATIENT)
Dept: INTERNAL MEDICINE | Facility: CLINIC | Age: 76
End: 2021-09-29

## 2021-09-29 NOTE — TELEPHONE ENCOUNTER
Caller: Anthony Vaughn    Relationship: Self    Best call back number: [phone]    Caller requesting test results: YES    What test was performed: XRAY    When was the test performed: 09/27    Where was the test performed: [facility]    Additional notes: PATIENT REQUESTING LAB RESULTS.

## 2021-09-30 ENCOUNTER — TELEPHONE (OUTPATIENT)
Dept: INTERNAL MEDICINE | Facility: CLINIC | Age: 76
End: 2021-09-30

## 2021-09-30 NOTE — TELEPHONE ENCOUNTER
PATIENT CALLED FOR HIS CT SCAN RESULTS. PLEASE CALL [phone]    HE HAS CALLED SEVERAL TIMES TO GET THEM.

## 2021-09-30 NOTE — TELEPHONE ENCOUNTER
Caller: Anthony Vaughn    Relationship: Self    Best call back number: [phone]    What test was performed: LABS    When was the test performed: 9/27/21    Where was the test performed: IN OFFICE, [facility]    Additional notes: PATIENT WAS RETURNING DYLAN'S PHONE CALL, FOLLOWING UP ON LAB RESULTS.    PLEASE ADVISE PATIENT

## 2021-10-01 NOTE — TELEPHONE ENCOUNTER
Caller: Anthony Vaughn    Relationship to patient: Self    Best call back number: [phone]    Patient is needing: PATIENT RETURNED CALL, UNABLE TO WARM TRANSFER    PLEASE CALL AGAIN

## 2021-10-06 ENCOUNTER — APPOINTMENT (OUTPATIENT)
Dept: CT IMAGING | Facility: HOSPITAL | Age: 76
End: 2021-10-06

## 2021-11-02 ENCOUNTER — OFFICE VISIT (OUTPATIENT)
Dept: ONCOLOGY | Facility: CLINIC | Age: 76
End: 2021-11-02

## 2021-11-02 ENCOUNTER — LAB (OUTPATIENT)
Dept: LAB | Facility: HOSPITAL | Age: 76
End: 2021-11-02

## 2021-11-02 ENCOUNTER — INFUSION (OUTPATIENT)
Dept: ONCOLOGY | Facility: HOSPITAL | Age: 76
End: 2021-11-02

## 2021-11-02 VITALS
HEIGHT: 70 IN | RESPIRATION RATE: 16 BRPM | HEART RATE: 67 BPM | TEMPERATURE: 97.3 F | SYSTOLIC BLOOD PRESSURE: 172 MMHG | OXYGEN SATURATION: 96 % | WEIGHT: 216.7 LBS | BODY MASS INDEX: 31.02 KG/M2 | DIASTOLIC BLOOD PRESSURE: 76 MMHG

## 2021-11-02 DIAGNOSIS — D75.1 ERYTHROCYTOSIS: Primary | ICD-10-CM

## 2021-11-02 DIAGNOSIS — D72.829 LEUKOCYTOSIS, UNSPECIFIED TYPE: ICD-10-CM

## 2021-11-02 DIAGNOSIS — D75.1 ERYTHROCYTOSIS: ICD-10-CM

## 2021-11-02 LAB
BASOPHILS # BLD AUTO: 0.08 10*3/MM3 (ref 0–0.2)
BASOPHILS NFR BLD AUTO: 0.7 % (ref 0–1.5)
DEPRECATED RDW RBC AUTO: 40.1 FL (ref 37–54)
EOSINOPHIL # BLD AUTO: 0.21 10*3/MM3 (ref 0–0.4)
EOSINOPHIL NFR BLD AUTO: 1.8 % (ref 0.3–6.2)
ERYTHROCYTE [DISTWIDTH] IN BLOOD BY AUTOMATED COUNT: 13.4 % (ref 12.3–15.4)
HCT VFR BLD AUTO: 46.7 % (ref 37.5–51)
HGB BLD-MCNC: 15.1 G/DL (ref 13–17.7)
IMM GRANULOCYTES # BLD AUTO: 0.05 10*3/MM3 (ref 0–0.05)
IMM GRANULOCYTES NFR BLD AUTO: 0.4 % (ref 0–0.5)
LYMPHOCYTES # BLD AUTO: 2.48 10*3/MM3 (ref 0.7–3.1)
LYMPHOCYTES NFR BLD AUTO: 21.2 % (ref 19.6–45.3)
MCH RBC QN AUTO: 26.8 PG (ref 26.6–33)
MCHC RBC AUTO-ENTMCNC: 32.3 G/DL (ref 31.5–35.7)
MCV RBC AUTO: 82.8 FL (ref 79–97)
MONOCYTES # BLD AUTO: 1.01 10*3/MM3 (ref 0.1–0.9)
MONOCYTES NFR BLD AUTO: 8.6 % (ref 5–12)
NEUTROPHILS NFR BLD AUTO: 67.3 % (ref 42.7–76)
NEUTROPHILS NFR BLD AUTO: 7.86 10*3/MM3 (ref 1.7–7)
NRBC BLD AUTO-RTO: 0 /100 WBC (ref 0–0.2)
PLATELET # BLD AUTO: 327 10*3/MM3 (ref 140–450)
PMV BLD AUTO: 8.9 FL (ref 6–12)
RBC # BLD AUTO: 5.64 10*6/MM3 (ref 4.14–5.8)
WBC # BLD AUTO: 11.69 10*3/MM3 (ref 3.4–10.8)

## 2021-11-02 PROCEDURE — 99195 PHLEBOTOMY: CPT

## 2021-11-02 PROCEDURE — 36415 COLL VENOUS BLD VENIPUNCTURE: CPT

## 2021-11-02 PROCEDURE — 85025 COMPLETE CBC W/AUTO DIFF WBC: CPT

## 2021-11-02 PROCEDURE — 99213 OFFICE O/P EST LOW 20 MIN: CPT | Performed by: NURSE PRACTITIONER

## 2021-11-02 RX ORDER — SODIUM CHLORIDE 9 MG/ML
250 INJECTION, SOLUTION INTRAVENOUS ONCE
Status: CANCELLED | OUTPATIENT
Start: 2021-11-02

## 2021-11-02 NOTE — PROGRESS NOTES
Subjective     REASON FOR FOLLOW UP:  Leukocytosis for 4 years, minimal erythrocytosis, normal platelet count, no splenomegaly by physical exam or ct abdomen.        History of Present Illness   Mr. Vaughn is a 76 y.o. with history of leukocytosis and erythrocytosis whom we are now following roughly every 6 months.  Previous work-up including BCR/ABL and JAK2 mutation were negative, no splenomegaly.  Last phlebotomy was in March 2021.  He did not require phlebotomy in June.  As he is reviewed back today his hematocrit has increased to 46% and we discussed proceeding with phlebotomy to maintain his hematocrit less than 45.  Leukocytosis is stable with WBC of 11.6.     Patient is up-to-date on Covid vaccines and states he is doing well.  No new concerns today.     Past Medical History:   Diagnosis Date   • Acute CVA (cerebrovascular accident) (HCC) 3/1/2018   • Chest pain    • Dizziness    • ED (erectile dysfunction)    • GERD (gastroesophageal reflux disease)    • Hyperlipidemia    • Kidney stones    • Pneumonia due to infectious organism 4/14/2017   • S/P angioplasty with stent    • Stroke (HCC)    • Tubular adenoma of colon 09/2019    Removed by colonoscopy by Dr. Weems        Past Surgical History:   Procedure Laterality Date   • CARDIAC CATHETERIZATION N/A 02/10/2011    Normal LV systolic function; Kind of diffuse, small vessel, distal disease. Most of it is not amenable to PCI, but his JADEN lesion is and it is a large vessel; All of his prior durg-eluting stents are widely patent & look good, Dr. Anibal Oliveira   • CATARACT EXTRACTION Bilateral    • COLONOSCOPY N/A 09/13/2004    Normal, Repeat in 5-8 years, Dr. Davion Sanon   • COLONOSCOPY N/A 09/04/2009    Mutiple small & large-mouthed diverticual were found in the entire colon; Exam otherwise normal, Dr. Davion Sanon   • COLONOSCOPY N/A 9/26/2019    Procedure: COLONOSCOPY to  cecum with cold biopsy polypectomy;  Surgeon: Haider Weems MD;   Location: [facility] ENDOSCOPY;  Service: General   • COLONOSCOPY W/ POLYPECTOMY N/A [id number]    2 benign polyps, [facility], Dr. Davion Sanon   • CORONARY ANGIOPLASTY WITH STENT PLACEMENT N/A 11/20/2007    Successful multivessel drug eluting stent implantation for restenosis above his prior stenting, Dr. Anibal Wray   • CORONARY ANGIOPLASTY WITH STENT PLACEMENT N/A 06/05/2007    Successful multivessel drug eluting stent implantation in all 3 of the major coronary arteries, Dr. Anibal Wray   • CORONARY ANGIOPLASTY WITH STENT PLACEMENT N/A 02/10/2011    Successful drug-stenting of the JADEN, Dr. Anibal Oliveira   • ENDOSCOPY N/A 11/21/2017    Procedure: ESOPHAGOGASTRODUODENOSCOPY WITH COLD BIOPSIES AND 53 F MICHAEL DILATION;  Surgeon: Wisam Potter MD;  Location: [facility] ENDOSCOPY;  Service:    • EYE SURGERY Left 2011    Retina   • HYDROCELECTOMY Left 08/19/2011    Dr. Ellie Stein   • INCISION AND DRAINAGE PERIRECTAL ABSCESS N/A 11/30/2007    Dr. Davion Sanon   • LUMBAR EPIDURAL INJECTION N/A 2012-2013    x 3   • NEUROMA SURGERY Right 07/19/2005    Excision Neuroma Right Arm; PATH: CONSISTENT W/ LIPOMA,  Dr. Davion Sanon   • ROTATOR CUFF REPAIR Right 2006   • ROTATOR CUFF REPAIR Left 2000   • UPPER GASTROINTESTINAL ENDOSCOPY N/A 09/06/2012    Normal esophagus; Normal Stomach, Normal examinded duodenum, Dr. Bruno Turk   • WRIST FRACTURE SURGERY Right 06/03/2003    ORIF Right Distal Radius, Dr. Ryan Bravo        Current Outpatient Medications on File Prior to Visit   Medication Sig Dispense Refill   • acyclovir (ZOVIRAX) 400 MG tablet Take 400 mg by mouth As Needed. Take no more than 5 doses a day.     • aspirin 81 MG EC tablet Take 81 mg by mouth daily.     • atorvastatin (LIPITOR) 20 MG tablet TAKE 1 TABLET BY MOUTH DAILY 90 tablet 1   • brimonidine (ALPHAGAN) 0.15 % ophthalmic solution Apply 1 drop to eye 2 (two) times a day.     • clopidogrel (PLAVIX) 75 MG tablet Take 75 mg by mouth  Every Night.     • Dulaglutide (TRULICITY) 1.5 MG/0.5ML solution pen-injector Inject 1.5 mg under the skin into the appropriate area as directed Every 7 (Seven) Days. 12 pen 1   • insulin aspart (novoLOG FLEXPEN) 100 UNIT/ML solution pen-injector sc pen INJECT 20 UNITS IN THE MORNING 20 UNITS AT LUNCH AND 20 UNITS AT DINNER (Patient taking differently: Inject 60 Units under the skin into the appropriate area as directed 3 (Three) Times a Day With Meals. 60 units tid with meals)     • insulin detemir (LEVEMIR) 100 UNIT/ML injection INJECT 60 UNITS AT NIGHT (Patient taking differently: Inject 74 Units under the skin into the appropriate area as directed Every Night.)  12   • isosorbide mononitrate (IMDUR) 30 MG 24 hr tablet Take 30 mg by mouth daily.     • latanoprost (XALATAN) 0.005 % ophthalmic solution Apply 1 drop to eye daily.     • lisinopril (PRINIVIL,ZESTRIL) 40 MG tablet TAKE 1 TABLET BY MOUTH DAILY 90 tablet 1   • metFORMIN (GLUCOPHAGE) 1000 MG tablet Take 0.5 tablets by mouth 2 (Two) Times a Day With Meals. Take 2 tablets bid     • metoprolol tartrate (LOPRESSOR) 25 MG tablet TAKE 1 TABLET BY MOUTH TWICE DAILY 180 tablet 2   • nitroglycerin (NITROSTAT) 0.4 MG SL tablet Place 0.4 mg under the tongue Every 5 (Five) Minutes As Needed for Chest Pain. Take no more than 3 doses in 15 minutes.     • Omega 3 1000 MG capsule Take 1 capsule by mouth 2 (Two) Times a Day.     • omeprazole (priLOSEC) 20 MG capsule Take 20 mg by mouth Daily.     • pilocarpine (PILOCAR) 1 % ophthalmic solution 1 drop 2 (Two) Times a Day.     • timolol (TIMOPTIC) 0.5 % ophthalmic solution 1 drop daily. INSTILL 1 DROP IN LEFT EYE EVERY MORNING AS DIRECTED  6     No current facility-administered medications on file prior to visit.        ALLERGIES:  No Known Allergies     Social History     Socioeconomic History   • Marital status:      Spouse name: Kerry   • Years of education: College   Tobacco Use   • Smoking status: Never Smoker  "  • Smokeless tobacco: Never Used   • Tobacco comment: no caffeine   Substance and Sexual Activity   • Alcohol use: No   • Drug use: No   • Sexual activity: Defer     Partners: Female        Family History   Problem Relation Age of Onset   • Heart disease Mother    • Prostate cancer Father    • Heart disease Father    • Colon polyps Daughter    • Colon polyps Daughter      Objective     Vitals:    11/02/21 0929   BP: 172/76   Pulse: 67   Resp: 16   Temp: 97.3 °F (36.3 °C)   TempSrc: Temporal   SpO2: 96%   Weight: 98.3 kg (216 lb 11.2 oz)   Height: 177.8 cm (70\")   PainSc: 0-No pain     Current Status 11/2/2021   ECOG score 0       Physical Exam   GENERAL:  Well-developed, well-nourished. Patient in no acute distress.   SKIN:  Warm, dry, NO rashes, NO purpura, NO petechiae.  HEENT:  Pupils were equal and reactive to light and accomodation, conjunctivae noninjected, no pterygium, normal extraocular movements, normal visual acuity.   NECK: Supple with good range of motion; no thyromegaly or masses, no JVD or bruits, no cervical adenopathies.  LYMPHATICS:  No cervical, NO supraclavicular, NO axillary adenopathy.  CARDIAC: normal rate and regular rhythm, without murmur,NO rubs NO S3 NO S4 right or left   VASCULAR VENOUS: no cyanosis, collateral circulation, varicosities, edema, palpable cords, pain, erythema.  ABDOMEN:  Soft, nontender with no hepatomegaly, no splenomegaly,no masses, no ascites. Normal bowel sounds.  EXTREMITIES  AND SPINE:  No clubbing, cyanosis or edema, no deformities or pain. No kyphosis, scoliosis, deformities or pain in spine, ribs or pelvic bone.  NEUROLOGICAL:  Patient was awake, alert, oriented to time, person and place.      RECENT LABS:  Results from last 7 days   Lab Units 11/02/21  0914   WBC 10*3/mm3 11.69*   NEUTROS ABS 10*3/mm3 7.86*   HEMOGLOBIN g/dL 15.1   HEMATOCRIT % 46.7   PLATELETS 10*3/mm3 327                   Assessment/Plan    1. Leukocytosis, longstanding and stable. "   · BCR-ABL in the past has been negative  · We have run laboratory testing looking for inflammatory or infectious etiologies that have been negative.     2.  Essential erythrocytosis.  · JAK2 mutation has been negative  · Patient undergoing phlebotomies to keep hematocrit 45 or less.  · Receiving phlebotomies roughly every 6 months.  · Patient encouraged to remain on Plavix and aspirin.    PLAN:  1.  Hematocrit 46.7 today.  Proceed with phlebotomy of 500 mL today.  2.  Patient otherwise return in 6 months for follow-up with Dr. Galaviz with repeat CBC and possible phlebotomy.  3.  Encouraged patient to call with any new questions or concerns prior to scheduled return.

## 2021-11-04 ENCOUNTER — OFFICE VISIT (OUTPATIENT)
Dept: ENDOCRINOLOGY | Age: 76
End: 2021-11-04

## 2021-11-04 VITALS
HEART RATE: 65 BPM | HEIGHT: 70 IN | BODY MASS INDEX: 30.92 KG/M2 | OXYGEN SATURATION: 98 % | SYSTOLIC BLOOD PRESSURE: 126 MMHG | DIASTOLIC BLOOD PRESSURE: 58 MMHG | WEIGHT: 216 LBS

## 2021-11-04 DIAGNOSIS — E11.21 TYPE 2 DIABETES WITH NEPHROPATHY (HCC): Primary | Chronic | ICD-10-CM

## 2021-11-04 DIAGNOSIS — Z95.820 S/P ANGIOPLASTY WITH STENT: ICD-10-CM

## 2021-11-04 DIAGNOSIS — I25.10 CORONARY ARTERY DISEASE INVOLVING NATIVE CORONARY ARTERY OF NATIVE HEART WITHOUT ANGINA PECTORIS: ICD-10-CM

## 2021-11-04 DIAGNOSIS — I10 ESSENTIAL HYPERTENSION: Chronic | ICD-10-CM

## 2021-11-04 DIAGNOSIS — E78.5 HYPERLIPIDEMIA, UNSPECIFIED HYPERLIPIDEMIA TYPE: Chronic | ICD-10-CM

## 2021-11-04 PROCEDURE — 99214 OFFICE O/P EST MOD 30 MIN: CPT | Performed by: INTERNAL MEDICINE

## 2021-11-06 LAB
ALBUMIN SERPL-MCNC: 4.5 G/DL (ref 3.7–4.7)
ALBUMIN/GLOB SERPL: 2.1 {RATIO} (ref 1.2–2.2)
ALP SERPL-CCNC: 49 IU/L (ref 44–121)
ALT SERPL-CCNC: 16 IU/L (ref 0–44)
AST SERPL-CCNC: 16 IU/L (ref 0–40)
BILIRUB SERPL-MCNC: 0.5 MG/DL (ref 0–1.2)
BUN SERPL-MCNC: 15 MG/DL (ref 8–27)
BUN/CREAT SERPL: 14 (ref 10–24)
CALCIUM SERPL-MCNC: 9 MG/DL (ref 8.6–10.2)
CHLORIDE SERPL-SCNC: 100 MMOL/L (ref 96–106)
CHOLEST SERPL-MCNC: 116 MG/DL (ref 100–199)
CO2 SERPL-SCNC: 24 MMOL/L (ref 20–29)
CREAT SERPL-MCNC: 1.07 MG/DL (ref 0.76–1.27)
GLOBULIN SER CALC-MCNC: 2.1 G/DL (ref 1.5–4.5)
GLUCOSE SERPL-MCNC: 210 MG/DL (ref 65–99)
HBA1C MFR BLD: 8 % (ref 4.8–5.6)
HDLC SERPL-MCNC: 30 MG/DL
IMP & REVIEW OF LAB RESULTS: NORMAL
LDLC SERPL CALC-MCNC: 52 MG/DL (ref 0–99)
POTASSIUM SERPL-SCNC: 5.2 MMOL/L (ref 3.5–5.2)
PROT SERPL-MCNC: 6.6 G/DL (ref 6–8.5)
SODIUM SERPL-SCNC: 140 MMOL/L (ref 134–144)
T4 FREE SERPL-MCNC: 1.01 NG/DL (ref 0.82–1.77)
TRIGL SERPL-MCNC: 206 MG/DL (ref 0–149)
TSH SERPL DL<=0.005 MIU/L-ACNC: 1.07 UIU/ML (ref 0.45–4.5)
VLDLC SERPL CALC-MCNC: 34 MG/DL (ref 5–40)

## 2021-11-07 RX ORDER — DULAGLUTIDE 3 MG/.5ML
3 INJECTION, SOLUTION SUBCUTANEOUS WEEKLY
Qty: 12 PEN | Refills: 1 | Status: SHIPPED | OUTPATIENT
Start: 2021-11-07 | End: 2021-11-17 | Stop reason: SDUPTHER

## 2021-11-07 NOTE — PROGRESS NOTES
LDL 52.  HDL 30.  Triglycerides 206.  Continue atorvastatin 20 mg a day and Lovaza 1 capsule twice daily.Hemoglobin A1c higher at 8.0%.  Diabetes poorly controlled.  Increase Trulicity to 3 mg weekly.  Normal thyroid function tests.Copy of labs sent to Mary Anne Ludwig NP.Please notify patient of results and instructions.

## 2021-11-09 ENCOUNTER — PRIOR AUTHORIZATION (OUTPATIENT)
Dept: ENDOCRINOLOGY | Age: 76
End: 2021-11-09

## 2021-11-17 RX ORDER — DULAGLUTIDE 3 MG/.5ML
3 INJECTION, SOLUTION SUBCUTANEOUS WEEKLY
Qty: 12 PEN | Refills: 1 | Status: SHIPPED | OUTPATIENT
Start: 2021-11-17 | End: 2022-08-25 | Stop reason: CLARIF

## 2022-03-24 DIAGNOSIS — E78.5 HYPERLIPIDEMIA, UNSPECIFIED HYPERLIPIDEMIA TYPE: Chronic | ICD-10-CM

## 2022-03-24 DIAGNOSIS — I10 ESSENTIAL HYPERTENSION: Chronic | ICD-10-CM

## 2022-03-24 RX ORDER — LISINOPRIL 40 MG/1
40 TABLET ORAL DAILY
Qty: 90 TABLET | Refills: 1 | Status: SHIPPED | OUTPATIENT
Start: 2022-03-24 | End: 2022-09-20

## 2022-03-24 RX ORDER — ATORVASTATIN CALCIUM 20 MG/1
20 TABLET, FILM COATED ORAL DAILY
Qty: 90 TABLET | Refills: 3 | Status: SHIPPED | OUTPATIENT
Start: 2022-03-24 | End: 2022-10-31

## 2022-04-19 ENCOUNTER — APPOINTMENT (OUTPATIENT)
Dept: ONCOLOGY | Facility: HOSPITAL | Age: 77
End: 2022-04-19

## 2022-04-19 ENCOUNTER — LAB (OUTPATIENT)
Dept: LAB | Facility: HOSPITAL | Age: 77
End: 2022-04-19

## 2022-04-19 ENCOUNTER — OFFICE VISIT (OUTPATIENT)
Dept: ONCOLOGY | Facility: CLINIC | Age: 77
End: 2022-04-19

## 2022-04-19 VITALS
WEIGHT: 216 LBS | BODY MASS INDEX: 30.92 KG/M2 | OXYGEN SATURATION: 96 % | SYSTOLIC BLOOD PRESSURE: 161 MMHG | HEIGHT: 70 IN | RESPIRATION RATE: 17 BRPM | TEMPERATURE: 97.4 F | HEART RATE: 79 BPM | DIASTOLIC BLOOD PRESSURE: 77 MMHG

## 2022-04-19 DIAGNOSIS — D75.1 ERYTHROCYTOSIS: Primary | ICD-10-CM

## 2022-04-19 DIAGNOSIS — D72.825 BANDEMIA: ICD-10-CM

## 2022-04-19 DIAGNOSIS — D75.1 ERYTHROCYTOSIS: ICD-10-CM

## 2022-04-19 LAB
BASOPHILS # BLD AUTO: 0.07 10*3/MM3 (ref 0–0.2)
BASOPHILS NFR BLD AUTO: 0.5 % (ref 0–1.5)
DEPRECATED RDW RBC AUTO: 45.6 FL (ref 37–54)
EOSINOPHIL # BLD AUTO: 0.15 10*3/MM3 (ref 0–0.4)
EOSINOPHIL NFR BLD AUTO: 1.2 % (ref 0.3–6.2)
ERYTHROCYTE [DISTWIDTH] IN BLOOD BY AUTOMATED COUNT: 15.4 % (ref 12.3–15.4)
HCT VFR BLD AUTO: 45.3 % (ref 37.5–51)
HGB BLD-MCNC: 14.3 G/DL (ref 13–17.7)
IMM GRANULOCYTES # BLD AUTO: 0.05 10*3/MM3 (ref 0–0.05)
IMM GRANULOCYTES NFR BLD AUTO: 0.4 % (ref 0–0.5)
LYMPHOCYTES # BLD AUTO: 2.61 10*3/MM3 (ref 0.7–3.1)
LYMPHOCYTES NFR BLD AUTO: 20.1 % (ref 19.6–45.3)
MCH RBC QN AUTO: 25.7 PG (ref 26.6–33)
MCHC RBC AUTO-ENTMCNC: 31.6 G/DL (ref 31.5–35.7)
MCV RBC AUTO: 81.5 FL (ref 79–97)
MONOCYTES # BLD AUTO: 1.32 10*3/MM3 (ref 0.1–0.9)
MONOCYTES NFR BLD AUTO: 10.2 % (ref 5–12)
NEUTROPHILS NFR BLD AUTO: 67.6 % (ref 42.7–76)
NEUTROPHILS NFR BLD AUTO: 8.79 10*3/MM3 (ref 1.7–7)
NRBC BLD AUTO-RTO: 0 /100 WBC (ref 0–0.2)
PLATELET # BLD AUTO: 345 10*3/MM3 (ref 140–450)
PMV BLD AUTO: 8.9 FL (ref 6–12)
RBC # BLD AUTO: 5.56 10*6/MM3 (ref 4.14–5.8)
WBC NRBC COR # BLD: 12.99 10*3/MM3 (ref 3.4–10.8)

## 2022-04-19 PROCEDURE — 99213 OFFICE O/P EST LOW 20 MIN: CPT | Performed by: INTERNAL MEDICINE

## 2022-04-19 PROCEDURE — 36415 COLL VENOUS BLD VENIPUNCTURE: CPT

## 2022-04-19 PROCEDURE — 85025 COMPLETE CBC W/AUTO DIFF WBC: CPT

## 2022-04-19 RX ORDER — TRIAMCINOLONE ACETONIDE 40 MG/ML
40 INJECTION, SUSPENSION INTRA-ARTICULAR; INTRAMUSCULAR
COMMUNITY
Start: 2022-03-02

## 2022-04-19 RX ORDER — DULAGLUTIDE 3 MG/.5ML
3 INJECTION, SOLUTION SUBCUTANEOUS
COMMUNITY
Start: 2021-11-17 | End: 2022-04-19 | Stop reason: SDUPTHER

## 2022-04-19 NOTE — PROGRESS NOTES
Subjective     REASON FOR FOLLOW UP:  Leukocytosis for 6 years, minimal erythrocytosis, normal platelet count, no splenomegaly by physical exam or ct abdomen.        History of Present Illness   DURING THE VISIT WITH THE PATIENT TODAY , PATIENT HAD FACE MASK, I HAD PROPER PROTECTIVE EQUIPMENT, AND I DID HAND HYGIENE WITH SOAP AND WATER BEFORE AND AFTER THE VISIT.    This patient returns today to the office with no symptoms of anything related to his chronically elevated white count. He used to have erythrocytosis, this number has settled down. His platelet count has remained normal. Physically he is able to do whatever he pleases with no limitations. He is ready to plant his garden and get it going. He continues eating a very healthy diet. Most of the food is fresh food that he grows himself or he buys in the farmers market. He is still going hunting for mushrooms that is one of the delicacies that he loves to eat. Besides this he has no abdominal pain, normal bowel activity, normal urination. He has multiple skin lesions in the scalp that he wants for me to review.    He denies any cardiovascular or respiratory issues. Diabetes seems to be under control and he is using insulin.               Past Medical History:   Diagnosis Date   • Acute CVA (cerebrovascular accident) ([facility]) 3/1/2018   • Chest pain    • Dizziness    • ED (erectile dysfunction)    • GERD (gastroesophageal reflux disease)    • Hyperlipidemia    • Kidney stones    • Pneumonia due to infectious organism 4/14/2017   • S/P angioplasty with stent    • Stroke ([facility])    • Tubular adenoma of colon 09/2019    Removed by colonoscopy by Dr. Weems        Past Surgical History:   Procedure Laterality Date   • CARDIAC CATHETERIZATION N/A 02/10/2011    Normal LV systolic function; Kind of diffuse, small vessel, distal disease. Most of it is not amenable to PCI, but his JADEN lesion is and it is a large vessel; All of his prior durg-eluting stents are widely patent  & look good, Dr. Anibal Oliveira   • CATARACT EXTRACTION Bilateral    • COLONOSCOPY N/A 09/13/2004    Normal, Repeat in 5-8 years, Dr. Davion Sanon   • COLONOSCOPY N/A 09/04/2009    Mutiple small & large-mouthed diverticual were found in the entire colon; Exam otherwise normal, Dr. Davion Sanon   • COLONOSCOPY N/A 9/26/2019    Procedure: COLONOSCOPY to  cecum with cold biopsy polypectomy;  Surgeon: Haider Weems MD;  Location: [facility] ENDOSCOPY;  Service: General   • COLONOSCOPY W/ POLYPECTOMY N/A [id number]    2 benign polyps, [facility], Dr. Davion Sanon   • CORONARY ANGIOPLASTY WITH STENT PLACEMENT N/A 11/20/2007    Successful multivessel drug eluting stent implantation for restenosis above his prior stenting, Dr. Anibal Wray   • CORONARY ANGIOPLASTY WITH STENT PLACEMENT N/A 06/05/2007    Successful multivessel drug eluting stent implantation in all 3 of the major coronary arteries, Dr. Anibal Wray   • CORONARY ANGIOPLASTY WITH STENT PLACEMENT N/A 02/10/2011    Successful drug-stenting of the JADEN, Dr. Anibal Oliveira   • ENDOSCOPY N/A 11/21/2017    Procedure: ESOPHAGOGASTRODUODENOSCOPY WITH COLD BIOPSIES AND 53 F MICHAEL DILATION;  Surgeon: Wisam Potter MD;  Location: [facility] ENDOSCOPY;  Service:    • EYE SURGERY Left 2011    Retina   • HYDROCELECTOMY Left 08/19/2011    Dr. Ellie Stein   • INCISION AND DRAINAGE PERIRECTAL ABSCESS N/A 11/30/2007    Dr. Davion Sanon   • LUMBAR EPIDURAL INJECTION N/A 2012-2013    x 3   • NEUROMA SURGERY Right 07/19/2005    Excision Neuroma Right Arm; PATH: CONSISTENT W/ LIPOMA,  Dr. Davion Sanon   • ROTATOR CUFF REPAIR Right 2006   • ROTATOR CUFF REPAIR Left 2000   • UPPER GASTROINTESTINAL ENDOSCOPY N/A 09/06/2012    Normal esophagus; Normal Stomach, Normal examinded duodenum, Dr. Bruno Turk   • WRIST FRACTURE SURGERY Right 06/03/2003    ORIF Right Distal Radius, Dr. Ryan Bravo        Current Outpatient Medications on File Prior to Visit    Medication Sig Dispense Refill   • acyclovir (ZOVIRAX) 400 MG tablet Take 400 mg by mouth As Needed. Take no more than 5 doses a day.     • aspirin 81 MG EC tablet Take 81 mg by mouth daily.     • atorvastatin (LIPITOR) 20 MG tablet TAKE 1 TABLET BY MOUTH DAILY 90 tablet 3   • brimonidine (ALPHAGAN) 0.15 % ophthalmic solution Apply 1 drop to eye 2 (two) times a day.     • clopidogrel (PLAVIX) 75 MG tablet Take 75 mg by mouth Every Night.     • Continuous Blood Gluc  (FreeStyle Juwan 2 [place]) device 1 each Daily. 1 each 1   • Continuous Blood Gluc Sensor (FreeStyle Juwan 2 Sensor) misc 1 each Every 14 (Fourteen) Days. 6 each 2   • Dulaglutide (Trulicity) 3 MG/0.5ML solution pen-injector Inject 0.5 mL under the skin into the appropriate area as directed 1 (One) Time Per Week. 12 pen 1   • Dulaglutide (Trulicity) 3 MG/0.5ML solution pen-injector Inject 3 mg under the skin into the appropriate area as directed.     • insulin aspart (novoLOG FLEXPEN) 100 UNIT/ML solution pen-injector sc pen INJECT 20 UNITS IN THE MORNING 20 UNITS AT LUNCH AND 20 UNITS AT DINNER (Patient taking differently: Inject 60 Units under the skin into the appropriate area as directed 3 (Three) Times a Day With Meals. 60 units tid with meals)     • insulin detemir (LEVEMIR) 100 UNIT/ML injection INJECT 60 UNITS AT NIGHT (Patient taking differently: Inject 74 Units under the skin into the appropriate area as directed Every Night.)  12   • isosorbide mononitrate (IMDUR) 30 MG 24 hr tablet Take 30 mg by mouth daily.     • latanoprost (XALATAN) 0.005 % ophthalmic solution Apply 1 drop to eye daily.     • lisinopril (PRINIVIL,ZESTRIL) 40 MG tablet TAKE 1 TABLET BY MOUTH DAILY 90 tablet 1   • metFORMIN (GLUCOPHAGE) 1000 MG tablet Take 0.5 tablets by mouth 2 (Two) Times a Day With Meals. Take 2 tablets bid     • metoprolol tartrate (LOPRESSOR) 25 MG tablet TAKE 1 TABLET BY MOUTH TWICE DAILY 180 tablet 2   • nitroglycerin (NITROSTAT) 0.4 MG SL  "tablet Place 0.4 mg under the tongue Every 5 (Five) Minutes As Needed for Chest Pain. Take no more than 3 doses in 15 minutes.     • Omega 3 1000 MG capsule Take 1 capsule by mouth 2 (Two) Times a Day.     • omeprazole (priLOSEC) 20 MG capsule Take 20 mg by mouth Daily.     • pilocarpine (PILOCAR) 1 % ophthalmic solution 1 drop 2 (Two) Times a Day.     • timolol (TIMOPTIC) 0.5 % ophthalmic solution 1 drop daily. INSTILL 1 DROP IN LEFT EYE EVERY MORNING AS DIRECTED  6   • triamcinolone acetonide (KENALOG-40) 40 MG/ML injection Inject 40 mg into the appropriate joint as directed by provider.       No current facility-administered medications on file prior to visit.        ALLERGIES:  No Known Allergies     Social History     Socioeconomic History   • Marital status:      Spouse name: Kerry   • Years of education: College   Tobacco Use   • Smoking status: Never Smoker   • Smokeless tobacco: Never Used   • Tobacco comment: no caffeine   Substance and Sexual Activity   • Alcohol use: No   • Drug use: No   • Sexual activity: Defer     Partners: Female        Family History   Problem Relation Age of Onset   • Heart disease Mother    • Prostate cancer Father    • Heart disease Father    • Colon polyps Daughter    • Colon polyps Daughter      Objective     Vitals:    04/19/22 1017   Height: 177.8 cm (70\")   PainSc: 0-No pain     Current Status 4/19/2022   ECOG score 0       Physical Exam   I HAVE PERSONALLY REVIEWED THE HISTORY OF THE PRESENT ILLNESS, PAST MEDICAL HISTORY, FAMILY HISTORY, SOCIAL HISTORY, ALLERGIES, MEDICATIONS STATED ABOVE IN THE  NOTE FROM TODAY.        GENERAL:  Well-developed, well-nourished  Patient  in no acute distress.   SKIN:  Warm, dry ,NO purpura ,NO petechiae, no rash.multiple actinic keratosis in scalp  HEENT:  Pupils were equal and reactive to light and accomodation, conjunctivae noninjected, no pterygium, normal extraocular movements, normal visual acuity.   NECK:  Supple with good range " of motion; no thyromegaly , no other masses, no JVD or bruits,.No carotid artery pain, no carotid abnormal pulsation , NO arterial dance.  LYMPHATICS:  No cervical, NO supraclavicular, NO axillary,NO epitrochlear , NO inguinal adenopathies.  CARDIAC   normal rate , regular rhythm, without murmur,NO rubs NO S3 NO S4   LUNGS: normal breath sounds bilateral, no wheezing, NO rhonchi, NO crackles ,NO rubs.  VASCULAR VENOUS: no cyanosis, NO collateral circulation, NO varicosities, NO edema, NO palpable cords, NO pain,NO erythema, NO pigmentation of the skin.  ABDOMEN:  Soft, NO pain,no hepatomegaly, no splenomegaly,no masses, no ascites, no collateral circulation,no distention,no Aquilino sign.  EXTREMITIES  AND SPINE:  No clubbing, no cyanosis ,no deformities , no pain .No kyphosis,  no pain in spine, no pain in ribs , no pain in pelvic bone.  NEUROLOGICAL:  Patient was awake, alert, oriented to time, person and place.                RECENT LABS:  Hematology WBC   Date Value Ref Range Status   04/19/2022 12.99 (H) 3.40 - 10.80 10*3/mm3 Final     RBC   Date Value Ref Range Status   04/19/2022 5.56 4.14 - 5.80 10*6/mm3 Final     Hemoglobin   Date Value Ref Range Status   04/19/2022 14.3 13.0 - 17.7 g/dL Final     Hematocrit   Date Value Ref Range Status   04/19/2022 45.3 37.5 - 51.0 % Final     Platelets   Date Value Ref Range Status   04/19/2022 345 140 - 450 10*3/mm3 Final            Assessment/Plan    1. This patient has history of leukocytosis that in my opinion is very likely an element of myeloproliferative disorder. We never have persuaded the patient to proceed with bone marrow testing. We have done BCR-ABL in the past that has been negative, JAK2 mutation that has been negative and we have run laboratory testing looking for inflammatory or infectious etiologies that have been negative. Nevertheless, his white count remains minimally elevated.       The patient was seen by telephonic visit on 12/01/2020. He is  asymptomatic at this time completely in control of his health. His diabetes management is not ideal, he loves to eat but he remains physically very active and his weight remains relatively stable. The good news with his blood work yesterday is that his white count is borderline 10,000. The hemoglobin and hematocrit are good, in fact his hematocrit is 45, his platelet count is normal and the white count differential is normal. I talked with him on the telephone about the good news about these numbers and I find no need for him to have a phlebotomy at this point. What I have recommended for him is to have a blood count every couple of months with nurse visit. If the hematocrit is above 45 he will have a phlebotomy otherwise doctor visit in 6 months. I encouraged him to remain on his aspirin and Plavix.       The patient returned to the office on 05/18/2021 with no symptoms or signs of anything directed to his minimal leukocytosis. His hematocrit was 43. His platelet count and white count differential were normal. He had no splenomegaly. I advised him to remain in observation from my point of view returning to see us in 6 months and 1 year. Blood count will be done on each one of those occasions. I remind him that his hemoglobin A1C recently was in the 7 category and he needs to work on better control of his weight and portions of food.     The patient returned to the office on 04/19/2022. He has no new symptoms of anything in particular. His physical exam is unremarkable with the exception of multiple actinic keratosis in his scalp. I asked him to wear a hat. He has an appointment to be seen by dermatologist.     The good news is that his white count remains stable at 12,000, his white count differential is normal, there is no left shifted maturation, there are no blasts in circulation. The hematocrit is normal at 45, the platelet count remains normal at 345,000.     Under the present circumstances and know that he is  BCR/ABL negative he will remain in observation for the time being returning to see us back in a year with a CBC.     I insisted highly in regard the utilization of a hat workup given the fact that he is an outsider, he is bald and he has a lot of sun exposure in the scalp that eventually is going to end up with skin cancer.

## 2022-07-06 ENCOUNTER — OFFICE VISIT (OUTPATIENT)
Dept: CARDIOLOGY | Facility: CLINIC | Age: 77
End: 2022-07-06

## 2022-07-06 VITALS
SYSTOLIC BLOOD PRESSURE: 120 MMHG | BODY MASS INDEX: 30.35 KG/M2 | HEART RATE: 78 BPM | DIASTOLIC BLOOD PRESSURE: 68 MMHG | HEIGHT: 71 IN | WEIGHT: 216.8 LBS

## 2022-07-06 DIAGNOSIS — I25.10 CORONARY ARTERY DISEASE INVOLVING NATIVE CORONARY ARTERY OF NATIVE HEART WITHOUT ANGINA PECTORIS: Primary | ICD-10-CM

## 2022-07-06 DIAGNOSIS — I10 ESSENTIAL HYPERTENSION: Chronic | ICD-10-CM

## 2022-07-06 DIAGNOSIS — E78.5 HYPERLIPIDEMIA, UNSPECIFIED HYPERLIPIDEMIA TYPE: Chronic | ICD-10-CM

## 2022-07-06 DIAGNOSIS — E11.21 TYPE 2 DIABETES WITH NEPHROPATHY: Chronic | ICD-10-CM

## 2022-07-06 PROCEDURE — 93000 ELECTROCARDIOGRAM COMPLETE: CPT | Performed by: INTERNAL MEDICINE

## 2022-07-06 PROCEDURE — 99214 OFFICE O/P EST MOD 30 MIN: CPT | Performed by: INTERNAL MEDICINE

## 2022-07-06 RX ORDER — METOPROLOL SUCCINATE 50 MG/1
75 TABLET, EXTENDED RELEASE ORAL DAILY
COMMUNITY

## 2022-07-06 RX ORDER — ATORVASTATIN CALCIUM 10 MG/1
10 TABLET, FILM COATED ORAL DAILY
COMMUNITY

## 2022-07-06 RX ORDER — AMLODIPINE BESYLATE 10 MG/1
10 TABLET ORAL DAILY
COMMUNITY

## 2022-07-06 NOTE — PROGRESS NOTES
Date of Office Visit: 22  Encounter Provider: Anibal Wray MD  Place of Service: [facility] CARDIOLOGY  Patient Name: Anthony Vaughn  :1945  [id number]    Chief Complaint   Patient presents with   • Coronary Artery Disease   :     HPI: Anthony Vaughn is a 77 y.o. male  an inferior MI with multiple stents placed in 2007-->2.5x28 Cypher to dLAD; 3.0x28mm Cypher to mLAD; 3.0x33mm Cypher to d circ; 3.0x23mm Cypher mcirc; 3.0x23mm Cypher to d RCA; 3.5x13mm Cypher pRCA; in 2007 restenosis pLAD 3.0x13mm Cypher 2.5x8mm Cypher to mLAD; 3.0x13mm Cypher to p circ. In 2011, had a stent to the JADEN details unavailable.  There was a question of a possible TIA in  that led to an echo which was normal and a Holter that was unremarkable.    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 Weems       Past Surgical History:   Procedure Laterality Date   • CARDIAC CATHETERIZATION N/A 02/10/2011    Normal LV systolic function; Kind of diffuse, small vessel, distal disease. Most of it is not amenable to PCI, but his JADEN lesion is and it is a large vessel; All of his prior durg-eluting stents are widely patent & look good, Dr. Anibal Oliveira   • CATARACT EXTRACTION Bilateral    • COLONOSCOPY N/A 2004    Normal, Repeat in 5-8 years, Dr. Davion Sanon   • COLONOSCOPY N/A 2009    Mutiple small & large-mouthed diverticual were found in the entire colon;  Exam otherwise normal, Dr. Davion Sanon   • COLONOSCOPY N/A 9/26/2019    Procedure: COLONOSCOPY to  cecum with cold biopsy polypectomy;  Surgeon: Haider Weems MD;  Location: [facility] ENDOSCOPY;  Service: General   • COLONOSCOPY W/ POLYPECTOMY N/A [id number]    2 benign polyps, [place], Dr. Davion Sanon   • CORONARY ANGIOPLASTY WITH STENT PLACEMENT N/A 11/20/2007    Successful multivessel drug eluting stent implantation for restenosis above his prior stenting, Dr. Anibal Wray   • CORONARY ANGIOPLASTY WITH STENT PLACEMENT N/A 06/05/2007    Successful multivessel drug eluting stent implantation in all 3 of the major coronary arteries, Dr. Anibal Wray   • CORONARY ANGIOPLASTY WITH STENT PLACEMENT N/A 02/10/2011    Successful drug-stenting of the JADEN, Dr. Anibal Oliveira   • ENDOSCOPY N/A 11/21/2017    Procedure: ESOPHAGOGASTRODUODENOSCOPY WITH COLD BIOPSIES AND 53 F MICHAEL DILATION;  Surgeon: Wisam Potter MD;  Location: [facility] ENDOSCOPY;  Service:    • EYE SURGERY Left 2011    Retina   • HYDROCELECTOMY Left 08/19/2011    Dr. Ellie Stein   • INCISION AND DRAINAGE PERIRECTAL ABSCESS N/A 11/30/2007    Dr. Davion Sanon   • LUMBAR EPIDURAL INJECTION N/A 2012-2013    x 3   • NEUROMA SURGERY Right 07/19/2005    Excision Neuroma Right Arm; PATH: CONSISTENT W/ LIPOMA,  Dr. Davion Sanon   • ROTATOR CUFF REPAIR Right 2006   • ROTATOR CUFF REPAIR Left 2000   • UPPER GASTROINTESTINAL ENDOSCOPY N/A 09/06/2012    Normal esophagus; Normal Stomach, Normal examinded duodenum, Dr. Bruno Turk   • WRIST FRACTURE SURGERY Right 06/03/2003    ORIF Right Distal Radius, Dr. Ryan Bravo       Social History     Socioeconomic History   • Marital status:      Spouse name: Kerry   • Years of education: College   Tobacco Use   • Smoking status: Never Smoker   • Smokeless tobacco: Never Used   • Tobacco comment: no caffeine   Substance and Sexual Activity   • Alcohol use: No   • Drug use: No   • Sexual  activity: Defer     Partners: Female       Family History   Problem Relation Age of Onset   • Heart disease Mother    • Prostate cancer Father    • Heart disease Father    • Colon polyps Daughter    • Colon polyps Daughter        Review of Systems   Constitutional: Negative for decreased appetite, fever, malaise/fatigue and weight loss.   HENT: Negative for nosebleeds.    Eyes: Negative for double vision.   Cardiovascular: Negative for chest pain, claudication, cyanosis, dyspnea on exertion, irregular heartbeat, leg swelling, near-syncope, orthopnea, palpitations, paroxysmal nocturnal dyspnea and syncope.   Respiratory: Negative for cough, hemoptysis and shortness of breath.    Hematologic/Lymphatic: Negative for bleeding problem.   Skin: Negative for rash.   Musculoskeletal: Negative for falls and myalgias.   Gastrointestinal: Negative for hematochezia, jaundice, melena, nausea and vomiting.   Genitourinary: Negative for hematuria.   Neurological: Negative for dizziness and seizures.   Psychiatric/Behavioral: Negative for altered mental status and memory loss.       No Known Allergies      Current Outpatient Medications:   •  amLODIPine (NORVASC) 10 MG tablet, Take 10 mg by mouth Daily., Disp: , Rfl:   •  atorvastatin (LIPITOR) 10 MG tablet, Take 10 mg by mouth Daily., Disp: , Rfl:   •  brimonidine (ALPHAGAN) 0.15 % ophthalmic solution, Apply 1 drop to eye 2 (two) times a day., Disp: , Rfl:   •  clopidogrel (PLAVIX) 75 MG tablet, Take 75 mg by mouth Every Night., Disp: , Rfl:   •  Continuous Blood Gluc  (FreeStyle Juwan 2 [place]) device, 1 each Daily., Disp: 1 each, Rfl: 1  •  Continuous Blood Gluc Sensor (FreeStyle Juwan 2 Sensor) misc, 1 each Every 14 (Fourteen) Days., Disp: 6 each, Rfl: 2  •  Dulaglutide (Trulicity) 3 MG/0.5ML solution pen-injector, Inject 0.5 mL under the skin into the appropriate area as directed 1 (One) Time Per Week., Disp: 12 pen, Rfl: 1  •  Glucose 15 GM/32ML gel, Take  by mouth.,  Disp: , Rfl:   •  insulin aspart (novoLOG FLEXPEN) 100 UNIT/ML solution pen-injector sc pen, INJECT 20 UNITS IN THE MORNING 20 UNITS AT LUNCH AND 20 UNITS AT DINNER (Patient taking differently: Inject 60 Units under the skin into the appropriate area as directed 3 (Three) Times a Day With Meals. 60 units tid with meals), Disp: , Rfl:   •  insulin detemir (LEVEMIR) 100 UNIT/ML injection, INJECT 60 UNITS AT NIGHT (Patient taking differently: Inject 74 Units under the skin into the appropriate area as directed Every Night.), Disp: , Rfl: 12  •  latanoprost (XALATAN) 0.005 % ophthalmic solution, Apply 1 drop to eye daily., Disp: , Rfl:   •  lisinopril (PRINIVIL,ZESTRIL) 40 MG tablet, TAKE 1 TABLET BY MOUTH DAILY, Disp: 90 tablet, Rfl: 1  •  metFORMIN (GLUCOPHAGE) 1000 MG tablet, Take 0.5 tablets by mouth 2 (Two) Times a Day With Meals. Take 2 tablets bid, Disp: , Rfl:   •  metoprolol succinate XL (TOPROL-XL) 50 MG 24 hr tablet, Take 75 mg by mouth Daily., Disp: , Rfl:   •  nitroglycerin (NITROSTAT) 0.4 MG SL tablet, Place 0.4 mg under the tongue Every 5 (Five) Minutes As Needed for Chest Pain. Take no more than 3 doses in 15 minutes., Disp: , Rfl:   •  pilocarpine (PILOCAR) 1 % ophthalmic solution, 1 drop 2 (Two) Times a Day., Disp: , Rfl:   •  timolol (TIMOPTIC) 0.5 % ophthalmic solution, 1 drop daily. INSTILL 1 DROP IN LEFT EYE EVERY MORNING AS DIRECTED, Disp: , Rfl: 6  •  acyclovir (ZOVIRAX) 400 MG tablet, Take 400 mg by mouth As Needed. Take no more than 5 doses a day., Disp: , Rfl:   •  atorvastatin (LIPITOR) 20 MG tablet, TAKE 1 TABLET BY MOUTH DAILY, Disp: 90 tablet, Rfl: 3  •  isosorbide mononitrate (IMDUR) 30 MG 24 hr tablet, Take 30 mg by mouth daily., Disp: , Rfl:   •  metoprolol tartrate (LOPRESSOR) 25 MG tablet, TAKE 1 TABLET BY MOUTH TWICE DAILY, Disp: 180 tablet, Rfl: 2  •  Omega 3 1000 MG capsule, Take 1 capsule by mouth 2 (Two) Times a Day., Disp: , Rfl:   •  omeprazole (priLOSEC) 20 MG capsule, Take 20  "mg by mouth Daily., Disp: , Rfl:   •  triamcinolone acetonide (KENALOG-40) 40 MG/ML injection, Inject 40 mg into the appropriate joint as directed by provider., Disp: , Rfl:       Objective:     Vitals:    07/06/22 1448   BP: 120/68   Pulse: 78   Weight: 98.3 kg (216 lb 12.8 oz)   Height: 180.3 cm (71\")     Body mass index is 30.24 kg/m².    Constitutional:       Appearance: Well-developed.   Eyes:      General: No scleral icterus.  HENT:      Head: Normocephalic.   Neck:      Thyroid: No thyromegaly.      Vascular: No JVD.      Lymphadenopathy: No cervical adenopathy.   Pulmonary:      Effort: Pulmonary effort is normal.      Breath sounds: Normal breath sounds. No wheezing. No rales.   Cardiovascular:      Normal rate. Regular rhythm.      No gallop.   Edema:     Peripheral edema absent.   Abdominal:      Palpations: Abdomen is soft.      Tenderness: There is no abdominal tenderness.   Musculoskeletal: Normal range of motion. Skin:     General: Skin is warm and dry.      Findings: No rash.   Neurological:      Mental Status: Alert and oriented to person, place, and time.           ECG 12 Lead    Date/Time: 7/6/2022 3:09 PM  Performed by: Anibal Wray MD  Authorized by: Anibal Wray MD   Rhythm: sinus rhythm  Other findings: non-specific ST-T wave changes    Clinical impression: abnormal EKG             Assessment:       Diagnosis Plan   1. Coronary artery disease involving native coronary artery of native heart without angina pectoris     2. Hyperlipidemia, unspecified hyperlipidemia type     3. Essential hypertension     4. Type 2 diabetes with nephropathy (HCC)            Plan:       Is doing well he is asymptomatic from a coronary standpoint his risk modification is pretty good somebody turned his Lipitor down to 10 mg a day.  I am going to stop his aspirin and just keep him on long-term clopidogrel but not change anything else I will have him come back and see us in a year sooner if he has " trouble    Return for 1 year Josseline Garrett, Laurita Gutierrez, Izabella Heard, 2 years with me.     As always, it has been a pleasure to participate in your patient's care.      Sincerely,       Anibal Wray MD

## 2022-08-24 NOTE — PROGRESS NOTES
Subjective   Anthony Vaughn is a 77 y.o. male.     F/u for dm 2, hyperlipidemia, CAD/ testing bs 1-2 x week / last dm eye exam April 2022/ last dm foot exam today  with dr Mckeon         Patient has known diabetes mellitus since 2006 and started on insulin in 2010. He is on Levemir 68 units every evening, NovoLog 66-66-58, Ozempic 0.5 mg weekly and metformin 1000 mg twice a day.  He has freestyle anya 2 sensor in place.  He has few hypoglycemic episodes. He eats out 3-4 meals per week due to his work.   His last meal was 8:30 AM.     His last eye examination was in 4/22 and he has retinopathy. He has microalbuminuria on urine sample taken last 4/21. He is on lisinopril. He occasional numbness in his toes     He has known coronary artery disease and had multiple angioplasty and stents in the past. He denies any previous history of myocardial infarction. He has not had a coronary artery bypass surgery. He denies any chest pains, exertional dyspnea or PND.   He follows with Dr. Wray. He is on amlodipine, Imdur, lisinopril, metoprolol tartrate and nitroglycerin sublingual as needed.       He has hyperlipidemia and has been on atorvastatin 10 mg every PM and Lovaza 1 capsule BID. He denies any myalgia.     He had tubular adenoma of colon removed by Dr. Weems by colonoscopy in September 2019.  He denies bowel complaints.  He was advised follow-up colonoscopy in 2024.     He had 2 Moderna Covid vaccines.    [facility]     The following portions of the patient's history were reviewed and updated as appropriate: allergies, current medications, past family history, past medical history, past social history, past surgical history and problem list.    Review of Systems   Eyes: Negative for visual disturbance.   Respiratory: Negative for shortness of breath.    Cardiovascular: Negative for chest pain and palpitations.   Gastrointestinal: Negative.    Endocrine: Negative for cold intolerance and  "polydipsia.   Genitourinary: Negative.    Neurological: Positive for numbness.     Vitals:    08/25/22 1237   BP: 124/56   Pulse: 76   Temp: 97.1 °F (36.2 °C)   SpO2: 98%   Weight: 98.2 kg (216 lb 6.4 oz)   Height: 180.3 cm (70.98\")      Objective   Physical Exam  Constitutional:       General: He is not in acute distress.     Appearance: He is obese. He is not ill-appearing, toxic-appearing or diaphoretic.   Eyes:      General: No scleral icterus.        Right eye: No discharge.         Left eye: No discharge.      Extraocular Movements: Extraocular movements intact.      Conjunctiva/sclera: Conjunctivae normal.   Neck:      Vascular: No carotid bruit.   Cardiovascular:      Rate and Rhythm: Normal rate and regular rhythm.   Pulmonary:      Effort: Pulmonary effort is normal. No respiratory distress.      Breath sounds: Normal breath sounds.   Chest:      Chest wall: No tenderness.   Abdominal:      General: Bowel sounds are normal.      Palpations: Abdomen is soft.      Tenderness: There is no right CVA tenderness or left CVA tenderness.   Musculoskeletal:      Cervical back: Normal range of motion and neck supple.      Right lower leg: No edema.      Left lower leg: No edema.   Lymphadenopathy:      Cervical: No cervical adenopathy.   Neurological:      General: No focal deficit present.      Mental Status: He is alert and oriented to person, place, and time.       Lab on 04/19/2022   Component Date Value Ref Range Status   • WBC 04/19/2022 12.99 (A) 3.40 - 10.80 10*3/mm3 Final   • RBC 04/19/2022 5.56  4.14 - 5.80 10*6/mm3 Final   • Hemoglobin 04/19/2022 14.3  13.0 - 17.7 g/dL Final   • Hematocrit 04/19/2022 45.3  37.5 - 51.0 % Final   • MCV 04/19/2022 81.5  79.0 - 97.0 fL Final   • MCH 04/19/2022 25.7 (A) 26.6 - 33.0 pg Final   • MCHC 04/19/2022 31.6  31.5 - 35.7 g/dL Final   • RDW 04/19/2022 15.4  12.3 - 15.4 % Final   • RDW-SD 04/19/2022 45.6  37.0 - 54.0 fl Final   • MPV 04/19/2022 8.9  6.0 - 12.0 fL Final "   • Platelets 04/19/2022 345  140 - 450 10*3/mm3 Final   • Neutrophil % 04/19/2022 67.6  42.7 - 76.0 % Final   • Lymphocyte % 04/19/2022 20.1  19.6 - 45.3 % Final   • Monocyte % 04/19/2022 10.2  5.0 - 12.0 % Final   • Eosinophil % 04/19/2022 1.2  0.3 - 6.2 % Final   • Basophil % 04/19/2022 0.5  0.0 - 1.5 % Final   • Immature Grans % 04/19/2022 0.4  0.0 - 0.5 % Final   • Neutrophils, Absolute 04/19/2022 8.79 (A) 1.70 - 7.00 10*3/mm3 Final   • Lymphocytes, Absolute 04/19/2022 2.61  0.70 - 3.10 10*3/mm3 Final   • Monocytes, Absolute 04/19/2022 1.32 (A) 0.10 - 0.90 10*3/mm3 Final   • Eosinophils, Absolute 04/19/2022 0.15  0.00 - 0.40 10*3/mm3 Final   • Basophils, Absolute 04/19/2022 0.07  0.00 - 0.20 10*3/mm3 Final   • Immature Grans, Absolute 04/19/2022 0.05  0.00 - 0.05 10*3/mm3 Final   • nRBC 04/19/2022 0.0  0.0 - 0.2 /100 WBC Final     Assessment & Plan   Diagnoses and all orders for this visit:    1. Type 2 diabetes with nephropathy (HCC) (Primary)  -     Comprehensive Metabolic Panel  -     Lipid Panel  -     Hemoglobin A1c  -     TSH  -     T4, Free  -     Microalbumin / Creatinine Urine Ratio - Urine, Clean Catch    2. Hyperlipidemia, unspecified hyperlipidemia type  -     Comprehensive Metabolic Panel  -     Lipid Panel  -     Microalbumin / Creatinine Urine Ratio - Urine, Clean Catch    3. Essential hypertension  -     Comprehensive Metabolic Panel    4. Coronary artery disease involving native coronary artery of native heart without angina pectoris  -     Comprehensive Metabolic Panel  -     Lipid Panel    5. S/P angioplasty with stent      Decrease Levemir to 65 units every evening.  Decrease NovoLog to 62 units at breakfast, 66 units at lunch, and 58 units at supper.  Continue Ozempic 0.5 mg weekly and metformin 1000 mg twice a day.  Continue lisinopril, amlodipine metoprolol tartrate and isosorbide mononitrate.  Continue atorvastatin 10 mg every evening and Lovaza 1 capsule twice a day.    Copy of my note  sent to Mary Anne Ludwig NP.    RTC 4 mos.

## 2022-08-25 ENCOUNTER — OFFICE VISIT (OUTPATIENT)
Dept: ENDOCRINOLOGY | Age: 77
End: 2022-08-25

## 2022-08-25 VITALS
DIASTOLIC BLOOD PRESSURE: 56 MMHG | HEIGHT: 71 IN | TEMPERATURE: 97.1 F | HEART RATE: 76 BPM | WEIGHT: 216.4 LBS | OXYGEN SATURATION: 98 % | BODY MASS INDEX: 30.3 KG/M2 | SYSTOLIC BLOOD PRESSURE: 124 MMHG

## 2022-08-25 DIAGNOSIS — I10 ESSENTIAL HYPERTENSION: Chronic | ICD-10-CM

## 2022-08-25 DIAGNOSIS — Z95.820 S/P ANGIOPLASTY WITH STENT: ICD-10-CM

## 2022-08-25 DIAGNOSIS — E11.42 DM TYPE 2 WITH DIABETIC PERIPHERAL NEUROPATHY: ICD-10-CM

## 2022-08-25 DIAGNOSIS — R80.9 MICROALBUMINURIA: ICD-10-CM

## 2022-08-25 DIAGNOSIS — I25.10 CORONARY ARTERY DISEASE INVOLVING NATIVE CORONARY ARTERY OF NATIVE HEART WITHOUT ANGINA PECTORIS: ICD-10-CM

## 2022-08-25 DIAGNOSIS — E78.5 HYPERLIPIDEMIA, UNSPECIFIED HYPERLIPIDEMIA TYPE: Chronic | ICD-10-CM

## 2022-08-25 DIAGNOSIS — E11.21 TYPE 2 DIABETES WITH NEPHROPATHY: Primary | Chronic | ICD-10-CM

## 2022-08-25 PROCEDURE — 99214 OFFICE O/P EST MOD 30 MIN: CPT | Performed by: INTERNAL MEDICINE

## 2022-08-26 LAB
ALBUMIN SERPL-MCNC: 4.6 G/DL (ref 3.7–4.7)
ALBUMIN/CREAT UR: 86 MG/G CREAT (ref 0–29)
ALBUMIN/GLOB SERPL: 1.9 {RATIO} (ref 1.2–2.2)
ALP SERPL-CCNC: 64 IU/L (ref 44–121)
ALT SERPL-CCNC: 18 IU/L (ref 0–44)
AST SERPL-CCNC: 16 IU/L (ref 0–40)
BILIRUB SERPL-MCNC: 0.3 MG/DL (ref 0–1.2)
BUN SERPL-MCNC: 12 MG/DL (ref 8–27)
BUN/CREAT SERPL: 10 (ref 10–24)
CALCIUM SERPL-MCNC: 9.7 MG/DL (ref 8.6–10.2)
CHLORIDE SERPL-SCNC: 103 MMOL/L (ref 96–106)
CHOLEST SERPL-MCNC: 130 MG/DL (ref 100–199)
CO2 SERPL-SCNC: 22 MMOL/L (ref 20–29)
CREAT SERPL-MCNC: 1.2 MG/DL (ref 0.76–1.27)
CREAT UR-MCNC: 100.8 MG/DL
EGFRCR-CYS SERPLBLD CKD-EPI 2021: 62 ML/MIN/1.73
GLOBULIN SER CALC-MCNC: 2.4 G/DL (ref 1.5–4.5)
GLUCOSE SERPL-MCNC: 46 MG/DL (ref 65–99)
HBA1C MFR BLD: 6.4 % (ref 4.8–5.6)
HDLC SERPL-MCNC: 32 MG/DL
IMP & REVIEW OF LAB RESULTS: NORMAL
LDLC SERPL CALC-MCNC: 48 MG/DL (ref 0–99)
MICROALBUMIN UR-MCNC: 86.9 UG/ML
POTASSIUM SERPL-SCNC: 4.5 MMOL/L (ref 3.5–5.2)
PROT SERPL-MCNC: 7 G/DL (ref 6–8.5)
SODIUM SERPL-SCNC: 142 MMOL/L (ref 134–144)
T4 FREE SERPL-MCNC: 0.99 NG/DL (ref 0.82–1.77)
TRIGL SERPL-MCNC: 326 MG/DL (ref 0–149)
TSH SERPL DL<=0.005 MIU/L-ACNC: 1.48 UIU/ML (ref 0.45–4.5)
VLDLC SERPL CALC-MCNC: 50 MG/DL (ref 5–40)

## 2022-08-26 NOTE — PROGRESS NOTES
LDL 48.  HDL 32.  Triglycerides 326.  Continue atorvastatin 10 mg daily, Lovaza 1 capsule twice daily and low-fat diet.  Hemoglobin A1c 6.4%.  Diabetes well controlled.  Normal thyroid function tests.  Urine microalbumin elevated.  Continue lisinopril.  Copy of labs sent to Mary Anne Ludwig NP.  Send copy of labs to patient.

## 2022-09-02 ENCOUNTER — OFFICE VISIT (OUTPATIENT)
Dept: INTERNAL MEDICINE | Facility: CLINIC | Age: 77
End: 2022-09-02

## 2022-09-02 VITALS
RESPIRATION RATE: 20 BRPM | DIASTOLIC BLOOD PRESSURE: 72 MMHG | HEART RATE: 84 BPM | SYSTOLIC BLOOD PRESSURE: 118 MMHG | HEIGHT: 71 IN | BODY MASS INDEX: 30.2 KG/M2 | TEMPERATURE: 98.2 F

## 2022-09-02 DIAGNOSIS — R05.9 COUGH: Primary | ICD-10-CM

## 2022-09-02 DIAGNOSIS — M25.511 RIGHT SHOULDER PAIN, UNSPECIFIED CHRONICITY: ICD-10-CM

## 2022-09-02 LAB
EXPIRATION DATE: NORMAL
FLUAV AG UPPER RESP QL IA.RAPID: NOT DETECTED
FLUBV AG UPPER RESP QL IA.RAPID: NOT DETECTED
INTERNAL CONTROL: NORMAL
Lab: NORMAL
SARS-COV-2 AG UPPER RESP QL IA.RAPID: NOT DETECTED

## 2022-09-02 PROCEDURE — 99213 OFFICE O/P EST LOW 20 MIN: CPT | Performed by: NURSE PRACTITIONER

## 2022-09-02 PROCEDURE — 87428 SARSCOV & INF VIR A&B AG IA: CPT | Performed by: NURSE PRACTITIONER

## 2022-09-02 RX ORDER — AMOXICILLIN AND CLAVULANATE POTASSIUM 875; 125 MG/1; MG/1
1 TABLET, FILM COATED ORAL 2 TIMES DAILY
Qty: 14 TABLET | Refills: 0 | Status: SHIPPED | OUTPATIENT
Start: 2022-09-02 | End: 2022-10-31

## 2022-09-02 RX ORDER — GUAIFENESIN 600 MG/1
1200 TABLET, EXTENDED RELEASE ORAL 2 TIMES DAILY
Start: 2022-09-02 | End: 2022-10-31

## 2022-09-02 NOTE — PROGRESS NOTES
Subjective   Chief Complaint   Patient presents with   • Cough       History of Present Illness   77 y.o. male presents with cc PC with yellow sputum ongoing times 1 weeks. Denies fever or chills. Throat sore from PND. No sinus or head congestion. Feels congested in his chest. Denies dyspnea or wheezing. COVID negative yesterday.    [facility]      Patient Active Problem List   Diagnosis   • Coronary artery disease involving native coronary artery without angina pectoris   • S/P angioplasty with stent   • Hyperlipidemia   • Essential hypertension   • Type 2 diabetes with nephropathy (HCC)   • Microalbuminuria   • DM type 2 with diabetic peripheral neuropathy (HCC)   • Morbid obesity due to excess calories (HCC)   • Bandemia   • Erythrocytosis   • Diverticulitis of large intestine without perforation or abscess without bleeding   • Osteoarthritis of left acromioclavicular joint   • Trapezius muscle spasm       No Known Allergies    Current Outpatient Medications on File Prior to Visit   Medication Sig Dispense Refill   • acyclovir (ZOVIRAX) 400 MG tablet Take 400 mg by mouth As Needed. Take no more than 5 doses a day.     • amLODIPine (NORVASC) 10 MG tablet Take 10 mg by mouth Daily.     • atorvastatin (LIPITOR) 10 MG tablet Take 10 mg by mouth Daily.     • atorvastatin (LIPITOR) 20 MG tablet TAKE 1 TABLET BY MOUTH DAILY 90 tablet 3   • brimonidine (ALPHAGAN) 0.15 % ophthalmic solution Apply 1 drop to eye 2 (two) times a day.     • clopidogrel (PLAVIX) 75 MG tablet Take 75 mg by mouth Every Night.     • Continuous Blood Gluc  (FreeStyle Juwan 2 Grundy) device 1 each Daily. 1 each 1   • Continuous Blood Gluc Sensor (FreeStyle Juwan 2 Sensor) misc 1 each Every 14 (Fourteen) Days. 6 each 2   • Glucose 15 GM/32ML gel Take  by mouth.     • insulin aspart (novoLOG FLEXPEN) 100 UNIT/ML solution pen-injector sc pen 62 units at breakfast, 66 units at lunch and 58 units at supper.     • insulin  detemir (LEVEMIR) 100 UNIT/ML injection INJECT 65 UNITS AT NIGHT  12   • isosorbide mononitrate (IMDUR) 30 MG 24 hr tablet Take 30 mg by mouth daily.     • latanoprost (XALATAN) 0.005 % ophthalmic solution Apply 1 drop to eye daily.     • lisinopril (PRINIVIL,ZESTRIL) 40 MG tablet TAKE 1 TABLET BY MOUTH DAILY 90 tablet 1   • metFORMIN (GLUCOPHAGE) 1000 MG tablet Take 0.5 tablets by mouth 2 (Two) Times a Day With Meals. Take 2 tablets bid     • metoprolol succinate XL (TOPROL-XL) 50 MG 24 hr tablet Take 75 mg by mouth Daily.     • nitroglycerin (NITROSTAT) 0.4 MG SL tablet Place 0.4 mg under the tongue Every 5 (Five) Minutes As Needed for Chest Pain. Take no more than 3 doses in 15 minutes.     • Omega 3 1000 MG capsule Take 1 capsule by mouth 2 (Two) Times a Day.     • omeprazole (priLOSEC) 20 MG capsule Take 20 mg by mouth Daily.     • pilocarpine (PILOCAR) 1 % ophthalmic solution 1 drop 2 (Two) Times a Day.     • Semaglutide (OZEMPIC, 0.25 OR 0.5 MG/DOSE, SC) Inject  under the skin into the appropriate area as directed. 0.5 mg weekly     • timolol (TIMOPTIC) 0.5 % ophthalmic solution 1 drop daily. INSTILL 1 DROP IN LEFT EYE EVERY MORNING AS DIRECTED  6   • triamcinolone acetonide (KENALOG-40) 40 MG/ML injection Inject 40 mg into the appropriate joint as directed by provider.       No current facility-administered medications on file prior to visit.       Past Medical History:   Diagnosis Date   • Acute CVA (cerebrovascular accident) (HCC) 3/1/2018   • Chest pain    • Dizziness    • ED (erectile dysfunction)    • GERD (gastroesophageal reflux disease)    • Hyperlipidemia    • Kidney stones    • Pneumonia due to infectious organism 4/14/2017   • S/P angioplasty with stent    • Stroke (HCC)    • Tubular adenoma of colon 09/2019    Removed by colonoscopy by Dr. Weems       Family History   Problem Relation Age of Onset   • Heart disease Mother    • Prostate cancer Father    • Heart disease Father    • Colon polyps  Daughter    • Colon polyps Daughter        Social History     Socioeconomic History   • Marital status:      Spouse name: Kerry   • Years of education: College   Tobacco Use   • Smoking status: Never Smoker   • Smokeless tobacco: Never Used   • Tobacco comment: no caffeine   Substance and Sexual Activity   • Alcohol use: No   • Drug use: No   • Sexual activity: Defer     Partners: Female       Past Surgical History:   Procedure Laterality Date   • CARDIAC CATHETERIZATION N/A 02/10/2011    Normal LV systolic function; Kind of diffuse, small vessel, distal disease. Most of it is not amenable to PCI, but his JADEN lesion is and it is a large vessel; All of his prior durg-eluting stents are widely patent & look good, Dr. Anibal Oliveira   • CATARACT EXTRACTION Bilateral    • COLONOSCOPY N/A 09/13/2004    Normal, Repeat in 5-8 years, Dr. Davion Sanon   • COLONOSCOPY N/A 09/04/2009    Mutiple small & large-mouthed diverticual were found in the entire colon; Exam otherwise normal, Dr. Davion Sanon   • COLONOSCOPY N/A 9/26/2019    Procedure: COLONOSCOPY to  cecum with cold biopsy polypectomy;  Surgeon: Haider Weems MD;  Location: [facility] ENDOSCOPY;  Service: General   • COLONOSCOPY W/ POLYPECTOMY N/A [id number]    2 benign polyps, [facility], Dr. Davion Sanon   • CORONARY ANGIOPLASTY WITH STENT PLACEMENT N/A 11/20/2007    Successful multivessel drug eluting stent implantation for restenosis above his prior stenting, Dr. Anibal Wray   • CORONARY ANGIOPLASTY WITH STENT PLACEMENT N/A 06/05/2007    Successful multivessel drug eluting stent implantation in all 3 of the major coronary arteries, Dr. Anibal Wray   • CORONARY ANGIOPLASTY WITH STENT PLACEMENT N/A 02/10/2011    Successful drug-stenting of the JADEN, Dr. Anibal Oliveira   • ENDOSCOPY N/A 11/21/2017    Procedure: ESOPHAGOGASTRODUODENOSCOPY WITH COLD BIOPSIES AND 53 F MICHAEL DILATION;  Surgeon: Wisam Potter MD;  Location: [facility] ENDOSCOPY;  " Service:    • EYE SURGERY Left 2011    Retina   • HYDROCELECTOMY Left 08/19/2011    Dr. Ellie Stein   • INCISION AND DRAINAGE PERIRECTAL ABSCESS N/A 11/30/2007    Dr. Davion Sanon   • LUMBAR EPIDURAL INJECTION N/A 2012-2013    x 3   • NEUROMA SURGERY Right 07/19/2005    Excision Neuroma Right Arm; PATH: CONSISTENT W/ LIPOMA,  Dr. Davion Sanon   • ROTATOR CUFF REPAIR Right 2006   • ROTATOR CUFF REPAIR Left 2000   • UPPER GASTROINTESTINAL ENDOSCOPY N/A 09/06/2012    Normal esophagus; Normal Stomach, Normal examinded duodenum, Dr. Bruno Turk   • WRIST FRACTURE SURGERY Right 06/03/2003    ORIF Right Distal Radius, Dr. Ryan Bravo       The following portions of the patient's history were reviewed and updated as appropriate: problem list, allergies, current medications, past medical history and past social history.    Review of Systems    Immunization History   Administered Date(s) Administered   • Fluzone High Dose =>65 Years ([employer] ONLY) 10/03/2016, 09/26/2017, 10/20/2018   • Hepatitis A 10/01/2019   • Pneumococcal Conjugate 13-Valent (PCV13) 11/22/2016   • Pneumococcal Polysaccharide (PPSV23) 11/22/2010   • Shingrix 01/01/2011, 01/01/2019   • Tdap 11/12/2017       Objective   Vitals:    09/02/22 1400   BP: 118/72   Pulse: 84   Resp: 20   Temp: 98.2 °F (36.8 °C)   Height: 180.3 cm (70.98\")     Body mass index is 30.2 kg/m².  Physical Exam  Vitals reviewed.   Constitutional:       Appearance: Normal appearance. He is well-developed. He is obese.   HENT:      Head: Normocephalic and atraumatic.      Nose: No congestion.      Mouth/Throat:      Mouth: Mucous membranes are moist.      Pharynx: No oropharyngeal exudate or posterior oropharyngeal erythema.      Comments: +PND.   Cardiovascular:      Rate and Rhythm: Normal rate and regular rhythm.      Heart sounds: Normal heart sounds, S1 normal and S2 normal.   Pulmonary:      Effort: Pulmonary effort is normal.      Breath sounds: Normal breath sounds. "   Musculoskeletal:      Cervical back: Neck supple.   Lymphadenopathy:      Cervical: No cervical adenopathy.   Skin:     General: Skin is warm and dry.   Neurological:      Mental Status: He is alert.   Psychiatric:         Mood and Affect: Mood normal.         Behavior: Behavior normal.         Procedures    Assessment & Plan   Diagnoses and all orders for this visit:    1. Cough (Primary)  Comments:  COVID and flu negative. Ongoing times 1 week with green sputum. ABX and Mucinex as below. Declined Tessalon perles. He will call should he change his mind.   Orders:  -     POCT SARS-CoV-2 Antigen VINCENT + Flu    2. Right shoulder pain, unspecified chronicity  Comments:  Scheduled with ortho in  4 days. Use Salon Pas patch and Tylenol till then.       Return in about 6 weeks (around 10/14/2022) for Medicare Wellness.

## 2022-09-20 DIAGNOSIS — I10 ESSENTIAL HYPERTENSION: Chronic | ICD-10-CM

## 2022-09-20 RX ORDER — LISINOPRIL 40 MG/1
40 TABLET ORAL DAILY
Qty: 90 TABLET | Refills: 1 | Status: SHIPPED | OUTPATIENT
Start: 2022-09-20

## 2022-10-28 PROBLEM — E66.01 MORBID OBESITY DUE TO EXCESS CALORIES: Chronic | Status: RESOLVED | Noted: 2017-03-07 | Resolved: 2022-10-28

## 2022-10-28 NOTE — PROGRESS NOTES
The ABCs of the Annual Wellness Visit  Subsequent Medicare Wellness Visit    Chief Complaint   Patient presents with   • Medicare Wellness-subsequent      Subjective    History of Present Illness:  Anthony Vaughn is a 77 y.o. male who presents for a Subsequent Medicare Wellness Visit.    Also presents for follow up regarding HTN. Denies CP or dyspnea. Feeling well and spends most of his time caring for his wife. DM2 followed by Dr. Mckeon with recent A1C 6.4%. PSA with Dr. Ackerman. He still is undecided regarding COVID vaccination. CLS UTD with Dr. Weems with recall advised in 2024. Eye exam in 3 days.     The following portions of the patient's history were reviewed and   updated as appropriate: allergies, current medications, past family history, past medical history, past social history, past surgical history and problem list.    Compared to one year ago, the patient feels his physical   health is worse.    Compared to one year ago, the patient feels his mental   health is the same.    Recent Hospitalizations:  He was not admitted to the hospital during the last year.       Current Medical Providers:  Patient Care Team:  Mary Anne Ludwig APRN as PCP - General (Family Medicine)  Gricel Diaz MD as PCP - Family Medicine  Anibal Wray MD as Consulting Physician (Cardiology)  Guillermo Live MD as Consulting Physician  Norman Mahmood MD as Consulting Physician (Ophthalmology)  Cristino Mckeon MD as Consulting Physician (Endocrinology)  Jeannie Wheeler APRN as Nurse Practitioner (Neurology)  Cesar Galaviz MD as Consulting Physician (Hematology and Oncology)  Gricel Diaz MD as Referring Physician (Internal Medicine)    Outpatient Medications Prior to Visit   Medication Sig Dispense Refill   • acyclovir (ZOVIRAX) 400 MG tablet Take 400 mg by mouth As Needed. Take no more than 5 doses a day.     • amLODIPine (NORVASC) 10 MG tablet Take 10 mg by mouth Daily.     • atorvastatin  (LIPITOR) 10 MG tablet Take 10 mg by mouth Daily.     • brimonidine (ALPHAGAN) 0.15 % ophthalmic solution Apply 1 drop to eye 2 (two) times a day.     • clopidogrel (PLAVIX) 75 MG tablet Take 75 mg by mouth Every Night.     • Continuous Blood Gluc  (FreeStyle Juwan 2 Newton) device 1 each Daily. 1 each 1   • Continuous Blood Gluc Sensor (FreeStyle Juwan 2 Sensor) misc 1 each Every 14 (Fourteen) Days. 6 each 2   • Glucose 15 GM/32ML gel Take  by mouth.     • insulin aspart (novoLOG FLEXPEN) 100 UNIT/ML solution pen-injector sc pen 62 units at breakfast, 66 units at lunch and 58 units at supper.     • insulin detemir (LEVEMIR) 100 UNIT/ML injection INJECT 65 UNITS AT NIGHT  12   • isosorbide mononitrate (IMDUR) 30 MG 24 hr tablet Take 30 mg by mouth daily.     • latanoprost (XALATAN) 0.005 % ophthalmic solution Apply 1 drop to eye daily.     • lisinopril (PRINIVIL,ZESTRIL) 40 MG tablet TAKE 1 TABLET BY MOUTH DAILY 90 tablet 1   • metFORMIN (GLUCOPHAGE) 1000 MG tablet Take 0.5 tablets by mouth 2 (Two) Times a Day With Meals. Take 2 tablets bid     • metoprolol succinate XL (TOPROL-XL) 50 MG 24 hr tablet Take 75 mg by mouth Daily.     • nitroglycerin (NITROSTAT) 0.4 MG SL tablet Place 0.4 mg under the tongue Every 5 (Five) Minutes As Needed for Chest Pain. Take no more than 3 doses in 15 minutes.     • Omega 3 1000 MG capsule Take 1 capsule by mouth 2 (Two) Times a Day.     • omeprazole (priLOSEC) 20 MG capsule Take 20 mg by mouth Daily.     • pilocarpine (PILOCAR) 1 % ophthalmic solution 1 drop 2 (Two) Times a Day.     • Semaglutide (OZEMPIC, 0.25 OR 0.5 MG/DOSE, SC) Inject  under the skin into the appropriate area as directed. 0.5 mg weekly     • timolol (TIMOPTIC) 0.5 % ophthalmic solution 1 drop daily. INSTILL 1 DROP IN LEFT EYE EVERY MORNING AS DIRECTED  6   • triamcinolone acetonide (KENALOG-40) 40 MG/ML injection Inject 40 mg into the appropriate joint as directed by provider.     • guaiFENesin (Mucinex)  "600 MG 12 hr tablet Take 2 tablets by mouth 2 (Two) Times a Day.     • amoxicillin-clavulanate (Augmentin) 875-125 MG per tablet Take 1 tablet by mouth 2 (Two) Times a Day. 14 tablet 0   • atorvastatin (LIPITOR) 20 MG tablet TAKE 1 TABLET BY MOUTH DAILY 90 tablet 3     No facility-administered medications prior to visit.       No opioid medication identified on active medication list. I have reviewed chart for other potential  high risk medication/s and harmful drug interactions in the elderly.          Aspirin is not on active medication list.  Aspirin use is indicated based on review of current medical condition/s. Pros and cons of this therapy have been discussed with this patient. Benefits of this medication outweigh potential harm.  Patient has been instructed to start taking this medication..    Patient Active Problem List   Diagnosis   • Coronary artery disease involving native coronary artery without angina pectoris   • S/P angioplasty with stent   • Hyperlipidemia   • Essential hypertension   • Type 2 diabetes with nephropathy (HCC)   • Microalbuminuria   • DM type 2 with diabetic peripheral neuropathy (HCC)   • Bandemia   • Erythrocytosis   • Diverticulitis of large intestine without perforation or abscess without bleeding   • Osteoarthritis of left acromioclavicular joint   • Trapezius muscle spasm     Advance Care Planning  Advance Directive is on file.  ACP discussion was held with the patient during this visit. Patient has an advance directive in EMR which is still valid.           Objective    Vitals:    10/31/22 1001   Temp: 97.5 °F (36.4 °C)   Weight: 95.3 kg (210 lb)   Height: 180.3 cm (70.98\")     Estimated body mass index is 29.31 kg/m² as calculated from the following:    Height as of this encounter: 180.3 cm (70.98\").    Weight as of this encounter: 95.3 kg (210 lb).    BMI is >= 30 and <35. (Class 1 Obesity). The following options were offered after discussion;: nutrition " counseling/recommendations      Does the patient have evidence of cognitive impairment? No    Physical Exam  Vitals reviewed.   Constitutional:       Appearance: Normal appearance. He is well-developed. He is obese.   HENT:      Head: Normocephalic and atraumatic.      Mouth/Throat:      Mouth: Mucous membranes are moist.      Pharynx: Oropharynx is clear. No oropharyngeal exudate or posterior oropharyngeal erythema.   Cardiovascular:      Rate and Rhythm: Normal rate and regular rhythm.      Heart sounds: Normal heart sounds, S1 normal and S2 normal.   Pulmonary:      Effort: Pulmonary effort is normal.      Breath sounds: Normal breath sounds.   Abdominal:      General: Bowel sounds are normal. There is no distension.      Palpations: Abdomen is soft.      Tenderness: There is no abdominal tenderness.   Musculoskeletal:      Cervical back: Neck supple.      Comments: Ambulates without assistance; no clubbing, cyanosis or edema.    Skin:     General: Skin is warm and dry.   Neurological:      Mental Status: He is alert.   Psychiatric:         Mood and Affect: Mood normal.         Behavior: Behavior normal.       Lab Results   Component Value Date    CHLPL 130 08/25/2022    TRIG 326 (H) 08/25/2022    HDL 32 (L) 08/25/2022    LDL 48 08/25/2022    VLDL 50 (H) 08/25/2022    HGBA1C 6.4 (H) 08/25/2022            HEALTH RISK ASSESSMENT    Smoking Status:  Social History     Tobacco Use   Smoking Status Never   Smokeless Tobacco Never   Tobacco Comments    no caffeine     Alcohol Consumption:  Social History     Substance and Sexual Activity   Alcohol Use No     Fall Risk Screen:    STEADI Fall Risk Assessment was completed, and patient is at LOW risk for falls.Assessment completed on:10/31/2022    Depression Screening:  PHQ-2/PHQ-9 Depression Screening 10/31/2022   Retired Total Score -   Little Interest or Pleasure in Doing Things 0-->not at all   Feeling Down, Depressed or Hopeless 0-->not at all   PHQ-9: Brief  Depression Severity Measure Score 0       Health Habits and Functional and Cognitive Screening:  Functional & Cognitive Status 10/31/2022   Do you have difficulty preparing food and eating? No   Do you have difficulty bathing yourself, getting dressed or grooming yourself? No   Do you have difficulty using the toilet? No   Do you have difficulty moving around from place to place? No   Do you have trouble with steps or getting out of a bed or a chair? No   Current Diet Well Balanced Diet   Dental Exam Up to date   Eye Exam Up to date   Exercise (times per week) 0 times per week   Current Exercises Include No Regular Exercise   Current Exercise Activities Include -   Do you need help using the phone?  No   Are you deaf or do you have serious difficulty hearing?  No   Do you need help with transportation? No   Do you need help shopping? No   Do you need help preparing meals?  No   Do you need help with housework?  No   Do you need help with laundry? No   Do you need help taking your medications? No   Do you need help managing money? No   Do you ever drive or ride in a car without wearing a seat belt? Yes   Have you felt unusual stress, anger or loneliness in the last month? No   Who do you live with? Spouse   If you need help, do you have trouble finding someone available to you? No   Have you been bothered in the last four weeks by sexual problems? No       Age-appropriate Screening Schedule:  Refer to the list below for future screening recommendations based on patient's age, sex and/or medical conditions. Orders for these recommended tests are listed in the plan section. The patient has been provided with a written plan.    Health Maintenance   Topic Date Due   • INFLUENZA VACCINE  08/01/2022   • HEMOGLOBIN A1C  02/25/2023   • DIABETIC EYE EXAM  06/22/2023   • LIPID PANEL  08/25/2023   • URINE MICROALBUMIN  08/25/2023   • TDAP/TD VACCINES (2 - Td or Tdap) 11/12/2027   • ZOSTER VACCINE  Completed               Assessment & Plan   CMS Preventative Services Quick Reference  Risk Factors Identified During Encounter  Immunizations Discussed/Encouraged (specific Immunizations; Influenza  The above risks/problems have been discussed with the patient.  Follow up actions/plans if indicated are seen below in the Assessment/Plan Section.  Pertinent information has been shared with the patient in the After Visit Summary.    Diagnoses and all orders for this visit:    1. Medicare annual wellness visit, subsequent (Primary)  Comments:  150 minutes aerobic exercise advised as well as weight loss via dietary changes. Flu shot today. He is undecided regarding COVID vacination.     2. Essential hypertension  Comments:  Controlled. No changes to current medications. RTO 6 months.     3. Chronic stable angina (HCC)  Comments:  FOllowed by Dr. Reyna. Continues Imdur. Never requires Nitrostat.     4. Type 2 diabetes with nephropathy (HCC)  Comments:  Controlled with A1C 6.4%. Followed by Dr. Mckeon. Occasional use of Glucose tabs. CGM. Flu shot today with eye exam in 2-3 days.     5. Need for hepatitis C screening test  -     Hepatitis C Antibody    6. Need for influenza vaccination  -     Fluzone High-Dose 65+yrs ([id number])    Records reviewed include previous OV with myself as well as labs.     Follow Up:   Return for Lab Today.     An After Visit Summary and PPPS were made available to the patient.

## 2022-10-31 ENCOUNTER — OFFICE VISIT (OUTPATIENT)
Dept: INTERNAL MEDICINE | Facility: CLINIC | Age: 77
End: 2022-10-31

## 2022-10-31 VITALS — HEIGHT: 71 IN | WEIGHT: 210 LBS | TEMPERATURE: 97.5 F | BODY MASS INDEX: 29.4 KG/M2

## 2022-10-31 DIAGNOSIS — I10 ESSENTIAL HYPERTENSION: Chronic | ICD-10-CM

## 2022-10-31 DIAGNOSIS — E11.21 TYPE 2 DIABETES WITH NEPHROPATHY: Chronic | ICD-10-CM

## 2022-10-31 DIAGNOSIS — Z00.00 MEDICARE ANNUAL WELLNESS VISIT, SUBSEQUENT: Primary | ICD-10-CM

## 2022-10-31 DIAGNOSIS — I20.8 CHRONIC STABLE ANGINA: Chronic | ICD-10-CM

## 2022-10-31 DIAGNOSIS — Z11.59 NEED FOR HEPATITIS C SCREENING TEST: ICD-10-CM

## 2022-10-31 DIAGNOSIS — Z23 NEED FOR INFLUENZA VACCINATION: ICD-10-CM

## 2022-10-31 PROCEDURE — 90662 IIV NO PRSV INCREASED AG IM: CPT | Performed by: NURSE PRACTITIONER

## 2022-10-31 PROCEDURE — G0008 ADMIN INFLUENZA VIRUS VAC: HCPCS | Performed by: NURSE PRACTITIONER

## 2022-10-31 PROCEDURE — G0439 PPPS, SUBSEQ VISIT: HCPCS | Performed by: NURSE PRACTITIONER

## 2022-10-31 PROCEDURE — 1170F FXNL STATUS ASSESSED: CPT | Performed by: NURSE PRACTITIONER

## 2022-10-31 PROCEDURE — 1160F RVW MEDS BY RX/DR IN RCRD: CPT | Performed by: NURSE PRACTITIONER

## 2022-11-01 LAB — HCV AB S/CO SERPL IA: <0.1 S/CO RATIO (ref 0–0.9)

## 2023-02-27 ENCOUNTER — TELEPHONE (OUTPATIENT)
Dept: INTERNAL MEDICINE | Facility: CLINIC | Age: 78
End: 2023-02-27

## 2023-02-27 NOTE — TELEPHONE ENCOUNTER
Caller: Anthony Vaughn    Relationship to patient: Self    Best call back number: [phone]    Chief complaint: FOLLOW UP APPOINTMENTS    Type of visit: OFFICE VISIT     Requested date: ANY    Additional notes: PATIENT IS REQUESTING TO SCHEDULE AN APPOINTMENT FOR HIMSELF & HIS WIFE WITH [facility] ON THE SAME DAY & TIME. HUB HAD NO 2 APPOINTMENTS TOGETHER & ATTEMPTED TO WARM TRANSFER BUT WAS UNSUCCESSFUL.    PLEASE CALL PATIENT

## 2023-02-28 NOTE — TELEPHONE ENCOUNTER
PATIENT CALLING BACK TO GET APPOINTMENTS FOR HIM AND HIS WIFE SYEDA. HE STATES GORDY DE LA CRUZ SAID TO COME IN TOGETHER.  PLEASE CALL [phone]

## 2023-03-27 ENCOUNTER — TELEPHONE (OUTPATIENT)
Dept: INTERNAL MEDICINE | Facility: CLINIC | Age: 78
End: 2023-03-27

## 2023-03-27 NOTE — TELEPHONE ENCOUNTER
PATIENT CALLED BACK AND STATED THAT THESE WERE ORIGINALLY CALLED LIDOCAINE PATCHES. PLEASE ADVISE PATIENT.

## 2023-03-27 NOTE — TELEPHONE ENCOUNTER
Caller: Anthony Vaughn    Relationship: Self    Best call back number:[phone]     Requested Prescriptions:   Requested Prescriptions      No prescriptions requested or ordered in this encounter        Pharmacy where request should be sent: [id number] [address] [address] AT [facility] [phone] [facility] [phone]      Last office visit with prescribing clinician: 10/31/2022   Last telemedicine visit with prescribing clinician: 4/20/2023   Next office visit with prescribing clinician: 4/20/2023     Additional details provided by patient: PATIENT STATES THAT HE WAS IN THE EMERGENCY ROOM. PATIENT STATES THAT HE WAS PRESCRIBED THREE DIFFERENT MEDICATIONS WHILE HE WAS THERE. HE PICKED TWO OF THEM UP, BUT THE PATCH THAT WAS PRESCRBIED TO HIM AT [facility] WAS NOT AT THE [facility]. PATIENT STATES THAT THIS PATCH IS CALLED METHYLPREDNISOLONE PATCHES. PLEASE ADVISE PATIENT AND SEND PATCH IF POSSIBLE.      Does the patient have less than a 3 day supply:  [] Yes  [x] No    Would you like a call back once the refill request has been completed: [] Yes [x] No    If the office needs to give you a call back, can they leave a voicemail: [] Yes [x] No    Xochilt Hayes Rep   03/27/23 14:42 EDT

## 2023-03-28 DIAGNOSIS — M54.50 ACUTE RIGHT-SIDED LOW BACK PAIN, UNSPECIFIED WHETHER SCIATICA PRESENT: Primary | ICD-10-CM

## 2023-03-28 RX ORDER — LIDOCAINE 50 MG/G
1 PATCH TOPICAL EVERY 24 HOURS
Qty: 15 PATCH | Refills: 1 | Status: SHIPPED | OUTPATIENT
Start: 2023-03-28

## 2023-03-28 NOTE — TELEPHONE ENCOUNTER
Patient says they are Lidocaine 5% patches.  If he has a prescription it doesn't cost him anything, if he buys them over the counter it is $150.00.

## 2023-04-18 PROBLEM — M19.012 OSTEOARTHRITIS OF LEFT ACROMIOCLAVICULAR JOINT: Chronic | Status: ACTIVE | Noted: 2018-03-19

## 2023-04-18 PROBLEM — K57.32 DIVERTICULITIS OF LARGE INTESTINE WITHOUT PERFORATION OR ABSCESS WITHOUT BLEEDING: Status: RESOLVED | Noted: 2018-07-31 | Resolved: 2023-04-18

## 2023-04-18 PROBLEM — M62.838 TRAPEZIUS MUSCLE SPASM: Status: RESOLVED | Noted: 2018-03-19 | Resolved: 2023-04-18

## 2023-04-18 RX ORDER — MOMETASONE FUROATE 1 MG/G
OINTMENT TOPICAL
COMMUNITY
Start: 2023-03-21

## 2023-04-18 RX ORDER — METHOCARBAMOL 500 MG/1
1 TABLET, FILM COATED ORAL 3 TIMES DAILY
COMMUNITY
Start: 2023-03-26

## 2023-04-18 NOTE — PROGRESS NOTES
Subjective   Chief Complaint   Patient presents with   • Hypertension       History of Present Illness   78 y.o. male presents for follow up regarding HTN. Feeling good without chest pain or dyspnea. He has lost 9# over the last couple of months. He is not eating as much. He is not cooking as much as he used to. Diabetes followed by Dr. Mckeon with upcoming visit 4/24. Levemir 45 units daily. Novolog cut back after he had some low blood sugars. Eye exam this morning--normal.      Patient Active Problem List   Diagnosis   • Coronary artery disease involving native coronary artery without angina pectoris   • Hyperlipidemia   • Essential hypertension   • Type 2 diabetes with nephropathy   • Microalbuminuria   • DM type 2 with diabetic peripheral neuropathy   • Bandemia   • Erythrocytosis   • Osteoarthritis of left acromioclavicular joint       No Known Allergies    Current Outpatient Medications on File Prior to Visit   Medication Sig Dispense Refill   • acyclovir (ZOVIRAX) 400 MG tablet Take 1 tablet by mouth As Needed. Take no more than 5 doses a day.     • amLODIPine (NORVASC) 10 MG tablet Take 1 tablet by mouth Daily.     • atorvastatin (LIPITOR) 10 MG tablet Take 1 tablet by mouth Daily.     • brimonidine (ALPHAGAN) 0.15 % ophthalmic solution Apply 1 drop to eye 2 (two) times a day.     • clopidogrel (PLAVIX) 75 MG tablet Take 1 tablet by mouth Every Night.     • Continuous Blood Gluc  (FreeStyle Juwan 2 Freedom) device 1 each Daily. 1 each 1   • Continuous Blood Gluc Sensor (FreeStyle Juwan 2 Sensor) misc 1 each Every 14 (Fourteen) Days. 6 each 2   • Glucose 15 GM/32ML gel Take  by mouth.     • insulin aspart (novoLOG FLEXPEN) 100 UNIT/ML solution pen-injector sc pen 62 units at breakfast, 66 units at lunch and 58 units at supper.     • insulin detemir (LEVEMIR) 100 UNIT/ML injection INJECT 65 UNITS AT NIGHT  12   • isosorbide mononitrate (IMDUR) 30 MG 24 hr tablet Take 1 tablet by mouth Daily.     •  latanoprost (XALATAN) 0.005 % ophthalmic solution Apply 1 drop to eye(s) as directed by provider Daily.     • lidocaine (Lidoderm) 5 % Place 1 patch on the skin as directed by provider Daily. Remove & Discard patch within 12 hours or as directed by MD 15 patch 1   • lisinopril (PRINIVIL,ZESTRIL) 40 MG tablet TAKE 1 TABLET BY MOUTH DAILY 90 tablet 1   • metFORMIN (GLUCOPHAGE) 1000 MG tablet Take 0.5 tablets by mouth 2 (Two) Times a Day With Meals. Take 2 tablets bid     • methocarbamol (ROBAXIN) 500 MG tablet Take 1 tablet by mouth 3 (Three) Times a Day.     • metoprolol succinate XL (TOPROL-XL) 50 MG 24 hr tablet Take 1.5 tablets by mouth Daily.     • mometasone (ELOCON) 0.1 % ointment APPLY SPARINGLY TO FEET TWICE DAILY     • nitroglycerin (NITROSTAT) 0.4 MG SL tablet Place 1 tablet under the tongue Every 5 (Five) Minutes As Needed for Chest Pain. Take no more than 3 doses in 15 minutes.     • Omega 3 1000 MG capsule Take 1 capsule by mouth 2 (Two) Times a Day.     • omeprazole (priLOSEC) 20 MG capsule Take 1 capsule by mouth Daily.     • pilocarpine (PILOCAR) 1 % ophthalmic solution 1 drop 2 (Two) Times a Day.     • Semaglutide (OZEMPIC, 0.25 OR 0.5 MG/DOSE, SC) Inject  under the skin into the appropriate area as directed. 0.5 mg weekly     • timolol (TIMOPTIC) 0.5 % ophthalmic solution 1 drop Daily. INSTILL 1 DROP IN LEFT EYE EVERY MORNING AS DIRECTED  6   • triamcinolone acetonide (KENALOG-40) 40 MG/ML injection Inject 1 mL into the appropriate joint as directed by provider.       No current facility-administered medications on file prior to visit.       Past Medical History:   Diagnosis Date   • Carpal tunnel syndrome on right    • Chest pain    • Diverticulitis of large intestine without perforation or abscess without bleeding 07/31/2018   • ED (erectile dysfunction)    • Erythrocytosis 07/05/2018   • GERD (gastroesophageal reflux disease)    • Hyperlipidemia    • Kidney stones    • Pneumonia due to infectious  organism 04/14/2017   • S/P angioplasty with stent    • Stroke    • Trapezius muscle spasm 03/19/2018   • Tubular adenoma of colon 09/2019    Removed by colonoscopy by Dr. Weems       Family History   Problem Relation Age of Onset   • Heart disease Mother    • Prostate cancer Father    • Heart disease Father    • Colon polyps Daughter    • Colon polyps Daughter        Social History     Socioeconomic History   • Marital status:      Spouse name: Kerry   • Years of education: College   Tobacco Use   • Smoking status: Never   • Smokeless tobacco: Never   • Tobacco comments:     no caffeine   Substance and Sexual Activity   • Alcohol use: No   • Drug use: No   • Sexual activity: Defer     Partners: Female       Past Surgical History:   Procedure Laterality Date   • CARDIAC CATHETERIZATION N/A 02/10/2011    Normal LV systolic function; Kind of diffuse, small vessel, distal disease. Most of it is not amenable to PCI, but his JADEN lesion is and it is a large vessel; All of his prior durg-eluting stents are widely patent & look good, Dr. Anibal Oliveira   • CATARACT EXTRACTION Bilateral    • COLONOSCOPY N/A 09/13/2004    Normal, Repeat in 5-8 years, Dr. Davion Sanon   • COLONOSCOPY N/A 09/04/2009    Mutiple small & large-mouthed diverticual were found in the entire colon; Exam otherwise normal, Dr. Davion Sanon   • COLONOSCOPY N/A 9/26/2019    Procedure: COLONOSCOPY to  cecum with cold biopsy polypectomy;  Surgeon: Haider Weems MD;  Location: [facility] ENDOSCOPY;  Service: General   • COLONOSCOPY W/ POLYPECTOMY N/A [id number]    2 benign polyps, [facility], Dr. Davion Sanon   • CORONARY ANGIOPLASTY WITH STENT PLACEMENT N/A 11/20/2007    Successful multivessel drug eluting stent implantation for restenosis above his prior stenting, Dr. Anibal Wray   • CORONARY ANGIOPLASTY WITH STENT PLACEMENT N/A 06/05/2007    Successful multivessel drug eluting stent implantation in all 3 of the major coronary  "arteries, Dr. Anibal Wray   • CORONARY ANGIOPLASTY WITH STENT PLACEMENT N/A 02/10/2011    Successful drug-stenting of the JADEN, Dr. Anibal Oliveira   • ENDOSCOPY N/A 11/21/2017    Procedure: ESOPHAGOGASTRODUODENOSCOPY WITH COLD BIOPSIES AND 53 F MICHAEL DILATION;  Surgeon: Wisam Potter MD;  Location: [facility] ENDOSCOPY;  Service:    • EYE SURGERY Left 2011    Retina   • HYDROCELECTOMY Left 08/19/2011    Dr. Ellie Stein   • INCISION AND DRAINAGE PERIRECTAL ABSCESS N/A 11/30/2007    Dr. Davion Sanon   • LUMBAR EPIDURAL INJECTION N/A 2012-2013    x 3   • NEUROMA SURGERY Right 07/19/2005    Excision Neuroma Right Arm; PATH: CONSISTENT W/ LIPOMA,  Dr. Davion Sanon   • ROTATOR CUFF REPAIR Right 2006   • ROTATOR CUFF REPAIR Left 2000   • UPPER GASTROINTESTINAL ENDOSCOPY N/A 09/06/2012    Normal esophagus; Normal Stomach, Normal examinded duodenum, Dr. Bruno Turk   • WRIST FRACTURE SURGERY Right 06/03/2003    ORIF Right Distal Radius, Dr. Ryan Bravo       The following portions of the patient's history were reviewed and updated as appropriate: problem list, allergies, current medications, past medical history and past social history.    Review of Systems    Immunization History   Administered Date(s) Administered   • Fluzone High Dose =>65 Years ([employer] ONLY) 10/03/2016, 09/26/2017, 10/20/2018   • Fluzone High-Dose 65+yrs 10/31/2022   • Hepatitis A 10/01/2019   • Pneumococcal Conjugate 13-Valent (PCV13) 11/22/2016   • Pneumococcal Polysaccharide (PPSV23) 11/22/2010   • Shingrix 01/01/2011, 01/01/2019   • TD Preservative Free 04/03/2023   • Tdap 11/12/2017       Objective   Vitals:    04/20/23 1047   BP: 120/62   Pulse: 68   Resp: 14   Temp: 97.6 °F (36.4 °C)   Weight: 91.6 kg (202 lb)   Height: 180.3 cm (70.98\")     Body mass index is 28.19 kg/m².  Physical Exam  Vitals reviewed.   Constitutional:       Appearance: Normal appearance. He is well-developed.   HENT:      Head: Normocephalic and atraumatic. "   Cardiovascular:      Rate and Rhythm: Normal rate and regular rhythm.      Heart sounds: Normal heart sounds, S1 normal and S2 normal.   Pulmonary:      Effort: Pulmonary effort is normal.      Breath sounds: Normal breath sounds.   Skin:     General: Skin is warm and dry.   Neurological:      Mental Status: He is alert.   Psychiatric:         Mood and Affect: Mood normal.         Behavior: Behavior normal.         Procedures    Assessment & Plan   Diagnoses and all orders for this visit:    1. Essential hypertension (Primary)  Comments:  Controlled. Continue current medications and RTO 6 months.     2. Type 2 diabetes with nephropathy  Comments:  Followed by Dr. Mckeon with upcoming visit and labs 4/24. Eye exam this morning with Dr. Lvie.     3. Chronic stable angina  Comments:  Followed annually by Dr. Wray/Josseline ARREOLA.     Records reviewed include previous OV with myself and Dr. Galaviz as well as labs.     Return in about 6 months (around 10/20/2023) for Medicare Wellness.

## 2023-04-19 ENCOUNTER — LAB (OUTPATIENT)
Dept: LAB | Facility: HOSPITAL | Age: 78
End: 2023-04-19
Payer: MEDICARE

## 2023-04-19 ENCOUNTER — OFFICE VISIT (OUTPATIENT)
Dept: ONCOLOGY | Facility: CLINIC | Age: 78
End: 2023-04-19
Payer: MEDICARE

## 2023-04-19 DIAGNOSIS — D75.1 ERYTHROCYTOSIS: Primary | ICD-10-CM

## 2023-04-19 DIAGNOSIS — D75.1 ERYTHROCYTOSIS: ICD-10-CM

## 2023-04-19 DIAGNOSIS — D72.825 BANDEMIA: ICD-10-CM

## 2023-04-19 LAB
BASOPHILS # BLD AUTO: 0.04 10*3/MM3 (ref 0–0.2)
BASOPHILS NFR BLD AUTO: 0.3 % (ref 0–1.5)
DEPRECATED RDW RBC AUTO: 40.6 FL (ref 37–54)
EOSINOPHIL # BLD AUTO: 0.09 10*3/MM3 (ref 0–0.4)
EOSINOPHIL NFR BLD AUTO: 0.8 % (ref 0.3–6.2)
ERYTHROCYTE [DISTWIDTH] IN BLOOD BY AUTOMATED COUNT: 13.1 % (ref 12.3–15.4)
HCT VFR BLD AUTO: 44.5 % (ref 37.5–51)
HGB BLD-MCNC: 14.7 G/DL (ref 13–17.7)
IMM GRANULOCYTES # BLD AUTO: 0.03 10*3/MM3 (ref 0–0.05)
IMM GRANULOCYTES NFR BLD AUTO: 0.3 % (ref 0–0.5)
LYMPHOCYTES # BLD AUTO: 2.55 10*3/MM3 (ref 0.7–3.1)
LYMPHOCYTES NFR BLD AUTO: 22 % (ref 19.6–45.3)
MCH RBC QN AUTO: 28.7 PG (ref 26.6–33)
MCHC RBC AUTO-ENTMCNC: 33 G/DL (ref 31.5–35.7)
MCV RBC AUTO: 86.9 FL (ref 79–97)
MONOCYTES # BLD AUTO: 0.7 10*3/MM3 (ref 0.1–0.9)
MONOCYTES NFR BLD AUTO: 6 % (ref 5–12)
NEUTROPHILS NFR BLD AUTO: 70.6 % (ref 42.7–76)
NEUTROPHILS NFR BLD AUTO: 8.19 10*3/MM3 (ref 1.7–7)
NRBC BLD AUTO-RTO: 0 /100 WBC (ref 0–0.2)
PLATELET # BLD AUTO: 296 10*3/MM3 (ref 140–450)
PMV BLD AUTO: 8.5 FL (ref 6–12)
RBC # BLD AUTO: 5.12 10*6/MM3 (ref 4.14–5.8)
WBC NRBC COR # BLD: 11.6 10*3/MM3 (ref 3.4–10.8)

## 2023-04-19 PROCEDURE — 36415 COLL VENOUS BLD VENIPUNCTURE: CPT

## 2023-04-19 PROCEDURE — 85025 COMPLETE CBC W/AUTO DIFF WBC: CPT

## 2023-04-19 NOTE — PROGRESS NOTES
Subjective     REASON FOR FOLLOW UP:  Leukocytosis for 6 years, minimal erythrocytosis, normal platelet count, no splenomegaly by physical exam or ct abdomen.BCR/ABL AND JAK2 MUTATION NEGATIVE IN 2017.        History of Present Illness     PATIENT LEFT THE OFFICE WITHOUT BEEN SEEN, MESSAGE LEFT IN HIS VOICE MAIL          Past Medical History:   Diagnosis Date   • Carpal tunnel syndrome on right    • Chest pain    • Diverticulitis of large intestine without perforation or abscess without bleeding 07/31/2018   • ED (erectile dysfunction)    • Erythrocytosis 07/05/2018   • GERD (gastroesophageal reflux disease)    • Hyperlipidemia    • Kidney stones    • Pneumonia due to infectious organism 04/14/2017   • S/P angioplasty with stent    • Stroke    • Trapezius muscle spasm 03/19/2018   • Tubular adenoma of colon 09/2019    Removed by colonoscopy by Dr. Weems        Past Surgical History:   Procedure Laterality Date   • CARDIAC CATHETERIZATION N/A 02/10/2011    Normal LV systolic function; Kind of diffuse, small vessel, distal disease. Most of it is not amenable to PCI, but his JADEN lesion is and it is a large vessel; All of his prior durg-eluting stents are widely patent & look good, Dr. Anibal Oliveira   • CATARACT EXTRACTION Bilateral    • COLONOSCOPY N/A 09/13/2004    Normal, Repeat in 5-8 years, Dr. Davion Sanon   • COLONOSCOPY N/A 09/04/2009    Mutiple small & large-mouthed diverticual were found in the entire colon; Exam otherwise normal, Dr. Davion Sanon   • COLONOSCOPY N/A 9/26/2019    Procedure: COLONOSCOPY to  cecum with cold biopsy polypectomy;  Surgeon: Haider Weems MD;  Location: [facility] ENDOSCOPY;  Service: General   • COLONOSCOPY W/ POLYPECTOMY N/A [id number]    2 benign polyps, [facility], Dr. Davion Sanon   • CORONARY ANGIOPLASTY WITH STENT PLACEMENT N/A 11/20/2007    Successful multivessel drug eluting stent implantation for restenosis above his prior stenting, Dr. Anibal Wray   •  CORONARY ANGIOPLASTY WITH STENT PLACEMENT N/A 06/05/2007    Successful multivessel drug eluting stent implantation in all 3 of the major coronary arteries, Dr. Anibal Wray   • CORONARY ANGIOPLASTY WITH STENT PLACEMENT N/A 02/10/2011    Successful drug-stenting of the JADEN, Dr. Anibal Oliveira   • ENDOSCOPY N/A 11/21/2017    Procedure: ESOPHAGOGASTRODUODENOSCOPY WITH COLD BIOPSIES AND 53 F MICHAEL DILATION;  Surgeon: Wisam Potter MD;  Location: [facility] ENDOSCOPY;  Service:    • EYE SURGERY Left 2011    Retina   • HYDROCELECTOMY Left 08/19/2011    Dr. Ellie Stein   • INCISION AND DRAINAGE PERIRECTAL ABSCESS N/A 11/30/2007    Dr. Davion Sanon   • LUMBAR EPIDURAL INJECTION N/A 2012-2013    x 3   • NEUROMA SURGERY Right 07/19/2005    Excision Neuroma Right Arm; PATH: CONSISTENT W/ LIPOMA,  Dr. Davion Sanon   • ROTATOR CUFF REPAIR Right 2006   • ROTATOR CUFF REPAIR Left 2000   • UPPER GASTROINTESTINAL ENDOSCOPY N/A 09/06/2012    Normal esophagus; Normal Stomach, Normal examinded duodenum, Dr. Bruno Turk   • WRIST FRACTURE SURGERY Right 06/03/2003    ORIF Right Distal Radius, Dr. Ryan Bravo        Current Outpatient Medications on File Prior to Visit   Medication Sig Dispense Refill   • acyclovir (ZOVIRAX) 400 MG tablet Take 400 mg by mouth As Needed. Take no more than 5 doses a day.     • amLODIPine (NORVASC) 10 MG tablet Take 10 mg by mouth Daily.     • atorvastatin (LIPITOR) 10 MG tablet Take 10 mg by mouth Daily.     • brimonidine (ALPHAGAN) 0.15 % ophthalmic solution Apply 1 drop to eye 2 (two) times a day.     • clopidogrel (PLAVIX) 75 MG tablet Take 75 mg by mouth Every Night.     • Continuous Blood Gluc  (FreeStyle Juwan 2 Roberts) device 1 each Daily. 1 each 1   • Continuous Blood Gluc Sensor (FreeStyle Juwan 2 Sensor) misc 1 each Every 14 (Fourteen) Days. 6 each 2   • Glucose 15 GM/32ML gel Take  by mouth.     • insulin aspart (novoLOG FLEXPEN) 100 UNIT/ML solution pen-injector sc pen 62  units at breakfast, 66 units at lunch and 58 units at supper.     • insulin detemir (LEVEMIR) 100 UNIT/ML injection INJECT 65 UNITS AT NIGHT  12   • isosorbide mononitrate (IMDUR) 30 MG 24 hr tablet Take 30 mg by mouth daily.     • latanoprost (XALATAN) 0.005 % ophthalmic solution Apply 1 drop to eye daily.     • lidocaine (Lidoderm) 5 % Place 1 patch on the skin as directed by provider Daily. Remove & Discard patch within 12 hours or as directed by MD 15 patch 1   • lisinopril (PRINIVIL,ZESTRIL) 40 MG tablet TAKE 1 TABLET BY MOUTH DAILY 90 tablet 1   • metFORMIN (GLUCOPHAGE) 1000 MG tablet Take 0.5 tablets by mouth 2 (Two) Times a Day With Meals. Take 2 tablets bid     • metoprolol succinate XL (TOPROL-XL) 50 MG 24 hr tablet Take 75 mg by mouth Daily.     • nitroglycerin (NITROSTAT) 0.4 MG SL tablet Place 0.4 mg under the tongue Every 5 (Five) Minutes As Needed for Chest Pain. Take no more than 3 doses in 15 minutes.     • Omega 3 1000 MG capsule Take 1 capsule by mouth 2 (Two) Times a Day.     • omeprazole (priLOSEC) 20 MG capsule Take 20 mg by mouth Daily.     • pilocarpine (PILOCAR) 1 % ophthalmic solution 1 drop 2 (Two) Times a Day.     • Semaglutide (OZEMPIC, 0.25 OR 0.5 MG/DOSE, SC) Inject  under the skin into the appropriate area as directed. 0.5 mg weekly     • timolol (TIMOPTIC) 0.5 % ophthalmic solution 1 drop daily. INSTILL 1 DROP IN LEFT EYE EVERY MORNING AS DIRECTED  6   • triamcinolone acetonide (KENALOG-40) 40 MG/ML injection Inject 40 mg into the appropriate joint as directed by provider.       No current facility-administered medications on file prior to visit.        ALLERGIES:  No Known Allergies     Social History     Socioeconomic History   • Marital status:      Spouse name: Kerry   • Years of education: College   Tobacco Use   • Smoking status: Never   • Smokeless tobacco: Never   • Tobacco comments:     no caffeine   Substance and Sexual Activity   • Alcohol use: No   • Drug use: No    • Sexual activity: Defer     Partners: Female        Family History   Problem Relation Age of Onset   • Heart disease Mother    • Prostate cancer Father    • Heart disease Father    • Colon polyps Daughter    • Colon polyps Daughter      Objective     There were no vitals filed for this visit.      4/19/2022    10:19 AM   Current Status   ECOG score 0       Physical Exam       NONE            RECENT LABS:  Hematology WBC   Date Value Ref Range Status   04/19/2023 11.60 (H) 3.40 - 10.80 10*3/mm3 Final     RBC   Date Value Ref Range Status   04/19/2023 5.12 4.14 - 5.80 10*6/mm3 Final     Hemoglobin   Date Value Ref Range Status   04/19/2023 14.7 13.0 - 17.7 g/dL Final     Hematocrit   Date Value Ref Range Status   04/19/2023 44.5 37.5 - 51.0 % Final     Platelets   Date Value Ref Range Status   04/19/2023 296 140 - 450 10*3/mm3 Final            Assessment & Plan    1. This patient has history of leukocytosis that in my opinion is very likely an element of myeloproliferative disorder. We never have persuaded the patient to proceed with bone marrow testing. We have done BCR-ABL in the past that has been negative, JAK2 mutation that has been negative and we have run laboratory testing looking for inflammatory or infectious etiologies that have been negative. Nevertheless, his white count remains minimally elevated.       The patient was seen by telephonic visit on 12/01/2020. He is asymptomatic at this time completely in control of his health. His diabetes management is not ideal, he loves to eat but he remains physically very active and his weight remains relatively stable. The good news with his blood work yesterday is that his white count is borderline 10,000. The hemoglobin and hematocrit are good, in fact his hematocrit is 45, his platelet count is normal and the white count differential is normal. I talked with him on the telephone about the good news about these numbers and I find no need for him to have a  phlebotomy at this point. What I have recommended for him is to have a blood count every couple of months with nurse visit. If the hematocrit is above 45 he will have a phlebotomy otherwise doctor visit in 6 months. I encouraged him to remain on his aspirin and Plavix.       The patient returned to the office on 05/18/2021 with no symptoms or signs of anything directed to his minimal leukocytosis. His hematocrit was 43. His platelet count and white count differential were normal. He had no splenomegaly. I advised him to remain in observation from my point of view returning to see us in 6 months and 1 year. Blood count will be done on each one of those occasions. I remind him that his hemoglobin A1C recently was in the 7 category and he needs to work on better control of his weight and portions of food.     The patient returned to the office on 04/19/2022. He has no new symptoms of anything in particular. His physical exam is unremarkable with the exception of multiple actinic keratosis in his scalp. I asked him to wear a hat. He has an appointment to be seen by dermatologist.     The good news is that his white count remains stable at 12,000, his white count differential is normal, there is no left shifted maturation, there are no blasts in circulation. The hematocrit is normal at 45, the platelet count remains normal at 345,000.     Under the present circumstances and know that he is BCR/ABL negative he will remain in observation for the time being returning to see us back in a year with a CBC.     4/19/23 :  PATIENT LEFT THE OFFICE WITHOUT BEEN SEEN, MESSAGE LEFT IN HIS VOICE MAIL: NORMAL BLOOD COUNTS , MELISSA IN ONE YEAR

## 2023-04-20 ENCOUNTER — TELEPHONE (OUTPATIENT)
Dept: ONCOLOGY | Facility: CLINIC | Age: 78
End: 2023-04-20
Payer: MEDICARE

## 2023-04-20 ENCOUNTER — OFFICE VISIT (OUTPATIENT)
Dept: INTERNAL MEDICINE | Facility: CLINIC | Age: 78
End: 2023-04-20
Payer: MEDICARE

## 2023-04-20 VITALS
TEMPERATURE: 97.6 F | HEART RATE: 68 BPM | HEIGHT: 71 IN | WEIGHT: 202 LBS | DIASTOLIC BLOOD PRESSURE: 62 MMHG | RESPIRATION RATE: 14 BRPM | BODY MASS INDEX: 28.28 KG/M2 | SYSTOLIC BLOOD PRESSURE: 120 MMHG

## 2023-04-20 DIAGNOSIS — I20.8 CHRONIC STABLE ANGINA: Chronic | ICD-10-CM

## 2023-04-20 DIAGNOSIS — E11.21 TYPE 2 DIABETES WITH NEPHROPATHY: Chronic | ICD-10-CM

## 2023-04-20 DIAGNOSIS — I10 ESSENTIAL HYPERTENSION: Primary | Chronic | ICD-10-CM

## 2023-04-20 PROCEDURE — 99213 OFFICE O/P EST LOW 20 MIN: CPT | Performed by: NURSE PRACTITIONER

## 2023-04-20 PROCEDURE — 1160F RVW MEDS BY RX/DR IN RCRD: CPT | Performed by: NURSE PRACTITIONER

## 2023-04-20 PROCEDURE — 3074F SYST BP LT 130 MM HG: CPT | Performed by: NURSE PRACTITIONER

## 2023-04-20 PROCEDURE — 3078F DIAST BP <80 MM HG: CPT | Performed by: NURSE PRACTITIONER

## 2023-04-20 PROCEDURE — 1159F MED LIST DOCD IN RCRD: CPT | Performed by: NURSE PRACTITIONER

## 2023-04-20 NOTE — TELEPHONE ENCOUNTER
Called patient to relay message blood work looks fine. Okay to see in one year. Pt v/u. Sakshi Spivey RN

## 2023-04-24 ENCOUNTER — OFFICE VISIT (OUTPATIENT)
Dept: ENDOCRINOLOGY | Age: 78
End: 2023-04-24
Payer: MEDICARE

## 2023-04-24 VITALS
HEART RATE: 66 BPM | DIASTOLIC BLOOD PRESSURE: 68 MMHG | TEMPERATURE: 97.7 F | WEIGHT: 205.2 LBS | OXYGEN SATURATION: 98 % | BODY MASS INDEX: 28.73 KG/M2 | SYSTOLIC BLOOD PRESSURE: 120 MMHG | HEIGHT: 71 IN

## 2023-04-24 DIAGNOSIS — E78.5 HYPERLIPIDEMIA, UNSPECIFIED HYPERLIPIDEMIA TYPE: Chronic | ICD-10-CM

## 2023-04-24 DIAGNOSIS — I10 ESSENTIAL HYPERTENSION: Chronic | ICD-10-CM

## 2023-04-24 DIAGNOSIS — R80.9 MICROALBUMINURIA: ICD-10-CM

## 2023-04-24 DIAGNOSIS — E11.21 TYPE 2 DIABETES WITH NEPHROPATHY: Primary | Chronic | ICD-10-CM

## 2023-04-24 DIAGNOSIS — I25.10 CORONARY ARTERY DISEASE INVOLVING NATIVE CORONARY ARTERY OF NATIVE HEART WITHOUT ANGINA PECTORIS: ICD-10-CM

## 2023-04-24 PROCEDURE — 3074F SYST BP LT 130 MM HG: CPT | Performed by: INTERNAL MEDICINE

## 2023-04-24 PROCEDURE — 95251 CONT GLUC MNTR ANALYSIS I&R: CPT | Performed by: INTERNAL MEDICINE

## 2023-04-24 PROCEDURE — 99214 OFFICE O/P EST MOD 30 MIN: CPT | Performed by: INTERNAL MEDICINE

## 2023-04-24 PROCEDURE — 3078F DIAST BP <80 MM HG: CPT | Performed by: INTERNAL MEDICINE

## 2023-04-24 NOTE — LETTER
April 24, 2023     Mary Anne Ludwig, ELBA  [address]  [place] [phone]    Patient: Anthony Vaughn   YOB: 1945   Date of Visit: 4/24/2023       Dear ELBA Whitley:    Thank you for referring Anthony Vaughn to me for evaluation. Below are the relevant portions of my assessment and plan of care.    If you have questions, please do not hesitate to call me. I look forward to following Anthony along with you.         Sincerely,        Cristino Mckeon MD        CC: MD Linda Longo, Cristino SESAY MD  04/24/23 1230  Signed  Subjective    Anthony Vaughn is a 78 y.o. male.     History of Present Illness        Patient has known diabetes mellitus since 2006 and started on insulin in 2010. He is on Levemir 45-50 units every evening, NovoLog 50-50-50, Ozempic 0.5 mg weekly and metformin 1000 mg twice a day.  He was on Levemir 60 until 1 week ago [facility].  He has freestyle anya 2 sensor in place. He is usually eats 1 meal a day.  He eats out 3-4 meals per week due to his work.  Has lost 6 pounds since February 2023.  His last meal was last night.     His last eye examination was in 4/23 and he has retinopathy. He has microalbuminuria on urine sample taken last 8/22. He is on lisinopril. He denies numbness in his toes     He has known coronary artery disease and had multiple angioplasty and stents in the past. He denies any previous history of myocardial infarction. He has not had a coronary artery bypass surgery. He denies any chest pains, exertional dyspnea or PND.   He follows with Dr. Wray. He is on amlodipine, Imdur, lisinopril, metoprolol tartrate and nitroglycerin sublingual as needed.       He has hyperlipidemia and has been on atorvastatin 10 mg every PM and Lovaza 1 capsule BID. He denies any myalgia.     He had tubular adenoma of colon removed by Dr. Weems by colonoscopy in September 2019.  He denies  "bowel complaints.  He was advised follow-up colonoscopy in 2024.     [facility]    The following portions of the patient's history were reviewed and updated as appropriate: allergies, current medications, past family history, past medical history, past social history, past surgical history and problem list.    Review of Systems   Eyes: Negative for visual disturbance.   Respiratory: Negative for shortness of breath.    Cardiovascular: Negative for chest pain and palpitations.   Gastrointestinal: Negative.    Endocrine: Negative for cold intolerance and heat intolerance.   Genitourinary: Negative.    Musculoskeletal: Negative for myalgias.   Neurological: Negative for numbness.     Vitals:    04/24/23 1132   BP: 120/68   Pulse: 66   Temp: 97.7 °F (36.5 °C)   SpO2: 98%   Weight: 93.1 kg (205 lb 3.2 oz)   Height: 180.3 cm (70.98\")      Objective    Physical Exam  Constitutional:       Appearance: Normal appearance.   Eyes:      General: No scleral icterus.        Right eye: No discharge.         Left eye: No discharge.   Neck:      Vascular: No carotid bruit.   Cardiovascular:      Rate and Rhythm: Normal rate and regular rhythm.      Heart sounds: Normal heart sounds.   Pulmonary:      Effort: No respiratory distress.      Breath sounds: Normal breath sounds. No stridor.   Abdominal:      General: Bowel sounds are normal.      Palpations: Abdomen is soft.      Tenderness: There is no right CVA tenderness or left CVA tenderness.   Musculoskeletal:      Right lower leg: No edema.      Left lower leg: No edema.      Comments: No plantar ulcers   Lymphadenopathy:      Cervical: No cervical adenopathy.   Neurological:      Mental Status: He is alert and oriented to person, place, and time.      Comments: Intact light touch in lower extremities.       Lab on 04/19/2023   Component Date Value Ref Range Status   • WBC 04/19/2023 11.60 (H)  3.40 - 10.80 10*3/mm3 Final   • RBC 04/19/2023 5.12  4.14 - " 5.80 10*6/mm3 Final   • Hemoglobin 04/19/2023 14.7  13.0 - 17.7 g/dL Final   • Hematocrit 04/19/2023 44.5  37.5 - 51.0 % Final   • MCV 04/19/2023 86.9  79.0 - 97.0 fL Final   • MCH 04/19/2023 28.7  26.6 - 33.0 pg Final   • MCHC 04/19/2023 33.0  31.5 - 35.7 g/dL Final   • RDW 04/19/2023 13.1  12.3 - 15.4 % Final   • RDW-SD 04/19/2023 40.6  37.0 - 54.0 fl Final   • MPV 04/19/2023 8.5  6.0 - 12.0 fL Final   • Platelets 04/19/2023 296  140 - 450 10*3/mm3 Final   • Neutrophil % 04/19/2023 70.6  42.7 - 76.0 % Final   • Lymphocyte % 04/19/2023 22.0  19.6 - 45.3 % Final   • Monocyte % 04/19/2023 6.0  5.0 - 12.0 % Final   • Eosinophil % 04/19/2023 0.8  0.3 - 6.2 % Final   • Basophil % 04/19/2023 0.3  0.0 - 1.5 % Final   • Immature Grans % 04/19/2023 0.3  0.0 - 0.5 % Final   • Neutrophils, Absolute 04/19/2023 8.19 (H)  1.70 - 7.00 10*3/mm3 Final   • Lymphocytes, Absolute 04/19/2023 2.55  0.70 - 3.10 10*3/mm3 Final   • Monocytes, Absolute 04/19/2023 0.70  0.10 - 0.90 10*3/mm3 Final   • Eosinophils, Absolute 04/19/2023 0.09  0.00 - 0.40 10*3/mm3 Final   • Basophils, Absolute 04/19/2023 0.04  0.00 - 0.20 10*3/mm3 Final   • Immature Grans, Absolute 04/19/2023 0.03  0.00 - 0.05 10*3/mm3 Final   • nRBC 04/19/2023 0.0  0.0 - 0.2 /100 WBC Final     Assessment & Plan   Diagnoses and all orders for this visit:    1. Type 2 diabetes with nephropathy (Primary)  -     Comprehensive Metabolic Panel  -     Hemoglobin A1c  -     TSH  -     Microalbumin / Creatinine Urine Ratio - Urine, Clean Catch    2. Hyperlipidemia, unspecified hyperlipidemia type  -     Lipid Panel  -     TSH    3. Essential hypertension  -     Comprehensive Metabolic Panel    4. Coronary artery disease involving native coronary artery of native heart without angina pectoris  -     Lipid Panel    5. Microalbuminuria  -     Microalbumin / Creatinine Urine Ratio - Urine, Clean Catch      Decrease Levemir to 45 units every evening.  Decrease NovoLog to 45 units 3 times a  day with meals.  Come in for sensor download in 1 month.  Continue atorvastatin 10 mg every evening and Lovaza 1 capsule twice daily.  Continue amlodipine, isosorbide mononitrate, lisinopril, and metoprolol tartrate per cardiologist.  Continue Plavix.    Copy of my note sent to Mary Anne Ludwig NP and .    RTC 4 mos with MALGORZATA Ritter NP and with me in 8 mos.

## 2023-04-24 NOTE — PROGRESS NOTES
Subjective   Anthony Vaughn is a 78 y.o. male.     History of Present Illness        Patient has known diabetes mellitus since 2006 and started on insulin in 2010. He is on Levemir 45-50 units every evening, NovoLog 50-50-50, Ozempic 0.5 mg weekly and metformin 1000 mg twice a day.  He was on Levemir 60 until 1 week ago [facility].  He has freestyle anya 2 sensor in place. He is usually eats 1 meal a day.  He eats out 3-4 meals per week due to his work.  Has lost 6 pounds since February 2023.  His last meal was last night.     His last eye examination was in 4/23 and he has retinopathy. He has microalbuminuria on urine sample taken last 8/22. He is on lisinopril. He denies numbness in his toes     He has known coronary artery disease and had multiple angioplasty and stents in the past. He denies any previous history of myocardial infarction. He has not had a coronary artery bypass surgery. He denies any chest pains, exertional dyspnea or PND.   He follows with Dr. Wray. He is on amlodipine, Imdur, lisinopril, metoprolol tartrate and nitroglycerin sublingual as needed.       He has hyperlipidemia and has been on atorvastatin 10 mg every PM and Lovaza 1 capsule BID. He denies any myalgia.     He had tubular adenoma of colon removed by Dr. Weems by colonoscopy in September 2019.  He denies bowel complaints.  He was advised follow-up colonoscopy in 2024.     [facility]    The following portions of the patient's history were reviewed and updated as appropriate: allergies, current medications, past family history, past medical history, past social history, past surgical history and problem list.    Review of Systems   Eyes: Negative for visual disturbance.   Respiratory: Negative for shortness of breath.    Cardiovascular: Negative for chest pain and palpitations.   Gastrointestinal: Negative.    Endocrine: Negative for cold intolerance and  "heat intolerance.   Genitourinary: Negative.    Musculoskeletal: Negative for myalgias.   Neurological: Negative for numbness.     Vitals:    04/24/23 1132   BP: 120/68   Pulse: 66   Temp: 97.7 °F (36.5 °C)   SpO2: 98%   Weight: 93.1 kg (205 lb 3.2 oz)   Height: 180.3 cm (70.98\")      Objective   Physical Exam  Constitutional:       Appearance: Normal appearance.   Eyes:      General: No scleral icterus.        Right eye: No discharge.         Left eye: No discharge.   Neck:      Vascular: No carotid bruit.   Cardiovascular:      Rate and Rhythm: Normal rate and regular rhythm.      Heart sounds: Normal heart sounds.   Pulmonary:      Effort: No respiratory distress.      Breath sounds: Normal breath sounds. No stridor.   Abdominal:      General: Bowel sounds are normal.      Palpations: Abdomen is soft.      Tenderness: There is no right CVA tenderness or left CVA tenderness.   Musculoskeletal:      Right lower leg: No edema.      Left lower leg: No edema.      Comments: No plantar ulcers   Lymphadenopathy:      Cervical: No cervical adenopathy.   Neurological:      Mental Status: He is alert and oriented to person, place, and time.      Comments: Intact light touch in lower extremities.       Lab on 04/19/2023   Component Date Value Ref Range Status   • WBC 04/19/2023 11.60 (H)  3.40 - 10.80 10*3/mm3 Final   • RBC 04/19/2023 5.12  4.14 - 5.80 10*6/mm3 Final   • Hemoglobin 04/19/2023 14.7  13.0 - 17.7 g/dL Final   • Hematocrit 04/19/2023 44.5  37.5 - 51.0 % Final   • MCV 04/19/2023 86.9  79.0 - 97.0 fL Final   • MCH 04/19/2023 28.7  26.6 - 33.0 pg Final   • MCHC 04/19/2023 33.0  31.5 - 35.7 g/dL Final   • RDW 04/19/2023 13.1  12.3 - 15.4 % Final   • RDW-SD 04/19/2023 40.6  37.0 - 54.0 fl Final   • MPV 04/19/2023 8.5  6.0 - 12.0 fL Final   • Platelets 04/19/2023 296  140 - 450 10*3/mm3 Final   • Neutrophil % 04/19/2023 70.6  42.7 - 76.0 % Final   • Lymphocyte % 04/19/2023 22.0  19.6 - 45.3 % Final   • Monocyte % " 04/19/2023 6.0  5.0 - 12.0 % Final   • Eosinophil % 04/19/2023 0.8  0.3 - 6.2 % Final   • Basophil % 04/19/2023 0.3  0.0 - 1.5 % Final   • Immature Grans % 04/19/2023 0.3  0.0 - 0.5 % Final   • Neutrophils, Absolute 04/19/2023 8.19 (H)  1.70 - 7.00 10*3/mm3 Final   • Lymphocytes, Absolute 04/19/2023 2.55  0.70 - 3.10 10*3/mm3 Final   • Monocytes, Absolute 04/19/2023 0.70  0.10 - 0.90 10*3/mm3 Final   • Eosinophils, Absolute 04/19/2023 0.09  0.00 - 0.40 10*3/mm3 Final   • Basophils, Absolute 04/19/2023 0.04  0.00 - 0.20 10*3/mm3 Final   • Immature Grans, Absolute 04/19/2023 0.03  0.00 - 0.05 10*3/mm3 Final   • nRBC 04/19/2023 0.0  0.0 - 0.2 /100 WBC Final     Assessment & Plan   Diagnoses and all orders for this visit:    1. Type 2 diabetes with nephropathy (Primary)  -     Comprehensive Metabolic Panel  -     Hemoglobin A1c  -     TSH  -     Microalbumin / Creatinine Urine Ratio - Urine, Clean Catch    2. Hyperlipidemia, unspecified hyperlipidemia type  -     Lipid Panel  -     TSH    3. Essential hypertension  -     Comprehensive Metabolic Panel    4. Coronary artery disease involving native coronary artery of native heart without angina pectoris  -     Lipid Panel    5. Microalbuminuria  -     Microalbumin / Creatinine Urine Ratio - Urine, Clean Catch      Decrease Levemir to 45 units every evening.  Decrease NovoLog to 45 units 3 times a day with meals.  Come in for sensor download in 1 month.  Continue atorvastatin 10 mg every evening and Lovaza 1 capsule twice daily.  Continue amlodipine, isosorbide mononitrate, lisinopril, and metoprolol tartrate per cardiologist.  Continue Plavix.    Copy of my note sent to Mary Anne Ludwig NP and .    RTC 4 mos with MALGORZATA Ritter NP and with me in 8 mos.

## 2023-04-25 LAB
ALBUMIN SERPL-MCNC: 4.6 G/DL (ref 3.7–4.7)
ALBUMIN/CREAT UR: 34 MG/G CREAT (ref 0–29)
ALBUMIN/GLOB SERPL: 2.1 {RATIO} (ref 1.2–2.2)
ALP SERPL-CCNC: 60 IU/L (ref 44–121)
ALT SERPL-CCNC: 21 IU/L (ref 0–44)
AST SERPL-CCNC: 18 IU/L (ref 0–40)
BILIRUB SERPL-MCNC: 0.4 MG/DL (ref 0–1.2)
BUN SERPL-MCNC: 12 MG/DL (ref 8–27)
BUN/CREAT SERPL: 12 (ref 10–24)
CALCIUM SERPL-MCNC: 9.5 MG/DL (ref 8.6–10.2)
CHLORIDE SERPL-SCNC: 103 MMOL/L (ref 96–106)
CHOLEST SERPL-MCNC: 118 MG/DL (ref 100–199)
CO2 SERPL-SCNC: 22 MMOL/L (ref 20–29)
CREAT SERPL-MCNC: 0.97 MG/DL (ref 0.76–1.27)
CREAT UR-MCNC: 72.5 MG/DL
EGFRCR SERPLBLD CKD-EPI 2021: 79 ML/MIN/1.73
GLOBULIN SER CALC-MCNC: 2.2 G/DL (ref 1.5–4.5)
GLUCOSE SERPL-MCNC: 115 MG/DL (ref 70–99)
HBA1C MFR BLD: 6.2 % (ref 4.8–5.6)
HDLC SERPL-MCNC: 39 MG/DL
IMP & REVIEW OF LAB RESULTS: NORMAL
LDLC SERPL CALC-MCNC: 60 MG/DL (ref 0–99)
MICROALBUMIN UR-MCNC: 24.5 UG/ML
POTASSIUM SERPL-SCNC: 4.4 MMOL/L (ref 3.5–5.2)
PROT SERPL-MCNC: 6.8 G/DL (ref 6–8.5)
SODIUM SERPL-SCNC: 142 MMOL/L (ref 134–144)
TRIGL SERPL-MCNC: 102 MG/DL (ref 0–149)
TSH SERPL DL<=0.005 MIU/L-ACNC: 1.42 UIU/ML (ref 0.45–4.5)
VLDLC SERPL CALC-MCNC: 19 MG/DL (ref 5–40)

## 2023-04-27 NOTE — PROGRESS NOTES
LDL 60.  HDL 39.  Continue atorvastatin 10 mg daily and Lovaza 1 capsule twice daily.  Hemoglobin A1c 6.2%.  Diabetes well controlled.   Urine microalbumin elevated.  Continue lisinopril.  Normal thyroid function tests.    Send copy of labs to patient and to Dr. Don  Copy of labs sent to Mary Anne Ludwig NP

## 2023-08-14 NOTE — PROGRESS NOTES
Subjective   Chief Complaint   Patient presents with    GI Problem     Excessive belching/4-5 months on going      Hypertension       History of Present Illness   78 y.o. male presents for follow up regarding HTN and belching. Denies chest pain or dyspnea. Nifedipine recently reduced to 30 mg after reporting lower extremity edema which improves with elevation and worsens throughout the day.     Feeling good today. Belching better over the over the last couple of days. No OTC meds. He enjoys spicy foods. No reflux  or heart burn.  No carbonated beverages. Ice tea when out but mostly water. No coffee. Eats 4-6 tomatoes at a time this time of year. Infrequent chocolate. No mints. Denies melena, hematochezia, abdominal pain.     Urinary frequency followd by Dr. Ackerman thought to be secondary to BPH.     Diabetes followed by Dr. Mckeon/Sruthi ARREOLA. Well controlled with A1C 6.2%. Eye exam upcoming later this month.      Patient Active Problem List   Diagnosis    Coronary artery disease involving native coronary artery without angina pectoris    Hyperlipidemia    Essential hypertension    Type 2 diabetes with nephropathy    Microalbuminuria    DM type 2 with diabetic peripheral neuropathy    Bandemia    Erythrocytosis    Osteoarthritis of left acromioclavicular joint       No Known Allergies    Current Outpatient Medications on File Prior to Visit   Medication Sig Dispense Refill    acyclovir (ZOVIRAX) 400 MG tablet Take 1 tablet by mouth As Needed. Take no more than 5 doses a day.      atorvastatin (LIPITOR) 10 MG tablet Take 1 tablet by mouth Daily.      brimonidine (ALPHAGAN) 0.15 % ophthalmic solution Apply 1 drop to eye 2 (two) times a day.      clopidogrel (PLAVIX) 75 MG tablet Take 1 tablet by mouth Every Night.      Continuous Blood Gluc  (FreeStyle Juwan 2 Louin) device 1 each Daily. 1 each 1    Continuous Blood Gluc Sensor (FreeStyle Juwan 2 Sensor) misc 1 each Every 14 (Fourteen) Days. 6 each 2     Glucose 15 GM/32ML gel Take  by mouth.      insulin aspart (novoLOG FLEXPEN) 100 UNIT/ML solution pen-injector sc pen 62 units at breakfast, 66 units at lunch and 58 units at supper. (Patient taking differently: 50 units at breakfast, 50 units at lunch and 50 units at supper.)      insulin detemir (LEVEMIR) 100 UNIT/ML injection INJECT 65 UNITS AT NIGHT  12    isosorbide mononitrate (IMDUR) 30 MG 24 hr tablet Take 1 tablet by mouth Daily. 45 UNITS AT NIGHT      latanoprost (XALATAN) 0.005 % ophthalmic solution Apply 1 drop to eye(s) as directed by provider Daily.      lidocaine (Lidoderm) 5 % Place 1 patch on the skin as directed by provider Daily. Remove & Discard patch within 12 hours or as directed by MD 15 patch 1    metFORMIN (GLUCOPHAGE) 1000 MG tablet Take 1 tablet by mouth 2 (Two) Times a Day With Meals. Take 2 tablets bid      metoprolol succinate XL (TOPROL-XL) 50 MG 24 hr tablet Take 1.5 tablets by mouth Daily.      mometasone (ELOCON) 0.1 % ointment APPLY SPARINGLY TO FEET TWICE DAILY      NIFEdipine XL (PROCARDIA XL) 60 MG 24 hr tablet Take 30 mg by mouth Daily.      nitroglycerin (NITROSTAT) 0.4 MG SL tablet Place 1 tablet under the tongue Every 5 (Five) Minutes As Needed for Chest Pain. Take no more than 3 doses in 15 minutes.      Omega 3 1000 MG capsule Take 1 capsule by mouth 2 (Two) Times a Day.      omeprazole (priLOSEC) 20 MG capsule Take 1 capsule by mouth Daily.      pilocarpine (PILOCAR) 1 % ophthalmic solution 1 drop 2 (Two) Times a Day.      Semaglutide (OZEMPIC, 0.25 OR 0.5 MG/DOSE, SC) Inject  under the skin into the appropriate area as directed. 0.5 mg weekly      timolol (TIMOPTIC) 0.5 % ophthalmic solution 1 drop Daily. INSTILL 1 DROP IN LEFT EYE EVERY MORNING AS DIRECTED  6    triamcinolone acetonide (KENALOG-40) 40 MG/ML injection Inject 1 mL into the appropriate joint as directed by provider.      [DISCONTINUED] lisinopril (PRINIVIL,ZESTRIL) 40 MG tablet TAKE 1 TABLET BY MOUTH DAILY  90 tablet 1     No current facility-administered medications on file prior to visit.       Past Medical History:   Diagnosis Date    Carpal tunnel syndrome on right     Chest pain     Degenerative disc disease, lumbar     Diverticulitis of large intestine without perforation or abscess without bleeding 07/31/2018    ED (erectile dysfunction)     Erythrocytosis 07/05/2018    GERD (gastroesophageal reflux disease)     Hyperlipidemia     Kidney stones     Pneumonia due to infectious organism 04/14/2017    S/P angioplasty with stent     Stroke     Trapezius muscle spasm 03/19/2018    Tubular adenoma of colon 09/2019    Removed by colonoscopy by Dr. Weems       Family History   Problem Relation Age of Onset    Heart disease Mother     Prostate cancer Father     Heart disease Father     Colon polyps Daughter     Colon polyps Daughter        Social History     Socioeconomic History    Marital status:      Spouse name: Kerry    Years of education: College   Tobacco Use    Smoking status: Never    Smokeless tobacco: Never    Tobacco comments:     no caffeine   Vaping Use    Vaping Use: Never used   Substance and Sexual Activity    Alcohol use: No    Drug use: No    Sexual activity: Defer     Partners: Female       Past Surgical History:   Procedure Laterality Date    CARDIAC CATHETERIZATION N/A 02/10/2011    Normal LV systolic function; Kind of diffuse, small vessel, distal disease. Most of it is not amenable to PCI, but his JADEN lesion is and it is a large vessel; All of his prior durg-eluting stents are widely patent & look good, Dr. Anibal Oliveira    CATARACT EXTRACTION Bilateral     COLONOSCOPY N/A 09/13/2004    Normal, Repeat in 5-8 years, Dr. Davion Sanon    COLONOSCOPY N/A 09/04/2009    Mutiple small & large-mouthed diverticual were found in the entire colon; Exam otherwise normal, Dr. Davion Sanon    COLONOSCOPY N/A 9/26/2019    Procedure: COLONOSCOPY to  cecum with cold biopsy polypectomy;  Surgeon:  Haider Weems MD;  Location: [facility] ENDOSCOPY;  Service: General    COLONOSCOPY W/ POLYPECTOMY N/A [id number]    2 benign polyps, [facility], Dr. Davion Sanon    CORONARY ANGIOPLASTY WITH STENT PLACEMENT N/A 11/20/2007    Successful multivessel drug eluting stent implantation for restenosis above his prior stenting, Dr. Anibal Wray    CORONARY ANGIOPLASTY WITH STENT PLACEMENT N/A 06/05/2007    Successful multivessel drug eluting stent implantation in all 3 of the major coronary arteries, Dr. Anibal Wray    CORONARY ANGIOPLASTY WITH STENT PLACEMENT N/A 02/10/2011    Successful drug-stenting of the JADEN, Dr. Anibal Oliveira    ENDOSCOPY N/A 11/21/2017    Procedure: ESOPHAGOGASTRODUODENOSCOPY WITH COLD BIOPSIES AND 53 F MICHAEL DILATION;  Surgeon: Wisam Potter MD;  Location: [facility] ENDOSCOPY;  Service:     EYE SURGERY Left 2011    Retina    HYDROCELECTOMY Left 08/19/2011    Dr. Argueta Short    INCISION AND DRAINAGE PERIRECTAL ABSCESS N/A 11/30/2007    Dr. Davion Sanon    LUMBAR EPIDURAL INJECTION N/A 2012-2013    x 3    NEUROMA SURGERY Right 07/19/2005    Excision Neuroma Right Arm; PATH: CONSISTENT W/ LIPOMA,  Dr. Davion Sanon    ROTATOR CUFF REPAIR Right 2006    ROTATOR CUFF REPAIR Left 2000    UPPER GASTROINTESTINAL ENDOSCOPY N/A 09/06/2012    Normal esophagus; Normal Stomach, Normal examinded duodenum, Dr. Bruno Turk    WRIST FRACTURE SURGERY Right 06/03/2003    ORIF Right Distal Radius, Dr. Ryan Bravo       The following portions of the patient's history were reviewed and updated as appropriate: problem list, allergies, current medications, past medical history, and past social history.    Review of Systems    Immunization History   Administered Date(s) Administered    Fluzone High Dose =>65 Years ([employer] ONLY) 10/03/2016, 09/26/2017, 10/20/2018    Fluzone High-Dose 65+yrs 10/31/2022    Hepatitis A 10/01/2019    Pneumococcal Conjugate 13-Valent (PCV13) 11/22/2016    Pneumococcal  "Polysaccharide (PPSV23) 11/22/2010    Shingrix 01/01/2011, 01/01/2019    TD Preservative Free (Tenivac) 04/03/2023    Tdap 11/12/2017       Objective   Vitals:    08/17/23 0827   BP: 128/80   Pulse: 60   Resp: 14   Temp: 97.1 øF (36.2 øC)   Weight: 94.8 kg (209 lb)   Height: 177.8 cm (70\")     Body mass index is 29.99 kg/mý.  Physical Exam  Vitals reviewed.   Constitutional:       Appearance: Normal appearance. He is well-developed.   HENT:      Head: Normocephalic and atraumatic.      Right Ear: Tympanic membrane, ear canal and external ear normal.      Left Ear: Tympanic membrane, ear canal and external ear normal.      Nose: Nose normal.      Mouth/Throat:      Mouth: Mucous membranes are moist.      Pharynx: Oropharynx is clear. No oropharyngeal exudate or posterior oropharyngeal erythema.   Eyes:      General: No scleral icterus.     Extraocular Movements: Extraocular movements intact.      Conjunctiva/sclera: Conjunctivae normal.      Pupils: Pupils are equal, round, and reactive to light.   Neck:      Thyroid: No thyromegaly.      Vascular: No carotid bruit.   Cardiovascular:      Rate and Rhythm: Normal rate and regular rhythm.      Heart sounds: Normal heart sounds. No murmur heard.  Pulmonary:      Effort: Pulmonary effort is normal.      Breath sounds: Normal breath sounds.   Abdominal:      General: Bowel sounds are normal. There is no distension.      Palpations: Abdomen is soft.      Tenderness: There is no abdominal tenderness.   Genitourinary:     Comments: RODRIGO with .   Musculoskeletal:      Cervical back: Neck supple.      Comments: Ambulates without assistance; no clubbing, cyanosis or edema.    Feet:      Right foot:      Protective Sensation: 10 sites tested.  6 sites sensed.      Skin integrity: Skin integrity normal.      Toenail Condition: Right toenails are normal.      Left foot:      Protective Sensation: 10 sites tested.  5 sites sensed.      Skin integrity: Skin integrity normal.      " Toenail Condition: Left toenails are normal.      Comments: Toenails are jagged where he has tried trimming them especially right great toe.   Lymphadenopathy:      Cervical: No cervical adenopathy.   Skin:     General: Skin is warm and dry.   Neurological:      Mental Status: He is alert.   Psychiatric:         Mood and Affect: Mood normal.         Behavior: Behavior normal.       Procedures    Assessment & Plan   Diagnoses and all orders for this visit:    1. Essential hypertension (Primary)  Comments:  Controlled. Continue current medications. Scheduled for follow up with endo with labs prior next week.  Orders:  -     lisinopril (PRINIVIL,ZESTRIL) 40 MG tablet; Take 1 tablet by mouth Daily.  Dispense: 90 tablet; Refill: 1    2. Belching  Comments:  IUmproved over the last 2 days. Reflux precautions reviewed.    3. Benign prostatic hyperplasia with urinary frequency  Comments:  Stable. Followed by Dr. Ackerman.    4. DM type 2 with diabetic peripheral neuropathy  Comments:  Foot exam reveals decreased sensation. Advised podiatry for trmming toe nails as edges are jagged and he is having trouble with nail care. Foot care reviewed. Toña & Kalpesh advised. Upcoming labs with Dr. Mckeon/Sruthi Ritter.     Records reviewed include previous OV with myself, Dr. Ackerman and cards--Josseline ARREOLA and labs.     Return for Next scheduled follow up.

## 2023-08-17 ENCOUNTER — OFFICE VISIT (OUTPATIENT)
Dept: INTERNAL MEDICINE | Facility: CLINIC | Age: 78
End: 2023-08-17
Payer: MEDICARE

## 2023-08-17 VITALS
DIASTOLIC BLOOD PRESSURE: 80 MMHG | HEART RATE: 60 BPM | RESPIRATION RATE: 14 BRPM | SYSTOLIC BLOOD PRESSURE: 128 MMHG | WEIGHT: 209 LBS | HEIGHT: 70 IN | TEMPERATURE: 97.1 F | BODY MASS INDEX: 29.92 KG/M2

## 2023-08-17 DIAGNOSIS — I10 ESSENTIAL HYPERTENSION: Primary | Chronic | ICD-10-CM

## 2023-08-17 DIAGNOSIS — R35.0 BENIGN PROSTATIC HYPERPLASIA WITH URINARY FREQUENCY: ICD-10-CM

## 2023-08-17 DIAGNOSIS — E11.42 DM TYPE 2 WITH DIABETIC PERIPHERAL NEUROPATHY: Chronic | ICD-10-CM

## 2023-08-17 DIAGNOSIS — N40.1 BENIGN PROSTATIC HYPERPLASIA WITH URINARY FREQUENCY: ICD-10-CM

## 2023-08-17 DIAGNOSIS — R14.2 BELCHING: ICD-10-CM

## 2023-08-17 PROCEDURE — 3079F DIAST BP 80-89 MM HG: CPT | Performed by: NURSE PRACTITIONER

## 2023-08-17 PROCEDURE — 1160F RVW MEDS BY RX/DR IN RCRD: CPT | Performed by: NURSE PRACTITIONER

## 2023-08-17 PROCEDURE — 99213 OFFICE O/P EST LOW 20 MIN: CPT | Performed by: NURSE PRACTITIONER

## 2023-08-17 PROCEDURE — 1159F MED LIST DOCD IN RCRD: CPT | Performed by: NURSE PRACTITIONER

## 2023-08-17 PROCEDURE — 3074F SYST BP LT 130 MM HG: CPT | Performed by: NURSE PRACTITIONER

## 2023-08-17 RX ORDER — LISINOPRIL 40 MG/1
40 TABLET ORAL DAILY
Qty: 90 TABLET | Refills: 1 | Status: SHIPPED | OUTPATIENT
Start: 2023-08-17

## 2023-08-17 NOTE — PATIENT INSTRUCTIONS
BP goal is less than 130/80. Please call if it is running higher so I can see you and make adjustments.     Podiatry--Ranjan is who I advise.

## 2023-08-24 ENCOUNTER — OFFICE VISIT (OUTPATIENT)
Dept: ENDOCRINOLOGY | Age: 78
End: 2023-08-24
Payer: MEDICARE

## 2023-08-24 VITALS
DIASTOLIC BLOOD PRESSURE: 70 MMHG | BODY MASS INDEX: 29.98 KG/M2 | WEIGHT: 209.4 LBS | HEART RATE: 77 BPM | HEIGHT: 70 IN | OXYGEN SATURATION: 96 % | SYSTOLIC BLOOD PRESSURE: 122 MMHG

## 2023-08-24 DIAGNOSIS — E11.29 TYPE 2 DIABETES MELLITUS WITH MICROALBUMINURIA, WITH LONG-TERM CURRENT USE OF INSULIN: Primary | ICD-10-CM

## 2023-08-24 DIAGNOSIS — R80.9 TYPE 2 DIABETES MELLITUS WITH MICROALBUMINURIA, WITH LONG-TERM CURRENT USE OF INSULIN: Primary | ICD-10-CM

## 2023-08-24 DIAGNOSIS — I10 ESSENTIAL HYPERTENSION: Chronic | ICD-10-CM

## 2023-08-24 DIAGNOSIS — E78.5 HYPERLIPIDEMIA, UNSPECIFIED HYPERLIPIDEMIA TYPE: Chronic | ICD-10-CM

## 2023-08-24 DIAGNOSIS — Z79.4 TYPE 2 DIABETES MELLITUS WITH MICROALBUMINURIA, WITH LONG-TERM CURRENT USE OF INSULIN: Primary | ICD-10-CM

## 2023-08-24 LAB
ALBUMIN SERPL-MCNC: 4.7 G/DL (ref 3.5–5.2)
ALBUMIN/GLOB SERPL: 2.5 G/DL
ALP SERPL-CCNC: 56 U/L (ref 39–117)
ALT SERPL-CCNC: 18 U/L (ref 1–41)
AST SERPL-CCNC: 17 U/L (ref 1–40)
BILIRUB SERPL-MCNC: 0.3 MG/DL (ref 0–1.2)
BUN SERPL-MCNC: 15 MG/DL (ref 8–23)
BUN/CREAT SERPL: 11.8 (ref 7–25)
CALCIUM SERPL-MCNC: 9.2 MG/DL (ref 8.6–10.5)
CHLORIDE SERPL-SCNC: 103 MMOL/L (ref 98–107)
CHOLEST SERPL-MCNC: 110 MG/DL (ref 0–200)
CO2 SERPL-SCNC: 23.7 MMOL/L (ref 22–29)
CREAT SERPL-MCNC: 1.27 MG/DL (ref 0.76–1.27)
EGFRCR SERPLBLD CKD-EPI 2021: 57.5 ML/MIN/1.73
GLOBULIN SER CALC-MCNC: 1.9 GM/DL
GLUCOSE SERPL-MCNC: 132 MG/DL (ref 65–99)
HBA1C MFR BLD: 6.4 % (ref 4.8–5.6)
HDLC SERPL-MCNC: 33 MG/DL (ref 40–60)
IMP & REVIEW OF LAB RESULTS: NORMAL
LDLC SERPL CALC-MCNC: 51 MG/DL (ref 0–100)
POTASSIUM SERPL-SCNC: 4.6 MMOL/L (ref 3.5–5.2)
PROT SERPL-MCNC: 6.6 G/DL (ref 6–8.5)
SODIUM SERPL-SCNC: 137 MMOL/L (ref 136–145)
TRIGL SERPL-MCNC: 148 MG/DL (ref 0–150)
VLDLC SERPL CALC-MCNC: 26 MG/DL (ref 5–40)

## 2023-08-24 PROCEDURE — 3078F DIAST BP <80 MM HG: CPT | Performed by: NURSE PRACTITIONER

## 2023-08-24 PROCEDURE — 99214 OFFICE O/P EST MOD 30 MIN: CPT | Performed by: NURSE PRACTITIONER

## 2023-08-24 PROCEDURE — 3074F SYST BP LT 130 MM HG: CPT | Performed by: NURSE PRACTITIONER

## 2023-08-24 PROCEDURE — 95251 CONT GLUC MNTR ANALYSIS I&R: CPT | Performed by: NURSE PRACTITIONER

## 2023-08-24 NOTE — PATIENT INSTRUCTIONS
Decrease Levemir 42 units every evening  Continue with NovoLog 45 units before breakfast, 45 units before lunch and 45 units before supper, Ozempic 0.5 mg weekly and metformin 1,000 mg twice a day

## 2023-09-05 ENCOUNTER — OFFICE VISIT (OUTPATIENT)
Dept: INTERNAL MEDICINE | Facility: CLINIC | Age: 78
End: 2023-09-05
Payer: MEDICARE

## 2023-09-05 VITALS — BODY MASS INDEX: 29.78 KG/M2 | HEIGHT: 70 IN | WEIGHT: 208 LBS

## 2023-09-05 DIAGNOSIS — H92.02 LEFT EAR PAIN: Primary | ICD-10-CM

## 2023-09-05 PROCEDURE — 99213 OFFICE O/P EST LOW 20 MIN: CPT | Performed by: INTERNAL MEDICINE

## 2023-09-05 NOTE — PROGRESS NOTES
"Chief Complaint  Earache    Subjective        Anthony Vaughn presents to [facility]  History of Present Illness  L ear pain and soreness in L side of throat for 3 days.  Doesn't feel terrible but not normal. No sinus congestion.  Only sore when he swallows.   Objective   Vital Signs:  Ht 177.8 cm (70\")   Wt 94.3 kg (208 lb)   BMI 29.84 kg/m²   Estimated body mass index is 29.84 kg/m² as calculated from the following:    Height as of this encounter: 177.8 cm (70\").    Weight as of this encounter: 94.3 kg (208 lb).             Physical Exam  Constitutional:       Appearance: Normal appearance.   HENT:      Head:      Jaw: There is normal jaw occlusion.      Left Ear: Tympanic membrane, ear canal and external ear normal. There is no impacted cerumen.      Nose: No congestion or rhinorrhea.      Mouth/Throat:      Mouth: Mucous membranes are moist.   Lymphadenopathy:      Head:      Left side of head: No submandibular, preauricular or posterior auricular adenopathy.      Result Review :                   Assessment and Plan   Diagnoses and all orders for this visit:    1. Left ear pain (Primary)  Comments:  appears benign- would recommend Zyrtec for a few days along with Tylenol.  Recheck if fails to improve.             Follow Up   No follow-ups on file.  Patient was given instructions and counseling regarding his condition or for health maintenance advice. Please see specific information pulled into the AVS if appropriate.         "

## 2023-10-29 NOTE — PROGRESS NOTES
The ABCs of the Annual Wellness Visit  Subsequent Medicare Wellness Visit    Subjective    Anthony Vaughn is a 78 y.o. male who presents for a Subsequent Medicare Wellness Visit.    The following portions of the patient's history were reviewed and   updated as appropriate: allergies, current medications, past family history, past medical history, past social history, past surgical history, and problem list.    Compared to one year ago, the patient feels his physical   health is worse.    Compared to one year ago, the patient feels his mental   health is the same.    Recent Hospitalizations:  He was not admitted to the hospital during the last year.       Current Medical Providers:  Patient Care Team:  Mary Anne Ludwig APRN as PCP - General (Family Medicine)  Gricel Diaz MD as PCP - Family Medicine  Anibal Wray MD as Consulting Physician (Cardiology)  Guillermo Live MD as Consulting Physician  Norman Mahmood MD as Consulting Physician (Ophthalmology)  Cristino Mckeon MD as Consulting Physician (Endocrinology)  Jeannie Wheeler APRN as Nurse Practitioner (Neurology)  Cesar Galaviz MD as Consulting Physician (Hematology and Oncology)  Gricel Diaz MD as Referring Physician (Internal Medicine)    Outpatient Medications Prior to Visit   Medication Sig Dispense Refill    Aspirin 81 MG capsule Take 81 mg by mouth Daily.      atorvastatin (LIPITOR) 10 MG tablet Take 1 tablet by mouth Daily.      brimonidine (ALPHAGAN) 0.15 % ophthalmic solution Apply 1 drop to eye 2 (two) times a day.      clopidogrel (PLAVIX) 75 MG tablet Take 1 tablet by mouth Every Night.      Continuous Blood Gluc  (FreeStyle [place] 2 [place]) device 1 each Daily. 1 each 1    Continuous Blood Gluc Sensor (FreeStyle Juwan 2 Sensor) misc 1 each Every 14 (Fourteen) Days. 6 each 2    Glucose 15 GM/32ML gel Take  by mouth.      insulin aspart (novoLOG FLEXPEN) 100 UNIT/ML solution pen-injector sc pen 62  units at breakfast, 66 units at lunch and 58 units at supper. (Patient taking differently: 50 units at breakfast, 50 units at lunch and 50 units at supper.)      insulin detemir (LEVEMIR) 100 UNIT/ML injection Inject 42 Units under the skin into the appropriate area as directed Daily.  12    isosorbide mononitrate (IMDUR) 30 MG 24 hr tablet Take 1 tablet by mouth Daily. 45 UNITS AT NIGHT      latanoprost (XALATAN) 0.005 % ophthalmic solution Apply 1 drop to eye(s) as directed by provider Daily.      lisinopril (PRINIVIL,ZESTRIL) 40 MG tablet Take 1 tablet by mouth Daily. 90 tablet 1    metFORMIN (GLUCOPHAGE) 1000 MG tablet Take 1 tablet by mouth 2 (Two) Times a Day With Meals. Take 2 tablets bid      metoprolol succinate XL (TOPROL-XL) 50 MG 24 hr tablet Take 1.5 tablets by mouth Daily.      mometasone (ELOCON) 0.1 % ointment APPLY SPARINGLY TO FEET TWICE DAILY      NIFEdipine XL (PROCARDIA XL) 60 MG 24 hr tablet Take 30 mg by mouth Daily.      nitroglycerin (NITROSTAT) 0.4 MG SL tablet Place 1 tablet under the tongue Every 5 (Five) Minutes As Needed for Chest Pain. Take no more than 3 doses in 15 minutes.      Omega 3 1000 MG capsule Take 1 capsule by mouth 2 (Two) Times a Day.      omeprazole (priLOSEC) 20 MG capsule Take 1 capsule by mouth Daily.      pilocarpine (PILOCAR) 1 % ophthalmic solution 1 drop 2 (Two) Times a Day.      Semaglutide (OZEMPIC, 0.25 OR 0.5 MG/DOSE, SC) Inject  under the skin into the appropriate area as directed. 0.5 mg weekly      timolol (TIMOPTIC) 0.5 % ophthalmic solution 1 drop Daily. INSTILL 1 DROP IN LEFT EYE EVERY MORNING AS DIRECTED  6    triamcinolone acetonide (KENALOG-40) 40 MG/ML injection Inject 1 mL into the appropriate joint as directed by provider.      acyclovir (ZOVIRAX) 400 MG tablet Take 1 tablet by mouth As Needed. Take no more than 5 doses a day. (Patient not taking: Reported on 11/2/2023)      lidocaine (Lidoderm) 5 % Place 1 patch on the skin as directed by provider  "Daily. Remove & Discard patch within 12 hours or as directed by MD (Patient not taking: Reported on 11/2/2023) 15 patch 1     No facility-administered medications prior to visit.       No opioid medication identified on active medication list. I have reviewed chart for other potential  high risk medication/s and harmful drug interactions in the elderly.        Aspirin is not on active medication list.   ASA discontinued by cardiology 04/2023 .    Patient Active Problem List   Diagnosis    Coronary artery disease involving native coronary artery without angina pectoris    Hyperlipidemia    Essential hypertension    Type 2 diabetes mellitus with microalbuminuria, with long-term current use of insulin    Microalbuminuria    DM type 2 with diabetic peripheral neuropathy    Bandemia    Erythrocytosis    Osteoarthritis of left acromioclavicular joint     Advance Care Planning   Advance Care Planning     Advance Directive is on file.  ACP discussion was held with the patient during this visit. Patient has an advance directive in EMR which is still valid.      Objective    Vitals:    11/02/23 1239   BP: 126/64   Pulse: 80   Resp: 16   Temp: 98.7 °F (37.1 °C)   Weight: 94.3 kg (208 lb)   Height: 177.8 cm (70\")     Estimated body mass index is 29.84 kg/m² as calculated from the following:    Height as of this encounter: 177.8 cm (70\").    Weight as of this encounter: 94.3 kg (208 lb).           Does the patient have evidence of cognitive impairment? No    Lab Results   Component Value Date    CHLPL 110 08/24/2023    TRIG 148 08/24/2023    HDL 33 (L) 08/24/2023    LDL 51 08/24/2023    VLDL 26 08/24/2023    HGBA1C 6.40 (H) 08/24/2023        HEALTH RISK ASSESSMENT    Smoking Status:  Social History     Tobacco Use   Smoking Status Never   Smokeless Tobacco Never   Tobacco Comments    no caffeine     Alcohol Consumption:  Social History     Substance and Sexual Activity   Alcohol Use No     Fall Risk Screen:    STEADI Fall Risk " Assessment was completed, and patient is at LOW risk for falls.Assessment completed on:2023    Depression Screenin/2/2023    12:45 PM   PHQ-2/PHQ-9 Depression Screening   Little Interest or Pleasure in Doing Things 0-->not at all   Feeling Down, Depressed or Hopeless 0-->not at all   PHQ-9: Brief Depression Severity Measure Score 0       Health Habits and Functional and Cognitive Screenin/2/2023    12:44 PM   Functional & Cognitive Status   Do you have difficulty preparing food and eating? No   Do you have difficulty bathing yourself, getting dressed or grooming yourself? No   Do you have difficulty using the toilet? No   Do you have difficulty moving around from place to place? No   Do you have trouble with steps or getting out of a bed or a chair? No   Current Diet Well Balanced Diet   Dental Exam Up to date   Eye Exam Up to date   Exercise (times per week) 2 times per week   Current Exercises Include Walking   Do you need help using the phone?  No   Are you deaf or do you have serious difficulty hearing?  No   Do you need help to go to places out of walking distance? No   Do you need help shopping? No   Do you need help preparing meals?  No   Do you need help with housework?  No   Do you need help with laundry? No   Do you need help taking your medications? No   Do you need help managing money? No   Do you ever drive or ride in a car without wearing a seat belt? No   Have you felt unusual stress, anger or loneliness in the last month? No   Who do you live with? Spouse   If you need help, do you have trouble finding someone available to you? No   Have you been bothered in the last four weeks by sexual problems? No   Do you have difficulty concentrating, remembering or making decisions? No       Age-appropriate Screening Schedule:  Refer to the list below for future screening recommendations based on patient's age, sex and/or medical conditions. Orders for these recommended tests are listed  in the plan section. The patient has been provided with a written plan.    Health Maintenance   Topic Date Due    INFLUENZA VACCINE  2023    ANNUAL WELLNESS VISIT  10/31/2023    BMI FOLLOWUP  10/31/2023    COVID-19 Vaccine (1) 2025 (Originally 1945)    HEMOGLOBIN A1C  2024    DIABETIC EYE EXAM  2024    URINE MICROALBUMIN  2024    LIPID PANEL  2024    COLORECTAL CANCER SCREENING  2024    TDAP/TD VACCINES (3 - Td or Tdap) 2033    HEPATITIS C SCREENING  Completed    Pneumococcal Vaccine 65+  Completed    ZOSTER VACCINE  Completed                  CMS Preventative Services Quick Reference  Risk Factors Identified During Encounter  Immunizations Discussed/Encouraged: Influenza  The above risks/problems have been discussed with the patient.  Pertinent information has been shared with the patient in the After Visit Summary.  An After Visit Summary and PPPS were made available to the patient.    Follow Up:   Next Medicare Wellness visit to be scheduled in 1 year.       Additional E&M Note during same encounter follows:  Patient has multiple medical problems which are significant and separately identifiable that require additional work above and beyond the Medicare Wellness Visit.      Chief Complaint  Medicare Wellness-subsequent    Subjective        HPI  Anthony Vaughn is also being seen today for HTN. Nocturia every two hours.Denies dysuria. Denies hematuria, fevers or chills. Denies abdominal pain, N/V/D/C. He is seeing Dr. Ackerman or associate next week. He has a knot on his lower right leg present for 2-3 weeks. NKI. It hurts. Doesn't recall an injury. It is a little smaller than in was.     Levemir recently  to 42 units daily with Yamil Ritter. Continues Novolog 45 units prior to meals and Ozempic 0.5 mg/week.  Insulin dependent diabetes followed by Dr. Mckeon and Sruthi Ritter. Last A1C 6.4% in August. Eye exam last week. Dental exam next month. Denies chest  "pain or dyspnea. No hematochezia or melena.        Objective   Vital Signs:  /64   Pulse 80   Temp 98.7 °F (37.1 °C)   Resp 16   Ht 177.8 cm (70\")   Wt 94.3 kg (208 lb)   BMI 29.84 kg/m²     Physical Exam  Vitals reviewed.   Constitutional:       Appearance: Normal appearance. He is well-developed.   HENT:      Head: Normocephalic and atraumatic.      Right Ear: Tympanic membrane, ear canal and external ear normal.      Left Ear: Tympanic membrane, ear canal and external ear normal.      Nose: Nose normal.      Mouth/Throat:      Mouth: Mucous membranes are moist.      Pharynx: Oropharynx is clear. No oropharyngeal exudate or posterior oropharyngeal erythema.   Eyes:      General: No scleral icterus.     Extraocular Movements: Extraocular movements intact.      Conjunctiva/sclera: Conjunctivae normal.      Pupils: Pupils are equal, round, and reactive to light.   Neck:      Thyroid: No thyromegaly.      Vascular: No carotid bruit.   Cardiovascular:      Rate and Rhythm: Normal rate and regular rhythm.      Heart sounds: Normal heart sounds. No murmur heard.  Pulmonary:      Effort: Pulmonary effort is normal.      Breath sounds: Normal breath sounds.   Abdominal:      General: Bowel sounds are normal. There is no distension.      Palpations: Abdomen is soft.      Tenderness: There is no abdominal tenderness.   Genitourinary:     Comments: RODRIGO with .   Musculoskeletal:      Cervical back: Neck supple.      Comments: Ambulates without assistance; no clubbing, cyanosis or edema. Quarter sized pinkish brown raised area TTP mid right shin. No crepitus. Not hot to touch.    Lymphadenopathy:      Cervical: No cervical adenopathy.   Skin:     General: Skin is warm and dry.   Neurological:      Mental Status: He is alert.   Psychiatric:         Mood and Affect: Mood normal.         Behavior: Behavior normal.                         Assessment and Plan   Diagnoses and all orders for this visit:    1. Medicare " annual wellness visit, subsequent (Primary)  Comments:  150 minutes weekly aerobic execise. He will get his flu shot with his beloved at the local pharmacy.    2. Essential hypertension  Comments:  Controlled. Continue current medications and RTO 6 months.    3. Pain in right shin  Comments:  Imaging as below. Some improvment over the last week. He will let me know should it not continue to improve especially as he is diabetic.  Orders:  -     XR Tibia Fibula 2 View Right    4. Nocturia  Comments:  Voiding every 2 hours. UA with micro reflex to culture. Scheduling with  next week.  Orders:  -     UA / M With / Rflx Culture([facility] ONLY) - Urine, Clean Catch  -     POCT urinalysis dipstick, automated    5. Type 2 diabetes mellitus with microalbuminuria, with long-term current use of insulin  Comments:  Controlled. Managed by Dr. Mckeon/Sruthi Ritter with follow up in December. Eye exam UTD.    Records reviewed include previous OV with myself, Josseline Cervantes, Dr. Ackerman, Dr. Galaviz, and Sruthi Ritter as well as labs.        Follow Up   Return in about 6 months (around 5/2/2024) for 30 min--XR today.  Patient was given instructions and counseling regarding his condition or for health maintenance advice. Please see specific information pulled into the AVS if appropriate.

## 2023-11-02 ENCOUNTER — HOSPITAL ENCOUNTER (OUTPATIENT)
Facility: HOSPITAL | Age: 78
Discharge: HOME | End: 2023-11-02
Admitting: NURSE PRACTITIONER
Payer: MEDICARE

## 2023-11-02 ENCOUNTER — OFFICE VISIT (OUTPATIENT)
Dept: INTERNAL MEDICINE | Facility: CLINIC | Age: 78
End: 2023-11-02
Payer: MEDICARE

## 2023-11-02 VITALS
HEART RATE: 80 BPM | BODY MASS INDEX: 29.78 KG/M2 | SYSTOLIC BLOOD PRESSURE: 126 MMHG | RESPIRATION RATE: 16 BRPM | DIASTOLIC BLOOD PRESSURE: 64 MMHG | HEIGHT: 70 IN | TEMPERATURE: 98.7 F | WEIGHT: 208 LBS

## 2023-11-02 DIAGNOSIS — M79.661 PAIN IN RIGHT SHIN: ICD-10-CM

## 2023-11-02 DIAGNOSIS — Z00.00 MEDICARE ANNUAL WELLNESS VISIT, SUBSEQUENT: Primary | ICD-10-CM

## 2023-11-02 DIAGNOSIS — R80.9 TYPE 2 DIABETES MELLITUS WITH MICROALBUMINURIA, WITH LONG-TERM CURRENT USE OF INSULIN: ICD-10-CM

## 2023-11-02 DIAGNOSIS — R35.1 NOCTURIA: ICD-10-CM

## 2023-11-02 DIAGNOSIS — E11.29 TYPE 2 DIABETES MELLITUS WITH MICROALBUMINURIA, WITH LONG-TERM CURRENT USE OF INSULIN: ICD-10-CM

## 2023-11-02 DIAGNOSIS — Z79.4 TYPE 2 DIABETES MELLITUS WITH MICROALBUMINURIA, WITH LONG-TERM CURRENT USE OF INSULIN: ICD-10-CM

## 2023-11-02 DIAGNOSIS — I10 ESSENTIAL HYPERTENSION: Chronic | ICD-10-CM

## 2023-11-02 LAB
BILIRUB BLD-MCNC: NEGATIVE MG/DL
CLARITY, POC: ABNORMAL
COLOR UR: ABNORMAL
EXPIRATION DATE: ABNORMAL
GLUCOSE UR STRIP-MCNC: ABNORMAL MG/DL
KETONES UR QL: ABNORMAL
LEUKOCYTE EST, POC: NEGATIVE
Lab: ABNORMAL
NITRITE UR-MCNC: NEGATIVE MG/ML
PH UR: 6 [PH] (ref 5–8)
PROT UR STRIP-MCNC: ABNORMAL MG/DL
RBC # UR STRIP: NEGATIVE /UL
SP GR UR: 1.03 (ref 1–1.03)
UROBILINOGEN UR QL: ABNORMAL

## 2023-11-02 PROCEDURE — 73590 X-RAY EXAM OF LOWER LEG: CPT

## 2023-11-03 ENCOUNTER — TELEPHONE (OUTPATIENT)
Dept: INTERNAL MEDICINE | Facility: CLINIC | Age: 78
End: 2023-11-03
Payer: MEDICARE

## 2023-11-03 ENCOUNTER — TELEPHONE (OUTPATIENT)
Dept: INTERNAL MEDICINE | Facility: CLINIC | Age: 78
End: 2023-11-03

## 2023-11-03 LAB
APPEARANCE UR: CLEAR
BACTERIA #/AREA URNS HPF: NORMAL /HPF
BILIRUB UR QL STRIP: NEGATIVE
CASTS URNS QL MICRO: NORMAL /LPF
COLOR UR: YELLOW
EPI CELLS #/AREA URNS HPF: NORMAL /HPF (ref 0–10)
GLUCOSE UR QL STRIP: ABNORMAL
HGB UR QL STRIP: NEGATIVE
KETONES UR QL STRIP: ABNORMAL
LEUKOCYTE ESTERASE UR QL STRIP: NEGATIVE
MICRO URNS: ABNORMAL
MICRO URNS: ABNORMAL
NITRITE UR QL STRIP: NEGATIVE
PH UR STRIP: 5.5 [PH] (ref 5–7.5)
PROT UR QL STRIP: ABNORMAL
RBC #/AREA URNS HPF: NORMAL /HPF (ref 0–2)
SP GR UR STRIP: 1.02 (ref 1–1.03)
URINALYSIS REFLEX: ABNORMAL
UROBILINOGEN UR STRIP-MCNC: 0.2 MG/DL (ref 0.2–1)
WBC #/AREA URNS HPF: NORMAL /HPF (ref 0–5)

## 2023-11-03 NOTE — TELEPHONE ENCOUNTER
"Relay     \"Urine dip +glucose, ketones and protein--he has known diabetes with microalbuminuria. UA with micro and culture negative. Please let him know we will call with results and fax those to his urologist. \"                "

## 2023-11-03 NOTE — TELEPHONE ENCOUNTER
Caller: Anthony Vaughn    Relationship: Self    Best call back number:     Caller requesting test results: ANTHONY VAUGHN    What test was performed: LAB AND X-RAY    When was the test performed: 11/02/23    Where was the test performed: [facility]    Additional notes: PATIENT IS CALLING TO REQUEST A CALL WITH THE RESULTS OF LABS AND X-RAY RESULTS.    PLEASE ADVISE.

## 2023-12-01 ENCOUNTER — CLINICAL SUPPORT (OUTPATIENT)
Dept: INTERNAL MEDICINE | Facility: CLINIC | Age: 78
End: 2023-12-01
Payer: MEDICARE

## 2023-12-01 DIAGNOSIS — Z23 FLU VACCINE NEED: Primary | ICD-10-CM

## 2023-12-22 ENCOUNTER — OFFICE VISIT (OUTPATIENT)
Dept: ENDOCRINOLOGY | Age: 78
End: 2023-12-22
Payer: MEDICARE

## 2023-12-22 VITALS
HEART RATE: 65 BPM | BODY MASS INDEX: 29.29 KG/M2 | TEMPERATURE: 96.3 F | WEIGHT: 204.6 LBS | DIASTOLIC BLOOD PRESSURE: 70 MMHG | SYSTOLIC BLOOD PRESSURE: 112 MMHG | HEIGHT: 70 IN | OXYGEN SATURATION: 99 %

## 2023-12-22 DIAGNOSIS — I10 ESSENTIAL HYPERTENSION: Chronic | ICD-10-CM

## 2023-12-22 DIAGNOSIS — R80.9 MICROALBUMINURIA: ICD-10-CM

## 2023-12-22 DIAGNOSIS — Z95.820 S/P ANGIOPLASTY WITH STENT: ICD-10-CM

## 2023-12-22 DIAGNOSIS — R80.9 TYPE 2 DIABETES MELLITUS WITH MICROALBUMINURIA, WITH LONG-TERM CURRENT USE OF INSULIN: Primary | ICD-10-CM

## 2023-12-22 DIAGNOSIS — E11.29 TYPE 2 DIABETES MELLITUS WITH MICROALBUMINURIA, WITH LONG-TERM CURRENT USE OF INSULIN: Primary | ICD-10-CM

## 2023-12-22 DIAGNOSIS — E11.42 DM TYPE 2 WITH DIABETIC PERIPHERAL NEUROPATHY: ICD-10-CM

## 2023-12-22 DIAGNOSIS — I25.10 CORONARY ARTERY DISEASE INVOLVING NATIVE CORONARY ARTERY OF NATIVE HEART WITHOUT ANGINA PECTORIS: ICD-10-CM

## 2023-12-22 DIAGNOSIS — Z79.4 TYPE 2 DIABETES MELLITUS WITH MICROALBUMINURIA, WITH LONG-TERM CURRENT USE OF INSULIN: Primary | ICD-10-CM

## 2023-12-22 DIAGNOSIS — E11.21 TYPE 2 DIABETES WITH NEPHROPATHY: Chronic | ICD-10-CM

## 2023-12-22 DIAGNOSIS — E78.5 HYPERLIPIDEMIA, UNSPECIFIED HYPERLIPIDEMIA TYPE: Chronic | ICD-10-CM

## 2023-12-22 LAB
ALBUMIN SERPL-MCNC: 5.1 G/DL (ref 3.5–5.2)
ALBUMIN/GLOB SERPL: 2.4 G/DL
ALP SERPL-CCNC: 73 U/L (ref 39–117)
ALT SERPL-CCNC: 19 U/L (ref 1–41)
AST SERPL-CCNC: 13 U/L (ref 1–40)
BILIRUB SERPL-MCNC: 0.5 MG/DL (ref 0–1.2)
BUN SERPL-MCNC: 14 MG/DL (ref 8–23)
BUN/CREAT SERPL: 11.7 (ref 7–25)
CALCIUM SERPL-MCNC: 9.8 MG/DL (ref 8.6–10.5)
CHLORIDE SERPL-SCNC: 99 MMOL/L (ref 98–107)
CHOLEST SERPL-MCNC: 143 MG/DL (ref 0–200)
CO2 SERPL-SCNC: 25.8 MMOL/L (ref 22–29)
CREAT SERPL-MCNC: 1.2 MG/DL (ref 0.76–1.27)
EGFRCR SERPLBLD CKD-EPI 2021: 61.5 ML/MIN/1.73
GLOBULIN SER CALC-MCNC: 2.1 GM/DL
GLUCOSE SERPL-MCNC: 138 MG/DL (ref 65–99)
HBA1C MFR BLD: 6.7 % (ref 4.8–5.6)
HDLC SERPL-MCNC: 45 MG/DL (ref 40–60)
IMP & REVIEW OF LAB RESULTS: NORMAL
LDLC SERPL CALC-MCNC: 71 MG/DL (ref 0–100)
POTASSIUM SERPL-SCNC: 4.9 MMOL/L (ref 3.5–5.2)
PROT SERPL-MCNC: 7.2 G/DL (ref 6–8.5)
SODIUM SERPL-SCNC: 137 MMOL/L (ref 136–145)
TRIGL SERPL-MCNC: 154 MG/DL (ref 0–150)
TSH SERPL DL<=0.005 MIU/L-ACNC: 1.79 UIU/ML (ref 0.27–4.2)
VLDLC SERPL CALC-MCNC: 27 MG/DL (ref 5–40)

## 2023-12-22 PROCEDURE — 95251 CONT GLUC MNTR ANALYSIS I&R: CPT | Performed by: INTERNAL MEDICINE

## 2023-12-22 PROCEDURE — 1159F MED LIST DOCD IN RCRD: CPT | Performed by: INTERNAL MEDICINE

## 2023-12-22 PROCEDURE — 3078F DIAST BP <80 MM HG: CPT | Performed by: INTERNAL MEDICINE

## 2023-12-22 PROCEDURE — 1160F RVW MEDS BY RX/DR IN RCRD: CPT | Performed by: INTERNAL MEDICINE

## 2023-12-22 PROCEDURE — 3074F SYST BP LT 130 MM HG: CPT | Performed by: INTERNAL MEDICINE

## 2023-12-22 PROCEDURE — 99214 OFFICE O/P EST MOD 30 MIN: CPT | Performed by: INTERNAL MEDICINE

## 2023-12-22 RX ORDER — SEMAGLUTIDE 1.34 MG/ML
1 INJECTION, SOLUTION SUBCUTANEOUS WEEKLY
Qty: 9 ML | Refills: 2 | Status: SHIPPED | OUTPATIENT
Start: 2023-12-22

## 2023-12-22 RX ORDER — INSULIN DEGLUDEC INJECTION 100 U/ML
INJECTION, SOLUTION SUBCUTANEOUS
Qty: 36 ML | Refills: 2 | Status: SHIPPED | OUTPATIENT
Start: 2023-12-22

## 2023-12-22 NOTE — PROGRESS NOTES
Subjective   Anthony Vaughn is a 78 y.o. male.     History of Present Illness     Patient has known diabetes mellitus since 2006 and started on insulin in 2010. He is on Levemir 45 units every evening, NovoLog 45 units TID, Ozempic 0.5 mg weekly and metformin 1000 mg twice a day.  He has freestyle anya 2 sensor in place. He is usually eats 1 meal a day.  He eats out 3-4 meals per week due to his work.  He has lost 1 lb since 4/23.  His last meal was last night.    Sensor data from December 9, 2023 to December 22, 2023 reviewed.  Average glucose 153 mg per DL.  Time in range 73%.  Time above range 27%.  Time below range 0  GMI 7.0%.  He has no early morning hypoglycemia.  He has occasional hypoglycemia.  He has intermittent postprandial hyperglycemia mostly after supper.      His last eye examination was in 12/23 and he has retinopathy. He has glaucoma.  He has microalbuminuria on urine sample taken last 4/23. He is on lisinopril. He denies numbness in his toes     He has known coronary artery disease and had multiple angioplasty and stents in the past. He denies any previous history of myocardial infarction. He has not had a coronary artery bypass surgery. He denies any chest pains, exertional dyspnea or PND.   He follows with Dr. Wray. He is on Imdur, lisinopril, metoprolol succinate , nifedepine XL and nitroglycerin sublingual as needed.       He has hyperlipidemia and has been on atorvastatin 10 mg every PM and Lovaza 1 capsule BID. He denies any myalgia.     He had tubular adenoma of colon removed by Dr. Weems by colonoscopy in September 2019.  He denies bowel complaints.  He was advised follow-up colonoscopy in 2024.     [facility]    The following portions of the patient's history were reviewed and updated as appropriate: allergies, current medications, past family history, past medical history, past social history, past surgical history, and problem list.    Review of Systems  "  Eyes:  Negative for visual disturbance.   Respiratory:  Negative for shortness of breath.    Cardiovascular:  Negative for chest pain and palpitations.   Gastrointestinal: Negative.    Genitourinary: Negative.    Musculoskeletal:  Negative for myalgias.   Neurological:  Negative for numbness.     Vitals:    12/22/23 0924   BP: 112/70   Pulse: 65   Temp: 96.3 °F (35.7 °C)   TempSrc: Temporal   SpO2: 99%   Weight: 92.8 kg (204 lb 9.6 oz)   Height: 177.8 cm (70\")      Objective   Physical Exam  Constitutional:       General: He is not in acute distress.     Appearance: Normal appearance. He is not ill-appearing or toxic-appearing.   Eyes:      General: No scleral icterus.        Right eye: No discharge.         Left eye: No discharge.      Extraocular Movements: Extraocular movements intact.      Conjunctiva/sclera: Conjunctivae normal.   Neck:      Vascular: No carotid bruit.   Cardiovascular:      Rate and Rhythm: Normal rate and regular rhythm.      Heart sounds: Normal heart sounds. No murmur heard.     No friction rub.   Pulmonary:      Breath sounds: Normal breath sounds. No rales.   Chest:      Chest wall: No tenderness.   Abdominal:      General: Bowel sounds are normal.      Palpations: Abdomen is soft.      Tenderness: There is no right CVA tenderness or left CVA tenderness.   Musculoskeletal:      Right lower leg: No edema.      Left lower leg: No edema.   Lymphadenopathy:      Cervical: No cervical adenopathy.   Skin:     General: Skin is warm.   Neurological:      Mental Status: He is alert and oriented to person, place, and time.      Comments: Intact light touch on lower ext       Office Visit on 11/02/2023   Component Date Value Ref Range Status    Specific Gravity, UA 11/02/2023 1.018  1.005 - 1.030 Final    pH, UA 11/02/2023 5.5  5.0 - 7.5 Final    Color, UA 11/02/2023 Yellow  Yellow Final    Appearance, UA 11/02/2023 Clear  Clear Final    Leukocytes, UA 11/02/2023 Negative  Negative Final    " Protein 11/02/2023 Trace  Negative/Trace Final    Glucose, UA 11/02/2023 1+ (A)  Negative Final    Ketones 11/02/2023 Trace (A)  Negative Final    Blood, UA 11/02/2023 Negative  Negative Final    Bilirubin, UA 11/02/2023 Negative  Negative Final    Urobilinogen, UA 11/02/2023 0.2  0.2 - 1.0 mg/dL Final    Nitrite, UA 11/02/2023 Negative  Negative Final    Microscopic Examination 11/02/2023 Comment   Final    Microscopic follows if indicated.    Microscopic Examination 11/02/2023 See below:   Final    Microscopic was indicated and was performed.    Urinalysis Reflex 11/02/2023 Comment   Final    This specimen will not reflex to a Urine Culture.    Color 11/02/2023 Dark Yellow  Yellow, Straw, Dark Yellow, Liana Final    Clarity, UA 11/02/2023 Cloudy (A)  Clear Final    Specific Gravity  11/02/2023 1.030  1.005 - 1.030 Final    pH, Urine 11/02/2023 6.0  5.0 - 8.0 Final    Leukocytes 11/02/2023 Negative  Negative Final    Nitrite, UA 11/02/2023 Negative  Negative Final    Protein, POC 11/02/2023 Trace (A)  Negative mg/dL Final    Glucose, UA 11/02/2023 1+ (A)  Negative mg/dL Final    Ketones, UA 11/02/2023 Trace (A)  Negative Final    Urobilinogen, UA 11/02/2023 0.2 E.U./dL  Normal, 0.2 E.U./dL Final    Bilirubin 11/02/2023 Negative  Negative Final    Blood, UA 11/02/2023 Negative  Negative Final    Lot Number 11/02/2023 [id number]   Final    Expiration Date 11/02/2023 4,[id number]   Final    WBC, UA 11/02/2023 None seen  0 - 5 /hpf Final    RBC, UA 11/02/2023 None seen  0 - 2 /hpf Final    Epithelial Cells (non renal) 11/02/2023 None seen  0 - 10 /hpf Final    Casts 11/02/2023 None seen  None seen /lpf Final    Bacteria, UA 11/02/2023 None seen  None seen/Few /hpf Final     Assessment & Plan   Diagnoses and all orders for this visit:    1. Type 2 diabetes mellitus with microalbuminuria, with long-term current use of insulin (Primary)  -     Comprehensive Metabolic Panel  -     Hemoglobin A1c  -     Lipid Panel  -      TSH    2. Coronary artery disease involving native coronary artery of native heart without angina pectoris  -     Comprehensive Metabolic Panel  -     Lipid Panel    3. Essential hypertension  -     Comprehensive Metabolic Panel    4. Hyperlipidemia, unspecified hyperlipidemia type  -     Lipid Panel  -     TSH      Switch from Levemir to Tresiba 40 units every evening.  Increase Ozempic to 1 mg weekly.  Continue NovoLog 40 to 45 units 3 times daily with meals.  Continue metformin 1000 mg twice a day.    Continue Imdur, lisinopril, metoprolol succinate, nifedipine XL and nitroglycerin as needed per Dr. Wray.    Continue atorvastatin 10 mg/day and Lovaza.    Follow-up colonoscopy next year.    Copy of my note sent to Mary Anne Ludwig NP and Dr. Wray.    RTC 4 mos.

## 2023-12-25 NOTE — PROGRESS NOTES
Hemoglobin A1c 6.7%.  Diabetes is well-controlled.  LDL 71.  HDL 45.  Triglycerides 154.  Continue atorvastatin 10 mg/day and Lovaza 1 capsule twice a day.  Normal thyroid function tests.  Copy of labs sent to Mary Anne Ludwig NP.  Please notify patient of results.

## 2024-01-01 NOTE — LETTER
Your Child's Health  Two-Month-Old Visit                                                                    Geoffrey Zazueta  2024    Visit Vitals  Temp 97.4 °F (36.3 °C) (Temporal)   Ht 23.62\" (60 cm)   Wt 6.13 kg (13 lb 8.2 oz)   HC 38 cm (14.96\")   BMI 17.03 kg/m²     Weight:       Your Child's 2 Month-Old Visit                                                       Key points at this age…    Car seat safety is critical! Make sure your baby is safely and properly riding in a rear-facing car seat.    Your baby continues to be safest when sleeping on their back in their own bed (a crib, bassinette or Pack-n-Play) at this age.     It’s normal to have periods of feeling exhausted and overwhelmed with a baby this age. Make sure to ask for help if you experiencing this sort of thing.     NUTRITION: If you are breastfeeding, keep it up! Breast milk or formula will provide all the nutrition your baby needs at this age, except for vitamin D. You should continue to give your baby a vitamin supplement (either a multivitamin or just pure vitamin D) every day if you are breastfeeding. (If you are feeding your baby formula at this age, they are likely getting enough vitamin D from the formula; supplementation with vitamin D is not necessary once babies are taking about 30 ounces of formula daily.)     DEVELOPMENT:  Most babies at this age will smile responsively and  and babble back at you when you talk to them. They will discover their hands and develop stronger neck muscles (which is why tummy time is still important--give your baby tummy time every day!). Talk, read and sing to your baby--hearing your voice often is very beneficial for your child's development (even at this young age!).     EMOTIONAL HEALTH:  Having a  is a joyous and exhausting experience. It is normal to be tired, but be aware that sometimes new moms can experience a true post-partum depression. This is different from “baby  December 22, 2023     ELBA Stout  [id number]  [place] [id number]    Patient: Anthony Vaughn   YOB: 1945   Date of Visit: 12/22/2023     Dear ELBA Stout:       Thank you for referring Anthony Vaughn to me for evaluation. Below are the relevant portions of my assessment and plan of care.    If you have questions, please do not hesitate to call me. I look forward to following Anthony along with you.         Sincerely,        Cristino Mckeon MD        CC: MD Linda Varela, Cristino SESAY MD  12/22/23 1029  Sign when Signing Visit  Subjective  Anthony Vaughn is a 78 y.o. male.     History of Present Illness     Patient has known diabetes mellitus since 2006 and started on insulin in 2010. He is on Levemir 45 units every evening, NovoLog 45 units TID, Ozempic 0.5 mg weekly and metformin 1000 mg twice a day.  He has freestyle anya 2 sensor in place. He is usually eats 1 meal a day.  He eats out 3-4 meals per week due to his work.  He has lost 1 lb since 4/23.  His last meal was last night.    Sensor data from December 9, 2023 to December 22, 2023 reviewed.  Average glucose 153 mg per DL.  Time in range 73%.  Time above range 27%.  Time below range 0  GMI 7.0%.  He has no early morning hypoglycemia.  He has occasional hypoglycemia.  He has intermittent postprandial hyperglycemia mostly after supper.      His last eye examination was in 12/23 and he has retinopathy. He has glaucoma.  He has microalbuminuria on urine sample taken last 4/23. He is on lisinopril. He denies numbness in his toes     He has known coronary artery disease and had multiple angioplasty and stents in the past. He denies any previous history of myocardial infarction. He has not had a coronary artery bypass surgery. He denies any chest pains, exertional dyspnea or PND.   He follows with Dr. Wray. He is on Imdur, lisinopril, metoprolol succinate , nifedepine XL and  "nitroglycerin sublingual as needed.       He has hyperlipidemia and has been on atorvastatin 10 mg every PM and Lovaza 1 capsule BID. He denies any myalgia.     He had tubular adenoma of colon removed by Dr. Weems by colonoscopy in September 2019.  He denies bowel complaints.  He was advised follow-up colonoscopy in 2024.     [facility]    The following portions of the patient's history were reviewed and updated as appropriate: allergies, current medications, past family history, past medical history, past social history, past surgical history, and problem list.    Review of Systems   Eyes:  Negative for visual disturbance.   Respiratory:  Negative for shortness of breath.    Cardiovascular:  Negative for chest pain and palpitations.   Gastrointestinal: Negative.    Genitourinary: Negative.    Musculoskeletal:  Negative for myalgias.   Neurological:  Negative for numbness.     Vitals:    12/22/23 0924   BP: 112/70   Pulse: 65   Temp: 96.3 °F (35.7 °C)   TempSrc: Temporal   SpO2: 99%   Weight: 92.8 kg (204 lb 9.6 oz)   Height: 177.8 cm (70\")      Objective  Physical Exam  Constitutional:       General: He is not in acute distress.     Appearance: Normal appearance. He is not ill-appearing or toxic-appearing.   Eyes:      General: No scleral icterus.        Right eye: No discharge.         Left eye: No discharge.      Extraocular Movements: Extraocular movements intact.      Conjunctiva/sclera: Conjunctivae normal.   Neck:      Vascular: No carotid bruit.   Cardiovascular:      Rate and Rhythm: Normal rate and regular rhythm.      Heart sounds: Normal heart sounds. No murmur heard.     No friction rub.   Pulmonary:      Breath sounds: Normal breath sounds. No rales.   Chest:      Chest wall: No tenderness.   Abdominal:      General: Bowel sounds are normal.      Palpations: Abdomen is soft.      Tenderness: There is no right CVA tenderness or left CVA tenderness.   Musculoskeletal:      " blues” which describes moms who are feeling overwhelmed and fatigued for short periods of time. These feelings shouldn’t last for a long time, and these moms generally know that they love their baby and are looking forward to watching them grow. Post-partum depression, on the other hand, describes moms who are not just exhausted but feel hopeless about the future and are not excited about caring for their baby (and maybe not even able to fully care for their baby). Talk to your doctor if think you are experiencing more than just some “baby blues”. It can be helped, and if it goes on too long, it can have negative effects on you and your baby.     Try to find time to spend alone with your partner, even if it is brief. Likewise, try to spend some brief one-on-one time with your other children each day. Try to find simple, safe tasks that they can do to feel like they are helping.     Having regular routines (as much as possible) for feeding, bathing, etc. may help your household run a little more smoothly and be a little less stressful.     CAR SAFETY: Geoffrey should be secured in a rear-facing infant car safety seat whenever riding in the car for the next several months (until they outgrow the upper weight limits of the seat). This will protect your baby in case of an accident, plus it is the law. If you are shopping for a new car seat or just want to learn more about them, www.safercar.gov is a helpful website, as is the [place] DOT website (search for “car seats”). You can also search for local inspection stations at www.safercar.org or http://www.[employer].com or at www.safekidswi.org. Studies show that car seats are installed incorrectly more often than they are installed correctly, so utilizing a certified car seat  can ensure your baby is as safely secured as possible. Remember that the seat straps need to be very snug to protect your baby in case of an accident (so no thick coats or snowsuits while  Right lower leg: No edema.      Left lower leg: No edema.   Lymphadenopathy:      Cervical: No cervical adenopathy.   Skin:     General: Skin is warm.   Neurological:      Mental Status: He is alert and oriented to person, place, and time.      Comments: Intact light touch on lower ext       Office Visit on 11/02/2023   Component Date Value Ref Range Status   • Specific Gravity, UA 11/02/2023 1.018  1.005 - 1.030 Final   • pH, UA 11/02/2023 5.5  5.0 - 7.5 Final   • Color, UA 11/02/2023 Yellow  Yellow Final   • Appearance, UA 11/02/2023 Clear  Clear Final   • Leukocytes, UA 11/02/2023 Negative  Negative Final   • Protein 11/02/2023 Trace  Negative/Trace Final   • Glucose, UA 11/02/2023 1+ (A)  Negative Final   • Ketones 11/02/2023 Trace (A)  Negative Final   • Blood, UA 11/02/2023 Negative  Negative Final   • Bilirubin, UA 11/02/2023 Negative  Negative Final   • Urobilinogen, UA 11/02/2023 0.2  0.2 - 1.0 mg/dL Final   • Nitrite, UA 11/02/2023 Negative  Negative Final   • Microscopic Examination 11/02/2023 Comment   Final    Microscopic follows if indicated.   • Microscopic Examination 11/02/2023 See below:   Final    Microscopic was indicated and was performed.   • Urinalysis Reflex 11/02/2023 Comment   Final    This specimen will not reflex to a Urine Culture.   • Color 11/02/2023 Dark Yellow  Yellow, Straw, Dark Yellow, Liana Final   • Clarity, UA 11/02/2023 Cloudy (A)  Clear Final   • Specific Gravity  11/02/2023 1.030  1.005 - 1.030 Final   • pH, Urine 11/02/2023 6.0  5.0 - 8.0 Final   • Leukocytes 11/02/2023 Negative  Negative Final   • Nitrite, UA 11/02/2023 Negative  Negative Final   • Protein, POC 11/02/2023 Trace (A)  Negative mg/dL Final   • Glucose, UA 11/02/2023 1+ (A)  Negative mg/dL Final   • Ketones, UA 11/02/2023 Trace (A)  Negative Final   • Urobilinogen, UA 11/02/2023 0.2 E.U./dL  Normal, 0.2 E.U./dL Final   • Bilirubin 11/02/2023 Negative  Negative Final   • Blood, UA 11/02/2023 Negative  Negative  riding in the car during the winter!).     SAFE SLEEPING: The safest place for a baby to sleep at this age is in their own crib in their parent’s room. Geoffrey should always be placed on their back to sleep (not on their tummy or their side). This “back to sleep” position decreases the risk of crib death (SIDS). Continue to avoid loose, soft bedding (blankets, comforters, sheepskins, quilts, pillows, pillow-like bumper pads) and soft toys in the baby’s crib because they are a risk for suffocation (and SIDS). “Sleep sacks” and swaddling with thin blankets are okay. Cribs (including Pack-n-Plays) should be certified by [facility] (a safety agency for baby products). Safety criteria for cribs include: slats or bars no more than 2-3/8 inches (6cm) apart, a snug fitting mattress, and a distance of no less than 26 inches from the mattress to the top rail. If your crib has a drop-down side rail, it should always be kept all the way up and locked in place.     If you are frustrated with or worried about your baby’s sleeping, the website “The Period of PURPLE Crying” (search “purple crying”) has helpful (and reassuring) information about infant sleep and crying patterns.    PACIFIER USE: Pacifier use during sleep offers some protection against crib death (SIDS). There are safety standards for pacifiers; you can check at saferproducts.gov or kidsindanger.org to make sure the pacifier you choose has not had any safety recalls. Clean the pacifier often, and never coat it with anything sweet (honey or sugar). Offer it to your baby as they are going off to sleep, and if your child spits it out, don’t put it back in their mouth. And never attach it with a cord or string around your baby’s neck or wrist--this can cause dangerous strangulation injuries.     SAFETY:  1.   Falls:  Babies can start rolling at this age, so be careful not to leave Geoffrey alone on a surface they could roll off of. (If you have to walk away or put your baby down  Final   • Lot Number 11/02/2023 [id number]   Final   • Expiration Date 11/02/2023 [id number]   Final   • WBC, UA 11/02/2023 None seen  0 - 5 /hpf Final   • RBC, UA 11/02/2023 None seen  0 - 2 /hpf Final   • Epithelial Cells (non renal) 11/02/2023 None seen  0 - 10 /hpf Final   • Casts 11/02/2023 None seen  None seen /lpf Final   • Bacteria, UA 11/02/2023 None seen  None seen/Few /hpf Final     Assessment & Plan  Diagnoses and all orders for this visit:    1. Type 2 diabetes mellitus with microalbuminuria, with long-term current use of insulin (Primary)  -     Comprehensive Metabolic Panel  -     Hemoglobin A1c  -     Lipid Panel  -     TSH    2. Coronary artery disease involving native coronary artery of native heart without angina pectoris  -     Comprehensive Metabolic Panel  -     Lipid Panel    3. Essential hypertension  -     Comprehensive Metabolic Panel    4. Hyperlipidemia, unspecified hyperlipidemia type  -     Lipid Panel  -     TSH      Switch from Levemir to Tresiba 40 units every evening.  Increase Ozempic to 1 mg weekly.  Continue NovoLog 40 to 45 units 3 times daily with meals.  Continue metformin 1000 mg twice a day.    Continue Imdur, lisinopril, metoprolol succinate, nifedipine XL and nitroglycerin as needed per Dr. Wray.    Continue atorvastatin 10 mg/day and Lovaza.    Follow-up colonoscopy next year.    Copy of my note sent to Mary Anne Ludwig NP and Dr. Wray.    RTC 4 mos.          suddenly, the safest place might be on the floor!)     2.   Scalding:  Adjust your hot-water heater to 120-125°F, or use the “low” setting. This will decrease the risk of scald burns (and save money on your utility bills!). It is also important to not carry anything hot (hot drinks, soup, pans) when holding your baby. As Geoffrey is getting more active, they are more likely to grab at what you are holding and cause a (dangerous!) spill.     3.   Walkers, Swings and Other Things:  Do not plan to buy a walker for your baby. They cause a lot of accidents, and they do not help your baby walk any sooner. Other options (stationary \"saucers\", bouncers or jumpers) may be a better choice when your baby is older. Swings may be appropriate for your baby at this age if you choose one for which you baby meets the recommended size (weight, length) criteria. You should always follow the  guidelines for use and never leave your baby unsupervised in a swing. Also, swings are NOT considered a safe sleeping place for babies.     4.   Toys:  Choose toys that have been approved for Geoffrey’s age and that encourage the development of appropriate motor skills (reaching, batting, grabbing). Make sure that other children in the home are not offering the baby their toys which may be inappropriate (e.g., small pieces that could cause choking, balloons, plastic bags).     5. Water Safety: Don't ever leave your baby alone in a bath or pool, even if they are secured in a seat.     6.   No smoking! Exposure to tobacco smoke puts children at risk for lots of problems (asthma and other severe breathing problems, crib death [SIDS], ear infections and even behavior and learning problems). Consider talking to your doctor about quitting smoking. If you can’t quit, keep all smoking outside the home (not just in another room), and all smokers should change their clothes and wash their hands before handling the baby.      & BABYSITTING:  Returning to work is a reality for many moms. If you need some guidance choosing a , the [place] Department of [employer] and [employer] offers tips to help you choose a childcare center. Go to their website and look under “Family Resources” for information.     If you are not planning a return to work, you still may feel ready for a break sometime. When preparing to leave your baby with a sitter, be sure to let them know where you will be going and leave a working phone number where you can be reached.  Make sure that whoever you are leaving your child with knows how to contact emergency resources if needed (911, Poison Control 1-734.619.8973). The [employer] offers training courses to prepare babysitters to provide safe and responsible care for children.     MEDICATION FOR FEVER OR PAIN:   Acetaminophen liquid (e.g., Tylenol or Tempra or store brands) may be given as often every four hours if needed for pain or fever. (Remember this is ONLY a pain and fever medicine. It will not help cold symptoms, congestion, cough, etc.--ONLY give it for pain or fever.) (Ibuprofen should not be given until 6 months of age.)    Child’s Weight: Dose:  06 - 11 pounds:   40 mg (1.25 mL which is the same as 1/4 teaspoon)  12 - 17 pounds:   80 mg (2.5 mL which is the same as 1/2 teaspoon)    If Geoffrey is outside these weight ranges, call the office for dosing advice.    (Any acetaminophen product--whether it is labelled as for “INFANTS” or “CHILDREN”-- should have a concentration of 160mg/5ml. Check the label to make sure the product you are using has 160mg/5ml; if it does not, call the clinic for dosing instructions.)      Most Recent Immunizations   Administered Date(s) Administered    Hep B, adolescent or pediatric 2024   Pended Date(s) Pended    DTaP/Hep B/IPV 2024    Hib (PRP-OMP) 2024    Pneumococcal Conjugate 20 Valent Vacc (Prevnar 20) 2024    Rotavirus - pentavalent 2024         If Geoffrey  received any immunizations today and they develop any of the following reactions within 72 hours after an immunization, call our clinic or the after hours pediatric phone nurse:  1.   A temperature of 105 degrees or above.  2.   More than 3 hours of continuous crying.  3.   A shrill, high-pitched cry.  4.   A seizure or a fainting spell.  In this case, you should call 911 or proceed to the emergency room.    NEXT VISIT:  FOUR MONTHS OF AGE      Thank you for entrusting your care to [facility]!    Also, check out “Children’s Health” on the [facility] Blog for updates on timely topics regarding children’s health

## 2024-05-16 ENCOUNTER — OFFICE VISIT (OUTPATIENT)
Dept: ENDOCRINOLOGY | Age: 79
End: 2024-05-16
Payer: MEDICARE

## 2024-05-16 ENCOUNTER — TELEPHONE (OUTPATIENT)
Dept: ENDOCRINOLOGY | Age: 79
End: 2024-05-16

## 2024-05-16 VITALS
HEART RATE: 77 BPM | OXYGEN SATURATION: 96 % | BODY MASS INDEX: 27.49 KG/M2 | SYSTOLIC BLOOD PRESSURE: 112 MMHG | DIASTOLIC BLOOD PRESSURE: 68 MMHG | WEIGHT: 192 LBS | HEIGHT: 70 IN

## 2024-05-16 DIAGNOSIS — R80.9 TYPE 2 DIABETES MELLITUS WITH MICROALBUMINURIA, WITH LONG-TERM CURRENT USE OF INSULIN: Primary | ICD-10-CM

## 2024-05-16 DIAGNOSIS — I25.10 CORONARY ARTERY DISEASE INVOLVING NATIVE CORONARY ARTERY OF NATIVE HEART WITHOUT ANGINA PECTORIS: ICD-10-CM

## 2024-05-16 DIAGNOSIS — Z79.4 TYPE 2 DIABETES MELLITUS WITH MICROALBUMINURIA, WITH LONG-TERM CURRENT USE OF INSULIN: Primary | ICD-10-CM

## 2024-05-16 DIAGNOSIS — I10 ESSENTIAL HYPERTENSION: Chronic | ICD-10-CM

## 2024-05-16 DIAGNOSIS — E11.29 TYPE 2 DIABETES MELLITUS WITH MICROALBUMINURIA, WITH LONG-TERM CURRENT USE OF INSULIN: Primary | ICD-10-CM

## 2024-05-16 DIAGNOSIS — Z86.010 HISTORY OF COLON POLYPS: ICD-10-CM

## 2024-05-16 PROCEDURE — 3074F SYST BP LT 130 MM HG: CPT | Performed by: INTERNAL MEDICINE

## 2024-05-16 PROCEDURE — 99214 OFFICE O/P EST MOD 30 MIN: CPT | Performed by: INTERNAL MEDICINE

## 2024-05-16 PROCEDURE — 3078F DIAST BP <80 MM HG: CPT | Performed by: INTERNAL MEDICINE

## 2024-05-16 NOTE — PROGRESS NOTES
Subjective   Anthony Vaughn is a 79 y.o. male.     History of Present Illness     Patient has known diabetes mellitus since 2006 and started on insulin in 2010. He is on insulin glargine 24 units every evening, NovoLog 45 units TID, Ozempic 1 mg weekly and metformin 1000 mg twice a day.  He has freestyle anya 2 sensor in place. He is usually eats 1 meal a day.  He has lost 12 lbs since 12/23.  His last meal was last night.     Sensor data from May 2, 2024 through May 15, 2024 reviewed.  Average glucose 156 mg per DL.  GMI 7.0%.  Time in target 75%.  Time above target 25%.  Time below target 0.  He has no early morning hypoglycemia.  He has intermittent postprandial hyperglycemia when he misses taking mealtime insulin.    His last eye examination was in 3/24 and he has retinopathy. He has glaucoma.  He has microalbuminuria on urine sample taken last 4/23. He is on lisinopril. He denies numbness in his toes     He has known coronary artery disease and had multiple angioplasty and stents in the past. He denies any previous history of myocardial infarction. He has not had a coronary artery bypass surgery. He denies any chest pains, exertional dyspnea or PND.   He follows with Dr. Wray. He is on Imdur, lisinopril, metoprolol succinate , nifedepine XL and nitroglycerin sublingual as needed.  He has pedal edema on the left foot which does not improve after he has been in bed.     He has hyperlipidemia and has been on atorvastatin 10 mg every PM and Lovaza 1 capsule BID. He denies any myalgia.     He had tubular adenoma of colon removed by Dr. Weems by colonoscopy in September 2019.  He denies bowel complaints.  He was advised follow-up colonoscopy in 2024.       The following portions of the patient's history were reviewed and updated as appropriate: allergies, current medications, past family history, past medical history, past social history, past surgical history, and problem list.    Review of Systems   Eyes:   "Negative for visual disturbance.   Respiratory:  Negative for shortness of breath and wheezing.    Cardiovascular:  Negative for chest pain and palpitations.   Gastrointestinal: Negative.    Genitourinary: Negative.    Musculoskeletal:  Negative for myalgias.   Neurological:  Negative for numbness.     Vitals:    05/16/24 0805   BP: 112/68   BP Location: Right arm   Patient Position: Sitting   Pulse: 77   SpO2: 96%   Weight: 87.1 kg (192 lb)   Height: 177.8 cm (70\")      Objective   Physical Exam  Constitutional:       General: He is not in acute distress.     Appearance: Normal appearance. He is not ill-appearing or toxic-appearing.   Eyes:      General: No scleral icterus.        Right eye: No discharge.         Left eye: No discharge.      Extraocular Movements: Extraocular movements intact.   Neck:      Vascular: No carotid bruit.   Cardiovascular:      Rate and Rhythm: Normal rate and regular rhythm.      Heart sounds: Normal heart sounds.   Pulmonary:      Breath sounds: Normal breath sounds. No rales.   Chest:      Chest wall: No tenderness.   Abdominal:      Palpations: Abdomen is soft.      Tenderness: There is no right CVA tenderness or left CVA tenderness.   Musculoskeletal:      Comments: +1 pedal edema on left.  No calf tenderness.  Feet warm.  No cyanosis.  No plantar ulcers.   Lymphadenopathy:      Cervical: No cervical adenopathy.   Neurological:      Mental Status: He is alert and oriented to person, place, and time.      Comments: Intact light touch in lower extremities.       Office Visit on 12/22/2023   Component Date Value Ref Range Status    Glucose 12/22/2023 138 (H)  65 - 99 mg/dL Final    BUN 12/22/2023 14  8 - 23 mg/dL Final    Creatinine 12/22/2023 1.20  0.76 - 1.27 mg/dL Final    EGFR Result 12/22/2023 61.5  >60.0 mL/min/1.73 Final    Comment: GFR Normal >60  Chronic Kidney Disease <60  Kidney Failure <15  The GFR formula is only valid for adults with stable renal  function between ages " 18 and 70.      BUN/Creatinine Ratio 12/22/2023 11.7  7.0 - 25.0 Final    Sodium 12/22/2023 137  136 - 145 mmol/L Final    Potassium 12/22/2023 4.9  3.5 - 5.2 mmol/L Final    Chloride 12/22/2023 99  98 - 107 mmol/L Final    Total CO2 12/22/2023 25.8  22.0 - 29.0 mmol/L Final    Calcium 12/22/2023 9.8  8.6 - 10.5 mg/dL Final    Total Protein 12/22/2023 7.2  6.0 - 8.5 g/dL Final    Albumin 12/22/2023 5.1  3.5 - 5.2 g/dL Final    Globulin 12/22/2023 2.1  gm/dL Final    A/G Ratio 12/22/2023 2.4  g/dL Final    Total Bilirubin 12/22/2023 0.5  0.0 - 1.2 mg/dL Final    Alkaline Phosphatase 12/22/2023 73  39 - 117 U/L Final    AST (SGOT) 12/22/2023 13  1 - 40 U/L Final    ALT (SGPT) 12/22/2023 19  1 - 41 U/L Final    Hemoglobin A1C 12/22/2023 6.70 (H)  4.80 - 5.60 % Final    Comment: Hemoglobin A1C Ranges:  Increased Risk for Diabetes  5.7% to 6.4%  Diabetes                     >= 6.5%  Diabetic Goal                < 7.0%      Total Cholesterol 12/22/2023 143  0 - 200 mg/dL Final    Comment: Cholesterol Reference Ranges  (U.S. Department of Health and Human Services ATP III  Classifications)  Desirable          <200 mg/dL  Borderline High    200-239 mg/dL  High Risk          >240 mg/dL  Triglyceride Reference Ranges  (U.S. Department of Health and Human Services ATP III  Classifications)  Normal           <150 mg/dL  Borderline High  150-199 mg/dL  High             200-499 mg/dL  Very High        >500 mg/dL  HDL Reference Ranges  (U.S. Department of Health and Human Services ATP III  Classifications)  Low     <40 mg/dl (major risk factor for CHD)  High    >60 mg/dl ('negative' risk factor for CHD)  LDL Reference Ranges  (U.S. Department of Health and Human Services ATP III  Classifications)  Optimal          <100 mg/dL  Near Optimal     100-129 mg/dL  Borderline High  130-159 mg/dL  High             160-189 mg/dL  Very High        >189 mg/dL      Triglycerides 12/22/2023 154 (H)  0 - 150 mg/dL Final    HDL Cholesterol  12/22/2023 45  40 - 60 mg/dL Final    VLDL Cholesterol Sabino 12/22/2023 27  5 - 40 mg/dL Final    LDL Chol Calc ([facility]) 12/22/2023 71  0 - 100 mg/dL Final    TSH 12/22/2023 1.790  0.270 - 4.200 uIU/mL Final    Interpretation 12/22/2023 Note   Final    Supplemental report is available.     Assessment & Plan   Diagnoses and all orders for this visit:    1. Type 2 diabetes mellitus with microalbuminuria, with long-term current use of insulin (Primary)  -     Comprehensive Metabolic Panel  -     Hemoglobin A1c  -     TSH Rfx On Abnormal To Free T4  -     Lipid Panel  -     Vitamin B12  -     Microalbumin / Creatinine Urine Ratio - Urine, Clean Catch    2. Coronary artery disease involving native coronary artery of native heart without angina pectoris  -     Comprehensive Metabolic Panel  -     Lipid Panel    3. Essential hypertension  -     Comprehensive Metabolic Panel    4. History of colon polyps      Patient will call with with his medication list.  [facility]    Continue isosorbide, lisinopril, metoprolol succinate, nifedipine XL and nitroglycerin as needed.   Advised to discuss with Dr. Wray about left pedal edema which may be aggravated by nifedipine.    Continue atorvastatin 10 mg every evening and Lovaza 1 capsule twice a day.    Advised to set up a colonoscopy with Dr. Weems.    RTC 6 mos.    Copy of my note sent to Mary Anne Ludwig NP, Dr. Wray, Dr. Mahmood and Dr. Weems.

## 2024-05-16 NOTE — TELEPHONE ENCOUNTER
"  Caller: Anthony Vaughn \"REYES\"    Relationship: Self    Best call back number: [phone]    Requested Prescriptions:   NOVALOG 45 UNITS PRIOR TO MEALS, OZEMPIC 1X A WEEK, GLARGINE 24 UNITS AT NIGHT     Pharmacy where request should be sent:  NA     Last office visit with prescribing clinician: 5/16/2024   Last telemedicine visit with prescribing clinician: Visit date not found   Next office visit with prescribing clinician: 11/19/2024     Additional details provided by patient: PT CALLED IN TO VERIFY THE AMOUNT OF INSULIN HE'S TAKING.     Does the patient have less than a 3 day supply:  [] Yes  [x] No    Would you like a call back once the refill request has been completed: [] Yes [x] No    If the office needs to give you a call back, can they leave a voicemail: [] Yes [] No    Xochilt Weeks Rep   05/16/24 13:31 EDT       "

## 2024-05-17 LAB
ALBUMIN SERPL-MCNC: 4.8 G/DL (ref 3.5–5.2)
ALBUMIN/CREAT UR: 30 MG/G CREAT (ref 0–29)
ALBUMIN/GLOB SERPL: 2.1 G/DL
ALP SERPL-CCNC: 72 U/L (ref 39–117)
ALT SERPL-CCNC: 13 U/L (ref 1–41)
AST SERPL-CCNC: 15 U/L (ref 1–40)
BILIRUB SERPL-MCNC: 0.7 MG/DL (ref 0–1.2)
BUN SERPL-MCNC: 14 MG/DL (ref 8–23)
BUN/CREAT SERPL: 13.9 (ref 7–25)
CALCIUM SERPL-MCNC: 9.4 MG/DL (ref 8.6–10.5)
CHLORIDE SERPL-SCNC: 101 MMOL/L (ref 98–107)
CHOLEST SERPL-MCNC: 120 MG/DL (ref 0–200)
CO2 SERPL-SCNC: 22.4 MMOL/L (ref 22–29)
CREAT SERPL-MCNC: 1.01 MG/DL (ref 0.76–1.27)
CREAT UR-MCNC: 155.1 MG/DL
EGFRCR SERPLBLD CKD-EPI 2021: 75.2 ML/MIN/1.73
GLOBULIN SER CALC-MCNC: 2.3 GM/DL
GLUCOSE SERPL-MCNC: 157 MG/DL (ref 65–99)
HBA1C MFR BLD: 6.5 % (ref 4.8–5.6)
HDLC SERPL-MCNC: 37 MG/DL (ref 40–60)
IMP & REVIEW OF LAB RESULTS: NORMAL
LDLC SERPL CALC-MCNC: 60 MG/DL (ref 0–100)
MICROALBUMIN UR-MCNC: 47.2 UG/ML
POTASSIUM SERPL-SCNC: 4.6 MMOL/L (ref 3.5–5.2)
PROT SERPL-MCNC: 7.1 G/DL (ref 6–8.5)
SODIUM SERPL-SCNC: 139 MMOL/L (ref 136–145)
TRIGL SERPL-MCNC: 130 MG/DL (ref 0–150)
TSH SERPL DL<=0.005 MIU/L-ACNC: 2 UIU/ML (ref 0.27–4.2)
VIT B12 SERPL-MCNC: 224 PG/ML (ref 211–946)
VLDLC SERPL CALC-MCNC: 23 MG/DL (ref 5–40)

## 2024-05-19 ENCOUNTER — PREP FOR SURGERY (OUTPATIENT)
Dept: OTHER | Facility: HOSPITAL | Age: 79
End: 2024-05-19
Payer: MEDICARE

## 2024-05-19 DIAGNOSIS — Z86.010 HX OF ADENOMATOUS POLYP OF COLON: Primary | ICD-10-CM

## 2024-05-23 NOTE — H&P (VIEW-ONLY)
Urine microalbumin mildly elevated.  Continue lisinopril.  Hemoglobin A1c 6.5%.  Diabetes is well-controlled.  LDL 60.  HDL 37.  Continue atorvastatin 10 mg every evening and Lovaza 1 capsule twice a day.  Normal thyroid function tests.  Normal vitamin B12.  Copy of labs sent to Mary Anne Ludwig NP.  Send copy of labs to patient and Dr. Mahmood

## 2024-05-28 NOTE — PROGRESS NOTES
"Subjective   Chief Complaint   Patient presents with    Hypertension     6mo fup        History of Present Illness   79 y.o. male presents for follow up regarding HTN and B12 deficiency. Feeling good overall. Notes swelling in the left foot ongoing for 10 months. Not better when he gets up in the morning. Initially Nifedipine was decreased to 30 mg daily with cardiology. Denies chest pain or dyspnea.     \"I think I had a small CVA 2-3 months ago.\" He had a head in the left (points to left temporofrontal area) with numbness going down into his cheek. He did not seek care. HA resolved but still has numbness and tingling in the area. He feels like he drags his left foot now. He has a metal plate in right wrist but reports he has been able to have MRIs.     DM2 followed by Dr. Mckeon with A1C well controlled at 6.5%. Eye exam UTD with Dr. Sherman.     CLS not scheduled; he cannot recall when it was last down or who did it.      Patient Active Problem List   Diagnosis    Coronary artery disease involving native coronary artery without angina pectoris    Hyperlipidemia    Essential hypertension    Type 2 diabetes mellitus with microalbuminuria, with long-term current use of insulin    Microalbuminuria    DM type 2 with diabetic peripheral neuropathy    Bandemia    Erythrocytosis    Osteoarthritis of left acromioclavicular joint       No Known Allergies    Current Outpatient Medications on File Prior to Visit   Medication Sig Dispense Refill    acyclovir (ZOVIRAX) 400 MG tablet Take 1 tablet by mouth As Needed. Take no more than 5 doses a day.      Aspirin 81 MG capsule Take 81 mg by mouth Daily.      atorvastatin (LIPITOR) 10 MG tablet Take 1 tablet by mouth Daily.      brimonidine (ALPHAGAN) 0.15 % ophthalmic solution Apply 1 drop to eye 2 (two) times a day.      clopidogrel (PLAVIX) 75 MG tablet Take 1 tablet by mouth Every Night.      Continuous Blood Gluc  (bright boxyle [employer]) device 1 each Daily. 1 each 1 "    Continuous Blood Gluc Sensor (FreeStyle [employer] 2 Sensor) misc 1 each Every 14 (Fourteen) Days. 6 each 2    Glucose 15 GM/32ML gel Take  by mouth.      insulin aspart (novoLOG FLEXPEN) 100 UNIT/ML solution pen-injector sc pen 40 to 45 units with each meal      insulin degludec (Tresiba FlexTouch) 100 UNIT/ML solution pen-injector injection 40 units every evening 36 mL 2    isosorbide mononitrate (IMDUR) 30 MG 24 hr tablet Take 1 tablet by mouth Daily.      latanoprost (XALATAN) 0.005 % ophthalmic solution Apply 1 drop to eye(s) as directed by provider Daily.      lisinopril (PRINIVIL,ZESTRIL) 40 MG tablet Take 1 tablet by mouth Daily. 90 tablet 1    metFORMIN (GLUCOPHAGE) 1000 MG tablet Take 1 tablet by mouth 2 (Two) Times a Day With Meals. Take 2 tablets bid      metoprolol succinate XL (TOPROL-XL) 50 MG 24 hr tablet Take 1.5 tablets by mouth Daily.      NIFEdipine XL (PROCARDIA XL) 60 MG 24 hr tablet Take 30 mg by mouth Daily.      nitroglycerin (NITROSTAT) 0.4 MG SL tablet Place 1 tablet under the tongue Every 5 (Five) Minutes As Needed for Chest Pain. Take no more than 3 doses in 15 minutes.      Omega 3 1000 MG capsule Take 1 capsule by mouth 2 (Two) Times a Day.      omeprazole (priLOSEC) 20 MG capsule Take 1 capsule by mouth Daily.      pilocarpine (PILOCAR) 1 % ophthalmic solution 1 drop 2 (Two) Times a Day.      Semaglutide, 1 MG/DOSE, (Ozempic, 1 MG/DOSE,) 4 MG/3ML solution pen-injector Inject 1 mg under the skin into the appropriate area as directed 1 (One) Time Per Week. Discontinue Ozempic 0.5 mg 9 mL 2    timolol (TIMOPTIC) 0.5 % ophthalmic solution 1 drop Daily. INSTILL 1 DROP IN LEFT EYE EVERY MORNING AS DIRECTED  6     No current facility-administered medications on file prior to visit.       Past Medical History:   Diagnosis Date    Carpal tunnel syndrome on right     Chest pain     Degenerative disc disease, lumbar     Diverticulitis of large intestine without perforation or abscess without  bleeding 07/31/2018    ED (erectile dysfunction)     Erythrocytosis 07/05/2018    GERD (gastroesophageal reflux disease)     Hyperlipidemia     Kidney stones     Pneumonia due to infectious organism 04/14/2017    S/P angioplasty with stent     Stroke     Trapezius muscle spasm 03/19/2018    Tubular adenoma of colon 09/2019    Removed by colonoscopy by Dr. Weems       Family History   Problem Relation Age of Onset    Heart disease Mother     Prostate cancer Father     Heart disease Father     Colon polyps Daughter     Colon polyps Daughter        Social History     Socioeconomic History    Marital status:      Spouse name: Kerry    Years of education: College   Tobacco Use    Smoking status: Never    Smokeless tobacco: Never    Tobacco comments:     no caffeine   Vaping Use    Vaping status: Never Used   Substance and Sexual Activity    Alcohol use: No    Drug use: No    Sexual activity: Defer     Partners: Female       Past Surgical History:   Procedure Laterality Date    CARDIAC CATHETERIZATION N/A 02/10/2011    Normal LV systolic function; Kind of diffuse, small vessel, distal disease. Most of it is not amenable to PCI, but his JADEN lesion is and it is a large vessel; All of his prior durg-eluting stents are widely patent & look good, Dr. Anibal Oliveira    CATARACT EXTRACTION Bilateral     COLONOSCOPY N/A 09/13/2004    Normal, Repeat in 5-8 years, Dr. Davion Sanon    COLONOSCOPY N/A 09/04/2009    Mutiple small & large-mouthed diverticual were found in the entire colon; Exam otherwise normal, Dr. Davion Sanon    COLONOSCOPY N/A 9/26/2019    Procedure: COLONOSCOPY to  cecum with cold biopsy polypectomy;  Surgeon: Haider Weems MD;  Location: [facility] ENDOSCOPY;  Service: General    COLONOSCOPY W/ POLYPECTOMY N/A [id number]    2 benign polyps, [facility], Dr. Davion Sanon    CORONARY ANGIOPLASTY WITH STENT PLACEMENT N/A 11/20/2007    Successful multivessel drug eluting stent implantation for  restenosis above his prior stenting, Dr. Anibal Wray    CORONARY ANGIOPLASTY WITH STENT PLACEMENT N/A 06/05/2007    Successful multivessel drug eluting stent implantation in all 3 of the major coronary arteries, Dr. Anibal Wray    CORONARY ANGIOPLASTY WITH STENT PLACEMENT N/A 02/10/2011    Successful drug-stenting of the JADEN, Dr. Anibal Oliveira    ENDOSCOPY N/A 11/21/2017    Procedure: ESOPHAGOGASTRODUODENOSCOPY WITH COLD BIOPSIES AND 53 F MICHAEL DILATION;  Surgeon: Wisam Potter MD;  Location: [facility] ENDOSCOPY;  Service:     EYE SURGERY Left 2011    Retina    HYDROCELECTOMY Left 08/19/2011    Dr. Ellie Stein    INCISION AND DRAINAGE PERIRECTAL ABSCESS N/A 11/30/2007    Dr. Davion Sanon    LUMBAR EPIDURAL INJECTION N/A 2012-2013    x 3    NEUROMA SURGERY Right 07/19/2005    Excision Neuroma Right Arm; PATH: CONSISTENT W/ LIPOMA,  Dr. Davion Sanon    ROTATOR CUFF REPAIR Right 2006    ROTATOR CUFF REPAIR Left 2000    UPPER GASTROINTESTINAL ENDOSCOPY N/A 09/06/2012    Normal esophagus; Normal Stomach, Normal examinded duodenum, Dr. Bruno Turk    WRIST FRACTURE SURGERY Right 06/03/2003    ORIF Right Distal Radius, Dr. Ryan Bravo       The following portions of the patient's history were reviewed and updated as appropriate: problem list, allergies, current medications, past medical history, and past social history.    Review of Systems    Immunization History   Administered Date(s) Administered    Fluzone High Dose =>65 Years ([employer] ONLY) 10/03/2016, 09/26/2017, 10/20/2018    Fluzone High-Dose 65+yrs 10/31/2022, 12/01/2023    Hepatitis A 10/01/2019    Pneumococcal Conjugate 13-Valent (PCV13) 11/22/2016    Pneumococcal Polysaccharide (PPSV23) 11/22/2010    Shingrix 01/01/2011, 01/01/2019    TD Preservative Free (Tenivac) 04/03/2023    Tdap 11/12/2017       Objective   Vitals:    05/31/24 0951   BP: 110/56   Pulse: 68   Resp: 16   Temp: 97.2 °F (36.2 °C)   Weight: 87.1 kg (192 lb)   Height: 177.8  "cm (70\")     Body mass index is 27.55 kg/m².  Physical Exam  Vitals reviewed.   Constitutional:       Appearance: Normal appearance. He is well-developed.   HENT:      Head: Normocephalic and atraumatic.      Right Ear: Tympanic membrane, ear canal and external ear normal.      Left Ear: Tympanic membrane, ear canal and external ear normal.      Nose: Nose normal.      Mouth/Throat:      Mouth: Mucous membranes are moist.      Pharynx: Oropharynx is clear. No oropharyngeal exudate or posterior oropharyngeal erythema.   Eyes:      General: No scleral icterus.     Extraocular Movements: Extraocular movements intact.      Conjunctiva/sclera: Conjunctivae normal.      Pupils: Pupils are equal, round, and reactive to light.   Neck:      Thyroid: No thyromegaly.      Trachea: Trachea normal.   Cardiovascular:      Rate and Rhythm: Normal rate and regular rhythm.      Pulses:           Dorsalis pedis pulses are 2+ on the right side and 2+ on the left side.        Posterior tibial pulses are 2+ on the right side and 2+ on the left side.      Heart sounds: Normal heart sounds. No murmur heard.     Comments: Trace pitting edema LLE.   Pulmonary:      Effort: Pulmonary effort is normal.      Breath sounds: Normal breath sounds.   Abdominal:      General: Bowel sounds are normal. There is no distension.      Palpations: Abdomen is soft.      Tenderness: There is no abdominal tenderness.   Musculoskeletal:      Cervical back: Neck supple.      Comments: Ambulates without assistance; no clubbing or cyanosis.    Lymphadenopathy:      Cervical: No cervical adenopathy.   Skin:     General: Skin is warm and dry.   Neurological:      Mental Status: He is alert.      Cranial Nerves: No cranial nerve deficit.      Gait: Gait abnormal.   Psychiatric:         Mood and Affect: Mood normal.         Behavior: Behavior normal.         Procedures    Assessment & Plan   Diagnoses and all orders for this visit:    1. Essential hypertension " (Primary)  Comments:  Controlled. Continue current medications. RTO in one month given stroke like Sx reported today.    2. B12 deficiency  Comments:  Recent labs with endocrinology revealed B12 224.    3. Temporofrontal HA  Comments:  New problem. Concerning for CVA. Given this occurred 2-3M ago will proceed with MRI as below. Additional imaging pending results. Contine B-ASA. LDL<70 with Lipitor 10 mg qd. RTO in 1M.   Orders:  -     MRI Brain With & Without Contrast; Future    4. Facial tingling  Comments:  Concerning for CVA. Given this occurred 2-3M ago will proceed with MRI as below. Additional imaging pending results. Contine B-ASA. LDL<70 with Lipitor 10 mg qd. RTO 1M.   Orders:  -     MRI Brain With & Without Contrast; Future    5. Gait abnormality  Comments:  Concerning for CVA. Given this occurred 2-3M ago will proceed with MRI as below. Additional imaging pending results. Contine B-ASA. LDL<70 with Lipitor 10 mg qd. RTO 1M.   Orders:  -     MRI Brain With & Without Contrast; Future    6. Left lower extremity edema  Comments:  Likely multifactorial with venous insufficiency, sodium indescretion and Nifedipine contributing. Recent labs with Dr. Mckeon reviewed. Compression stockings advise; he has follow up with cardiology in July.     42 minutes spent with patient taking history; reviewing records; examining patient; placing orders; counseling, coordinating and documenting care.   Records reviewed include previous OV with myself, Dr. Mckeon, Lou Angel, Josseline Garrett as well as labs.     Return in about 18 days (around 6/18/2024) for B12 weekly for 1M them monthly.

## 2024-05-31 ENCOUNTER — OFFICE VISIT (OUTPATIENT)
Dept: ONCOLOGY | Facility: CLINIC | Age: 79
End: 2024-05-31
Payer: MEDICARE

## 2024-05-31 ENCOUNTER — OFFICE VISIT (OUTPATIENT)
Dept: INTERNAL MEDICINE | Facility: CLINIC | Age: 79
End: 2024-05-31
Payer: MEDICARE

## 2024-05-31 ENCOUNTER — LAB (OUTPATIENT)
Dept: LAB | Facility: HOSPITAL | Age: 79
End: 2024-05-31
Payer: MEDICARE

## 2024-05-31 VITALS
TEMPERATURE: 97.5 F | WEIGHT: 192.5 LBS | HEIGHT: 70 IN | OXYGEN SATURATION: 97 % | SYSTOLIC BLOOD PRESSURE: 137 MMHG | DIASTOLIC BLOOD PRESSURE: 73 MMHG | BODY MASS INDEX: 27.56 KG/M2 | HEART RATE: 58 BPM

## 2024-05-31 VITALS
RESPIRATION RATE: 16 BRPM | HEIGHT: 70 IN | HEART RATE: 68 BPM | TEMPERATURE: 97.2 F | BODY MASS INDEX: 27.49 KG/M2 | DIASTOLIC BLOOD PRESSURE: 56 MMHG | WEIGHT: 192 LBS | SYSTOLIC BLOOD PRESSURE: 110 MMHG

## 2024-05-31 DIAGNOSIS — R20.2 FACIAL TINGLING: ICD-10-CM

## 2024-05-31 DIAGNOSIS — R51.9 NONINTRACTABLE HEADACHE, UNSPECIFIED CHRONICITY PATTERN, UNSPECIFIED HEADACHE TYPE: ICD-10-CM

## 2024-05-31 DIAGNOSIS — R60.0 LOWER EXTREMITY EDEMA: ICD-10-CM

## 2024-05-31 DIAGNOSIS — R26.9 GAIT ABNORMALITY: ICD-10-CM

## 2024-05-31 DIAGNOSIS — D75.1 ERYTHROCYTOSIS: ICD-10-CM

## 2024-05-31 DIAGNOSIS — D72.825 BANDEMIA: ICD-10-CM

## 2024-05-31 DIAGNOSIS — R80.9 MICROALBUMINURIA: Primary | ICD-10-CM

## 2024-05-31 DIAGNOSIS — I10 ESSENTIAL HYPERTENSION: Primary | Chronic | ICD-10-CM

## 2024-05-31 DIAGNOSIS — E53.8 B12 DEFICIENCY: ICD-10-CM

## 2024-05-31 LAB
BASOPHILS # BLD AUTO: 0.06 10*3/MM3 (ref 0–0.2)
BASOPHILS NFR BLD AUTO: 0.3 % (ref 0–1.5)
DEPRECATED RDW RBC AUTO: 39.9 FL (ref 37–54)
EOSINOPHIL # BLD AUTO: 0.05 10*3/MM3 (ref 0–0.4)
EOSINOPHIL NFR BLD AUTO: 0.3 % (ref 0.3–6.2)
ERYTHROCYTE [DISTWIDTH] IN BLOOD BY AUTOMATED COUNT: 12.8 % (ref 12.3–15.4)
HCT VFR BLD AUTO: 45.2 % (ref 37.5–51)
HGB BLD-MCNC: 15.2 G/DL (ref 13–17.7)
IMM GRANULOCYTES # BLD AUTO: 0.08 10*3/MM3 (ref 0–0.05)
IMM GRANULOCYTES NFR BLD AUTO: 0.4 % (ref 0–0.5)
LYMPHOCYTES # BLD AUTO: 2.61 10*3/MM3 (ref 0.7–3.1)
LYMPHOCYTES NFR BLD AUTO: 14.3 % (ref 19.6–45.3)
MCH RBC QN AUTO: 29.1 PG (ref 26.6–33)
MCHC RBC AUTO-ENTMCNC: 33.6 G/DL (ref 31.5–35.7)
MCV RBC AUTO: 86.6 FL (ref 79–97)
MONOCYTES # BLD AUTO: 1.39 10*3/MM3 (ref 0.1–0.9)
MONOCYTES NFR BLD AUTO: 7.6 % (ref 5–12)
NEUTROPHILS NFR BLD AUTO: 14.1 10*3/MM3 (ref 1.7–7)
NEUTROPHILS NFR BLD AUTO: 77.1 % (ref 42.7–76)
NRBC BLD AUTO-RTO: 0 /100 WBC (ref 0–0.2)
PLATELET # BLD AUTO: 337 10*3/MM3 (ref 140–450)
PMV BLD AUTO: 9.4 FL (ref 6–12)
RBC # BLD AUTO: 5.22 10*6/MM3 (ref 4.14–5.8)
WBC NRBC COR # BLD AUTO: 18.29 10*3/MM3 (ref 3.4–10.8)

## 2024-05-31 PROCEDURE — 85025 COMPLETE CBC W/AUTO DIFF WBC: CPT

## 2024-05-31 PROCEDURE — 3074F SYST BP LT 130 MM HG: CPT | Performed by: NURSE PRACTITIONER

## 2024-05-31 PROCEDURE — 3078F DIAST BP <80 MM HG: CPT | Performed by: NURSE PRACTITIONER

## 2024-05-31 PROCEDURE — 99215 OFFICE O/P EST HI 40 MIN: CPT | Performed by: NURSE PRACTITIONER

## 2024-05-31 PROCEDURE — 36415 COLL VENOUS BLD VENIPUNCTURE: CPT

## 2024-05-31 PROCEDURE — 1126F AMNT PAIN NOTED NONE PRSNT: CPT | Performed by: NURSE PRACTITIONER

## 2024-05-31 NOTE — PROGRESS NOTES
Subjective     REASON FOR FOLLOW UP:  Leukocytosis for  years, minimal erythrocytosis, normal platelet count, no splenomegaly by physical exam or ct abdomen.BCR/ABL AND JAK2 MUTATION NEGATIVE IN 2017.        History of Present Illness     On 05/31/2024 this 79-year-old male returns to the office for follow up of his previous leukocytosis. In the past his white count has remained around 12,000 with normal hemoglobin, normal platelet count and BCR/ABL and JAK2 mutations being negative in 2017. Today in absence of any infection or fever the patient has a white count that is further elevated. He states that recently he has had experience of numbness in the left side of the fact associated with left foot drop. This left a sequela in him. He is having some limping related to that. Besides this no visual deficit, no headache, no balance issues. No alterations in speech. No abnormal movements. No other areas of motor deficit. His appetite has remained good, his bowel activity and urination are normal. No cardiac irregularity, no chest pain. No palpitations. Minimal swelling in his ankles. He has not had any clinical bleeding.     He denies any bone pain or joint pain besides his mild chronic back and occasional joint pain in the shoulders and knees.         Past Medical History:   Diagnosis Date    Carpal tunnel syndrome on right     Chest pain     Degenerative disc disease, lumbar     Diverticulitis of large intestine without perforation or abscess without bleeding 07/31/2018    ED (erectile dysfunction)     Erythrocytosis 07/05/2018    GERD (gastroesophageal reflux disease)     Hyperlipidemia     Kidney stones     Pneumonia due to infectious organism 04/14/2017    S/P angioplasty with stent     Stroke     Trapezius muscle spasm 03/19/2018    Tubular adenoma of colon 09/2019    Removed by colonoscopy by Dr. Weems        Past Surgical History:   Procedure Laterality Date    CARDIAC CATHETERIZATION N/A 02/10/2011    Normal  LV systolic function; Kind of diffuse, small vessel, distal disease. Most of it is not amenable to PCI, but his JADEN lesion is and it is a large vessel; All of his prior durg-eluting stents are widely patent & look good, Dr. Anibal Oliveira    CATARACT EXTRACTION Bilateral     COLONOSCOPY N/A 09/13/2004    Normal, Repeat in 5-8 years, Dr. Davion Sanon    COLONOSCOPY N/A 09/04/2009    Mutiple small & large-mouthed diverticual were found in the entire colon; Exam otherwise normal, Dr. Davion Sanon    COLONOSCOPY N/A 9/26/2019    Procedure: COLONOSCOPY to  cecum with cold biopsy polypectomy;  Surgeon: Haider Weems MD;  Location: [facility] ENDOSCOPY;  Service: General    COLONOSCOPY W/ POLYPECTOMY N/A [id number]    2 benign polyps, [facility], Dr. Davion Sanon    CORONARY ANGIOPLASTY WITH STENT PLACEMENT N/A 11/20/2007    Successful multivessel drug eluting stent implantation for restenosis above his prior stenting, Dr. Anibal Wray    CORONARY ANGIOPLASTY WITH STENT PLACEMENT N/A 06/05/2007    Successful multivessel drug eluting stent implantation in all 3 of the major coronary arteries, Dr. Anibal Wray    CORONARY ANGIOPLASTY WITH STENT PLACEMENT N/A 02/10/2011    Successful drug-stenting of the JADEN, Dr. Anibal Oliveira    ENDOSCOPY N/A 11/21/2017    Procedure: ESOPHAGOGASTRODUODENOSCOPY WITH COLD BIOPSIES AND 53 F MICHAEL DILATION;  Surgeon: Wisam Potter MD;  Location: [facility] ENDOSCOPY;  Service:     EYE SURGERY Left 2011    Retina    HYDROCELECTOMY Left 08/19/2011    Dr. Argueta Short    INCISION AND DRAINAGE PERIRECTAL ABSCESS N/A 11/30/2007    Dr. Davion Sanon    LUMBAR EPIDURAL INJECTION N/A 2012-2013    x 3    NEUROMA SURGERY Right 07/19/2005    Excision Neuroma Right Arm; PATH: CONSISTENT W/ LIPOMA,  Dr. Davion Sanon    ROTATOR CUFF REPAIR Right 2006    ROTATOR CUFF REPAIR Left 2000    UPPER GASTROINTESTINAL ENDOSCOPY N/A 09/06/2012    Normal esophagus; Normal Stomach, Normal  examinded Dr. Bruno sneed    WRIST FRACTURE SURGERY Right 06/03/2003    ORIF Right Distal Radius, Dr. Ryan Bravo        Current Outpatient Medications on File Prior to Visit   Medication Sig Dispense Refill    acyclovir (ZOVIRAX) 400 MG tablet Take 1 tablet by mouth As Needed. Take no more than 5 doses a day.      Aspirin 81 MG capsule Take 81 mg by mouth Daily.      atorvastatin (LIPITOR) 10 MG tablet Take 1 tablet by mouth Daily.      brimonidine (ALPHAGAN) 0.15 % ophthalmic solution Apply 1 drop to eye 2 (two) times a day.      clopidogrel (PLAVIX) 75 MG tablet Take 1 tablet by mouth Every Night.      Continuous Blood Gluc  (FreeStyle Juwan 2 Bement) device 1 each Daily. 1 each 1    Continuous Blood Gluc Sensor (FreeStyle Juwan 2 Sensor) misc 1 each Every 14 (Fourteen) Days. 6 each 2    Glucose 15 GM/32ML gel Take  by mouth.      insulin aspart (novoLOG FLEXPEN) 100 UNIT/ML solution pen-injector sc pen 40 to 45 units with each meal      insulin degludec (Tresiba FlexTouch) 100 UNIT/ML solution pen-injector injection 40 units every evening 36 mL 2    isosorbide mononitrate (IMDUR) 30 MG 24 hr tablet Take 1 tablet by mouth Daily.      latanoprost (XALATAN) 0.005 % ophthalmic solution Apply 1 drop to eye(s) as directed by provider Daily.      lisinopril (PRINIVIL,ZESTRIL) 40 MG tablet Take 1 tablet by mouth Daily. 90 tablet 1    metFORMIN (GLUCOPHAGE) 1000 MG tablet Take 1 tablet by mouth 2 (Two) Times a Day With Meals. Take 2 tablets bid      metoprolol succinate XL (TOPROL-XL) 50 MG 24 hr tablet Take 1.5 tablets by mouth Daily.      NIFEdipine XL (PROCARDIA XL) 60 MG 24 hr tablet Take 30 mg by mouth Daily.      nitroglycerin (NITROSTAT) 0.4 MG SL tablet Place 1 tablet under the tongue Every 5 (Five) Minutes As Needed for Chest Pain. Take no more than 3 doses in 15 minutes.      Omega 3 1000 MG capsule Take 1 capsule by mouth 2 (Two) Times a Day.      omeprazole (priLOSEC) 20 MG capsule  "Take 1 capsule by mouth Daily.      pilocarpine (PILOCAR) 1 % ophthalmic solution 1 drop 2 (Two) Times a Day.      Semaglutide, 1 MG/DOSE, (Ozempic, 1 MG/DOSE,) 4 MG/3ML solution pen-injector Inject 1 mg under the skin into the appropriate area as directed 1 (One) Time Per Week. Discontinue Ozempic 0.5 mg 9 mL 2    timolol (TIMOPTIC) 0.5 % ophthalmic solution 1 drop Daily. INSTILL 1 DROP IN LEFT EYE EVERY MORNING AS DIRECTED  6     No current facility-administered medications on file prior to visit.        ALLERGIES:  No Known Allergies     Social History     Socioeconomic History    Marital status:      Spouse name: Kerry    Years of education: College   Tobacco Use    Smoking status: Never    Smokeless tobacco: Never    Tobacco comments:     no caffeine   Vaping Use    Vaping status: Never Used   Substance and Sexual Activity    Alcohol use: No    Drug use: No    Sexual activity: Defer     Partners: Female        Family History   Problem Relation Age of Onset    Heart disease Mother     Prostate cancer Father     Heart disease Father     Colon polyps Daughter     Colon polyps Daughter      Objective     Vitals:    05/31/24 1115   BP: 137/73   Pulse: 58   Temp: 97.5 °F (36.4 °C)   TempSrc: Oral   SpO2: 97%   Weight: 87.3 kg (192 lb 8 oz)   Height: 177.8 cm (70\")   PainSc: 0-No pain         5/31/2024    11:14 AM   Current Status   ECOG score 0       Physical Exam             GENERAL:  Well-developed, Patient  in no acute distress.   SKIN:  Warm, dry ,NO purpura ,no rash.  HEENT:  Pupils were equal and reactive to light and accomodation, conjunctivae noninjected,  normal visual acuity.   NECK:  Supple with good range of motion; no thyromegaly , no JVD or bruits,.No carotid artery pain, no carotid abnormal pulsation   LYMPHATICS:  No cervical, NO supraclavicular, NO axillary, NO inguinal adenopathies.  CARDIAC   normal rate , regular rhythm, without murmur,NO rubs NO S3 NO S4   LUNGS: normal breath sounds " bilateral, no wheezing, NO rhonchi, NO crackles ,NO rubs.  VASCULAR VENOUS: no cyanosis, NO collateral circulation, NO varicosities, NO edema, NO palpable cords, NO pain,NO erythema, NO pigmentation of the skin.  ABDOMEN:  Soft, NO pain,no hepatomegaly, no splenomegaly,no masses, no ascites, no collateral circulation,no distention.  EXTREMITIES  AND SPINE:  No clubbing, no cyanosis ,no deformities , no pain .No kyphosis,  no pain in spine, no pain in ribs , no pain in pelvic bone.  NEUROLOGICAL:  Patient was awake, alert, oriented to time, person and place.Minimal numbness in the left cheek. Extraocular movements were normal. Mouth was having no difficulty with opening, it was symmetric, tongue upon protrusion was symmetric with no deviation, uvula was mobile. He had no numbness in the chin. He had normal strength in upper extremities. He had left foot drop that was very obvious on clinical examination.                  RECENT LABS:  Hematology WBC   Date Value Ref Range Status   05/31/2024 18.29 (H) 3.40 - 10.80 10*3/mm3 Final     RBC   Date Value Ref Range Status   05/31/2024 5.22 4.14 - 5.80 10*6/mm3 Final     Hemoglobin   Date Value Ref Range Status   05/31/2024 15.2 13.0 - 17.7 g/dL Final     Hematocrit   Date Value Ref Range Status   05/31/2024 45.2 37.5 - 51.0 % Final     Platelets   Date Value Ref Range Status   05/31/2024 337 140 - 450 10*3/mm3 Final            Assessment & Plan    1. This patient has history of leukocytosis that in my opinion is very likely an element of myeloproliferative disorder. We never have persuaded the patient to proceed with bone marrow testing. We have done BCR-ABL in the past that has been negative, JAK2 mutation that has been negative and we have run laboratory testing looking for inflammatory or infectious etiologies that have been negative. Nevertheless, his white count remains minimally elevated.       The patient was seen by telephonic visit on 12/01/2020. He is  asymptomatic at this time completely in control of his health. His diabetes management is not ideal, he loves to eat but he remains physically very active and his weight remains relatively stable. The good news with his blood work yesterday is that his white count is borderline 10,000. The hemoglobin and hematocrit are good, in fact his hematocrit is 45, his platelet count is normal and the white count differential is normal. I talked with him on the telephone about the good news about these numbers and I find no need for him to have a phlebotomy at this point. What I have recommended for him is to have a blood count every couple of months with nurse visit. If the hematocrit is above 45 he will have a phlebotomy otherwise doctor visit in 6 months. I encouraged him to remain on his aspirin and Plavix.       The patient returned to the office on 05/18/2021 with no symptoms or signs of anything directed to his minimal leukocytosis. His hematocrit was 43. His platelet count and white count differential were normal. He had no splenomegaly. I advised him to remain in observation from my point of view returning to see us in 6 months and 1 year. Blood count will be done on each one of those occasions. I remind him that his hemoglobin A1C recently was in the 7 category and he needs to work on better control of his weight and portions of food.     The patient returned to the office on 04/19/2022. He has no new symptoms of anything in particular. His physical exam is unremarkable with the exception of multiple actinic keratosis in his scalp. I asked him to wear a hat. He has an appointment to be seen by dermatologist.     The good news is that his white count remains stable at 12,000, his white count differential is normal, there is no left shifted maturation, there are no blasts in circulation. The hematocrit is normal at 45, the platelet count remains normal at 345,000.     Under the present circumstances and know that he is  BCR/ABL negative he will remain in observation for the time being returning to see us back in a year with a CBC.     On 05/31/2024 I am concerned about the neurological symptoms like he has had a few weeks ago that are permanent with some numbness in the left side of the face and foot drop on the left lower extremity. The symptoms are very obvious and the signs are very obvious. The other issue is the white count that is now 18,000 with stable hemoglobin and stable platelet count and normal white count differential. In spite of having BCR/ABL and JAK2 mutations done in 2017 this is very bothersome to me. My approach to this issue will be as follows:    1. I will discuss all of these issues with, ELBA Hager, in regard plan for neurological assessment given the recent likely vascular event.  2. The patient will proceed with peripheral blood analysis by me and a report will be done once that this is completed.  3. I went ahead and scheduled the patient to have a bone marrow testing. This can be done next Thursday. I asked him to stop his Plavix tomorrow and for the rest of the week and resume the Plavix the same day that he has his bone marrow testing. His aspirin will remain the same 81 mg a day.     The bone marrow will be sent for regular pathology, flow cytometry and molecular analysis depending on needs and findings.     The patient will be returning to see me back in 2-3 weeks to review the report of that and then and then decide how to proceed.     It is my understanding that, ELBA Hager, will proceed with neurological assessment including radiological assessment of his brain and so forth. He probably will require physical therapy for his foot drop on the left side.    I discussed all of these facts with the patient. 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 She has had an amputation of the left leg  and she requires completely this patient's presence to be able to function.

## 2024-06-06 ENCOUNTER — HOSPITAL ENCOUNTER (OUTPATIENT)
Dept: CT IMAGING | Facility: HOSPITAL | Age: 79
Discharge: HOME | End: 2024-06-06
Payer: MEDICARE

## 2024-06-06 ENCOUNTER — HOSPITAL ENCOUNTER (OUTPATIENT)
Dept: CT IMAGING | Facility: HOSPITAL | Age: 79
End: 2024-06-06
Payer: MEDICARE

## 2024-06-06 VITALS
HEART RATE: 64 BPM | OXYGEN SATURATION: 93 % | DIASTOLIC BLOOD PRESSURE: 58 MMHG | WEIGHT: 185 LBS | SYSTOLIC BLOOD PRESSURE: 118 MMHG | BODY MASS INDEX: 25.9 KG/M2 | HEIGHT: 71 IN | TEMPERATURE: 97.3 F | RESPIRATION RATE: 18 BRPM

## 2024-06-06 DIAGNOSIS — D72.825 BANDEMIA: ICD-10-CM

## 2024-06-06 DIAGNOSIS — D75.1 ERYTHROCYTOSIS: ICD-10-CM

## 2024-06-06 LAB — GLUCOSE BLDC GLUCOMTR-MCNC: 178 MG/DL (ref 70–130)

## 2024-06-06 PROCEDURE — 25010000002 LIDOCAINE 1 % SOLUTION: Performed by: RADIOLOGY

## 2024-06-06 PROCEDURE — 82948 REAGENT STRIP/BLOOD GLUCOSE: CPT

## 2024-06-06 PROCEDURE — 99152 MOD SED SAME PHYS/QHP 5/>YRS: CPT

## 2024-06-06 PROCEDURE — 25010000002 MIDAZOLAM PER 1 MG: Performed by: RADIOLOGY

## 2024-06-06 PROCEDURE — 25010000002 FENTANYL CITRATE (PF) 50 MCG/ML SOLUTION: Performed by: RADIOLOGY

## 2024-06-06 PROCEDURE — 77012 CT SCAN FOR NEEDLE BIOPSY: CPT

## 2024-06-06 PROCEDURE — C1830 POWER BONE MARROW BX NEEDLE: HCPCS

## 2024-06-06 RX ORDER — SODIUM CHLORIDE 9 MG/ML
25 INJECTION, SOLUTION INTRAVENOUS ONCE
Status: DISCONTINUED | OUTPATIENT
Start: 2024-06-06 | End: 2024-06-07 | Stop reason: HOSPADM

## 2024-06-06 RX ORDER — SODIUM CHLORIDE 0.9 % (FLUSH) 0.9 %
3 SYRINGE (ML) INJECTION EVERY 12 HOURS SCHEDULED
Status: DISCONTINUED | OUTPATIENT
Start: 2024-06-06 | End: 2024-06-07 | Stop reason: HOSPADM

## 2024-06-06 RX ORDER — SODIUM CHLORIDE 9 MG/ML
INJECTION, SOLUTION INTRAVENOUS CONTINUOUS PRN
Status: COMPLETED | OUTPATIENT
Start: 2024-06-06 | End: 2024-06-06

## 2024-06-06 RX ORDER — FENTANYL CITRATE 50 UG/ML
INJECTION, SOLUTION INTRAMUSCULAR; INTRAVENOUS AS NEEDED
Status: COMPLETED | OUTPATIENT
Start: 2024-06-06 | End: 2024-06-06

## 2024-06-06 RX ORDER — LIDOCAINE HYDROCHLORIDE 10 MG/ML
20 INJECTION, SOLUTION INFILTRATION; PERINEURAL ONCE
Status: COMPLETED | OUTPATIENT
Start: 2024-06-06 | End: 2024-06-06

## 2024-06-06 RX ORDER — SODIUM CHLORIDE 0.9 % (FLUSH) 0.9 %
10 SYRINGE (ML) INJECTION AS NEEDED
Status: DISCONTINUED | OUTPATIENT
Start: 2024-06-06 | End: 2024-06-07 | Stop reason: HOSPADM

## 2024-06-06 RX ORDER — MIDAZOLAM HYDROCHLORIDE 1 MG/ML
INJECTION INTRAMUSCULAR; INTRAVENOUS AS NEEDED
Status: COMPLETED | OUTPATIENT
Start: 2024-06-06 | End: 2024-06-06

## 2024-06-06 RX ORDER — SODIUM CHLORIDE 9 MG/ML
40 INJECTION, SOLUTION INTRAVENOUS AS NEEDED
Status: DISCONTINUED | OUTPATIENT
Start: 2024-06-06 | End: 2024-06-07 | Stop reason: HOSPADM

## 2024-06-06 RX ADMIN — LIDOCAINE HYDROCHLORIDE 20 ML: 10 INJECTION, SOLUTION INFILTRATION; PERINEURAL at 09:01

## 2024-06-06 RX ADMIN — Medication 3 ML: at 08:05

## 2024-06-06 RX ADMIN — FENTANYL CITRATE 50 MCG: 50 INJECTION, SOLUTION INTRAMUSCULAR; INTRAVENOUS at 09:03

## 2024-06-06 RX ADMIN — SODIUM CHLORIDE 20 ML/HR: 9 INJECTION, SOLUTION INTRAVENOUS at 08:55

## 2024-06-06 RX ADMIN — MIDAZOLAM 1 MG: 1 INJECTION INTRAMUSCULAR; INTRAVENOUS at 09:03

## 2024-06-09 LAB
BLOOD OR BONE MARROW RESULT: NORMAL
Lab: NORMAL

## 2024-06-10 ENCOUNTER — TELEPHONE (OUTPATIENT)
Dept: ONCOLOGY | Facility: OTHER | Age: 79
End: 2024-06-10
Payer: MEDICARE

## 2024-06-10 NOTE — TELEPHONE ENCOUNTER
Caller: Karla Abreu    Relationship: Emergency Contact    Best call back number: [phone]    What test was performed: BONE MARROW BX     When was the test performed: 6/6/24    Where was the test performed:

## 2024-06-11 NOTE — TELEPHONE ENCOUNTER
Called and relay message that final and complete biopsy results will not be available for 7-10 days after biopsy. Patient's daughter v/u. Sakshi Spivey RN

## 2024-06-13 LAB — CYTOGENETICS RESULT: NORMAL

## 2024-06-21 ENCOUNTER — OFFICE VISIT (OUTPATIENT)
Dept: ONCOLOGY | Facility: CLINIC | Age: 79
End: 2024-06-21
Payer: MEDICARE

## 2024-06-21 ENCOUNTER — LAB (OUTPATIENT)
Dept: LAB | Facility: HOSPITAL | Age: 79
End: 2024-06-21
Payer: MEDICARE

## 2024-06-21 VITALS
RESPIRATION RATE: 18 BRPM | HEART RATE: 78 BPM | WEIGHT: 187.3 LBS | BODY MASS INDEX: 26.22 KG/M2 | OXYGEN SATURATION: 96 % | TEMPERATURE: 97.9 F | HEIGHT: 71 IN

## 2024-06-21 DIAGNOSIS — D75.1 ERYTHROCYTOSIS: ICD-10-CM

## 2024-06-21 DIAGNOSIS — D72.825 BANDEMIA: ICD-10-CM

## 2024-06-21 DIAGNOSIS — R80.9 MICROALBUMINURIA: ICD-10-CM

## 2024-06-21 DIAGNOSIS — D72.825 BANDEMIA: Primary | ICD-10-CM

## 2024-06-21 DIAGNOSIS — M21.372 FOOT DROP, LEFT: ICD-10-CM

## 2024-06-21 LAB
ALBUMIN SERPL-MCNC: 3.9 G/DL (ref 3.5–5.2)
ALBUMIN/GLOB SERPL: 1.5 G/DL
ALP SERPL-CCNC: 63 U/L (ref 39–117)
ALT SERPL W P-5'-P-CCNC: 11 U/L (ref 1–41)
ANION GAP SERPL CALCULATED.3IONS-SCNC: 10.8 MMOL/L (ref 5–15)
AST SERPL-CCNC: 17 U/L (ref 1–40)
BASOPHILS # BLD AUTO: 0.06 10*3/MM3 (ref 0–0.2)
BASOPHILS NFR BLD AUTO: 0.5 % (ref 0–1.5)
BILIRUB SERPL-MCNC: 0.3 MG/DL (ref 0–1.2)
BUN SERPL-MCNC: 18 MG/DL (ref 8–23)
BUN/CREAT SERPL: 15 (ref 7–25)
CALCIUM SPEC-SCNC: 9.2 MG/DL (ref 8.6–10.5)
CHLORIDE SERPL-SCNC: 101 MMOL/L (ref 98–107)
CO2 SERPL-SCNC: 25.2 MMOL/L (ref 22–29)
CREAT SERPL-MCNC: 1.2 MG/DL (ref 0.76–1.27)
DEPRECATED RDW RBC AUTO: 41.9 FL (ref 37–54)
EGFRCR SERPLBLD CKD-EPI 2021: 61.1 ML/MIN/1.73
EOSINOPHIL # BLD AUTO: 0.12 10*3/MM3 (ref 0–0.4)
EOSINOPHIL NFR BLD AUTO: 1 % (ref 0.3–6.2)
ERYTHROCYTE [DISTWIDTH] IN BLOOD BY AUTOMATED COUNT: 12.8 % (ref 12.3–15.4)
GLOBULIN UR ELPH-MCNC: 2.6 GM/DL
GLUCOSE SERPL-MCNC: 163 MG/DL (ref 65–99)
HCT VFR BLD AUTO: 45.1 % (ref 37.5–51)
HGB BLD-MCNC: 15 G/DL (ref 13–17.7)
IMM GRANULOCYTES # BLD AUTO: 0.05 10*3/MM3 (ref 0–0.05)
IMM GRANULOCYTES NFR BLD AUTO: 0.4 % (ref 0–0.5)
LYMPHOCYTES # BLD AUTO: 2.3 10*3/MM3 (ref 0.7–3.1)
LYMPHOCYTES NFR BLD AUTO: 19 % (ref 19.6–45.3)
MCH RBC QN AUTO: 29.5 PG (ref 26.6–33)
MCHC RBC AUTO-ENTMCNC: 33.3 G/DL (ref 31.5–35.7)
MCV RBC AUTO: 88.8 FL (ref 79–97)
MONOCYTES # BLD AUTO: 0.95 10*3/MM3 (ref 0.1–0.9)
MONOCYTES NFR BLD AUTO: 7.9 % (ref 5–12)
NEUTROPHILS NFR BLD AUTO: 71.2 % (ref 42.7–76)
NEUTROPHILS NFR BLD AUTO: 8.62 10*3/MM3 (ref 1.7–7)
NRBC BLD AUTO-RTO: 0 /100 WBC (ref 0–0.2)
PLATELET # BLD AUTO: 334 10*3/MM3 (ref 140–450)
PMV BLD AUTO: 8.5 FL (ref 6–12)
POTASSIUM SERPL-SCNC: 4.7 MMOL/L (ref 3.5–5.2)
PROT SERPL-MCNC: 6.5 G/DL (ref 6–8.5)
RBC # BLD AUTO: 5.08 10*6/MM3 (ref 4.14–5.8)
SODIUM SERPL-SCNC: 137 MMOL/L (ref 136–145)
WBC NRBC COR # BLD AUTO: 12.1 10*3/MM3 (ref 3.4–10.8)

## 2024-06-21 PROCEDURE — 80053 COMPREHEN METABOLIC PANEL: CPT

## 2024-06-21 PROCEDURE — 85025 COMPLETE CBC W/AUTO DIFF WBC: CPT

## 2024-06-21 PROCEDURE — 36415 COLL VENOUS BLD VENIPUNCTURE: CPT

## 2024-06-21 NOTE — PROGRESS NOTES
Subjective     REASON FOR FOLLOW UP:  Leukocytosis for  years, minimal erythrocytosis, normal platelet count, no splenomegaly by physical exam or ct abdomen.BCR/ABL AND JAK2 MUTATION NEGATIVE IN 2017.        History of Present Illness     On 05/31/2024 this 79-year-old male returns to the office for follow up of his previous leukocytosis. In the past his white count has remained around 12,000 with normal hemoglobin, normal platelet count and BCR/ABL and JAK2 mutations being negative in 2017. Today in absence of any infection or fever the patient has a white count that is further elevated. He states that recently he has had experience of numbness in the left side of the fact associated with left foot drop. This left a sequela in him. He is having some limping related to that. Besides this no visual deficit, no headache, no balance issues. No alterations in speech. No abnormal movements. No other areas of motor deficit. His appetite has remained good, his bowel activity and urination are normal. No cardiac irregularity, no chest pain. No palpitations. Minimal swelling in his ankles. He has not had any clinical bleeding.     He denies any bone pain or joint pain besides his mild chronic back and occasional joint pain in the shoulders and knees.     On 06/21/2024 the patient returned to the office after he has had his bone marrow test done in the first of 06/2024 with no complications. He had no hematoma formation at the biopsy site and he resumed the Plavix the same day of the procedure. He has not had any other new neurological symptoms, he continues having foot drop. He will proceed with radiological assessment of the brain in a few days. He has not had any other new issues, no fever, no chills, no bone pain. Appetite and weight remain stable. Bowel activity and urination remain normal. He is doing the best that he can to control diabetes.         Past Medical History:   Diagnosis Date    Carpal tunnel syndrome on  right     Chest pain     Degenerative disc disease, lumbar     Diverticulitis of large intestine without perforation or abscess without bleeding 07/31/2018    ED (erectile dysfunction)     Erythrocytosis 07/05/2018    GERD (gastroesophageal reflux disease)     Hyperlipidemia     Kidney stones     Pneumonia due to infectious organism 04/14/2017    S/P angioplasty with stent     Stroke     Trapezius muscle spasm 03/19/2018    Tubular adenoma of colon 09/2019    Removed by colonoscopy by Dr. Weems        Past Surgical History:   Procedure Laterality Date    CARDIAC CATHETERIZATION N/A 02/10/2011    Normal LV systolic function; Kind of diffuse, small vessel, distal disease. Most of it is not amenable to PCI, but his JADEN lesion is and it is a large vessel; All of his prior durg-eluting stents are widely patent & look good, Dr. Anibal Oliveira    CATARACT EXTRACTION Bilateral     COLONOSCOPY N/A 09/13/2004    Normal, Repeat in 5-8 years, Dr. Davion Sanon    COLONOSCOPY N/A 09/04/2009    Mutiple small & large-mouthed diverticual were found in the entire colon; Exam otherwise normal, Dr. Davion Sanon    COLONOSCOPY N/A 9/26/2019    Procedure: COLONOSCOPY to  cecum with cold biopsy polypectomy;  Surgeon: Haider Weems MD;  Location: [facility] ENDOSCOPY;  Service: General    COLONOSCOPY W/ POLYPECTOMY N/A [id number]    2 benign polyps, [facility], Dr. Davion Sanon    CORONARY ANGIOPLASTY WITH STENT PLACEMENT N/A 11/20/2007    Successful multivessel drug eluting stent implantation for restenosis above his prior stenting, Dr. Anibal Wray    CORONARY ANGIOPLASTY WITH STENT PLACEMENT N/A 06/05/2007    Successful multivessel drug eluting stent implantation in all 3 of the major coronary arteries, Dr. Anibal Wray    CORONARY ANGIOPLASTY WITH STENT PLACEMENT N/A 02/10/2011    Successful drug-stenting of the JADEN, Dr. Anibal Oliveira    ENDOSCOPY N/A 11/21/2017    Procedure: ESOPHAGOGASTRODUODENOSCOPY WITH COLD  BIOPSIES AND 53 F MICHAEL DILATION;  Surgeon: Wisam Potter MD;  Location: [facility] ENDOSCOPY;  Service:     EYE SURGERY Left 2011    Retina    HYDROCELECTOMY Left 08/19/2011    Dr. Ellie Stein    INCISION AND DRAINAGE PERIRECTAL ABSCESS N/A 11/30/2007    Dr. Davion Sanon    LUMBAR EPIDURAL INJECTION N/A 2012-2013    x 3    NEUROMA SURGERY Right 07/19/2005    Excision Neuroma Right Arm; PATH: CONSISTENT W/ LIPOMA,  Dr. Davion Sanon    ROTATOR CUFF REPAIR Right 2006    ROTATOR CUFF REPAIR Left 2000    UPPER GASTROINTESTINAL ENDOSCOPY N/A 09/06/2012    Normal esophagus; Normal Stomach, Normal examinded duodenum, Dr. Bruno Turk    WRIST FRACTURE SURGERY Right 06/03/2003    ORIF Right Distal Radius, Dr. Ryan Bravo        Current Outpatient Medications on File Prior to Visit   Medication Sig Dispense Refill    acyclovir (ZOVIRAX) 400 MG tablet Take 1 tablet by mouth As Needed. Take no more than 5 doses a day.      Aspirin 81 MG capsule Take 81 mg by mouth Daily.      atorvastatin (LIPITOR) 10 MG tablet Take 1 tablet by mouth Daily.      brimonidine (ALPHAGAN) 0.15 % ophthalmic solution Apply 1 drop to eye 2 (two) times a day.      clopidogrel (PLAVIX) 75 MG tablet Take 1 tablet by mouth Every Night.      Continuous Blood Gluc  (FreeStyle Juwan 2 Hassell) device 1 each Daily. 1 each 1    Continuous Blood Gluc Sensor (FreeStyle Juwan 2 Sensor) misc 1 each Every 14 (Fourteen) Days. 6 each 2    Glucose 15 GM/32ML gel Take  by mouth.      insulin aspart (novoLOG FLEXPEN) 100 UNIT/ML solution pen-injector sc pen 40 to 45 units with each meal      insulin degludec (Tresiba FlexTouch) 100 UNIT/ML solution pen-injector injection 40 units every evening 36 mL 2    isosorbide mononitrate (IMDUR) 30 MG 24 hr tablet Take 1 tablet by mouth Daily.      latanoprost (XALATAN) 0.005 % ophthalmic solution Apply 1 drop to eye(s) as directed by provider Daily.      lisinopril (PRINIVIL,ZESTRIL) 40 MG tablet Take 1 tablet  "by mouth Daily. 90 tablet 1    metFORMIN (GLUCOPHAGE) 1000 MG tablet Take 1 tablet by mouth 2 (Two) Times a Day With Meals. Take 2 tablets bid      metoprolol succinate XL (TOPROL-XL) 50 MG 24 hr tablet Take 1.5 tablets by mouth Daily.      NIFEdipine XL (PROCARDIA XL) 60 MG 24 hr tablet Take 30 mg by mouth Daily.      nitroglycerin (NITROSTAT) 0.4 MG SL tablet Place 1 tablet under the tongue Every 5 (Five) Minutes As Needed for Chest Pain. Take no more than 3 doses in 15 minutes.      Omega 3 1000 MG capsule Take 1 capsule by mouth 2 (Two) Times a Day.      omeprazole (priLOSEC) 20 MG capsule Take 1 capsule by mouth Daily.      pilocarpine (PILOCAR) 1 % ophthalmic solution 1 drop 2 (Two) Times a Day.      Semaglutide, 1 MG/DOSE, (Ozempic, 1 MG/DOSE,) 4 MG/3ML solution pen-injector Inject 1 mg under the skin into the appropriate area as directed 1 (One) Time Per Week. Discontinue Ozempic 0.5 mg 9 mL 2    timolol (TIMOPTIC) 0.5 % ophthalmic solution 1 drop Daily. INSTILL 1 DROP IN LEFT EYE EVERY MORNING AS DIRECTED  6     No current facility-administered medications on file prior to visit.        ALLERGIES:  No Known Allergies     Social History     Socioeconomic History    Marital status:      Spouse name: Kerry    Years of education: College   Tobacco Use    Smoking status: Never    Smokeless tobacco: Never    Tobacco comments:     no caffeine   Vaping Use    Vaping status: Never Used   Substance and Sexual Activity    Alcohol use: No    Drug use: No    Sexual activity: Defer     Partners: Female        Family History   Problem Relation Age of Onset    Heart disease Mother     Prostate cancer Father     Heart disease Father     Colon polyps Daughter     Colon polyps Daughter      Objective     Vitals:    06/21/24 1105   Resp: 18   Temp: 97.9 °F (36.6 °C)   TempSrc: Temporal   Height: 180.3 cm (70.98\")         6/21/2024    11:06 AM   Current Status   ECOG score 0       Physical Exam                 GENERAL:  " Well-developed, Patient  in no acute distress.   SKIN:  Warm, dry ,NO purpura ,no rash.  HEENT:  Pupils were equal and reactive to light and accomodation, conjunctivae noninjected,  normal visual acuity.   NECK:  Supple with good range of motion; no thyromegaly , no JVD or bruits,.No carotid artery pain, no carotid abnormal pulsation   LYMPHATICS:  No cervical, NO supraclavicular, NO axillary, NO inguinal adenopathies.  CARDIAC   normal rate , regular rhythm, without murmur,NO rubs NO S3 NO S4   LUNGS: normal breath sounds bilateral, no wheezing, NO rhonchi, NO crackles ,NO rubs.  VASCULAR VENOUS: no cyanosis, NO collateral circulation, NO varicosities, NO edema, NO palpable cords, NO pain,NO erythema, NO pigmentation of the skin.  ABDOMEN:  Soft, NO pain,no hepatomegaly, no splenomegaly,no masses, no ascites, no collateral circulation,no distention.  EXTREMITIES  AND SPINE:  No clubbing, no cyanosis ,no deformities , no pain .No kyphosis,  no pain in spine, no pain in ribs , no pain in pelvic bone.  NEUROLOGICAL:  Patient was awake, alert, oriented to time, person and place.left foot drop                RECENT LABS:  Hematology WBC   Date Value Ref Range Status   06/21/2024 12.10 (H) 3.40 - 10.80 10*3/mm3 Final     RBC   Date Value Ref Range Status   06/21/2024 5.08 4.14 - 5.80 10*6/mm3 Final     Hemoglobin   Date Value Ref Range Status   06/21/2024 15.0 13.0 - 17.7 g/dL Final     Hematocrit   Date Value Ref Range Status   06/21/2024 45.1 37.5 - 51.0 % Final     Platelets   Date Value Ref Range Status   06/21/2024 334 140 - 450 10*3/mm3 Final          Blood or Bone Marrow Morphology (06/06/2024 09:10)  Flow Cytometry (06/06/2024 09:10)  Cytogenetics (Integrated Oncology) (06/06/2024 09:10)  Assessment & Plan    1. This patient has history of leukocytosis that in my opinion is very likely an element of myeloproliferative disorder. We never have persuaded the patient to proceed with bone marrow testing. We have done  BCR-ABL in the past that has been negative, JAK2 mutation that has been negative and we have run laboratory testing looking for inflammatory or infectious etiologies that have been negative. Nevertheless, his white count remains minimally elevated.       The patient was seen by telephonic visit on 12/01/2020. He is asymptomatic at this time completely in control of his health. His diabetes management is not ideal, he loves to eat but he remains physically very active and his weight remains relatively stable. The good news with his blood work yesterday is that his white count is borderline 10,000. The hemoglobin and hematocrit are good, in fact his hematocrit is 45, his platelet count is normal and the white count differential is normal. I talked with him on the telephone about the good news about these numbers and I find no need for him to have a phlebotomy at this point. What I have recommended for him is to have a blood count every couple of months with nurse visit. If the hematocrit is above 45 he will have a phlebotomy otherwise doctor visit in 6 months. I encouraged him to remain on his aspirin and Plavix.       The patient returned to the office on 05/18/2021 with no symptoms or signs of anything directed to his minimal leukocytosis. His hematocrit was 43. His platelet count and white count differential were normal. He had no splenomegaly. I advised him to remain in observation from my point of view returning to see us in 6 months and 1 year. Blood count will be done on each one of those occasions. I remind him that his hemoglobin A1C recently was in the 7 category and he needs to work on better control of his weight and portions of food.     The patient returned to the office on 04/19/2022. He has no new symptoms of anything in particular. His physical exam is unremarkable with the exception of multiple actinic keratosis in his scalp. I asked him to wear a hat. He has an appointment to be seen by  dermatologist.     The good news is that his white count remains stable at 12,000, his white count differential is normal, there is no left shifted maturation, there are no blasts in circulation. The hematocrit is normal at 45, the platelet count remains normal at 345,000.     Under the present circumstances and know that he is BCR/ABL negative he will remain in observation for the time being returning to see us back in a year with a CBC.     On 05/31/2024 I am concerned about the neurological symptoms like he has had a few weeks ago that are permanent with some numbness in the left side of the face and foot drop on the left lower extremity. The symptoms are very obvious and the signs are very obvious. The other issue is the white count that is now 18,000 with stable hemoglobin and stable platelet count and normal white count differential. In spite of having BCR/ABL and JAK2 mutations done in 2017 this is very bothersome to me. My approach to this issue will be as follows:    1. I will discuss all of these issues with, ELBA Hager, in regard plan for neurological assessment given the recent likely vascular event.  2. The patient will proceed with peripheral blood analysis by me and a report will be done once that this is completed.  3. I went ahead and scheduled the patient to have a bone marrow testing. This can be done next Thursday. I asked him to stop his Plavix tomorrow and for the rest of the week and resume the Plavix the same day that he has his bone marrow testing. His aspirin will remain the same 81 mg a day.     The bone marrow will be sent for regular pathology, flow cytometry and molecular analysis depending on needs and findings.     The patient will be returning to see me back in 2-3 weeks to review the report of that and then and then decide how to proceed.     It is my understanding that, ELBA Hager, will proceed with neurological assessment including radiological assessment  of his brain and so forth. He probably will require physical therapy for his foot drop on the left side.    I discussed all of these facts with the patient. 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 She has had an amputation of the left leg and she requires completely this patient's presence to be able to function.   On 06/21/2024 even though we do not have all of the reports available of the bone marrow lacking the report of NGS still we have more opportunity of diagnosis of his leukocytosis showing some element of excessive leukocytes in the bone marrow with minimal fibrosis and minimal dyserythropoiesis suggesting early stage of myelodysplastic syndrome. His hemoglobin is normal, his platelet count is normal, he has no splenomegaly. His BCR/ABL in the past has been negative and again we have pending NGS that will be discussed with him on the telephone once that it becomes available.     We were checking on him on a yearly basis. I think now with this kind of diagnosis we need to monitor him closer with a CBC, CMP, LDH every 3 months.     Finally the patient's left food drop continues being an issue. I went ahead and sent a referral for physical therapy close to his home. He can have significant improvement of this issue if this is taken care of now.    Besides that he will continue and complete his workup for stroke as initiated by, ELBA Hager.    One more time the patient has been back on Plavix since the completion of his bone marrow test.

## 2024-07-02 LAB
CCV RESULT: NORMAL
INTELLIGEN MYELOID RESULT: NORMAL

## 2024-07-16 ENCOUNTER — TREATMENT (OUTPATIENT)
Dept: PHYSICAL THERAPY | Facility: CLINIC | Age: 79
End: 2024-07-16
Payer: MEDICARE

## 2024-07-16 DIAGNOSIS — R29.898 WEAKNESS OF LEFT FOOT: ICD-10-CM

## 2024-07-16 DIAGNOSIS — M21.372 LEFT FOOT DROP: Primary | ICD-10-CM

## 2024-07-16 NOTE — PROGRESS NOTES
Physical Therapy Initial Evaluation and Plan of Care  [address], [address]  [place] [address]    Patient: Anthony Vaughn   : 1945  Diagnosis/ICD-10 Code:  Left foot drop [M21.372]  Referring practitioner: Cesar Galaviz MD  Date of Initial Visit: 2024  Today's Date: 2024  Patient seen for 1 session         Visit Diagnoses:    ICD-10-CM ICD-9-CM   1. Left foot drop  M21.372 736.79   2. Weakness of left foot  R29.898 734         Subjective Questionnaire: LEFS: 66      Subjective Evaluation    History of Present Illness  Mechanism of injury: Patient is an 79 year old male who presents with left foot drop that has been going on for about 6 months.  Denies an injury at this time.  Reports that he trips 1 or 2 times a day; states that he has fallen a couple of times due to it.  Reports that he catches the toes on any surface.  Reports weakness in the left leg.  Reports that it's the same as it was at the onset.  Denies any leg or back pain.    Denies any previous lumbar or LE surgeries.      Patient Occupation: Retired Pain  Current pain ratin  Progression: no change             Objective          Observations     Additional Ankle/Foot Observation Details  Patient ambulates with an audible foot slap on the left foot.    Active Range of Motion     Additional Active Range of Motion Details  Patient is only able to raise the toes about an inch in sitting.    Left Ankle AROM in long sitting -33  Right Ankle AROM in long sitting 4    Strength/Myotome Testing     Left Hip   Planes of Motion   Flexion: 4  Abduction: 4+  Adduction: 4    Right Hip   Planes of Motion   Flexion: 4+  Abduction: 4+  Adduction: 4    Left Knee   Extension: 4    Right Knee   Extension: 4+    Left Ankle/Foot   Dorsiflexion: 3-    Right Ankle/Foot   Dorsiflexion: 5          Assessment & Plan       Assessment  Impairments: abnormal gait, abnormal or restricted ROM, impaired physical strength, lacks appropriate  home exercise program and pain with function   Assessment details: Patient is an 79 year old male with c/o weakness, decreased AROM, decreased strength and an antalgic gait pattern which is causing him to trip on a daily basis.  Prognosis: good  Prognosis details: STG's to be met by 2 weeks  1)  Independent with HEP to show compliance  2)  Decrease pain by 50% or more to allow patient to perform self care and activities more comfortably  3)  AROM left ankle DF to -20 to indicate activation of the anterior tib muscle  4)  Patient to raise the left toes about 2 inches in sitting    LTG's to be met by 4 weeks  1)  Independent with HEP progression to show continued compliance  2)  Decrease pain by 75% or more to allow patient to return to activities and hobbies with minimal limitations   3)  Increase strength for the left hip and knee to 4+/5 or more  4)  MMT left ankle DF to 3/5 or more for improved strength and decreased trip risk  5)  Decrease incidences of trips to 4 times a week or less for improved gait, stability and balance and reduced risk of falling        Plan  Therapy options: will be seen for skilled therapy services  Planned therapy interventions: therapeutic activities, home exercise program, gait training and neuromuscular re-education  Frequency: 2x week  Duration in weeks: 4  Treatment plan discussed with: patient            Timed:         Manual Therapy:    0     mins  06897;     Therapeutic Exercise:    12     mins  61688;     Neuromuscular Maggie:    0    mins  30377;    Therapeutic Activity:     0     mins  43802;     Gait Trainin     mins  44815;     Ultrasound:     0     mins  52003;          Un-Timed:  Electrical Stimulation:    0     mins  18131 ( );    Low Eval     14     Mins  09572  Mod Eval     0     Mins  97486  High Eval                       0     Mins  96995        Timed Treatment:   12   mins   Total Treatment:     28   mins          PT: Valente Salter, PT     [place]  License [id number]  Electronically signed by Valente Salter, PT, 07/16/24, 9:59 AM EDT    Certification Period: 7/16/2024 thru 10/13/2024  I certify that the therapy services are furnished while this patient is under my care.  The services outlined above are required by this patient, and will be reviewed every 90 days.    Cesar Galaviz Md  [address] [place] [address]   NPI: [id number]      Valente Salter, PT   License number: [id number]        Physician Signature:__________________________________________________    PHYSICIAN: Cesar Galaviz MD      DATE:     Please sign and return via fax to [email or web address] . Thank you, [facility].

## 2024-07-17 ENCOUNTER — OFFICE VISIT (OUTPATIENT)
Dept: CARDIOLOGY | Facility: CLINIC | Age: 79
End: 2024-07-17
Payer: MEDICARE

## 2024-07-17 VITALS
HEART RATE: 64 BPM | WEIGHT: 186.6 LBS | BODY MASS INDEX: 26.12 KG/M2 | DIASTOLIC BLOOD PRESSURE: 70 MMHG | SYSTOLIC BLOOD PRESSURE: 130 MMHG | HEIGHT: 71 IN

## 2024-07-17 DIAGNOSIS — I25.10 CORONARY ARTERY DISEASE INVOLVING NATIVE CORONARY ARTERY OF NATIVE HEART WITHOUT ANGINA PECTORIS: Primary | ICD-10-CM

## 2024-07-17 DIAGNOSIS — E78.5 HYPERLIPIDEMIA, UNSPECIFIED HYPERLIPIDEMIA TYPE: Chronic | ICD-10-CM

## 2024-07-17 DIAGNOSIS — Z79.4 TYPE 2 DIABETES MELLITUS WITH MICROALBUMINURIA, WITH LONG-TERM CURRENT USE OF INSULIN: ICD-10-CM

## 2024-07-17 DIAGNOSIS — I10 ESSENTIAL HYPERTENSION: Chronic | ICD-10-CM

## 2024-07-17 DIAGNOSIS — R80.9 TYPE 2 DIABETES MELLITUS WITH MICROALBUMINURIA, WITH LONG-TERM CURRENT USE OF INSULIN: ICD-10-CM

## 2024-07-17 DIAGNOSIS — E11.29 TYPE 2 DIABETES MELLITUS WITH MICROALBUMINURIA, WITH LONG-TERM CURRENT USE OF INSULIN: ICD-10-CM

## 2024-07-17 PROCEDURE — 99214 OFFICE O/P EST MOD 30 MIN: CPT | Performed by: INTERNAL MEDICINE

## 2024-07-17 PROCEDURE — 3078F DIAST BP <80 MM HG: CPT | Performed by: INTERNAL MEDICINE

## 2024-07-17 PROCEDURE — 3075F SYST BP GE 130 - 139MM HG: CPT | Performed by: INTERNAL MEDICINE

## 2024-07-17 PROCEDURE — 93000 ELECTROCARDIOGRAM COMPLETE: CPT | Performed by: INTERNAL MEDICINE

## 2024-07-17 NOTE — PROGRESS NOTES
Date of Office Visit: 24  Encounter Provider: Anibal Wray MD  Place of Service: [facility] CARDIOLOGY  Patient Name: Anthony Vaughn  :1945  [id number]    Chief Complaint   Patient presents with    Coronary Artery Disease   :     HPI: Anthony Vaughn is a 79 y.o. male  an inferior MI with multiple stents placed in 2007 2.5x28 Cypher to dLAD; 3.0x28mm Cypher to mLAD; 3.0x33mm Cypher to d circ; 3.0x23mm Cypher mcirc; 3.0x23mm Cypher to d RCA; 3.5x13mm Cypher pRCA; in 2007 restenosis pLAD 3.0x13mm Cypher 2.5x8mm Cypher to mLAD; 3.0x13mm Cypher to p circ. In 2011, had a stent to the JADEN details unavailable.  There was a question of a possible TIA in  that led to an echo which was normal and a Holter that was unremarkable.  He had an echo in  with normal LV systolic function without segmental wall motion abnormality.    He is here for follow-up today and he has been feeling well he has not had any chest pain shortness of breath PND orthopnea edema syncope or palpitations has a little bit of a swelling in his left leg no erythema no injury no calf tenderness.  He otherwise has been doing well he is lost about 15 pounds    Past Medical History:   Diagnosis Date    Carpal tunnel syndrome on right     Chest pain     Degenerative disc disease, lumbar     Diverticulitis of large intestine without perforation or abscess without bleeding 2018    ED (erectile dysfunction)     Erythrocytosis 2018    GERD (gastroesophageal reflux disease)     Hyperlipidemia     Kidney stones     Pneumonia due to infectious organism 2017    S/P angioplasty with stent     Stroke     Trapezius muscle spasm 2018    Tubular adenoma of colon 2019    Removed by colonoscopy by Dr. Weems       Past Surgical History:   Procedure Laterality Date    CARDIAC CATHETERIZATION N/A 02/10/2011    Normal LV systolic function; Kind of diffuse, small vessel, distal disease.  Most of it is not amenable to PCI, but his JADEN lesion is and it is a large vessel; All of his prior durg-eluting stents are widely patent & look good, Dr. Anibal Oliveira    CATARACT EXTRACTION Bilateral     COLONOSCOPY N/A 09/13/2004    Normal, Repeat in 5-8 years, Dr. Davion Sanon    COLONOSCOPY N/A 09/04/2009    Mutiple small & large-mouthed diverticual were found in the entire colon; Exam otherwise normal, Dr. Davion Sanon    COLONOSCOPY N/A 9/26/2019    Procedure: COLONOSCOPY to  cecum with cold biopsy polypectomy;  Surgeon: Haider Weems MD;  Location: [facility] ENDOSCOPY;  Service: General    COLONOSCOPY W/ POLYPECTOMY N/A [id number]    2 benign polyps, [facility], Dr. Davion Sanon    CORONARY ANGIOPLASTY WITH STENT PLACEMENT N/A 11/20/2007    Successful multivessel drug eluting stent implantation for restenosis above his prior stenting, Dr. Anibal Wray    CORONARY ANGIOPLASTY WITH STENT PLACEMENT N/A 06/05/2007    Successful multivessel drug eluting stent implantation in all 3 of the major coronary arteries, Dr. Anibal Wray    CORONARY ANGIOPLASTY WITH STENT PLACEMENT N/A 02/10/2011    Successful drug-stenting of the JADEN, Dr. Anibal Oliveira    ENDOSCOPY N/A 11/21/2017    Procedure: ESOPHAGOGASTRODUODENOSCOPY WITH COLD BIOPSIES AND 53 F MICHAEL DILATION;  Surgeon: Wisam Potter MD;  Location: [facility] ENDOSCOPY;  Service:     EYE SURGERY Left 2011    Retina    HYDROCELECTOMY Left 08/19/2011    Dr. Argueta Short    INCISION AND DRAINAGE PERIRECTAL ABSCESS N/A 11/30/2007    Dr. Davion Sanon    LUMBAR EPIDURAL INJECTION N/A 2012-2013    x 3    NEUROMA SURGERY Right 07/19/2005    Excision Neuroma Right Arm; PATH: CONSISTENT W/ LIPOMA,  Dr. Davion Sanon    ROTATOR CUFF REPAIR Right 2006    ROTATOR CUFF REPAIR Left 2000    UPPER GASTROINTESTINAL ENDOSCOPY N/A 09/06/2012    Normal esophagus; Normal Stomach, Normal examinded duodenum, Dr. Bruno Turk    WRIST FRACTURE SURGERY Right  06/03/2003    ORIF Right Distal Radius, Dr. Ryan Bravo       Social History     Socioeconomic History    Marital status:      Spouse name: Kerry    Years of education: College   Tobacco Use    Smoking status: Never    Smokeless tobacco: Never    Tobacco comments:     no caffeine   Vaping Use    Vaping status: Never Used   Substance and Sexual Activity    Alcohol use: No    Drug use: No    Sexual activity: Defer     Partners: Female       Family History   Problem Relation Age of Onset    Heart disease Mother     Prostate cancer Father     Heart disease Father     Colon polyps Daughter     Colon polyps Daughter        Review of Systems   Constitutional: Negative for decreased appetite, fever, malaise/fatigue and weight loss.   HENT:  Negative for nosebleeds.    Eyes:  Negative for double vision.   Cardiovascular:  Negative for chest pain, claudication, cyanosis, dyspnea on exertion, irregular heartbeat, leg swelling, near-syncope, orthopnea, palpitations, paroxysmal nocturnal dyspnea and syncope.   Respiratory:  Negative for cough, hemoptysis and shortness of breath.    Hematologic/Lymphatic: Negative for bleeding problem.   Skin:  Negative for rash.   Musculoskeletal:  Negative for falls and myalgias.   Gastrointestinal:  Negative for hematochezia, jaundice, melena, nausea and vomiting.   Genitourinary:  Negative for hematuria.   Neurological:  Negative for dizziness and seizures.   Psychiatric/Behavioral:  Negative for altered mental status and memory loss.        No Known Allergies      Current Outpatient Medications:     acyclovir (ZOVIRAX) 400 MG tablet, Take 1 tablet by mouth As Needed. Take no more than 5 doses a day., Disp: , Rfl:     atorvastatin (LIPITOR) 10 MG tablet, Take 1 tablet by mouth Daily., Disp: , Rfl:     brimonidine (ALPHAGAN) 0.15 % ophthalmic solution, Apply 1 drop to eye 2 (two) times a day., Disp: , Rfl:     clopidogrel (PLAVIX) 75 MG tablet, Take 1 tablet by mouth Every Night.,  Disp: , Rfl:     Continuous Blood Gluc  (FreeStyle Juwan 2 [place]) device, 1 each Daily., Disp: 1 each, Rfl: 1    Continuous Blood Gluc Sensor (FreeStyle Juwan 2 Sensor) misc, 1 each Every 14 (Fourteen) Days., Disp: 6 each, Rfl: 2    Glucose 15 GM/32ML gel, Take  by mouth., Disp: , Rfl:     insulin aspart (novoLOG FLEXPEN) 100 UNIT/ML solution pen-injector sc pen, 40 to 45 units with each meal, Disp: , Rfl:     insulin degludec (Tresiba FlexTouch) 100 UNIT/ML solution pen-injector injection, 40 units every evening, Disp: 36 mL, Rfl: 2    isosorbide mononitrate (IMDUR) 30 MG 24 hr tablet, Take 1 tablet by mouth Daily., Disp: , Rfl:     latanoprost (XALATAN) 0.005 % ophthalmic solution, Apply 1 drop to eye(s) as directed by provider Daily., Disp: , Rfl:     lisinopril (PRINIVIL,ZESTRIL) 40 MG tablet, Take 1 tablet by mouth Daily., Disp: 90 tablet, Rfl: 1    metFORMIN (GLUCOPHAGE) 1000 MG tablet, Take 1 tablet by mouth 2 (Two) Times a Day With Meals. Take 2 tablets bid, Disp: , Rfl:     metoprolol succinate XL (TOPROL-XL) 50 MG 24 hr tablet, Take 1.5 tablets by mouth Daily., Disp: , Rfl:     NIFEdipine XL (PROCARDIA XL) 60 MG 24 hr tablet, Take 30 mg by mouth Daily., Disp: , Rfl:     nitroglycerin (NITROSTAT) 0.4 MG SL tablet, Place 1 tablet under the tongue Every 5 (Five) Minutes As Needed for Chest Pain. Take no more than 3 doses in 15 minutes., Disp: , Rfl:     omeprazole (priLOSEC) 20 MG capsule, Take 1 capsule by mouth Daily., Disp: , Rfl:     pilocarpine (PILOCAR) 1 % ophthalmic solution, 1 drop 2 (Two) Times a Day., Disp: , Rfl:     Semaglutide, 1 MG/DOSE, (Ozempic, 1 MG/DOSE,) 4 MG/3ML solution pen-injector, Inject 1 mg under the skin into the appropriate area as directed 1 (One) Time Per Week. Discontinue Ozempic 0.5 mg, Disp: 9 mL, Rfl: 2    timolol (TIMOPTIC) 0.5 % ophthalmic solution, 1 drop Daily. INSTILL 1 DROP IN LEFT EYE EVERY MORNING AS DIRECTED, Disp: , Rfl: 6    Aspirin 81 MG capsule, Take 81  "mg by mouth Daily. (Patient not taking: Reported on 7/17/2024), Disp: , Rfl:     Omega 3 1000 MG capsule, Take 1 capsule by mouth 2 (Two) Times a Day. (Patient not taking: Reported on 7/17/2024), Disp: , Rfl:       Objective:     Vitals:    07/17/24 1003   BP: 130/70   Pulse: 64   Weight: 84.6 kg (186 lb 9.6 oz)   Height: 180.3 cm (71\")     Body mass index is 26.03 kg/m².    Constitutional:       Appearance: Well-developed.   Eyes:      General: No scleral icterus.  HENT:      Head: Normocephalic.   Neck:      Thyroid: No thyromegaly.      Vascular: No JVD.      Lymphadenopathy: No cervical adenopathy.   Pulmonary:      Effort: Pulmonary effort is normal.      Breath sounds: Normal breath sounds. No wheezing. No rales.   Cardiovascular:      Normal rate. Regular rhythm.      No gallop.    Edema:     Peripheral edema absent.   Abdominal:      Palpations: Abdomen is soft.      Tenderness: There is no abdominal tenderness.   Musculoskeletal: Normal range of motion. Skin:     General: Skin is warm and dry.      Findings: No rash.   Neurological:      Mental Status: Alert and oriented to person, place, and time.           ECG 12 Lead    Date/Time: 7/17/2024 10:20 AM  Performed by: Anibal Wray MD    Authorized by: Anibal Wray MD  Comparison: compared with previous ECG   Rhythm: sinus rhythm  Other findings: non-specific ST-T wave changes    Clinical impression: abnormal EKG           Assessment:       Diagnosis Plan   1. Coronary artery disease involving native coronary artery of native heart without angina pectoris        2. Essential hypertension        3. Hyperlipidemia, unspecified hyperlipidemia type        4. Type 2 diabetes mellitus with microalbuminuria, with long-term current use of insulin               Plan:       He is doing well.  At this point he is asymptomatic he has a number of stents placed a long time ago and he is doing really well with that.  He has normal LV function on his last echo.  He " is on good medical therapy he is lost some weight he is treating his diabetes and his blood pressure I do not know if his swelling in his left foot it is from his Procardia or not but looks pretty good and I think there is an infection I do not think there is a DVT.  This can have him come back and see Josseline in a year and see me in 2    No follow-ups on file.     As always, it has been a pleasure to participate in your patient's care.      Sincerely,       Anibal Wray MD

## 2024-07-21 ENCOUNTER — HOSPITAL ENCOUNTER (OUTPATIENT)
Facility: HOSPITAL | Age: 79
Discharge: HOME | End: 2024-07-21
Payer: MEDICARE

## 2024-07-21 DIAGNOSIS — R20.2 FACIAL TINGLING: ICD-10-CM

## 2024-07-21 DIAGNOSIS — R51.9 NONINTRACTABLE HEADACHE, UNSPECIFIED CHRONICITY PATTERN, UNSPECIFIED HEADACHE TYPE: ICD-10-CM

## 2024-07-21 DIAGNOSIS — R26.9 GAIT ABNORMALITY: ICD-10-CM

## 2024-07-21 PROCEDURE — 0 GADOBENATE DIMEGLUMINE 529 MG/ML SOLUTION: Performed by: NURSE PRACTITIONER

## 2024-07-21 PROCEDURE — 70544 MR ANGIOGRAPHY HEAD W/O DYE: CPT

## 2024-07-21 PROCEDURE — 70553 MRI BRAIN STEM W/O & W/DYE: CPT

## 2024-07-21 PROCEDURE — 70549 MR ANGIOGRAPH NECK W/O&W/DYE: CPT

## 2024-07-21 PROCEDURE — A9577 INJ MULTIHANCE: HCPCS | Performed by: NURSE PRACTITIONER

## 2024-07-21 RX ADMIN — GADOBENATE DIMEGLUMINE 17 ML: 529 INJECTION, SOLUTION INTRAVENOUS at 14:50

## 2024-07-22 ENCOUNTER — OFFICE VISIT (OUTPATIENT)
Dept: INTERNAL MEDICINE | Facility: CLINIC | Age: 79
End: 2024-07-22
Payer: MEDICARE

## 2024-07-22 ENCOUNTER — TELEPHONE (OUTPATIENT)
Dept: INTERNAL MEDICINE | Facility: CLINIC | Age: 79
End: 2024-07-22

## 2024-07-22 VITALS — BODY MASS INDEX: 26.18 KG/M2 | TEMPERATURE: 98.2 F | WEIGHT: 187 LBS | HEIGHT: 71 IN

## 2024-07-22 DIAGNOSIS — R93.0 ABNORMAL MRA, HEAD: ICD-10-CM

## 2024-07-22 DIAGNOSIS — I65.02 STENOSIS OF LEFT VERTEBRAL ARTERY: ICD-10-CM

## 2024-07-22 DIAGNOSIS — H92.02 LEFT EAR PAIN: Primary | ICD-10-CM

## 2024-07-22 DIAGNOSIS — R93.89 ABNORMAL MAGNETIC RESONANCE ANGIOGRAPHY (MRA) OF NECK: ICD-10-CM

## 2024-07-22 DIAGNOSIS — S16.1XXA STRAIN OF NECK MUSCLE, INITIAL ENCOUNTER: ICD-10-CM

## 2024-07-22 PROCEDURE — 1159F MED LIST DOCD IN RCRD: CPT | Performed by: NURSE PRACTITIONER

## 2024-07-22 PROCEDURE — 99214 OFFICE O/P EST MOD 30 MIN: CPT | Performed by: NURSE PRACTITIONER

## 2024-07-22 PROCEDURE — 1160F RVW MEDS BY RX/DR IN RCRD: CPT | Performed by: NURSE PRACTITIONER

## 2024-07-22 PROCEDURE — 1126F AMNT PAIN NOTED NONE PRSNT: CPT | Performed by: NURSE PRACTITIONER

## 2024-07-22 NOTE — PROGRESS NOTES
Subjective   Chief Complaint   Patient presents with    Earache     Left ear and head pain//onset 4/5 days// soreness// sharp pain comes and goes       History of Present Illness   79 y.o. male presents with cc left ear and head soreness ongoing times several days. Denies fever or chills. He has taken Tylenol 1000 mg x2 doses without relief. He an almost shooting pain. Area behind his left ear is tender to touch. Denies sinus congestion, sore throat or cough. Hurts when he turns his head to the right. No recent change in pillows or activity. He had MRI head, MRA head and MRA neck yesterday for previously reported stroke like symptoms.        Patient Active Problem List   Diagnosis    Coronary artery disease involving native coronary artery without angina pectoris    Hyperlipidemia    Essential hypertension    Type 2 diabetes mellitus with microalbuminuria, with long-term current use of insulin    Microalbuminuria    DM type 2 with diabetic peripheral neuropathy    Bandemia    Erythrocytosis    Osteoarthritis of left acromioclavicular joint    Stenosis of left vertebral artery       No Known Allergies    Current Outpatient Medications on File Prior to Visit   Medication Sig Dispense Refill    acyclovir (ZOVIRAX) 400 MG tablet Take 1 tablet by mouth As Needed. Take no more than 5 doses a day.      atorvastatin (LIPITOR) 10 MG tablet Take 1 tablet by mouth Daily.      brimonidine (ALPHAGAN) 0.15 % ophthalmic solution Apply 1 drop to eye 2 (two) times a day.      clopidogrel (PLAVIX) 75 MG tablet Take 1 tablet by mouth Every Night.      Continuous Blood Gluc  (FreeStyle Juwan 2 [place]) device 1 each Daily. 1 each 1    Continuous Blood Gluc Sensor (FreeStyle Juwan 2 Sensor) misc 1 each Every 14 (Fourteen) Days. 6 each 2    Glucose 15 GM/32ML gel Take  by mouth.      insulin aspart (novoLOG FLEXPEN) 100 UNIT/ML solution pen-injector sc pen 40 to 45 units with each meal      insulin degludec (Tresiba FlexTouch) 100  UNIT/ML solution pen-injector injection 40 units every evening 36 mL 2    isosorbide mononitrate (IMDUR) 30 MG 24 hr tablet Take 1 tablet by mouth Daily.      latanoprost (XALATAN) 0.005 % ophthalmic solution Apply 1 drop to eye(s) as directed by provider Daily.      lisinopril (PRINIVIL,ZESTRIL) 40 MG tablet Take 1 tablet by mouth Daily. 90 tablet 1    metFORMIN (GLUCOPHAGE) 1000 MG tablet Take 1 tablet by mouth 2 (Two) Times a Day With Meals. Take 2 tablets bid      metoprolol succinate XL (TOPROL-XL) 50 MG 24 hr tablet Take 1.5 tablets by mouth Daily.      NIFEdipine XL (PROCARDIA XL) 60 MG 24 hr tablet Take 30 mg by mouth Daily.      nitroglycerin (NITROSTAT) 0.4 MG SL tablet Place 1 tablet under the tongue Every 5 (Five) Minutes As Needed for Chest Pain. Take no more than 3 doses in 15 minutes.      omeprazole (priLOSEC) 20 MG capsule Take 1 capsule by mouth Daily.      pilocarpine (PILOCAR) 1 % ophthalmic solution 1 drop 2 (Two) Times a Day.      Semaglutide, 1 MG/DOSE, (Ozempic, 1 MG/DOSE,) 4 MG/3ML solution pen-injector Inject 1 mg under the skin into the appropriate area as directed 1 (One) Time Per Week. Discontinue Ozempic 0.5 mg 9 mL 2    timolol (TIMOPTIC) 0.5 % ophthalmic solution 1 drop Daily. INSTILL 1 DROP IN LEFT EYE EVERY MORNING AS DIRECTED  6    Aspirin 81 MG capsule Take 81 mg by mouth Daily. (Patient not taking: Reported on 7/17/2024)      Omega 3 1000 MG capsule Take 1 capsule by mouth 2 (Two) Times a Day. (Patient not taking: Reported on 7/17/2024)       Current Facility-Administered Medications on File Prior to Visit   Medication Dose Route Frequency Provider Last Rate Last Admin    [COMPLETED] gadobenate dimeglumine (MULTIHANCE) injection 20 mL  20 mL Intravenous Once in imaging Mary Anne Ludwig APRN   17 mL at 07/21/24 1450       Past Medical History:   Diagnosis Date    Carpal tunnel syndrome on right     Chest pain     Degenerative disc disease, lumbar     Diverticulitis of  large intestine without perforation or abscess without bleeding 07/31/2018    ED (erectile dysfunction)     Erythrocytosis 07/05/2018    GERD (gastroesophageal reflux disease)     Hyperlipidemia     Kidney stones     Pneumonia due to infectious organism 04/14/2017    S/P angioplasty with stent     Stroke     Trapezius muscle spasm 03/19/2018    Tubular adenoma of colon 09/2019    Removed by colonoscopy by Dr. Weems       Family History   Problem Relation Age of Onset    Heart disease Mother     Prostate cancer Father     Heart disease Father     Colon polyps Daughter     Colon polyps Daughter        Social History     Socioeconomic History    Marital status:      Spouse name: Kerry    Years of education: College   Tobacco Use    Smoking status: Never    Smokeless tobacco: Never    Tobacco comments:     no caffeine   Vaping Use    Vaping status: Never Used   Substance and Sexual Activity    Alcohol use: No    Drug use: No    Sexual activity: Defer     Partners: Female       Past Surgical History:   Procedure Laterality Date    CARDIAC CATHETERIZATION N/A 02/10/2011    Normal LV systolic function; Kind of diffuse, small vessel, distal disease. Most of it is not amenable to PCI, but his JADEN lesion is and it is a large vessel; All of his prior durg-eluting stents are widely patent & look good, Dr. Anibal Oliveira    CATARACT EXTRACTION Bilateral     COLONOSCOPY N/A 09/13/2004    Normal, Repeat in 5-8 years, Dr. Davion Sanon    COLONOSCOPY N/A 09/04/2009    Mutiple small & large-mouthed diverticual were found in the entire colon; Exam otherwise normal, Dr. Davion Sanon    COLONOSCOPY N/A 9/26/2019    Procedure: COLONOSCOPY to  cecum with cold biopsy polypectomy;  Surgeon: Haider Weems MD;  Location: [facility] ENDOSCOPY;  Service: General    COLONOSCOPY W/ POLYPECTOMY N/A [id number]    2 benign polyps, [facility], Dr. Davion Sanon    CORONARY ANGIOPLASTY WITH STENT PLACEMENT N/A 11/20/2007     Successful multivessel drug eluting stent implantation for restenosis above his prior stenting, Dr. Anibal Wray    CORONARY ANGIOPLASTY WITH STENT PLACEMENT N/A 06/05/2007    Successful multivessel drug eluting stent implantation in all 3 of the major coronary arteries, Dr. Anibal Wray    CORONARY ANGIOPLASTY WITH STENT PLACEMENT N/A 02/10/2011    Successful drug-stenting of the JADEN, Dr. Anibal Oliveira    ENDOSCOPY N/A 11/21/2017    Procedure: ESOPHAGOGASTRODUODENOSCOPY WITH COLD BIOPSIES AND 53 F MICHAEL DILATION;  Surgeon: Wisam Potter MD;  Location: [facility] ENDOSCOPY;  Service:     EYE SURGERY Left 2011    Retina    HYDROCELECTOMY Left 08/19/2011    Dr. Ellie Stein    INCISION AND DRAINAGE PERIRECTAL ABSCESS N/A 11/30/2007    Dr. Davion Sanon    LUMBAR EPIDURAL INJECTION N/A 2012-2013    x 3    NEUROMA SURGERY Right 07/19/2005    Excision Neuroma Right Arm; PATH: CONSISTENT W/ LIPOMA,  Dr. Davion Sanon    ROTATOR CUFF REPAIR Right 2006    ROTATOR CUFF REPAIR Left 2000    UPPER GASTROINTESTINAL ENDOSCOPY N/A 09/06/2012    Normal esophagus; Normal Stomach, Normal examinded duodenum, Dr. Bruno Turk    WRIST FRACTURE SURGERY Right 06/03/2003    ORIF Right Distal Radius, Dr. Ryan Bravo       The following portions of the patient's history were reviewed and updated as appropriate: problem list, allergies, current medications, past medical history, and past social history.    Review of Systems    Immunization History   Administered Date(s) Administered    Fluzone High Dose =>65 Years ([employer] ONLY) 10/03/2016, 09/26/2017, 10/20/2018    Fluzone High-Dose 65+yrs 10/31/2022, 12/01/2023    Hepatitis A 10/01/2019    Pneumococcal Conjugate 13-Valent (PCV13) 11/22/2016    Pneumococcal Polysaccharide (PPSV23) 11/22/2010    Shingrix 01/01/2011, 01/01/2019    TD Preservative Free (Tenivac) 04/03/2023    Tdap 11/12/2017       Objective   Vitals:    07/22/24 1052   Temp: 98.2 °F (36.8 °C)   Weight: 84.8 kg  "(187 lb)   Height: 180.3 cm (70.98\")     Body mass index is 26.09 kg/m².  Physical Exam  Vitals reviewed.   Constitutional:       Appearance: Normal appearance. He is well-developed.   HENT:      Head: Normocephalic and atraumatic.      Right Ear: Tympanic membrane, ear canal and external ear normal. There is no impacted cerumen.      Left Ear: Tympanic membrane, ear canal and external ear normal. There is no impacted cerumen.      Nose: Nose normal.      Mouth/Throat:      Mouth: Mucous membranes are moist.      Pharynx: Oropharynx is clear. No oropharyngeal exudate or posterior oropharyngeal erythema.      Comments: +PND.   Neck:      Comments: FROM C-spine; pain with right cervical rotation.   Cardiovascular:      Rate and Rhythm: Normal rate and regular rhythm.      Heart sounds: Normal heart sounds, S1 normal and S2 normal.   Pulmonary:      Effort: Pulmonary effort is normal.      Breath sounds: Normal breath sounds.   Musculoskeletal:      Cervical back: Normal range of motion and neck supple.   Lymphadenopathy:      Cervical: No cervical adenopathy.   Skin:     General: Skin is warm and dry.   Neurological:      Mental Status: He is alert.   Psychiatric:         Mood and Affect: Mood normal.         Behavior: Behavior normal.         Procedures    Assessment & Plan   Diagnoses and all orders for this visit:    1. Left ear pain (Primary)  Comments:  No sign of infection. Tylenol 1000 mg tid advised.    2. Strain of neck muscle, initial encounter  Comments:  Upper left trapezius TTP. Tylenol 1000 mg tid advised as well as moist heat.    3. Stenosis of left vertebral artery  Comments:  New finding. Addtitional imaging as below. Continues Plavix daily given history CVA. LDL 60 and BP controlled. Schedule with Dr. Alvarenga.  Orders:  -     CT Angiogram Head With Contrast  -     CT Angiogram Neck With & Without Contrast  -     Ambulatory Referral to Neurosurgery    4. Abnormal magnetic resonance angiography (MRA) " of neck  -     CT Angiogram Neck With & Without Contrast    5. Abnormal MRA, head  -     CT Angiogram Head With Contrast    Reviewed imaging MRI head, MRA head and neck with radiology, Dr. Simon; no acute findings to account for pain behind left ear.     Records reviewed include previous OV with myself as well as labs.     Return in about 3 months (around 11/5/2024) for Medicare Wellness.

## 2024-07-23 ENCOUNTER — TREATMENT (OUTPATIENT)
Dept: PHYSICAL THERAPY | Facility: CLINIC | Age: 79
End: 2024-07-23
Payer: MEDICARE

## 2024-07-23 DIAGNOSIS — M21.372 LEFT FOOT DROP: Primary | ICD-10-CM

## 2024-07-23 DIAGNOSIS — R29.898 WEAKNESS OF LEFT FOOT: ICD-10-CM

## 2024-07-23 PROCEDURE — 97530 THERAPEUTIC ACTIVITIES: CPT | Performed by: PHYSICAL THERAPIST

## 2024-07-23 PROCEDURE — 97110 THERAPEUTIC EXERCISES: CPT | Performed by: PHYSICAL THERAPIST

## 2024-07-23 NOTE — PROGRESS NOTES
Physical Therapy Daily Treatment Note  [address], [address]  [place] [address]      Patient: Anthony Vauhgn   : 1945  Referring practitioner: Cesar Galaviz MD  Date of Initial Visit: Type: THERAPY  Noted: 2024  Today's Date: 2024  Patient seen for 2 sessions       Visit Diagnoses:    ICD-10-CM ICD-9-CM   1. Left foot drop  M21.372 736.79   2. Weakness of left foot  R29.898 734           Subjective   Patient reports that the foot feels the same.    Objective   See Exercise, Manual, and Modality Logs for complete treatment.   Added squats and standing knee flexion.    Assessment/Plan  Subjective reports remain the same.  Added squats and standing knee flexion for LE strengthening which will improve stability with gait and stairs.  Patient tolerated the increase in the exercise routine very well, no visual or verbal signs of pain.      Timed:         Manual Therapy:    0     mins  91096;     Therapeutic Exercise:    15     mins  86766;     Neuromuscular Maggie:    0    mins  01548;    Therapeutic Activity:     8     mins  30405;     Gait Training      0    mins  43812;  Work Conditioning     0   mins  80624       Untimed:  Electrical Stimulation:    0     mins  21518 ( );      Timed Treatment:   23   mins   Total Treatment:     23   mins    Valente Salter, PT  [place] License: [id number]

## 2024-07-28 ENCOUNTER — TELEPHONE (OUTPATIENT)
Dept: INTERNAL MEDICINE | Facility: CLINIC | Age: 79
End: 2024-07-28
Payer: MEDICARE

## 2024-07-28 NOTE — TELEPHONE ENCOUNTER
Mil--I placed an order a week ago to schedule him with Dr. Becerra regarding stenosis of the left vertebral artery. He has not yet heard from their office. Would you please call and be sure they are going to call him this week. Also orders were placed for additional imaging (CTA neck and head) but are not yet scheduled. Please help!    T--Please let him know I am scheduling him with Dr. Becerra regarding left vertebral artery stenosis, not Dr. Duvall.

## 2024-07-29 ENCOUNTER — TREATMENT (OUTPATIENT)
Dept: PHYSICAL THERAPY | Facility: CLINIC | Age: 79
End: 2024-07-29
Payer: MEDICARE

## 2024-07-29 DIAGNOSIS — R29.898 WEAKNESS OF LEFT FOOT: ICD-10-CM

## 2024-07-29 DIAGNOSIS — M21.372 LEFT FOOT DROP: Primary | ICD-10-CM

## 2024-07-29 PROCEDURE — 97530 THERAPEUTIC ACTIVITIES: CPT | Performed by: PHYSICAL THERAPIST

## 2024-07-29 PROCEDURE — 97110 THERAPEUTIC EXERCISES: CPT | Performed by: PHYSICAL THERAPIST

## 2024-07-29 NOTE — PROGRESS NOTES
Subjective   Patient ID: Anthony Vaughn is a 79 y.o. male is being seen for consultation today at the request of Mary Anne Ludwig,* Stenosis of left vertebral artery. MRI brain and MRI angiogram head and neck completed on 07-.    History of Present Illness    The following portions of the patient's history were reviewed and updated as appropriate: He  has a past medical history of Carpal tunnel syndrome on right, Chest pain, Degenerative disc disease, lumbar, Diverticulitis of large intestine without perforation or abscess without bleeding (07/31/2018), ED (erectile dysfunction), Erythrocytosis (07/05/2018), GERD (gastroesophageal reflux disease), Hyperlipidemia, Kidney stones, Pneumonia due to infectious organism (04/14/2017), S/P angioplasty with stent, Stroke, Trapezius muscle spasm (03/19/2018), and Tubular adenoma of colon (09/2019).  He does not have any pertinent problems on file.  He  has a past surgical history that includes Colonoscopy w/ polypectomy (N/A, 6149-6875); Esophagogastroduodenoscopy (N/A, 11/21/2017); Cardiac catheterization (N/A, 02/10/2011); Lumbar epidural injection (N/A, 8820-0636); Hydrocelectomy (Left, 08/19/2011); Incision and drainage perirectal abscess (N/A, 11/30/2007); Coronary angioplasty with stent (N/A, 11/20/2007); Coronary angioplasty with stent (N/A, 06/05/2007); Coronary angioplasty with stent (N/A, 02/10/2011); Colonoscopy (N/A, 09/13/2004); Wrist fracture surgery (Right, 06/03/2003); Upper gastrointestinal endoscopy (N/A, 09/06/2012); Colonoscopy (N/A, 09/04/2009); Neuroma surgery (Right, 07/19/2005); Cataract Extraction (Bilateral); Rotator cuff repair (Right, 2006); Rotator cuff repair (Left, 2000); Eye surgery (Left, 2011); and Colonoscopy (N/A, 9/26/2019).  His family history includes Colon polyps in his daughter and daughter; Heart disease in his father and mother; Prostate cancer in his father.  He  reports that he has never smoked. He has never used  smokeless tobacco. He reports that he does not drink alcohol and does not use drugs.  Current Outpatient Medications   Medication Sig Dispense Refill   • acyclovir (ZOVIRAX) 400 MG tablet Take 1 tablet by mouth As Needed. Take no more than 5 doses a day.     • atorvastatin (LIPITOR) 10 MG tablet Take 1 tablet by mouth Daily.     • brimonidine (ALPHAGAN) 0.15 % ophthalmic solution Apply 1 drop to eye 2 (two) times a day.     • clopidogrel (PLAVIX) 75 MG tablet Take 1 tablet by mouth Every Night.     • Continuous Blood Gluc  (FreeStyle Juwan 2 [place]) device 1 each Daily. 1 each 1   • Continuous Blood Gluc Sensor (FreeStyle Juwan 2 Sensor) misc 1 each Every 14 (Fourteen) Days. 6 each 2   • Glucose 15 GM/32ML gel Take  by mouth.     • insulin aspart (novoLOG FLEXPEN) 100 UNIT/ML solution pen-injector sc pen 40 to 45 units with each meal     • insulin degludec (Tresiba FlexTouch) 100 UNIT/ML solution pen-injector injection 40 units every evening 36 mL 2   • isosorbide mononitrate (IMDUR) 30 MG 24 hr tablet Take 1 tablet by mouth Daily.     • latanoprost (XALATAN) 0.005 % ophthalmic solution Apply 1 drop to eye(s) as directed by provider Daily.     • lisinopril (PRINIVIL,ZESTRIL) 40 MG tablet Take 1 tablet by mouth Daily. 90 tablet 1   • metFORMIN (GLUCOPHAGE) 1000 MG tablet Take 1 tablet by mouth 2 (Two) Times a Day With Meals. Take 2 tablets bid     • metoprolol succinate XL (TOPROL-XL) 50 MG 24 hr tablet Take 1.5 tablets by mouth Daily.     • NIFEdipine XL (PROCARDIA XL) 60 MG 24 hr tablet Take 30 mg by mouth Daily.     • nitroglycerin (NITROSTAT) 0.4 MG SL tablet Place 1 tablet under the tongue Every 5 (Five) Minutes As Needed for Chest Pain. Take no more than 3 doses in 15 minutes.     • omeprazole (priLOSEC) 20 MG capsule Take 1 capsule by mouth Daily.     • pilocarpine (PILOCAR) 1 % ophthalmic solution 1 drop 2 (Two) Times a Day.     • Semaglutide, 1 MG/DOSE, (Ozempic, 1 MG/DOSE,) 4 MG/3ML solution  pen-injector Inject 1 mg under the skin into the appropriate area as directed 1 (One) Time Per Week. Discontinue Ozempic 0.5 mg 9 mL 2   • timolol (TIMOPTIC) 0.5 % ophthalmic solution 1 drop Daily. INSTILL 1 DROP IN LEFT EYE EVERY MORNING AS DIRECTED  6   • Aspirin 81 MG capsule Take 81 mg by mouth Daily. (Patient not taking: Reported on 8/6/2024)     • Omega 3 1000 MG capsule Take 1 capsule by mouth 2 (Two) Times a Day. (Patient not taking: Reported on 7/17/2024)       No current facility-administered medications for this visit.     Current Outpatient Medications on File Prior to Visit   Medication Sig   • acyclovir (ZOVIRAX) 400 MG tablet Take 1 tablet by mouth As Needed. Take no more than 5 doses a day.   • atorvastatin (LIPITOR) 10 MG tablet Take 1 tablet by mouth Daily.   • brimonidine (ALPHAGAN) 0.15 % ophthalmic solution Apply 1 drop to eye 2 (two) times a day.   • clopidogrel (PLAVIX) 75 MG tablet Take 1 tablet by mouth Every Night.   • Continuous Blood Gluc  (FreeStyle Juwan 2 [place]) device 1 each Daily.   • Continuous Blood Gluc Sensor (FreeStyle Juwan 2 Sensor) misc 1 each Every 14 (Fourteen) Days.   • Glucose 15 GM/32ML gel Take  by mouth.   • insulin aspart (novoLOG FLEXPEN) 100 UNIT/ML solution pen-injector sc pen 40 to 45 units with each meal   • insulin degludec (Tresiba FlexTouch) 100 UNIT/ML solution pen-injector injection 40 units every evening   • isosorbide mononitrate (IMDUR) 30 MG 24 hr tablet Take 1 tablet by mouth Daily.   • latanoprost (XALATAN) 0.005 % ophthalmic solution Apply 1 drop to eye(s) as directed by provider Daily.   • lisinopril (PRINIVIL,ZESTRIL) 40 MG tablet Take 1 tablet by mouth Daily.   • metFORMIN (GLUCOPHAGE) 1000 MG tablet Take 1 tablet by mouth 2 (Two) Times a Day With Meals. Take 2 tablets bid   • metoprolol succinate XL (TOPROL-XL) 50 MG 24 hr tablet Take 1.5 tablets by mouth Daily.   • NIFEdipine XL (PROCARDIA XL) 60 MG 24 hr tablet Take 30 mg by mouth  "Daily.   • nitroglycerin (NITROSTAT) 0.4 MG SL tablet Place 1 tablet under the tongue Every 5 (Five) Minutes As Needed for Chest Pain. Take no more than 3 doses in 15 minutes.   • omeprazole (priLOSEC) 20 MG capsule Take 1 capsule by mouth Daily.   • pilocarpine (PILOCAR) 1 % ophthalmic solution 1 drop 2 (Two) Times a Day.   • Semaglutide, 1 MG/DOSE, (Ozempic, 1 MG/DOSE,) 4 MG/3ML solution pen-injector Inject 1 mg under the skin into the appropriate area as directed 1 (One) Time Per Week. Discontinue Ozempic 0.5 mg   • timolol (TIMOPTIC) 0.5 % ophthalmic solution 1 drop Daily. INSTILL 1 DROP IN LEFT EYE EVERY MORNING AS DIRECTED   • Aspirin 81 MG capsule Take 81 mg by mouth Daily. (Patient not taking: Reported on 8/6/2024)   • Omega 3 1000 MG capsule Take 1 capsule by mouth 2 (Two) Times a Day. (Patient not taking: Reported on 7/17/2024)     No current facility-administered medications on file prior to visit.     He has No Known Allergies..    Review of Systems    Objective     Vitals:    08/06/24 0850   BP: 110/60   Pulse: 58   Temp: 97.1 °F (36.2 °C)   SpO2: 100%   Weight: 86.2 kg (190 lb)   Height: 180 cm (70.87\")     Body mass index is 26.6 kg/m².      Physical Exam  Vitals and nursing note reviewed.   Constitutional:       General: He is not in acute distress.     Appearance: Normal appearance.   Eyes:      Extraocular Movements: Extraocular movements intact.      Conjunctiva/sclera: Conjunctivae normal.   Cardiovascular:      Rate and Rhythm: Normal rate.   Pulmonary:      Effort: Pulmonary effort is normal.   Musculoskeletal:         General: Normal range of motion.      Cervical back: Normal range of motion.   Skin:     General: Skin is warm and dry.   Neurological:      General: No focal deficit present.      Mental Status: He is alert and oriented to person, place, and time.      Cranial Nerves: No cranial nerve deficit.      Sensory: No sensory deficit.      Motor: No weakness.      Coordination: " Coordination normal.      Gait: Gait normal.   Neurologic Exam     Mental Status   Oriented to person, place, and time.         Assessment & Plan   Independent Review of Radiographic Studies:      I personally reviewed the images from the following studies.    ***    Medical Decision Making:      ***   Diagnoses and all orders for this visit:    1. Intracranial carotid stenosis, right (Primary)    2. Stenosis of left vertebral artery    3. Essential hypertension    4. Hyperlipidemia, unspecified hyperlipidemia type    5. Type 2 diabetes mellitus with microalbuminuria, with long-term current use of insulin    6. Coronary artery disease involving native coronary artery of native heart without angina pectoris      Return in about 1 week (around 8/13/2024) for Recheck, CTA Head/ Neck, Next scheduled follow up.

## 2024-07-29 NOTE — PROGRESS NOTES
Physical Therapy Daily Treatment Note  [address], [address]  [place] [address]      Patient: Anthony Vaughn   : 1945  Referring practitioner: Cesar Galaviz MD  Date of Initial Visit: Type: THERAPY  Noted: 2024  Today's Date: 2024  Patient seen for 3 sessions       Visit Diagnoses:    ICD-10-CM ICD-9-CM   1. Left foot drop  M21.372 736.79   2. Weakness of left foot  R29.898 734           Subjective   Patient reports no big change so far.    Objective   See Exercise, Manual, and Modality Logs for complete treatment.       Assessment/Plan  Subjective reports remain unchanged.  Added standing calf raise in addition to progressing the previous strengthening exercises.  Patient performed the exercises without reports of pain in the foot or ankle.  Patient continues to have foot drop on the left.      Timed:         Manual Therapy:    0     mins  89539;     Therapeutic Exercise:    16     mins  10460;    Neuromuscular Maggie:    0    mins  63586;    Therapeutic Activity:     8     mins  32435;     Gait Training      0    mins  85968;  Work Conditioning     0   mins  00519       Untimed:  Electrical Stimulation:    0     mins  16931 ( );      Timed Treatment:   24   mins   Total Treatment:     24   mins    Valente Salter, PT  [place] License: [id number]

## 2024-08-05 ENCOUNTER — TREATMENT (OUTPATIENT)
Dept: PHYSICAL THERAPY | Facility: CLINIC | Age: 79
End: 2024-08-05
Payer: MEDICARE

## 2024-08-05 DIAGNOSIS — M21.372 LEFT FOOT DROP: Primary | ICD-10-CM

## 2024-08-05 DIAGNOSIS — R29.898 WEAKNESS OF LEFT FOOT: ICD-10-CM

## 2024-08-05 PROCEDURE — 97110 THERAPEUTIC EXERCISES: CPT | Performed by: PHYSICAL THERAPIST

## 2024-08-05 PROCEDURE — 97530 THERAPEUTIC ACTIVITIES: CPT | Performed by: PHYSICAL THERAPIST

## 2024-08-05 NOTE — PROGRESS NOTES
Physical Therapy Daily Treatment Note  [address], [address]  [place] [address]      Patient: Anthony Vaughn   : 1945  Referring practitioner: Cesar Galaviz MD  Date of Initial Visit: Type: THERAPY  Noted: 2024  Today's Date: 2024  Patient seen for 4 sessions       Visit Diagnoses:    ICD-10-CM ICD-9-CM   1. Left foot drop  M21.372 736.79   2. Weakness of left foot  R29.898 734         Subjective   Patient reports that the foot is about the same.     Objective   See Exercise, Manual, and Modality Logs for complete treatment.       Assessment/Plan  Subjective reports remain unchanged.  Feel that the patient has a good HEP that he can continue at this time.  Feel that since the foot has bothered him for so long, it make take another couple months to notice a difference.  Reiterated to the patient the importance of being diligent with the HEP and to reach out with any questions or concerns, which he was agreeable to.  Patient performed the routine without issues.      Timed:         Manual Therapy:    0     mins  31993;     Therapeutic Exercise:    17     mins  10396;     Neuromuscular Maggie:    0    mins  30052;    Therapeutic Activity:     8     mins  92616;     Gait Training      0    mins  22871;  Work Conditioning     0   mins  12806       Untimed:  Electrical Stimulation:    0     mins  61991 ( );      Timed Treatment:   25   mins   Total Treatment:     25   mins    Valente Salter, PT  [place] License: [id number]

## 2024-08-06 ENCOUNTER — OFFICE VISIT (OUTPATIENT)
Dept: NEUROSURGERY | Facility: CLINIC | Age: 79
End: 2024-08-06
Payer: MEDICARE

## 2024-08-06 VITALS
DIASTOLIC BLOOD PRESSURE: 60 MMHG | HEART RATE: 58 BPM | SYSTOLIC BLOOD PRESSURE: 110 MMHG | OXYGEN SATURATION: 100 % | TEMPERATURE: 97.1 F | BODY MASS INDEX: 26.6 KG/M2 | HEIGHT: 71 IN | WEIGHT: 190 LBS

## 2024-08-06 DIAGNOSIS — E11.29 TYPE 2 DIABETES MELLITUS WITH MICROALBUMINURIA, WITH LONG-TERM CURRENT USE OF INSULIN: ICD-10-CM

## 2024-08-06 DIAGNOSIS — I65.21 INTRACRANIAL CAROTID STENOSIS, RIGHT: Primary | ICD-10-CM

## 2024-08-06 DIAGNOSIS — R80.9 TYPE 2 DIABETES MELLITUS WITH MICROALBUMINURIA, WITH LONG-TERM CURRENT USE OF INSULIN: ICD-10-CM

## 2024-08-06 DIAGNOSIS — E78.5 HYPERLIPIDEMIA, UNSPECIFIED HYPERLIPIDEMIA TYPE: Chronic | ICD-10-CM

## 2024-08-06 DIAGNOSIS — I65.02 STENOSIS OF LEFT VERTEBRAL ARTERY: ICD-10-CM

## 2024-08-06 DIAGNOSIS — I10 ESSENTIAL HYPERTENSION: Chronic | ICD-10-CM

## 2024-08-06 DIAGNOSIS — Z79.4 TYPE 2 DIABETES MELLITUS WITH MICROALBUMINURIA, WITH LONG-TERM CURRENT USE OF INSULIN: ICD-10-CM

## 2024-08-06 DIAGNOSIS — I25.10 CORONARY ARTERY DISEASE INVOLVING NATIVE CORONARY ARTERY OF NATIVE HEART WITHOUT ANGINA PECTORIS: ICD-10-CM

## 2024-08-06 PROCEDURE — 99204 OFFICE O/P NEW MOD 45 MIN: CPT | Performed by: RADIOLOGY

## 2024-08-06 PROCEDURE — 1159F MED LIST DOCD IN RCRD: CPT | Performed by: RADIOLOGY

## 2024-08-06 PROCEDURE — 3074F SYST BP LT 130 MM HG: CPT | Performed by: RADIOLOGY

## 2024-08-06 PROCEDURE — 3078F DIAST BP <80 MM HG: CPT | Performed by: RADIOLOGY

## 2024-08-06 PROCEDURE — 1160F RVW MEDS BY RX/DR IN RCRD: CPT | Performed by: RADIOLOGY

## 2024-08-06 NOTE — PROGRESS NOTES
Subjective   Patient ID: Anthony Vaughn is a 79 y.o. male is being seen for consultation today at the request of Mary Anne Ludwig,* for stenosis of left vertebral artery. Imaging completed on 07-.    History of Present Illness  79 y.o. male with history of hypertension, hyperlipidemia, diabetes and coronary artery disease who presented to his primary care physician with complaining of left ear and head soreness that have been ongoing for several days.  He denied fever or chills. He has taken Tylenol 1000 mg x2 doses without relief. He reports that soreness as almost shooting pain located in an area behind his left ear that is tender to touch. Denies sinus congestion, sore throat or cough. Hurts when he turns his head to the right. No recent change in pillows or activity. He had MRI head, MRA head and MRA neck imaging for previously reported stroke like symptoms including left face and foot weakness in June 2024.  Patient is on aspirin and Plavix currently.  He presents today to discuss his MR angiography findings.    The following portions of the patient's history were reviewed and updated as appropriate: He  has a past medical history of Carpal tunnel syndrome on right, Chest pain, Degenerative disc disease, lumbar, Diverticulitis of large intestine without perforation or abscess without bleeding (07/31/2018), ED (erectile dysfunction), Erythrocytosis (07/05/2018), GERD (gastroesophageal reflux disease), Hyperlipidemia, Kidney stones, Pneumonia due to infectious organism (04/14/2017), S/P angioplasty with stent, Stroke, Trapezius muscle spasm (03/19/2018), and Tubular adenoma of colon (09/2019).  He does not have any pertinent problems on file.  He  has a past surgical history that includes Colonoscopy w/ polypectomy (N/A, 4756-8934); Esophagogastroduodenoscopy (N/A, 11/21/2017); Cardiac catheterization (N/A, 02/10/2011); Lumbar epidural injection (N/A, 5277-9301); Hydrocelectomy (Left, 08/19/2011);  Incision and drainage perirectal abscess (N/A, 11/30/2007); Coronary angioplasty with stent (N/A, 11/20/2007); Coronary angioplasty with stent (N/A, 06/05/2007); Coronary angioplasty with stent (N/A, 02/10/2011); Colonoscopy (N/A, 09/13/2004); Wrist fracture surgery (Right, 06/03/2003); Upper gastrointestinal endoscopy (N/A, 09/06/2012); Colonoscopy (N/A, 09/04/2009); Neuroma surgery (Right, 07/19/2005); Cataract Extraction (Bilateral); Rotator cuff repair (Right, 2006); Rotator cuff repair (Left, 2000); Eye surgery (Left, 2011); and Colonoscopy (N/A, 9/26/2019).  His family history includes Colon polyps in his daughter and daughter; Heart disease in his father and mother; Prostate cancer in his father.  He  reports that he has never smoked. He has never used smokeless tobacco. He reports that he does not drink alcohol and does not use drugs.  Current Outpatient Medications   Medication Sig Dispense Refill    acyclovir (ZOVIRAX) 400 MG tablet Take 1 tablet by mouth As Needed. Take no more than 5 doses a day.      atorvastatin (LIPITOR) 10 MG tablet Take 1 tablet by mouth Daily.      brimonidine (ALPHAGAN) 0.15 % ophthalmic solution Apply 1 drop to eye 2 (two) times a day.      clopidogrel (PLAVIX) 75 MG tablet Take 1 tablet by mouth Every Night.      Continuous Blood Gluc  (FreeStyle Juwan 2 [place]) device 1 each Daily. 1 each 1    Continuous Blood Gluc Sensor (FreeStyle Juwan 2 Sensor) misc 1 each Every 14 (Fourteen) Days. 6 each 2    Glucose 15 GM/32ML gel Take  by mouth.      insulin aspart (novoLOG FLEXPEN) 100 UNIT/ML solution pen-injector sc pen 40 to 45 units with each meal      insulin degludec (Tresiba FlexTouch) 100 UNIT/ML solution pen-injector injection 40 units every evening 36 mL 2    isosorbide mononitrate (IMDUR) 30 MG 24 hr tablet Take 1 tablet by mouth Daily.      latanoprost (XALATAN) 0.005 % ophthalmic solution Apply 1 drop to eye(s) as directed by provider Daily.      lisinopril  (PRINIVIL,ZESTRIL) 40 MG tablet Take 1 tablet by mouth Daily. 90 tablet 1    metFORMIN (GLUCOPHAGE) 1000 MG tablet Take 1 tablet by mouth 2 (Two) Times a Day With Meals. Take 2 tablets bid      metoprolol succinate XL (TOPROL-XL) 50 MG 24 hr tablet Take 1.5 tablets by mouth Daily.      NIFEdipine XL (PROCARDIA XL) 60 MG 24 hr tablet Take 30 mg by mouth Daily.      nitroglycerin (NITROSTAT) 0.4 MG SL tablet Place 1 tablet under the tongue Every 5 (Five) Minutes As Needed for Chest Pain. Take no more than 3 doses in 15 minutes.      omeprazole (priLOSEC) 20 MG capsule Take 1 capsule by mouth Daily.      pilocarpine (PILOCAR) 1 % ophthalmic solution 1 drop 2 (Two) Times a Day.      Semaglutide, 1 MG/DOSE, (Ozempic, 1 MG/DOSE,) 4 MG/3ML solution pen-injector Inject 1 mg under the skin into the appropriate area as directed 1 (One) Time Per Week. Discontinue Ozempic 0.5 mg 9 mL 2    timolol (TIMOPTIC) 0.5 % ophthalmic solution 1 drop Daily. INSTILL 1 DROP IN LEFT EYE EVERY MORNING AS DIRECTED  6    Aspirin 81 MG capsule Take 81 mg by mouth Daily. (Patient not taking: Reported on 8/6/2024)      Omega 3 1000 MG capsule Take 1 capsule by mouth 2 (Two) Times a Day. (Patient not taking: Reported on 7/17/2024)       No current facility-administered medications for this visit.     Current Outpatient Medications on File Prior to Visit   Medication Sig    acyclovir (ZOVIRAX) 400 MG tablet Take 1 tablet by mouth As Needed. Take no more than 5 doses a day.    atorvastatin (LIPITOR) 10 MG tablet Take 1 tablet by mouth Daily.    brimonidine (ALPHAGAN) 0.15 % ophthalmic solution Apply 1 drop to eye 2 (two) times a day.    clopidogrel (PLAVIX) 75 MG tablet Take 1 tablet by mouth Every Night.    Continuous Blood Gluc  (FreeStyle Juwan 2 [place]) device 1 each Daily.    Continuous Blood Gluc Sensor (FreeStyle Juwan 2 Sensor) misc 1 each Every 14 (Fourteen) Days.    Glucose 15 GM/32ML gel Take  by mouth.    insulin aspart (novoLOG  FLEXPEN) 100 UNIT/ML solution pen-injector sc pen 40 to 45 units with each meal    insulin degludec (Tresiba FlexTouch) 100 UNIT/ML solution pen-injector injection 40 units every evening    isosorbide mononitrate (IMDUR) 30 MG 24 hr tablet Take 1 tablet by mouth Daily.    latanoprost (XALATAN) 0.005 % ophthalmic solution Apply 1 drop to eye(s) as directed by provider Daily.    lisinopril (PRINIVIL,ZESTRIL) 40 MG tablet Take 1 tablet by mouth Daily.    metFORMIN (GLUCOPHAGE) 1000 MG tablet Take 1 tablet by mouth 2 (Two) Times a Day With Meals. Take 2 tablets bid    metoprolol succinate XL (TOPROL-XL) 50 MG 24 hr tablet Take 1.5 tablets by mouth Daily.    NIFEdipine XL (PROCARDIA XL) 60 MG 24 hr tablet Take 30 mg by mouth Daily.    nitroglycerin (NITROSTAT) 0.4 MG SL tablet Place 1 tablet under the tongue Every 5 (Five) Minutes As Needed for Chest Pain. Take no more than 3 doses in 15 minutes.    omeprazole (priLOSEC) 20 MG capsule Take 1 capsule by mouth Daily.    pilocarpine (PILOCAR) 1 % ophthalmic solution 1 drop 2 (Two) Times a Day.    Semaglutide, 1 MG/DOSE, (Ozempic, 1 MG/DOSE,) 4 MG/3ML solution pen-injector Inject 1 mg under the skin into the appropriate area as directed 1 (One) Time Per Week. Discontinue Ozempic 0.5 mg    timolol (TIMOPTIC) 0.5 % ophthalmic solution 1 drop Daily. INSTILL 1 DROP IN LEFT EYE EVERY MORNING AS DIRECTED    Aspirin 81 MG capsule Take 81 mg by mouth Daily. (Patient not taking: Reported on 8/6/2024)    Omega 3 1000 MG capsule Take 1 capsule by mouth 2 (Two) Times a Day. (Patient not taking: Reported on 7/17/2024)     No current facility-administered medications on file prior to visit.     He has No Known Allergies..    Review of Systems   Neurological:  Positive for numbness (behind left ear tender to touch). Negative for light-headedness and headaches.   All other systems reviewed and are negative.    Objective     Vitals:    08/06/24 0850   BP: 110/60   Pulse: 58   Temp: 97.1 °F  "(36.2 °C)   SpO2: 100%   Weight: 86.2 kg (190 lb)   Height: 180 cm (70.87\")     Body mass index is 26.6 kg/m².      Physical Exam  Vitals and nursing note reviewed.   Constitutional:       General: He is not in acute distress.     Appearance: Normal appearance.   HENT:      Head: Normocephalic.   Eyes:      Extraocular Movements: Extraocular movements intact.      Conjunctiva/sclera: Conjunctivae normal.   Cardiovascular:      Rate and Rhythm: Normal rate.   Pulmonary:      Effort: Pulmonary effort is normal.   Musculoskeletal:         General: Normal range of motion.      Cervical back: Normal range of motion.   Skin:     General: Skin is warm and dry.   Neurological:      General: No focal deficit present.      Mental Status: He is oriented to person, place, and time.      Cranial Nerves: No cranial nerve deficit.      Sensory: No sensory deficit.      Motor: No weakness.      Coordination: Coordination normal.       Neurologic Exam     Mental Status   Oriented to person, place, and time.     Assessment & Plan   Independent Review of Radiographic Studies:      I personally reviewed the images from the following studies.    The MR angiogram dated 2024 was reviewed with the patient and compared to the prior study of 3/1/2018 in which there has been development of a right cavernous carotid stenosis that is severe.  The left vertebral artery intracranial stenosis remains unchanged.  The patient is to undergo a confirmatory CTA in the next few days.    Medical Decision Makin-year-old male with history of coronary artery disease status post stent placement with insulin-dependent diabetes, hyperlipidemia, hypertension who has had headache and some face tingling in the past but no focal neurologic deficit reported.  During imaging workup for some nonspecific neurologic complaints including headache and face tingling as well as what he feels is a changing gait.  The right ICA intracranial stenosis appears " severe on the MRA but this can be overestimated by this technique and waiting for the CTA should be helpful.  Patient has no focal neurologic deficits that are persistent.  Depending on the appearance on the CT angiogram a conventional angiogram may be necessary for further evaluation.  The right MCA territory is isolated and progression of the stenosis or development of thrombus would certainly have a devastating effect.  Diagnoses and all orders for this visit:    1. Intracranial carotid stenosis, right (Primary)  -     Ambulatory Referral to Neurology    2. Stenosis of left vertebral artery  -     Ambulatory Referral to Neurology    3. Essential hypertension  -     Ambulatory Referral to Neurology    4. Hyperlipidemia, unspecified hyperlipidemia type  -     Ambulatory Referral to Neurology    5. Type 2 diabetes mellitus with microalbuminuria, with long-term current use of insulin  -     Ambulatory Referral to Neurology    6. Coronary artery disease involving native coronary artery of native heart without angina pectoris      Return in about 1 month (around 9/6/2024) after Referral to stroke neurology for urgent assessment of stroke risk.  Potential candidate for intracranial stenting of the right ICA.

## 2024-08-07 ENCOUNTER — HOSPITAL ENCOUNTER (OUTPATIENT)
Facility: HOSPITAL | Age: 79
Discharge: HOME | End: 2024-08-07
Admitting: NURSE PRACTITIONER
Payer: MEDICARE

## 2024-08-07 PROCEDURE — 70498 CT ANGIOGRAPHY NECK: CPT

## 2024-08-07 PROCEDURE — 25510000001 IOPAMIDOL PER 1 ML: Performed by: NURSE PRACTITIONER

## 2024-08-07 PROCEDURE — 70496 CT ANGIOGRAPHY HEAD: CPT

## 2024-08-07 RX ADMIN — IOPAMIDOL 100 ML: 755 INJECTION, SOLUTION INTRAVENOUS at 16:39

## 2024-08-08 ENCOUNTER — TELEPHONE (OUTPATIENT)
Dept: INTERNAL MEDICINE | Facility: CLINIC | Age: 79
End: 2024-08-08
Payer: MEDICARE

## 2024-08-08 NOTE — TELEPHONE ENCOUNTER
"Relay     \"CTA of neck and head does show moderate to severe stenosis. I know he saw Dr. Alvarenga on Tuesday and they are scheduling him with neurology. Please let me know when he is seeing Dr. Khanna. I have sent Dr. Alvarenga a message letting him know his imaging has been completed.  \"        "

## 2024-08-09 ENCOUNTER — TELEPHONE (OUTPATIENT)
Dept: NEUROLOGY | Facility: CLINIC | Age: 79
End: 2024-08-09
Payer: MEDICARE

## 2024-08-09 ENCOUNTER — PATIENT ROUNDING (BHMG ONLY) (OUTPATIENT)
Dept: NEUROSURGERY | Facility: CLINIC | Age: 79
End: 2024-08-09
Payer: MEDICARE

## 2024-08-09 NOTE — TELEPHONE ENCOUNTER
Per Dr. Khanna, patient can be added to his schedule for Monday 8/12/2024 at 7:30.  Called patient to schedule.  He states that he is not home and will call back.

## 2024-08-12 ENCOUNTER — OFFICE VISIT (OUTPATIENT)
Dept: NEUROLOGY | Facility: CLINIC | Age: 79
End: 2024-08-12
Payer: MEDICARE

## 2024-08-12 VITALS
OXYGEN SATURATION: 98 % | HEIGHT: 71 IN | DIASTOLIC BLOOD PRESSURE: 60 MMHG | WEIGHT: 191 LBS | BODY MASS INDEX: 26.74 KG/M2 | HEART RATE: 69 BPM | SYSTOLIC BLOOD PRESSURE: 110 MMHG

## 2024-08-12 DIAGNOSIS — I65.02 ASYMPTOMATIC STENOSIS OF LEFT VERTEBRAL ARTERY: ICD-10-CM

## 2024-08-12 DIAGNOSIS — I65.22 LEFT CAROTID STENOSIS: Primary | ICD-10-CM

## 2024-08-12 DIAGNOSIS — E53.8 B12 DEFICIENCY: ICD-10-CM

## 2024-08-12 DIAGNOSIS — I65.21 INTRACRANIAL CAROTID STENOSIS, RIGHT: ICD-10-CM

## 2024-08-12 DIAGNOSIS — M21.372 LEFT FOOT DROP: ICD-10-CM

## 2024-08-12 PROCEDURE — 3078F DIAST BP <80 MM HG: CPT | Performed by: PSYCHIATRY & NEUROLOGY

## 2024-08-12 PROCEDURE — 99204 OFFICE O/P NEW MOD 45 MIN: CPT | Performed by: PSYCHIATRY & NEUROLOGY

## 2024-08-12 PROCEDURE — 3074F SYST BP LT 130 MM HG: CPT | Performed by: PSYCHIATRY & NEUROLOGY

## 2024-08-12 PROCEDURE — G2211 COMPLEX E/M VISIT ADD ON: HCPCS | Performed by: PSYCHIATRY & NEUROLOGY

## 2024-08-12 RX ORDER — LANOLIN ALCOHOL/MO/W.PET/CERES
1000 CREAM (GRAM) TOPICAL DAILY
Qty: 30 TABLET | Refills: 3 | Status: SHIPPED | OUTPATIENT
Start: 2024-08-12

## 2024-08-12 RX ORDER — INSULIN GLARGINE-YFGN 100 [IU]/ML
INJECTION, SOLUTION SUBCUTANEOUS
COMMUNITY
Start: 2024-02-15

## 2024-08-12 NOTE — LETTER
2024       No Recipients    Patient: Anthony Vaughn   YOB: 1945   Date of Visit: 2024     Dear ELBA Stout:       Thank you for referring Anthony Vaughn to me for evaluation. Below are the relevant portions of my assessment and plan of care.    If you have questions, please do not hesitate to call me. I look forward to following Anthony along with you.         Sincerely,        Javier Khanna MD        CC:   No Recipients    Javier Khanna MD  [phone]  Sign when Signing Visit  DOS: 2024  NAME: Anthony Vaughn   : 1945  PCP: Mary Anne Ludwig APRN  Chief Complaint   Patient presents with   • Intracranial carotid stenosis     NP       Chief complaint: Referred for abnormal imaging  Subjective: New patient to me today.  This is an 79-year-old man with history of a TIA in 2018, insulin-dependent diabetes, mixed hyperlipidemia, possible myelodysplastic syndrome.    In 2018 he was seen for blurred vision.  CTA was done which showed moderate to severe left V1 stenosis, moderate left ICA stenosis, right ICA carotid siphon stenosis.  He was started on DAPT and remained asymptomatic on follow-up.  He was released from the office.  He complained recently to his primary care doctor of left retroauricular pain.  An MRI of the brain was done.  This was negative but incidentally showed abnormal signal in the right ICA.  He was referred to Dr. Alvarenga who ordered a CTA.  This showed the same findings as described above but slightly worse compared to prior.  The patient denies any warning signs of stroke in the last 6 years.  He complains of left retroauricular pain which has improved.  He also complains of left foot drop for the last 3 months and recently completed physical therapy for that.  He has no other complaints today.    He reports that he has been continued on Plavix monotherapy.  B12 was recently checked but he was not started on  "replacement.    Objective:  Vital signs: /60   Pulse 69   Ht 180.3 cm (71\")   Wt 86.6 kg (191 lb)   SpO2 98%   BMI 26.64 kg/m²    Exam:  vitals reviewed  MS: oriented x3, recent/remote memory intact, normal attention/concentration, language intact, no neglect.  CN: visual acuity grossly normal, visual fields full, PERRL, EOMI, no facial droop, no dysarthria  Motor: 5/5 throughout upper extremities, 5/5 right hip flexion, 5/5 right knee flexion and extension, 4+/5 right foot plantarflexion, 5/5 right foot dorsiflexion, 4+/5 left hip flexion, 5/5 left knee extension and flexion, 4+/5 left foot plantarflexion, 4/5 left foot dorsiflexion  Sensory: Diminished to pinprick distal lower extremities more prominent in the left L5 and S1 distribution  Reflexes: 2+ bilateral patellar's, absent bilateral Achilles  Gait: Normal station, no ataxia, slaps the left foot    Laboratory results:  Lab Results   Component Value Date    LDL 60 05/16/2024    LDL 71 12/22/2023    LDL 51 08/24/2023            Review of labs: Recent B12 224, A1c 6.5%, LDL 66    Review and interpretation of imaging: I personally reviewed his recent MRI brain and CTA head and neck as described above.  The most significant findings are apparent severe left V1 stenosis, slightly worse compared to 2018, 70% left ICA stenosis, again slightly worse than prior, right carotid siphon stenosis overall looks fairly stable from prior.  Radiology reports reviewed.    Diagnoses:  Asymptomatic left carotid stenosis, 70%  Right carotid siphon stenosis  Severe left vertebral origin stenosis  Left retroauricular pain, improving  Left foot drop, findings on exam more consistent with left L5-S1 radiculopathy    Assessment/comments: With regard to his CTA findings although these are slightly progressed compared to 2018 they remain asymptomatic.  I would recommend continuing medical management with at least antiplatelet monotherapy, continuing statin and maintaining " excellent glycemic control.  If he were to develop symptoms correlating to his stenoses he should present to the emergency department.    Plan:  1.  Continue Plavix, statin  2.  Maintain excellent glycemic control  3.  Consider annual CTA head and neck  4.  Replace B12 orally.  Prescription sent  5.  Bilateral lower extremity EMG for left foot drop    I spent a total of 45 minutes on this clinical encounter including chart/records review, review of laboratory data, personal review of imaging studies, patient counseling, and care coordination.    Follow-up with me in 3 to 4 months after EMG completed    Management discussed with Dr. Alvarenga by phone.

## 2024-08-12 NOTE — PROGRESS NOTES
"DOS: 2024  NAME: Anthony Vaughn   : 1945  PCP: Mary Anne Ludwig APRN  Chief Complaint   Patient presents with    Intracranial carotid stenosis     NP       Chief complaint: Referred for abnormal imaging  Subjective: New patient to me today.  This is an 79-year-old man with history of a TIA in 2018, insulin-dependent diabetes, mixed hyperlipidemia, possible myelodysplastic syndrome.    In 2018 he was seen for blurred vision.  CTA was done which showed moderate to severe left V1 stenosis, moderate left ICA stenosis, right ICA carotid siphon stenosis.  He was started on DAPT and remained asymptomatic on follow-up.  He was released from the office.  He complained recently to his primary care doctor of left retroauricular pain.  An MRI of the brain was done.  This was negative but incidentally showed abnormal signal in the right ICA.  He was referred to Dr. Alvarenga who ordered a CTA.  This showed the same findings as described above but slightly worse compared to prior.  The patient denies any warning signs of stroke in the last 6 years.  He complains of left retroauricular pain which has improved.  He also complains of left foot drop for the last 3 months and recently completed physical therapy for that.  He has no other complaints today.    He reports that he has been continued on Plavix monotherapy.  B12 was recently checked but he was not started on replacement.    Objective:  Vital signs: /60   Pulse 69   Ht 180.3 cm (71\")   Wt 86.6 kg (191 lb)   SpO2 98%   BMI 26.64 kg/m²    Exam:  vitals reviewed  MS: oriented x3, recent/remote memory intact, normal attention/concentration, language intact, no neglect.  CN: visual acuity grossly normal, visual fields full, PERRL, EOMI, no facial droop, no dysarthria  Motor: 5/5 throughout upper extremities, 5/5 right hip flexion, 5/5 right knee flexion and extension, 4+/5 right foot plantarflexion, 5/5 right foot dorsiflexion, 4+/5 left hip " flexion, 5/5 left knee extension and flexion, 4+/5 left foot plantarflexion, 4/5 left foot dorsiflexion  Sensory: Diminished to pinprick distal lower extremities more prominent in the left L5 and S1 distribution  Reflexes: 2+ bilateral patellar's, absent bilateral Achilles  Gait: Normal station, no ataxia, slaps the left foot    Laboratory results:  Lab Results   Component Value Date    LDL 60 05/16/2024    LDL 71 12/22/2023    LDL 51 08/24/2023            Review of labs: Recent B12 224, A1c 6.5%, LDL 66    Review and interpretation of imaging: I personally reviewed his recent MRI brain and CTA head and neck as described above.  The most significant findings are apparent severe left V1 stenosis, slightly worse compared to 2018, 70% left ICA stenosis, again slightly worse than prior, right carotid siphon stenosis overall looks fairly stable from prior.  Radiology reports reviewed.    Diagnoses:  Asymptomatic left carotid stenosis, 70%  Right carotid siphon stenosis  Severe left vertebral origin stenosis  Left retroauricular pain, improving  Left foot drop, findings on exam more consistent with left L5-S1 radiculopathy    Assessment/comments: With regard to his CTA findings although these are slightly progressed compared to 2018 they remain asymptomatic.  I would recommend continuing medical management with at least antiplatelet monotherapy, continuing statin and maintaining excellent glycemic control.  If he were to develop symptoms correlating to his stenoses he should present to the emergency department.    Plan:  1.  Continue Plavix, statin  2.  Maintain excellent glycemic control  3.  Consider annual CTA head and neck  4.  Replace B12 orally.  Prescription sent  5.  Bilateral lower extremity EMG for left foot drop    I spent a total of 45 minutes on this clinical encounter including chart/records review, review of laboratory data, personal review of imaging studies, patient counseling, and care  coordination.    Follow-up with me in 3 to 4 months after EMG completed    Management discussed with Dr. Alvarenga by phone.

## 2024-08-13 ENCOUNTER — PATIENT ROUNDING (BHMG ONLY) (OUTPATIENT)
Dept: NEUROLOGY | Facility: CLINIC | Age: 79
End: 2024-08-13
Payer: MEDICARE

## 2024-08-14 ENCOUNTER — TELEPHONE (OUTPATIENT)
Dept: ONCOLOGY | Facility: CLINIC | Age: 79
End: 2024-08-14
Payer: MEDICARE

## 2024-08-22 ENCOUNTER — HOSPITAL ENCOUNTER (OUTPATIENT)
Dept: INFUSION THERAPY | Facility: HOSPITAL | Age: 79
Discharge: HOME | End: 2024-08-22
Admitting: PSYCHIATRY & NEUROLOGY
Payer: MEDICARE

## 2024-08-22 DIAGNOSIS — M21.372 LEFT FOOT DROP: ICD-10-CM

## 2024-08-22 PROCEDURE — 95886 MUSC TEST DONE W/N TEST COMP: CPT | Performed by: PSYCHIATRY & NEUROLOGY

## 2024-08-22 PROCEDURE — 95909 NRV CNDJ TST 5-6 STUDIES: CPT | Performed by: PSYCHIATRY & NEUROLOGY

## 2024-08-22 PROCEDURE — 95886 MUSC TEST DONE W/N TEST COMP: CPT

## 2024-08-22 PROCEDURE — 95909 NRV CNDJ TST 5-6 STUDIES: CPT

## 2024-08-22 NOTE — PROCEDURES
EMG and Nerve Conduction Studies    I.      Instrument used: Neuromax 1002  II.     Please see data sheets for tabular summary of NCS and details on methods, temperatures and lab standards.   III.    EMG muscles tested for upper extremity studies include the deltoid, biceps, triceps, pronator teres, extensor digitorum communis, first dorsal interosseous and abductor pollicis brevis.    IV.   EMG muscles tested for lower extremity studies include the vastus lateralis, tibialis anterior, peroneus longus, medial gastrocnemius and extensor digitorum brevis.    V.    Additional muscles tested as needed.  Paraspinal muscles tested as needed.   VI.   Please see data sheets for tabular summary of EMG findings.   VII. The complete report includes the data sheets.      Indication: Left foot drop  History: 79-year-old male with diabetes and a history of stroke who has a 3-month or more history of left foot drop.  He denies numbness tingling or burning in the feet.      Ht: 71 inches  Wt: 185 pounds  HbA1C:   Lab Results   Component Value Date    HGBA1C 6.50 (H) 05/16/2024     TSH:   Lab Results   Component Value Date    TSH 2.000 05/16/2024       Technical summary:  Nerve conduction studies were obtained in the left leg with 1 comparison on the right.  Skin temperatures were a bit cold but temperature correction was not needed.  Needle examination was obtained on selected muscles in both legs.    Results:  1.  No reproducible left sural sensory response  2.  No reproducible left superficial peroneal sensory response.  3.  Slow left peroneal motor conduction velocity below the knee at 33.8 m/s with much slower velocity in the short segment across the fibular head at 19.4 m/s.  Normal distal latency with very low amplitude from ankle stimulation at 0.383 mV but very low amplitude from the popliteal fossa at 0.083 mV consistent with motor conduction block at the knee.  Slow right peroneal motor velocity below the knee at 30.6 m/s  with slow velocity in the short segment across the fibular head at 35.3 m/s.  Normal distal latency with very low amplitude of 0.567 mV from ankle stimulation and no significant conduction block proximally.  4.  Slow left tibial motor velocity at 33.6 m/s with a normal distal latency.  The amplitude was very low at 0.350 mV from ankle stimulation.  5.  Needle examination of selected muscles in both legs showed fibrillations and/or positive sharp waves in the left tibialis anterior and peroneus longus with an increased number of large motor units increased firing rates and reduced interference patterns.  The remaining muscles tested showed normal insertional activities.  There were normal-appearing motor units in the extensor digitorum brevis but very reduced number with very rapid firing rate and reduced interference pattern on the left and only mildly so on the right.  Remaining muscles tested which also included the left short head of the biceps femoris and the semitendinosus showed normal motor units and recruitment.  Lumbar paraspinals at L5 showed no abnormality on the left but a few positive sharp waves were seen on the right.    Impression:  Complex abnormal study.  There is evidence of a diffuse polyneuropathy which appears fairly significant.  The left peroneal nerve is much worse at the knee.  There is root level involvement on the right.  Study results were discussed with the patient.    Chacho Nolan M.D.              Dictated utilizing Dragon dictation.

## 2024-09-11 ENCOUNTER — DOCUMENTATION (OUTPATIENT)
Dept: PHYSICAL THERAPY | Facility: CLINIC | Age: 79
End: 2024-09-11
Payer: MEDICARE

## 2024-09-11 NOTE — PROGRESS NOTES
Discharge Summary  Discharge Summary from Physical Therapy Report  [address]  [place] [address]    Patient Information  Anthony Vaughn  1945    Dates  PT visit: 7/16 to 8/5/24  Number of Visits: 4     Discharge Status of Patient: See last daily note.    Goals: Partially Met    Visit Diagnoses:  No diagnosis found.    Discharge Plan: Continue with current home exercise program as instructed    Comments see above.    Date of Discharge 9/11/24        Valente Salter, PT  Physical Therapist  [place] License [id number]

## 2024-09-23 ENCOUNTER — LAB (OUTPATIENT)
Dept: LAB | Facility: HOSPITAL | Age: 79
End: 2024-09-23
Payer: MEDICARE

## 2024-09-23 ENCOUNTER — OFFICE VISIT (OUTPATIENT)
Dept: ONCOLOGY | Facility: CLINIC | Age: 79
End: 2024-09-23
Payer: MEDICARE

## 2024-09-23 VITALS
TEMPERATURE: 97.6 F | DIASTOLIC BLOOD PRESSURE: 58 MMHG | WEIGHT: 187.7 LBS | HEART RATE: 65 BPM | OXYGEN SATURATION: 96 % | SYSTOLIC BLOOD PRESSURE: 109 MMHG | BODY MASS INDEX: 26.28 KG/M2 | HEIGHT: 71 IN

## 2024-09-23 DIAGNOSIS — D75.1 ERYTHROCYTOSIS: ICD-10-CM

## 2024-09-23 DIAGNOSIS — D72.825 BANDEMIA: ICD-10-CM

## 2024-09-23 DIAGNOSIS — D46.9 MYELODYSPLASTIC SYNDROME: Primary | ICD-10-CM

## 2024-09-23 DIAGNOSIS — M21.372 FOOT DROP, LEFT: ICD-10-CM

## 2024-09-23 LAB
ALBUMIN SERPL-MCNC: 4.4 G/DL (ref 3.5–5.2)
ALBUMIN/GLOB SERPL: 1.8 G/DL
ALP SERPL-CCNC: 63 U/L (ref 39–117)
ALT SERPL W P-5'-P-CCNC: 11 U/L (ref 1–41)
ANION GAP SERPL CALCULATED.3IONS-SCNC: 14.3 MMOL/L (ref 5–15)
AST SERPL-CCNC: 17 U/L (ref 1–40)
BASOPHILS # BLD AUTO: 0.06 10*3/MM3 (ref 0–0.2)
BASOPHILS NFR BLD AUTO: 0.5 % (ref 0–1.5)
BILIRUB SERPL-MCNC: 0.4 MG/DL (ref 0–1.2)
BUN SERPL-MCNC: 18 MG/DL (ref 8–23)
BUN/CREAT SERPL: 14.6 (ref 7–25)
CALCIUM SPEC-SCNC: 8.8 MG/DL (ref 8.6–10.5)
CHLORIDE SERPL-SCNC: 104 MMOL/L (ref 98–107)
CO2 SERPL-SCNC: 22.7 MMOL/L (ref 22–29)
CREAT SERPL-MCNC: 1.23 MG/DL (ref 0.76–1.27)
DEPRECATED RDW RBC AUTO: 39.8 FL (ref 37–54)
EGFRCR SERPLBLD CKD-EPI 2021: 59.3 ML/MIN/1.73
EOSINOPHIL # BLD AUTO: 0.14 10*3/MM3 (ref 0–0.4)
EOSINOPHIL NFR BLD AUTO: 1.2 % (ref 0.3–6.2)
ERYTHROCYTE [DISTWIDTH] IN BLOOD BY AUTOMATED COUNT: 12.2 % (ref 12.3–15.4)
GLOBULIN UR ELPH-MCNC: 2.5 GM/DL
GLUCOSE SERPL-MCNC: 110 MG/DL (ref 65–99)
HCT VFR BLD AUTO: 44.3 % (ref 37.5–51)
HGB BLD-MCNC: 14.8 G/DL (ref 13–17.7)
IMM GRANULOCYTES # BLD AUTO: 0.04 10*3/MM3 (ref 0–0.05)
IMM GRANULOCYTES NFR BLD AUTO: 0.3 % (ref 0–0.5)
LDH SERPL-CCNC: 130 U/L (ref 135–225)
LYMPHOCYTES # BLD AUTO: 2.88 10*3/MM3 (ref 0.7–3.1)
LYMPHOCYTES NFR BLD AUTO: 24 % (ref 19.6–45.3)
MCH RBC QN AUTO: 29.7 PG (ref 26.6–33)
MCHC RBC AUTO-ENTMCNC: 33.4 G/DL (ref 31.5–35.7)
MCV RBC AUTO: 88.8 FL (ref 79–97)
MONOCYTES # BLD AUTO: 1.03 10*3/MM3 (ref 0.1–0.9)
MONOCYTES NFR BLD AUTO: 8.6 % (ref 5–12)
NEUTROPHILS NFR BLD AUTO: 65.4 % (ref 42.7–76)
NEUTROPHILS NFR BLD AUTO: 7.84 10*3/MM3 (ref 1.7–7)
NRBC BLD AUTO-RTO: 0 /100 WBC (ref 0–0.2)
PLATELET # BLD AUTO: 361 10*3/MM3 (ref 140–450)
PMV BLD AUTO: 8.6 FL (ref 6–12)
POTASSIUM SERPL-SCNC: 4.1 MMOL/L (ref 3.5–5.2)
PROT SERPL-MCNC: 6.9 G/DL (ref 6–8.5)
RBC # BLD AUTO: 4.99 10*6/MM3 (ref 4.14–5.8)
SODIUM SERPL-SCNC: 141 MMOL/L (ref 136–145)
WBC NRBC COR # BLD AUTO: 11.99 10*3/MM3 (ref 3.4–10.8)

## 2024-09-23 PROCEDURE — 36415 COLL VENOUS BLD VENIPUNCTURE: CPT

## 2024-09-23 PROCEDURE — 80053 COMPREHEN METABOLIC PANEL: CPT

## 2024-09-23 PROCEDURE — 1159F MED LIST DOCD IN RCRD: CPT | Performed by: NURSE PRACTITIONER

## 2024-09-23 PROCEDURE — 99213 OFFICE O/P EST LOW 20 MIN: CPT | Performed by: NURSE PRACTITIONER

## 2024-09-23 PROCEDURE — 1160F RVW MEDS BY RX/DR IN RCRD: CPT | Performed by: NURSE PRACTITIONER

## 2024-09-23 PROCEDURE — 85025 COMPLETE CBC W/AUTO DIFF WBC: CPT

## 2024-09-23 PROCEDURE — 1126F AMNT PAIN NOTED NONE PRSNT: CPT | Performed by: NURSE PRACTITIONER

## 2024-09-23 PROCEDURE — 3074F SYST BP LT 130 MM HG: CPT | Performed by: NURSE PRACTITIONER

## 2024-09-23 PROCEDURE — 83615 LACTATE (LD) (LDH) ENZYME: CPT

## 2024-09-23 PROCEDURE — 3078F DIAST BP <80 MM HG: CPT | Performed by: NURSE PRACTITIONER

## 2024-10-15 NOTE — TELEPHONE ENCOUNTER
PT SARAH BETH and I returned call.  She was inquiring about her and her dental appts scheduled in November.  She wanted to change the date but then after I told her status of appts she said to keep for now.  Also, I told her she can check in w/me at week or so ahead to see if there's something open on dates she wants.      SB   Patient says they are Lidocaine 5% patches.  If he has a prescription it doesn't cost him anything, if he buys them over the counter it is $150.00.

## 2024-10-16 PROBLEM — E53.8 B12 DEFICIENCY: Status: ACTIVE | Noted: 2024-10-16

## 2024-10-16 PROBLEM — I65.21 INTRACRANIAL CAROTID STENOSIS, RIGHT: Chronic | Status: ACTIVE | Noted: 2024-08-06

## 2024-10-16 PROBLEM — I65.02 STENOSIS OF LEFT VERTEBRAL ARTERY: Chronic | Status: ACTIVE | Noted: 2024-07-22

## 2024-10-16 PROBLEM — D46.9 MYELODYSPLASTIC SYNDROME: Status: ACTIVE | Noted: 2024-10-16

## 2024-10-16 PROBLEM — M15.0 PRIMARY OSTEOARTHRITIS INVOLVING MULTIPLE JOINTS: Status: ACTIVE | Noted: 2018-03-19

## 2024-10-16 PROBLEM — I65.22 STENOSIS OF LEFT CAROTID ARTERY: Chronic | Status: ACTIVE | Noted: 2024-10-16

## 2024-10-16 PROBLEM — M21.372 LEFT FOOT DROP: Status: ACTIVE | Noted: 2024-10-16

## 2024-10-16 PROBLEM — I65.22 STENOSIS OF LEFT CAROTID ARTERY: Status: ACTIVE | Noted: 2024-10-16

## 2024-10-16 NOTE — PROGRESS NOTES
"Subjective   Chief Complaint   Patient presents with    Fatigue    Gait Problem       History of Present Illness   79 y.o. male presents with cc \"just not feeling well\" ongoing for months. He feels weak and tired; he is cold all the time. Denies fever or chills. No night sweats. Sleeping 4 hours on average; this has been his habit for many years. Denies chest pain or dyspnea. No hematochezia or melena. Started Gabapentin 2-3 weeks ago for nerve pain. Appetite is good and weight is stable.     Diagnosd with foot drop in August and completed PT in September.  Continues as the primary caregiver for his infirmed wife who just returned home from rehab.     Diagnosed with myelodysplastic syndrom in June; followed by Dr. Serrano and Delmy Hampton  every 3M. Advised to start oral B12 by Dr. Khanna; he hasn't started it.      Patient Active Problem List   Diagnosis    Coronary artery disease involving native coronary artery without angina pectoris    Hyperlipidemia    Essential hypertension    Type 2 diabetes mellitus with microalbuminuria, with long-term current use of insulin    Microalbuminuria    DM type 2 with diabetic peripheral neuropathy    Bandemia    Erythrocytosis    Primary osteoarthritis involving multiple joints    Stenosis of left vertebral artery    Intracranial carotid stenosis, right    Myelodysplastic syndrome    Stenosis of left carotid artery    Left foot drop    B12 deficiency       No Known Allergies    Current Outpatient Medications on File Prior to Visit   Medication Sig Dispense Refill    acyclovir (ZOVIRAX) 400 MG tablet Take 1 tablet by mouth As Needed. Take no more than 5 doses a day.      Aspirin 81 MG capsule Take 81 mg by mouth Daily.      atorvastatin (LIPITOR) 10 MG tablet Take 1 tablet by mouth Daily.      brimonidine (ALPHAGAN) 0.15 % ophthalmic solution Apply 1 drop to eye 2 (two) times a day.      clopidogrel (PLAVIX) 75 MG tablet Take 1 tablet by mouth Every Night.      Continuous Blood " Gluc  (FreeStyle Juwan 2 [place]) device 1 each Daily. 1 each 1    Continuous Blood Gluc Sensor (FreeStyle Juwan 2 Sensor) misc 1 each Every 14 (Fourteen) Days. 6 each 2    gabapentin (NEURONTIN) 100 MG capsule Take 1 capsule by mouth.      Glucose 15 GM/32ML gel Take  by mouth.      insulin aspart (novoLOG FLEXPEN) 100 UNIT/ML solution pen-injector sc pen 40 to 45 units with each meal      insulin degludec (Tresiba FlexTouch) 100 UNIT/ML solution pen-injector injection 40 units every evening 36 mL 2    Insulin Glargine-yfgn 100 UNIT/ML solution pen-injector Inject  under the skin into the appropriate area as directed.      isosorbide mononitrate (IMDUR) 30 MG 24 hr tablet Take 1 tablet by mouth Daily.      latanoprost (XALATAN) 0.005 % ophthalmic solution Apply 1 drop to eye(s) as directed by provider Daily.      lisinopril (PRINIVIL,ZESTRIL) 40 MG tablet Take 1 tablet by mouth Daily. 90 tablet 1    metFORMIN (GLUCOPHAGE) 1000 MG tablet Take 1 tablet by mouth 2 (Two) Times a Day With Meals. Take 2 tablets bid      metoprolol succinate XL (TOPROL-XL) 50 MG 24 hr tablet Take 1.5 tablets by mouth Daily.      mupirocin (BACTROBAN) 2 % ointment APPLY TWICE DAILY TO THE INFECTION/BIOPSY SITES      NIFEdipine XL (PROCARDIA XL) 60 MG 24 hr tablet Take 30 mg by mouth Daily.      nitroglycerin (NITROSTAT) 0.4 MG SL tablet Place 1 tablet under the tongue Every 5 (Five) Minutes As Needed for Chest Pain. Take no more than 3 doses in 15 minutes.      Omega 3 1000 MG capsule Take 1 capsule by mouth 2 (Two) Times a Day.      omeprazole (priLOSEC) 20 MG capsule Take 1 capsule by mouth Daily.      pilocarpine (PILOCAR) 1 % ophthalmic solution 1 drop 2 (Two) Times a Day.      Semaglutide, 1 MG/DOSE, (Ozempic, 1 MG/DOSE,) 4 MG/3ML solution pen-injector Inject 1 mg under the skin into the appropriate area as directed 1 (One) Time Per Week. Discontinue Ozempic 0.5 mg 9 mL 2    timolol (BETIMOL) 0.5 % ophthalmic solution Apply  to  eye(s) as directed by provider.      timolol (TIMOPTIC) 0.5 % ophthalmic solution 1 drop Daily. INSTILL 1 DROP IN LEFT EYE EVERY MORNING AS DIRECTED  6    [DISCONTINUED] vitamin B-12 (CYANOCOBALAMIN) 1000 MCG tablet Take 1 tablet by mouth Daily. 30 tablet 3     No current facility-administered medications on file prior to visit.       Past Medical History:   Diagnosis Date    Carpal tunnel syndrome on right     Chest pain     Degenerative disc disease, lumbar     Diverticulitis of large intestine without perforation or abscess without bleeding 07/31/2018    ED (erectile dysfunction)     Erythrocytosis 07/05/2018    GERD (gastroesophageal reflux disease)     Glaucoma     Hyperlipidemia     Kidney stones     Pneumonia due to infectious organism 04/14/2017    S/P angioplasty with stent     Stroke     Trapezius muscle spasm 03/19/2018    Tubular adenoma of colon 09/2019    Removed by colonoscopy by Dr. Weems       Family History   Problem Relation Age of Onset    Heart disease Mother     Prostate cancer Father     Heart disease Father     Colon polyps Daughter     Colon polyps Daughter        Social History     Socioeconomic History    Marital status:      Spouse name: Kerry    Years of education: College   Tobacco Use    Smoking status: Never    Smokeless tobacco: Never    Tobacco comments:     no caffeine   Vaping Use    Vaping status: Never Used   Substance and Sexual Activity    Alcohol use: No    Drug use: No    Sexual activity: Defer     Partners: Female       Past Surgical History:   Procedure Laterality Date    CARDIAC CATHETERIZATION N/A 02/10/2011    Normal LV systolic function; Kind of diffuse, small vessel, distal disease. Most of it is not amenable to PCI, but his JADEN lesion is and it is a large vessel; All of his prior durg-eluting stents are widely patent & look good, Dr. Anibal Oliveira    CATARACT EXTRACTION Bilateral     CATARACT EXTRACTION      COLONOSCOPY N/A 09/13/2004    Normal, Repeat in  5-8 years, Dr. Davion Sanon    COLONOSCOPY N/A 09/04/2009    Mutiple small & large-mouthed diverticual were found in the entire colon; Exam otherwise normal, Dr. Davion Sanon    COLONOSCOPY N/A 09/26/2019    Procedure: COLONOSCOPY to  cecum with cold biopsy polypectomy;  Surgeon: Haider Weems MD;  Location: [facility] ENDOSCOPY;  Service: General    COLONOSCOPY W/ POLYPECTOMY N/A [id number]    2 benign polyps, [facility], Dr. Davion Sanon    CORONARY ANGIOPLASTY WITH STENT PLACEMENT N/A 11/20/2007    Successful multivessel drug eluting stent implantation for restenosis above his prior stenting, Dr. Anibal Wray    CORONARY ANGIOPLASTY WITH STENT PLACEMENT N/A 06/05/2007    Successful multivessel drug eluting stent implantation in all 3 of the major coronary arteries, Dr. Anibal Wray    CORONARY ANGIOPLASTY WITH STENT PLACEMENT N/A 02/10/2011    Successful drug-stenting of the JADEN, Dr. Anibal Oliveira    ENDOSCOPY N/A 11/21/2017    Procedure: ESOPHAGOGASTRODUODENOSCOPY WITH COLD BIOPSIES AND 53 F MICHAEL DILATION;  Surgeon: Wisam Potter MD;  Location: [facility] ENDOSCOPY;  Service:     EYE SURGERY Left 2011    Retina    HYDROCELECTOMY Left 08/19/2011    Dr. Argueta Short    INCISION AND DRAINAGE PERIRECTAL ABSCESS N/A 11/30/2007    Dr. Davion Sanon    LUMBAR EPIDURAL INJECTION N/A 2012-2013    x 3    NEUROMA SURGERY Right 07/19/2005    Excision Neuroma Right Arm; PATH: CONSISTENT W/ LIPOMA,  Dr. Davion Sanon    ROTATOR CUFF REPAIR Right 2006    ROTATOR CUFF REPAIR Left 2000    UPPER GASTROINTESTINAL ENDOSCOPY N/A 09/06/2012    Normal esophagus; Normal Stomach, Normal examinded duodenum, Dr. Bruno Turk    WRIST FRACTURE SURGERY Right 06/03/2003    ORIF Right Distal Radius, Dr. Ryan Bravo       The following portions of the patient's history were reviewed and updated as appropriate: problem list, allergies, current medications, past medical history, and past social history.    Review  "of Systems    Immunization History   Administered Date(s) Administered    COVID-19 (MODERNA) 1st,2nd,3rd Dose Monovalent 01/28/2021, 02/25/2021    Fluzone High-Dose 65+YRS 10/03/2016, 09/26/2017, 10/20/2018, 10/17/2024    Fluzone High-Dose 65+yrs 11/08/2021, 10/31/2022, 12/01/2023    Hepatitis A 10/01/2019    Pneumococcal Conjugate 13-Valent (PCV13) 11/22/2016    Pneumococcal Polysaccharide (PPSV23) 11/22/2010    Shingrix 01/01/2011, 01/01/2019    TD Preservative Free (Tenivac) 04/03/2023    Tdap 11/12/2017       Objective   Vitals:    10/17/24 0942   BP: 120/60   Pulse: 80   Resp: 16   Temp: 98.1 °F (36.7 °C)   Weight: 85.3 kg (188 lb)   Height: 180.3 cm (70.98\")     Body mass index is 26.23 kg/m².  Physical Exam  Vitals reviewed.   Constitutional:       Appearance: Normal appearance. He is well-developed.   HENT:      Head: Normocephalic and atraumatic.      Mouth/Throat:      Mouth: Mucous membranes are moist.      Pharynx: Oropharynx is clear. No oropharyngeal exudate or posterior oropharyngeal erythema.   Neck:      Thyroid: No thyromegaly.   Cardiovascular:      Rate and Rhythm: Normal rate and regular rhythm.      Heart sounds: Normal heart sounds, S1 normal and S2 normal.   Pulmonary:      Effort: Pulmonary effort is normal.      Breath sounds: Normal breath sounds.   Abdominal:      General: Bowel sounds are normal. There is no distension.      Palpations: Abdomen is soft.      Tenderness: There is no abdominal tenderness.   Musculoskeletal:      Cervical back: Neck supple.      Right lower leg: No edema.      Left lower leg: No edema.   Lymphadenopathy:      Cervical: No cervical adenopathy.   Skin:     General: Skin is warm and dry.   Neurological:      Mental Status: He is alert.   Psychiatric:         Mood and Affect: Mood normal.         Behavior: Behavior normal.     Procedures    Assessment & Plan   Diagnoses and all orders for this visit:    1. Fatigue, unspecified type (Primary)  Comments:  Likely " multifactorial. CMP, CBC, TSH and UA today. He has known myelodysplatic syndrome followed by Heme/ONC. Started Gabapentin last month but he notes fatigue preceded it. Starting IM B12 replacement today. Continues to faithfully care for his beloved wife of 58 years and sleeps less than 4 hours a night.    Orders:  -     Comprehensive Metabolic Panel  -     CBC & Differential  -     TSH  -     UA / M With / Rflx Culture([facility] ONLY) - Urine, Clean Catch    2. Left foot drop  Comments:  Completed PT in September. Walking well today.    3. B12 deficiency  Comments:  Unfortunately never started oral B12 supplements. Start IM B12 replacement weekly for one month and monthly thereafter.  Orders:  -     cyanocobalamin injection 1,000 mcg    4. Need for influenza vaccination  -     Fluzone High-Dose 65+yrs ([id number])    5. Caregiver role strain  Comments:  Wife discharged home yesterday. Home health, PT,OT planned but not yet started.    Records reviewed include previous OV with myself as well as labs.     Return in about 19 days (around 11/5/2024) for 11:30 (30)    Lab Today; B12 shots weekly for one month then monthly x1Y.

## 2024-10-17 ENCOUNTER — OFFICE VISIT (OUTPATIENT)
Dept: INTERNAL MEDICINE | Facility: CLINIC | Age: 79
End: 2024-10-17
Payer: MEDICARE

## 2024-10-17 VITALS
BODY MASS INDEX: 26.32 KG/M2 | TEMPERATURE: 98.1 F | RESPIRATION RATE: 16 BRPM | DIASTOLIC BLOOD PRESSURE: 60 MMHG | SYSTOLIC BLOOD PRESSURE: 120 MMHG | WEIGHT: 188 LBS | HEART RATE: 80 BPM | HEIGHT: 71 IN

## 2024-10-17 DIAGNOSIS — E53.8 B12 DEFICIENCY: ICD-10-CM

## 2024-10-17 DIAGNOSIS — M21.372 LEFT FOOT DROP: ICD-10-CM

## 2024-10-17 DIAGNOSIS — R53.83 FATIGUE, UNSPECIFIED TYPE: Primary | ICD-10-CM

## 2024-10-17 DIAGNOSIS — Z63.8 CAREGIVER ROLE STRAIN: ICD-10-CM

## 2024-10-17 DIAGNOSIS — Z23 NEED FOR INFLUENZA VACCINATION: ICD-10-CM

## 2024-10-17 PROCEDURE — 1159F MED LIST DOCD IN RCRD: CPT | Performed by: NURSE PRACTITIONER

## 2024-10-17 PROCEDURE — 3074F SYST BP LT 130 MM HG: CPT | Performed by: NURSE PRACTITIONER

## 2024-10-17 PROCEDURE — 90662 IIV NO PRSV INCREASED AG IM: CPT | Performed by: NURSE PRACTITIONER

## 2024-10-17 PROCEDURE — 96372 THER/PROPH/DIAG INJ SC/IM: CPT | Performed by: NURSE PRACTITIONER

## 2024-10-17 PROCEDURE — 1160F RVW MEDS BY RX/DR IN RCRD: CPT | Performed by: NURSE PRACTITIONER

## 2024-10-17 PROCEDURE — 99214 OFFICE O/P EST MOD 30 MIN: CPT | Performed by: NURSE PRACTITIONER

## 2024-10-17 PROCEDURE — 3078F DIAST BP <80 MM HG: CPT | Performed by: NURSE PRACTITIONER

## 2024-10-17 PROCEDURE — G0008 ADMIN INFLUENZA VIRUS VAC: HCPCS | Performed by: NURSE PRACTITIONER

## 2024-10-17 PROCEDURE — 1126F AMNT PAIN NOTED NONE PRSNT: CPT | Performed by: NURSE PRACTITIONER

## 2024-10-17 RX ORDER — GABAPENTIN 100 MG/1
100 CAPSULE ORAL
COMMUNITY
Start: 2024-08-27

## 2024-10-17 RX ORDER — CYANOCOBALAMIN 1000 UG/ML
1000 INJECTION, SOLUTION INTRAMUSCULAR; SUBCUTANEOUS
Status: SHIPPED | OUTPATIENT
Start: 2024-10-17

## 2024-10-17 RX ADMIN — CYANOCOBALAMIN 1000 MCG: 1000 INJECTION, SOLUTION INTRAMUSCULAR; SUBCUTANEOUS at 11:02

## 2024-10-17 SDOH — SOCIAL STABILITY - SOCIAL INSECURITY: OTHER SPECIFIED PROBLEMS RELATED TO PRIMARY SUPPORT GROUP: Z63.8

## 2024-10-18 DIAGNOSIS — E87.5 SERUM POTASSIUM ELEVATED: Primary | ICD-10-CM

## 2024-10-18 LAB
ALBUMIN SERPL-MCNC: 4.2 G/DL (ref 3.5–5.2)
ALBUMIN/GLOB SERPL: 1.8 G/DL
ALP SERPL-CCNC: 69 U/L (ref 39–117)
ALT SERPL-CCNC: 13 U/L (ref 1–41)
APPEARANCE UR: CLEAR
AST SERPL-CCNC: 13 U/L (ref 1–40)
BACTERIA #/AREA URNS HPF: NORMAL /[HPF]
BASOPHILS # BLD AUTO: 0.07 10*3/MM3 (ref 0–0.2)
BASOPHILS NFR BLD AUTO: 0.5 % (ref 0–1.5)
BILIRUB SERPL-MCNC: 0.5 MG/DL (ref 0–1.2)
BILIRUB UR QL STRIP: NEGATIVE
BUN SERPL-MCNC: 19 MG/DL (ref 8–23)
BUN/CREAT SERPL: 15.3 (ref 7–25)
CALCIUM SERPL-MCNC: 9.5 MG/DL (ref 8.6–10.5)
CASTS URNS QL MICRO: NORMAL /LPF
CHLORIDE SERPL-SCNC: 104 MMOL/L (ref 98–107)
CO2 SERPL-SCNC: 24.5 MMOL/L (ref 22–29)
COLOR UR: YELLOW
CREAT SERPL-MCNC: 1.24 MG/DL (ref 0.76–1.27)
EGFRCR SERPLBLD CKD-EPI 2021: 58.8 ML/MIN/1.73
EOSINOPHIL # BLD AUTO: 0.15 10*3/MM3 (ref 0–0.4)
EOSINOPHIL NFR BLD AUTO: 1 % (ref 0.3–6.2)
EPI CELLS #/AREA URNS HPF: NORMAL /HPF (ref 0–10)
ERYTHROCYTE [DISTWIDTH] IN BLOOD BY AUTOMATED COUNT: 11.9 % (ref 12.3–15.4)
GLOBULIN SER CALC-MCNC: 2.3 GM/DL
GLUCOSE SERPL-MCNC: 140 MG/DL (ref 65–99)
GLUCOSE UR QL STRIP: ABNORMAL
HCT VFR BLD AUTO: 43.2 % (ref 37.5–51)
HGB BLD-MCNC: 15.1 G/DL (ref 13–17.7)
HGB UR QL STRIP: NEGATIVE
IMM GRANULOCYTES # BLD AUTO: 0.07 10*3/MM3 (ref 0–0.05)
IMM GRANULOCYTES NFR BLD AUTO: 0.5 % (ref 0–0.5)
KETONES UR QL STRIP: NEGATIVE
LEUKOCYTE ESTERASE UR QL STRIP: NEGATIVE
LYMPHOCYTES # BLD AUTO: 2.69 10*3/MM3 (ref 0.7–3.1)
LYMPHOCYTES NFR BLD AUTO: 17.6 % (ref 19.6–45.3)
MCH RBC QN AUTO: 31.3 PG (ref 26.6–33)
MCHC RBC AUTO-ENTMCNC: 35 G/DL (ref 31.5–35.7)
MCV RBC AUTO: 89.4 FL (ref 79–97)
MICRO URNS: ABNORMAL
MICRO URNS: ABNORMAL
MONOCYTES # BLD AUTO: 1.08 10*3/MM3 (ref 0.1–0.9)
MONOCYTES NFR BLD AUTO: 7.1 % (ref 5–12)
NEUTROPHILS # BLD AUTO: 11.22 10*3/MM3 (ref 1.7–7)
NEUTROPHILS NFR BLD AUTO: 73.3 % (ref 42.7–76)
NITRITE UR QL STRIP: NEGATIVE
NRBC BLD AUTO-RTO: 0 /100 WBC (ref 0–0.2)
PH UR STRIP: 5.5 [PH] (ref 5–7.5)
PLATELET # BLD AUTO: 369 10*3/MM3 (ref 140–450)
POTASSIUM SERPL-SCNC: 5.3 MMOL/L (ref 3.5–5.2)
PROT SERPL-MCNC: 6.5 G/DL (ref 6–8.5)
PROT UR QL STRIP: NEGATIVE
RBC # BLD AUTO: 4.83 10*6/MM3 (ref 4.14–5.8)
RBC #/AREA URNS HPF: NORMAL /HPF (ref 0–2)
SODIUM SERPL-SCNC: 139 MMOL/L (ref 136–145)
SP GR UR STRIP: 1.02 (ref 1–1.03)
TSH SERPL DL<=0.005 MIU/L-ACNC: 1.42 UIU/ML (ref 0.27–4.2)
URINALYSIS REFLEX: ABNORMAL
UROBILINOGEN UR STRIP-MCNC: 0.2 MG/DL (ref 0.2–1)
WBC # BLD AUTO: 15.28 10*3/MM3 (ref 3.4–10.8)
WBC #/AREA URNS HPF: NORMAL /HPF (ref 0–5)

## 2024-10-24 ENCOUNTER — CLINICAL SUPPORT (OUTPATIENT)
Dept: INTERNAL MEDICINE | Facility: CLINIC | Age: 79
End: 2024-10-24
Payer: MEDICARE

## 2024-10-24 DIAGNOSIS — E53.8 B12 DEFICIENCY: Primary | ICD-10-CM

## 2024-10-24 PROCEDURE — 96372 THER/PROPH/DIAG INJ SC/IM: CPT | Performed by: NURSE PRACTITIONER

## 2024-10-24 RX ADMIN — CYANOCOBALAMIN 1000 MCG: 1000 INJECTION, SOLUTION INTRAMUSCULAR; SUBCUTANEOUS at 10:42

## 2024-10-31 ENCOUNTER — CLINICAL SUPPORT (OUTPATIENT)
Dept: INTERNAL MEDICINE | Facility: CLINIC | Age: 79
End: 2024-10-31
Payer: MEDICARE

## 2024-10-31 DIAGNOSIS — E53.8 B12 DEFICIENCY: Primary | ICD-10-CM

## 2024-10-31 PROCEDURE — 96372 THER/PROPH/DIAG INJ SC/IM: CPT | Performed by: NURSE PRACTITIONER

## 2024-10-31 RX ADMIN — CYANOCOBALAMIN 1000 MCG: 1000 INJECTION, SOLUTION INTRAMUSCULAR; SUBCUTANEOUS at 10:04

## 2024-11-05 ENCOUNTER — TELEPHONE (OUTPATIENT)
Dept: INTERNAL MEDICINE | Facility: CLINIC | Age: 79
End: 2024-11-05

## 2024-11-05 NOTE — TELEPHONE ENCOUNTER
Left message to get him in with another provider later this week, Annmarie has no appointments available today

## 2024-11-05 NOTE — TELEPHONE ENCOUNTER
Patient called stating his daughter had canceled his appointment today with Annmarie and he is wanting to come in and be seen if possible before 11 am, he has to be at the  home today, patient can also be here Thursday or Friday but it would have to be early, please advise?  [phone].

## 2024-11-08 ENCOUNTER — CLINICAL SUPPORT (OUTPATIENT)
Dept: INTERNAL MEDICINE | Facility: CLINIC | Age: 79
End: 2024-11-08
Payer: MEDICARE

## 2024-11-08 DIAGNOSIS — E53.8 B12 DEFICIENCY: Primary | ICD-10-CM

## 2024-11-08 RX ADMIN — CYANOCOBALAMIN 1000 MCG: 1000 INJECTION, SOLUTION INTRAMUSCULAR; SUBCUTANEOUS at 11:41

## 2024-11-18 ENCOUNTER — OFFICE VISIT (OUTPATIENT)
Dept: NEUROLOGY | Facility: CLINIC | Age: 79
End: 2024-11-18
Payer: MEDICARE

## 2024-11-18 VITALS
HEIGHT: 71 IN | OXYGEN SATURATION: 96 % | DIASTOLIC BLOOD PRESSURE: 70 MMHG | BODY MASS INDEX: 25.84 KG/M2 | HEART RATE: 74 BPM | WEIGHT: 184.6 LBS | SYSTOLIC BLOOD PRESSURE: 110 MMHG

## 2024-11-18 DIAGNOSIS — I65.21 CAROTID STENOSIS, ASYMPTOMATIC, RIGHT: ICD-10-CM

## 2024-11-18 DIAGNOSIS — G60.9 IDIOPATHIC PERIPHERAL NEUROPATHY: ICD-10-CM

## 2024-11-18 DIAGNOSIS — G57.32 PERONEAL NEUROPATHY AT KNEE, LEFT: Primary | ICD-10-CM

## 2024-11-18 PROCEDURE — 3074F SYST BP LT 130 MM HG: CPT | Performed by: PSYCHIATRY & NEUROLOGY

## 2024-11-18 PROCEDURE — 1159F MED LIST DOCD IN RCRD: CPT | Performed by: PSYCHIATRY & NEUROLOGY

## 2024-11-18 PROCEDURE — 1160F RVW MEDS BY RX/DR IN RCRD: CPT | Performed by: PSYCHIATRY & NEUROLOGY

## 2024-11-18 PROCEDURE — 99214 OFFICE O/P EST MOD 30 MIN: CPT | Performed by: PSYCHIATRY & NEUROLOGY

## 2024-11-18 PROCEDURE — 3078F DIAST BP <80 MM HG: CPT | Performed by: PSYCHIATRY & NEUROLOGY

## 2024-11-18 NOTE — PROGRESS NOTES
"DOS: 2024  NAME: Anthony Vaughn   : 1945  PCP: Mary Anne Ludwig APRN  Chief Complaint   Patient presents with    INTRACRANIAL CAROTID STENOSIS     F/U       Chief complaint: Follow-up for carotid and vertebral stenosis, left foot drop.  Subjective: 79-year-old man originally referred to me for large vessel disease which ultimately I felt was asymptomatic.  On the initial assessment he was found to have left foot drop.  He was referred to Dr. Nolan for an EMG which revealed left peroneal neuropathy at the fibular head as well as evidence of a more diffuse polyneuropathy and an L5 radiculopathy on the right.  He previously had an MRI of the lumbar spine at [facility] in 2023.  I reviewed the report which showed multilevel spinal stenosis in the lumbar spine with particularly severe neuroforaminal stenosis at L5 on the right side.  The patient endorses chronic sharp low back pain but denies neurogenic claudication.  He went to physical therapy in September.  He feels that his left foot drop is about the same.  He is not requiring a cane or a walker and feels that he is walking well independently.  He did have 1 fall a month ago where he tripped in the grass.  He endorses mild intermittent tingling of his feet bilaterally.    He denies any symptoms of TIA since his last visit.  He continues on Plavix.    He has been followed by hematology for an unspecified myeloproliferative disorder.  He had a bone marrow biopsy that was basically negative.    Objective:  Vital signs: /70   Pulse 74   Ht 180.3 cm (71\")   Wt 83.7 kg (184 lb 9.6 oz)   SpO2 96%   BMI 25.75 kg/m²    Exam:  vitals reviewed  MS: oriented x3, recent/remote memory intact, normal attention/concentration, language intact, no neglect.  CN: visual acuity grossly normal, PERRL, EOMI, no facial droop, no dysarthria  Motor: 5/5 throughout upper and lower extremities except for 4/5 left foot dorsiflexion, normal tone  Sensory: " Diminished to pinprick sensation bilateral lower extremities in a stocking/glove distribution  Reflexes: Absent bilateral lower extremities  Gait: Normal station, no ataxia    Laboratory results:  Lab Results   Component Value Date    LDL 60 05/16/2024    LDL 71 12/22/2023    LDL 51 08/24/2023            Review of labs: SPEP ordered today.  Recent B12 was low earlier this year but he is on IM B12 injections    Review and interpretation of imaging: I reviewed the radiology report from his MRI lumbar spine in 2023 through [facility]    I reviewed the results of his recent EMG with Dr. 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  He was originally referred to me for these findings.  I recommend long-term imaging surveillance and clinical follow-up in case he develops neurologic symptoms.  He should continued on an antiplatelet and a statin.    His left foot drop was confirmed to be related to peroneal neuropathy at the fibular head.  I noticed during her visit that he frequently sits with his legs crossed over each other at the knee.  I recommended that he stop this practice.  If his foot drop worsens we could order an orthotic but he feels that that is not needed at this time.  He underwent a course of physical therapy which I suspect was helpful.    For his idiopathic peripheral neuropathy more generally we will check an SPEP today.  A1c has been good, B12 is being replaced.    Plan:  1.  Follow-up CTA head and neck in 1 year, follow-up with us at that time  2.  Continue B12 replacement  3.  Check SPEP  4.   Can follow-up with us sooner if neuropathic symptoms worsen    I spent a total of 30 minutes on this clinical encounter including chart/records review, review of laboratory data, personal review of imaging studies, patient counseling, and care coordination.

## 2024-11-19 ENCOUNTER — OFFICE VISIT (OUTPATIENT)
Dept: ENDOCRINOLOGY | Age: 79
End: 2024-11-19
Payer: MEDICARE

## 2024-11-19 VITALS
TEMPERATURE: 98.3 F | HEART RATE: 58 BPM | HEIGHT: 71 IN | OXYGEN SATURATION: 98 % | WEIGHT: 182 LBS | SYSTOLIC BLOOD PRESSURE: 102 MMHG | DIASTOLIC BLOOD PRESSURE: 62 MMHG | BODY MASS INDEX: 25.48 KG/M2

## 2024-11-19 DIAGNOSIS — I10 ESSENTIAL HYPERTENSION: ICD-10-CM

## 2024-11-19 DIAGNOSIS — E78.5 HYPERLIPIDEMIA, UNSPECIFIED HYPERLIPIDEMIA TYPE: ICD-10-CM

## 2024-11-19 DIAGNOSIS — I25.10 CORONARY ARTERY DISEASE INVOLVING NATIVE CORONARY ARTERY OF NATIVE HEART WITHOUT ANGINA PECTORIS: ICD-10-CM

## 2024-11-19 DIAGNOSIS — R80.9 TYPE 2 DIABETES MELLITUS WITH MICROALBUMINURIA, WITH LONG-TERM CURRENT USE OF INSULIN: Primary | ICD-10-CM

## 2024-11-19 DIAGNOSIS — E11.29 TYPE 2 DIABETES MELLITUS WITH MICROALBUMINURIA, WITH LONG-TERM CURRENT USE OF INSULIN: Primary | ICD-10-CM

## 2024-11-19 DIAGNOSIS — Z79.4 TYPE 2 DIABETES MELLITUS WITH MICROALBUMINURIA, WITH LONG-TERM CURRENT USE OF INSULIN: Primary | ICD-10-CM

## 2024-11-19 DIAGNOSIS — Z86.0100 HISTORY OF COLON POLYPS: ICD-10-CM

## 2024-11-19 DIAGNOSIS — Z95.820 S/P ANGIOPLASTY WITH STENT: ICD-10-CM

## 2024-11-19 LAB
ALBUMIN SERPL ELPH-MCNC: 3.6 G/DL (ref 2.9–4.4)
ALBUMIN/GLOB SERPL: 1.3 {RATIO} (ref 0.7–1.7)
ALPHA1 GLOB SERPL ELPH-MCNC: 0.2 G/DL (ref 0–0.4)
ALPHA2 GLOB SERPL ELPH-MCNC: 1 G/DL (ref 0.4–1)
B-GLOBULIN SERPL ELPH-MCNC: 0.8 G/DL (ref 0.7–1.3)
GAMMA GLOB SERPL ELPH-MCNC: 0.6 G/DL (ref 0.4–1.8)
GLOBULIN SER CALC-MCNC: 2.7 G/DL (ref 2.2–3.9)
LABORATORY COMMENT REPORT: NORMAL
M PROTEIN SERPL ELPH-MCNC: NORMAL G/DL
PROT PATTERN SERPL ELPH-IMP: NORMAL
PROT SERPL-MCNC: 6.3 G/DL (ref 6–8.5)

## 2024-11-19 PROCEDURE — 99214 OFFICE O/P EST MOD 30 MIN: CPT | Performed by: INTERNAL MEDICINE

## 2024-11-19 PROCEDURE — G2211 COMPLEX E/M VISIT ADD ON: HCPCS | Performed by: INTERNAL MEDICINE

## 2024-11-19 PROCEDURE — 95251 CONT GLUC MNTR ANALYSIS I&R: CPT | Performed by: INTERNAL MEDICINE

## 2024-11-19 PROCEDURE — 3078F DIAST BP <80 MM HG: CPT | Performed by: INTERNAL MEDICINE

## 2024-11-19 PROCEDURE — 3074F SYST BP LT 130 MM HG: CPT | Performed by: INTERNAL MEDICINE

## 2024-11-19 RX ORDER — INSULIN GLARGINE-YFGN 100 [IU]/ML
20 INJECTION, SOLUTION SUBCUTANEOUS EVERY EVENING
Status: SHIPPED
Start: 2024-11-19

## 2024-11-19 NOTE — PROGRESS NOTES
Subjective   Anthony Vaughn is a 79 y.o. male.     History of Present Illness     Patient has known diabetes mellitus since 2006 and started on insulin in 2010. He is on insulin glargine (Semglee) 24 units every evening, NovoLog 30-43 units TID, Ozempic 1 mg weekly and metformin 1000 mg twice a day.  He has freestyle anya 2 sensor in place. He is usually eats 1 meal a day.  He has lost 10 lbs since 5/24.  His last meal was last night.     Sensor data from November 6, 2024 to November 19, 2024 reviewed.  Average glucose 139 mg per DL.  Time in range 84%.  Time above range 16%.  Time below range 0.  There are multiple gaps and sensor data due to infrequent scanning.  He had 1 episode of hypoglycemia in the early morning.  He is rare postprandial hyperglycemia.     His last eye examination was in 3/24 and he has retinopathy. He has glaucoma.  He has microalbuminuria on urine sample taken last 5/24. He is on lisinopril. He denies numbness in his toes     He has known coronary artery disease and had multiple angioplasty and stents in the past. He denies any previous history of myocardial infarction. He has not had a coronary artery bypass surgery. He denies any chest pains, exertional dyspnea or PND.   He follows with Dr. Wray. He is on Imdur, lisinopril, metoprolol succinate , nifedepine XL and nitroglycerin sublingual as needed.  He has pedal edema on the left foot which does not improve after he has been in bed.     He has hyperlipidemia and has been on atorvastatin 10 mg every PM.  He denies any myalgia.     He had tubular adenoma of colon removed by Dr. Weems by colonoscopy in September 2019.  He denies bowel complaints.  He was advised follow-up colonoscopy in 2024.    He had flu vaccine this fall.    The following portions of the patient's history were reviewed and updated as appropriate: allergies, current medications, past family history, past medical history, past social history, past surgical history,  "and problem list.    Review of Systems   Eyes:  Negative for visual disturbance.   Respiratory:  Positive for shortness of breath. Negative for wheezing.    Cardiovascular:  Negative for chest pain and palpitations.   Gastrointestinal: Negative.    Genitourinary: Negative.    Musculoskeletal:  Negative for myalgias.   Neurological:  Negative for numbness.     Vitals:    11/19/24 0903   BP: 102/62   Pulse: 58   Temp: 98.3 °F (36.8 °C)   TempSrc: Oral   SpO2: 98%   Weight: 82.6 kg (182 lb)   Height: 180.3 cm (70.98\")      Objective   Physical Exam  Constitutional:       General: He is not in acute distress.     Appearance: Normal appearance. He is not ill-appearing or toxic-appearing.   Eyes:      General: No scleral icterus.        Right eye: No discharge.         Left eye: No discharge.      Extraocular Movements: Extraocular movements intact.   Neck:      Vascular: No carotid bruit.   Cardiovascular:      Rate and Rhythm: Normal rate and regular rhythm.      Heart sounds: Normal heart sounds. No murmur heard.     No friction rub. No gallop.   Pulmonary:      Breath sounds: Normal breath sounds. No rales.   Chest:      Chest wall: No tenderness.   Abdominal:      General: Bowel sounds are normal.      Palpations: Abdomen is soft.      Tenderness: There is no right CVA tenderness or left CVA tenderness.   Musculoskeletal:      Right lower leg: No edema.      Left lower leg: No edema.   Lymphadenopathy:      Cervical: No cervical adenopathy.   Neurological:      Mental Status: He is alert and oriented to person, place, and time.       Office Visit on 10/17/2024   Component Date Value Ref Range Status    Glucose 10/17/2024 140 (H)  65 - 99 mg/dL Final    BUN 10/17/2024 19  8 - 23 mg/dL Final    Creatinine 10/17/2024 1.24  0.76 - 1.27 mg/dL Final    EGFR Result 10/17/2024 58.8 (L)  >60.0 mL/min/1.73 Final    Comment: GFR Normal >60  Chronic Kidney Disease <60  Kidney Failure <15  The GFR formula is only valid for adults " with stable renal  function between ages 18 and 70.      BUN/Creatinine Ratio 10/17/2024 15.3  7.0 - 25.0 Final    Sodium 10/17/2024 139  136 - 145 mmol/L Final    Potassium 10/17/2024 5.3 (H)  3.5 - 5.2 mmol/L Final    Chloride 10/17/2024 104  98 - 107 mmol/L Final    Total CO2 10/17/2024 24.5  22.0 - 29.0 mmol/L Final    Calcium 10/17/2024 9.5  8.6 - 10.5 mg/dL Final    Total Protein 10/17/2024 6.5  6.0 - 8.5 g/dL Final    Albumin 10/17/2024 4.2  3.5 - 5.2 g/dL Final    Globulin 10/17/2024 2.3  gm/dL Final    A/G Ratio 10/17/2024 1.8  g/dL Final    Total Bilirubin 10/17/2024 0.5  0.0 - 1.2 mg/dL Final    Alkaline Phosphatase 10/17/2024 69  39 - 117 U/L Final    AST (SGOT) 10/17/2024 13  1 - 40 U/L Final    ALT (SGPT) 10/17/2024 13  1 - 41 U/L Final    WBC 10/17/2024 15.28 (H)  3.40 - 10.80 10*3/mm3 Final    RBC 10/17/2024 4.83  4.14 - 5.80 10*6/mm3 Final    Hemoglobin 10/17/2024 15.1  13.0 - 17.7 g/dL Final    Hematocrit 10/17/2024 43.2  37.5 - 51.0 % Final    MCV 10/17/2024 89.4  79.0 - 97.0 fL Final    MCH 10/17/2024 31.3  26.6 - 33.0 pg Final    MCHC 10/17/2024 35.0  31.5 - 35.7 g/dL Final    RDW 10/17/2024 11.9 (L)  12.3 - 15.4 % Final    Platelets 10/17/2024 369  140 - 450 10*3/mm3 Final    Neutrophil Rel % 10/17/2024 73.3  42.7 - 76.0 % Final    Lymphocyte Rel % 10/17/2024 17.6 (L)  19.6 - 45.3 % Final    Monocyte Rel % 10/17/2024 7.1  5.0 - 12.0 % Final    Eosinophil Rel % 10/17/2024 1.0  0.3 - 6.2 % Final    Basophil Rel % 10/17/2024 0.5  0.0 - 1.5 % Final    Neutrophils Absolute 10/17/2024 11.22 (H)  1.70 - 7.00 10*3/mm3 Final    Lymphocytes Absolute 10/17/2024 2.69  0.70 - 3.10 10*3/mm3 Final    Monocytes Absolute 10/17/2024 1.08 (H)  0.10 - 0.90 10*3/mm3 Final    Eosinophils Absolute 10/17/2024 0.15  0.00 - 0.40 10*3/mm3 Final    Basophils Absolute 10/17/2024 0.07  0.00 - 0.20 10*3/mm3 Final    Immature Granulocyte Rel % 10/17/2024 0.5  0.0 - 0.5 % Final    Immature Grans Absolute 10/17/2024 0.07 (H)   0.00 - 0.05 10*3/mm3 Final    nRBC 10/17/2024 0.0  0.0 - 0.2 /100 WBC Final    TSH 10/17/2024 1.420  0.270 - 4.200 uIU/mL Final    Specific Gravity, UA 10/17/2024 1.018  1.005 - 1.030 Final    pH, UA 10/17/2024 5.5  5.0 - 7.5 Final    Color, UA 10/17/2024 Yellow  Yellow Final    Appearance, UA 10/17/2024 Clear  Clear Final    Leukocytes, UA 10/17/2024 Negative  Negative Final    Protein 10/17/2024 Negative  Negative/Trace Final    Glucose, UA 10/17/2024 Trace (A)  Negative Final    Ketones 10/17/2024 Negative  Negative Final    Blood, UA 10/17/2024 Negative  Negative Final    Bilirubin, UA 10/17/2024 Negative  Negative Final    Urobilinogen, UA 10/17/2024 0.2  0.2 - 1.0 mg/dL Final    Nitrite, UA 10/17/2024 Negative  Negative Final    Microscopic Examination 10/17/2024 Comment   Final    Microscopic follows if indicated.    Microscopic Examination 10/17/2024 See below:   Final    Microscopic was indicated and was performed.    Urinalysis Reflex 10/17/2024 Comment   Final    This specimen will not reflex to a Urine Culture.    WBC, UA 10/17/2024 None seen  0 - 5 /hpf Final    RBC, UA 10/17/2024 None seen  0 - 2 /hpf Final    Epithelial Cells (non renal) 10/17/2024 None seen  0 - 10 /hpf Final    Casts 10/17/2024 None seen  None seen /lpf Final    Bacteria, UA 10/17/2024 None seen  None seen/Few Final     Assessment & Plan   Diagnoses and all orders for this visit:    1. Type 2 diabetes mellitus with microalbuminuria, with long-term current use of insulin (Primary)  -     Comprehensive Metabolic Panel  -     Hemoglobin A1c  -     Lipid Panel  -     TSH Rfx On Abnormal To Free T4  -     Vitamin B12    2. Coronary artery disease involving native coronary artery of native heart without angina pectoris    3. Hyperlipidemia, unspecified hyperlipidemia type  -     Lipid Panel  -     TSH Rfx On Abnormal To Free T4    4. History of colon polyps    5. Essential hypertension    6. S/P angioplasty with stent      Decrease  Semglee 24 units every evening  Continue mealtime NovoLog.  Continue Ozempic 1 mg weekly and metformin 1000 mg twice a day.  Follow-up with ophthalmologist as scheduled.  Advised to discuss with [facility] about switching to freestyle anya 3 sensor.    Continue Imdur, lisinopril, metoprolol succinate, nifedipine XL and nitroglycerin sublingual as needed per Dr. Wray.    Continue atorvastatin 10 mg/day.    Advised to schedule follow-up colonoscopy with Dr. Weems.    Copy of my note sent to Mary Anne Ludwig NP, Dr. Weems, Dr. Wray    RTC 6 mos.

## 2024-11-20 LAB
ALBUMIN SERPL-MCNC: 4.2 G/DL (ref 3.5–5.2)
ALBUMIN/GLOB SERPL: 1.7 G/DL
ALP SERPL-CCNC: 69 U/L (ref 39–117)
ALT SERPL-CCNC: 12 U/L (ref 1–41)
AST SERPL-CCNC: 13 U/L (ref 1–40)
BILIRUB SERPL-MCNC: 0.6 MG/DL (ref 0–1.2)
BUN SERPL-MCNC: 13 MG/DL (ref 8–23)
BUN/CREAT SERPL: 11.4 (ref 7–25)
CALCIUM SERPL-MCNC: 9 MG/DL (ref 8.6–10.5)
CHLORIDE SERPL-SCNC: 101 MMOL/L (ref 98–107)
CHOLEST SERPL-MCNC: 115 MG/DL (ref 0–200)
CO2 SERPL-SCNC: 21.8 MMOL/L (ref 22–29)
CREAT SERPL-MCNC: 1.14 MG/DL (ref 0.76–1.27)
EGFRCR SERPLBLD CKD-EPI 2021: 65 ML/MIN/1.73
GLOBULIN SER CALC-MCNC: 2.5 GM/DL
GLUCOSE SERPL-MCNC: 160 MG/DL (ref 65–99)
HBA1C MFR BLD: 6.8 % (ref 4.8–5.6)
HDLC SERPL-MCNC: 35 MG/DL (ref 40–60)
IMP & REVIEW OF LAB RESULTS: NORMAL
LDLC SERPL CALC-MCNC: 56 MG/DL (ref 0–100)
POTASSIUM SERPL-SCNC: 4.4 MMOL/L (ref 3.5–5.2)
PROT SERPL-MCNC: 6.7 G/DL (ref 6–8.5)
SODIUM SERPL-SCNC: 136 MMOL/L (ref 136–145)
TRIGL SERPL-MCNC: 133 MG/DL (ref 0–150)
TSH SERPL DL<=0.005 MIU/L-ACNC: 1.37 UIU/ML (ref 0.27–4.2)
VIT B12 SERPL-MCNC: 632 PG/ML (ref 211–946)
VLDLC SERPL CALC-MCNC: 24 MG/DL (ref 5–40)

## 2024-11-22 ENCOUNTER — OFFICE VISIT (OUTPATIENT)
Dept: INTERNAL MEDICINE | Facility: CLINIC | Age: 79
End: 2024-11-22
Payer: MEDICARE

## 2024-11-22 ENCOUNTER — TELEPHONE (OUTPATIENT)
Dept: INTERNAL MEDICINE | Facility: CLINIC | Age: 79
End: 2024-11-22

## 2024-11-22 ENCOUNTER — LAB (OUTPATIENT)
Facility: HOSPITAL | Age: 79
End: 2024-11-22
Payer: MEDICARE

## 2024-11-22 VITALS
SYSTOLIC BLOOD PRESSURE: 114 MMHG | OXYGEN SATURATION: 97 % | HEART RATE: 88 BPM | RESPIRATION RATE: 16 BRPM | DIASTOLIC BLOOD PRESSURE: 66 MMHG | TEMPERATURE: 98.3 F

## 2024-11-22 DIAGNOSIS — R05.1 ACUTE COUGH: Primary | ICD-10-CM

## 2024-11-22 DIAGNOSIS — R68.83 CHILLS: ICD-10-CM

## 2024-11-22 DIAGNOSIS — E11.43 TYPE 2 DIABETES MELLITUS WITH AUTONOMIC NEUROPATHY, UNSPECIFIED WHETHER LONG TERM INSULIN USE: Chronic | ICD-10-CM

## 2024-11-22 DIAGNOSIS — I25.10 CORONARY ARTERY DISEASE INVOLVING NATIVE CORONARY ARTERY OF NATIVE HEART WITHOUT ANGINA PECTORIS: Chronic | ICD-10-CM

## 2024-11-22 DIAGNOSIS — R09.89 CHEST CONGESTION: ICD-10-CM

## 2024-11-22 DIAGNOSIS — R53.83 FATIGUE, UNSPECIFIED TYPE: Chronic | ICD-10-CM

## 2024-11-22 LAB
B PARAPERT DNA SPEC QL NAA+PROBE: NOT DETECTED
B PERT DNA SPEC QL NAA+PROBE: NOT DETECTED
C PNEUM DNA NPH QL NAA+NON-PROBE: NOT DETECTED
EXPIRATION DATE: NORMAL
FLUAV AG UPPER RESP QL IA.RAPID: NOT DETECTED
FLUAV SUBTYP SPEC NAA+PROBE: NOT DETECTED
FLUBV AG UPPER RESP QL IA.RAPID: NOT DETECTED
FLUBV RNA ISLT QL NAA+PROBE: NOT DETECTED
HADV DNA SPEC NAA+PROBE: NOT DETECTED
HCOV 229E RNA SPEC QL NAA+PROBE: NOT DETECTED
HCOV HKU1 RNA SPEC QL NAA+PROBE: NOT DETECTED
HCOV NL63 RNA SPEC QL NAA+PROBE: NOT DETECTED
HCOV OC43 RNA SPEC QL NAA+PROBE: NOT DETECTED
HMPV RNA NPH QL NAA+NON-PROBE: NOT DETECTED
HPIV1 RNA ISLT QL NAA+PROBE: NOT DETECTED
HPIV2 RNA SPEC QL NAA+PROBE: NOT DETECTED
HPIV3 RNA NPH QL NAA+PROBE: NOT DETECTED
HPIV4 P GENE NPH QL NAA+PROBE: NOT DETECTED
INTERNAL CONTROL: NORMAL
Lab: NORMAL
M PNEUMO IGG SER IA-ACNC: NOT DETECTED
RHINOVIRUS RNA SPEC NAA+PROBE: DETECTED
RSV RNA NPH QL NAA+NON-PROBE: NOT DETECTED
SARS-COV-2 AG UPPER RESP QL IA.RAPID: NOT DETECTED
SARS-COV-2 RNA NPH QL NAA+NON-PROBE: NOT DETECTED

## 2024-11-22 PROCEDURE — 0202U NFCT DS 22 TRGT SARS-COV-2: CPT | Performed by: NURSE PRACTITIONER

## 2024-11-22 NOTE — PROGRESS NOTES
Subjective   Chief Complaint   Patient presents with    Nasal Congestion    Cough    Fatigue    Generalized Body Aches     X2 days       History of Present Illness   79 y.o. male presents with cc complaint cough, sinus and chest congestion ongoing times 2 days. Denies fever or chills.     His daughter has had a cold. His wife recently ;  was in the last couple of weeks. Cough is usually non-productive. Sinus drainage is clear. Denies trouble breathing, HA or sore throat. Notes some PND.     Fatigue persists and ongoing for the last 6-8 months; previous unremarkable in October with upcoming follow up with Dr. Serrano regarding MDS .      Patient Active Problem List   Diagnosis    Coronary artery disease involving native coronary artery without angina pectoris    Hyperlipidemia    Essential hypertension    Type 2 diabetes mellitus with microalbuminuria, with long-term current use of insulin    Microalbuminuria    DM type 2 with diabetic peripheral neuropathy    Bandemia    Erythrocytosis    Primary osteoarthritis involving multiple joints    Stenosis of left vertebral artery    Intracranial carotid stenosis, right    Myelodysplastic syndrome    Stenosis of left carotid artery    Left foot drop    B12 deficiency       No Known Allergies    Current Outpatient Medications on File Prior to Visit   Medication Sig Dispense Refill    acyclovir (ZOVIRAX) 400 MG tablet Take 1 tablet by mouth As Needed. Take no more than 5 doses a day.      atorvastatin (LIPITOR) 10 MG tablet Take 1 tablet by mouth Daily.      brimonidine (ALPHAGAN) 0.15 % ophthalmic solution Apply 1 drop to eye 2 (two) times a day.      clopidogrel (PLAVIX) 75 MG tablet Take 1 tablet by mouth Every Night.      Continuous Blood Gluc  (FreeStyle Ju[place] 2 [place]) device 1 each Daily. 1 each 1    Continuous Blood Gluc Sensor (FreeStyle Juwan 2 Sensor) misc 1 each Every 14 (Fourteen) Days. 6 each 2    gabapentin (NEURONTIN) 100 MG capsule Take 1  capsule by mouth.      Glucose 15 GM/32ML gel Take  by mouth.      insulin aspart (novoLOG FLEXPEN) 100 UNIT/ML solution pen-injector sc pen 40 to 45 units with each meal      Insulin Glargine-yfgn 100 UNIT/ML solution pen-injector Inject 20 Units under the skin into the appropriate area as directed Every Evening.      isosorbide mononitrate (IMDUR) 30 MG 24 hr tablet Take 1 tablet by mouth Daily.      latanoprost (XALATAN) 0.005 % ophthalmic solution Apply 1 drop to eye(s) as directed by provider Daily.      lisinopril (PRINIVIL,ZESTRIL) 40 MG tablet Take 1 tablet by mouth Daily. 90 tablet 1    metFORMIN (GLUCOPHAGE) 1000 MG tablet Take 1 tablet by mouth 2 (Two) Times a Day With Meals. Take 2 tablets bid      metoprolol succinate XL (TOPROL-XL) 50 MG 24 hr tablet Take 1.5 tablets by mouth Daily.      NIFEdipine XL (PROCARDIA XL) 60 MG 24 hr tablet Take 30 mg by mouth Daily.      nitroglycerin (NITROSTAT) 0.4 MG SL tablet Place 1 tablet under the tongue Every 5 (Five) Minutes As Needed for Chest Pain. Take no more than 3 doses in 15 minutes.      omeprazole (priLOSEC) 20 MG capsule Take 1 capsule by mouth Daily.      pilocarpine (PILOCAR) 1 % ophthalmic solution 1 drop 2 (Two) Times a Day.      Semaglutide, 1 MG/DOSE, (Ozempic, 1 MG/DOSE,) 4 MG/3ML solution pen-injector Inject 1 mg under the skin into the appropriate area as directed 1 (One) Time Per Week. Discontinue Ozempic 0.5 mg 9 mL 2    timolol (BETIMOL) 0.5 % ophthalmic solution Apply  to eye(s) as directed by provider.       Current Facility-Administered Medications on File Prior to Visit   Medication Dose Route Frequency Provider Last Rate Last Admin    cyanocobalamin injection 1,000 mcg  1,000 mcg Intramuscular Q28 Days Mary Anne Ludwig APRN   1,000 mcg at 11/08/24 1141       Past Medical History:   Diagnosis Date    Carpal tunnel syndrome on right     Chest pain     Degenerative disc disease, lumbar     Diverticulitis of large intestine without  perforation or abscess without bleeding 07/31/2018    ED (erectile dysfunction)     Erythrocytosis 07/05/2018    GERD (gastroesophageal reflux disease)     Glaucoma     Hyperlipidemia     Kidney stones     Pneumonia due to infectious organism 04/14/2017    S/P angioplasty with stent     Stroke     Trapezius muscle spasm 03/19/2018    Tubular adenoma of colon 09/2019    Removed by colonoscopy by Dr. Weems       Family History   Problem Relation Age of Onset    Heart disease Mother     Prostate cancer Father     Heart disease Father     Colon polyps Daughter     Colon polyps Daughter        Social History     Socioeconomic History    Marital status:      Spouse name: Kerry    Years of education: College   Tobacco Use    Smoking status: Never    Smokeless tobacco: Never    Tobacco comments:     no caffeine   Vaping Use    Vaping status: Never Used   Substance and Sexual Activity    Alcohol use: No    Drug use: No    Sexual activity: Defer     Partners: Female       Past Surgical History:   Procedure Laterality Date    CARDIAC CATHETERIZATION N/A 02/10/2011    Normal LV systolic function; Kind of diffuse, small vessel, distal disease. Most of it is not amenable to PCI, but his JADEN lesion is and it is a large vessel; All of his prior durg-eluting stents are widely patent & look good, Dr. Anibal Oliveira    CATARACT EXTRACTION Bilateral     CATARACT EXTRACTION      COLONOSCOPY N/A 09/13/2004    Normal, Repeat in 5-8 years, Dr. Davion Sanon    COLONOSCOPY N/A 09/04/2009    Mutiple small & large-mouthed diverticual were found in the entire colon; Exam otherwise normal, Dr. Davion Sanon    COLONOSCOPY N/A 09/26/2019    Procedure: COLONOSCOPY to  cecum with cold biopsy polypectomy;  Surgeon: Haider Weems MD;  Location: [facility] ENDOSCOPY;  Service: General    COLONOSCOPY W/ POLYPECTOMY N/A [id number]    2 benign polyps, [facility], Dr. Davion Sanon    CORONARY ANGIOPLASTY WITH STENT PLACEMENT N/A  11/20/2007    Successful multivessel drug eluting stent implantation for restenosis above his prior stenting, Dr. Anibal Wray    CORONARY ANGIOPLASTY WITH STENT PLACEMENT N/A 06/05/2007    Successful multivessel drug eluting stent implantation in all 3 of the major coronary arteries, Dr. Anibal Wray    CORONARY ANGIOPLASTY WITH STENT PLACEMENT N/A 02/10/2011    Successful drug-stenting of the JADEN, Dr. Anibal Oliveira    ENDOSCOPY N/A 11/21/2017    Procedure: ESOPHAGOGASTRODUODENOSCOPY WITH COLD BIOPSIES AND 53 F MICHAEL DILATION;  Surgeon: Wisam Potter MD;  Location: [facility] ENDOSCOPY;  Service:     EYE SURGERY Left 2011    Retina    HYDROCELECTOMY Left 08/19/2011    Dr. Argueta Short    INCISION AND DRAINAGE PERIRECTAL ABSCESS N/A 11/30/2007    Dr. Davion Sanon    LUMBAR EPIDURAL INJECTION N/A 2012-2013    x 3    NEUROMA SURGERY Right 07/19/2005    Excision Neuroma Right Arm; PATH: CONSISTENT W/ LIPOMA,  Dr. Davion Sanon    ROTATOR CUFF REPAIR Right 2006    ROTATOR CUFF REPAIR Left 2000    UPPER GASTROINTESTINAL ENDOSCOPY N/A 09/06/2012    Normal esophagus; Normal Stomach, Normal examinded duodenum, Dr. Bruno Turk    WRIST FRACTURE SURGERY Right 06/03/2003    ORIF Right Distal Radius, Dr. Ryan Bravo       The following portions of the patient's history were reviewed and updated as appropriate: problem list, allergies, current medications, past medical history, and past social history.    Review of Systems    Immunization History   Administered Date(s) Administered    COVID-19 (MODERNA) 1st,2nd,3rd Dose Monovalent 01/28/2021, 02/25/2021    Fluzone High-Dose 65+YRS 10/03/2016, 09/26/2017, 10/20/2018, 10/17/2024    Fluzone High-Dose 65+yrs 11/08/2021, 10/31/2022, 12/01/2023    Hepatitis A 10/01/2019    Pneumococcal Conjugate 13-Valent (PCV13) 11/22/2016    Pneumococcal Polysaccharide (PPSV23) 11/22/2010    Shingrix 01/01/2011, 01/01/2019    TD Preservative Free (Tenivac) 04/03/2023    Tdap  11/12/2017       Objective   Vitals:    11/22/24 0904   BP: 114/66   Pulse: 88   Resp: 16   Temp: 98.3 °F (36.8 °C)   SpO2: 97%     There is no height or weight on file to calculate BMI.  Physical Exam  Constitutional:       Appearance: Normal appearance.   HENT:      Head: Normocephalic and atraumatic.      Right Ear: Tympanic membrane, ear canal and external ear normal.      Left Ear: Tympanic membrane, ear canal and external ear normal.      Nose: Congestion present.      Mouth/Throat:      Mouth: Mucous membranes are moist.      Pharynx: Oropharynx is clear. No oropharyngeal exudate or posterior oropharyngeal erythema.   Cardiovascular:      Rate and Rhythm: Normal rate and regular rhythm.      Heart sounds: Normal heart sounds.   Pulmonary:      Effort: Pulmonary effort is normal.      Breath sounds: Normal breath sounds.   Musculoskeletal:      Cervical back: Neck supple.   Lymphadenopathy:      Cervical: No cervical adenopathy.   Skin:     General: Skin is warm and dry.   Neurological:      Mental Status: He is alert.   Psychiatric:         Mood and Affect: Mood normal.         Behavior: Behavior normal.       Procedures    Assessment & Plan   Diagnoses and all orders for this visit:    1. Acute cough (Primary)  Comments:  Suspect this is viral. Rapid flu/COVID negative. Respiratory panel as below. Mucinex advised.  Orders:  -     Respiratory Panel PCR w/COVID-19(SARS-CoV-2) HOLDEN/MACRINA/ANDREA/PAD/COR/JAMES In-House, NP Swab in UTM/VTM, 2 HR TAT - Swab, Nasopharynx  -     POCT SARS-CoV-2 Antigen VINCENT + Flu    2. Chest congestion  -     Respiratory Panel PCR w/COVID-19(SARS-CoV-2) HOLDEN/MACRINA/ANDREA/PAD/COR/JAMES In-House, NP Swab in UTM/VTM, 2 HR TAT - Swab, Nasopharynx  -     POCT SARS-CoV-2 Antigen VINCENT + Flu    3. Chills  -     Respiratory Panel PCR w/COVID-19(SARS-CoV-2) HOLDEN/MACRINA/ANDREA/PAD/COR/JAMES In-House, NP Swab in UTM/VTM, 2 HR TAT - Swab, Nasopharynx    4. Fatigue, unspecified type  Comments:  Previous labs (Oct/Nov)  reviewed. Scheduled for follow u with Dr. Serrano in December regarding MDS. Schedule echo.  Orders:  -     Adult Transthoracic Echo Complete W/ Cont if Necessary Per Protocol    5. Type 2 diabetes with peripheral neuropathy and long-term insulin use  -     Respiratory Panel PCR w/COVID-19(SARS-CoV-2) HOLDEN/MACRINA/ANDREA/PAD/COR/JAMES In-House, NP Swab in UTM/VTM, 2 HR TAT - Swab, Nasopharynx    6. Coronary artery disease involving native coronary artery of native heart without angina pectoris  -     Adult Transthoracic Echo Complete W/ Cont if Necessary Per Protocol    ADDENDUM: Rhinovirus detected. Symptomatic treatment advised. He will let me know how he is doing next week.     Records reviewed include previous OV with myself as well as labs.     Return in about 5 weeks (around 12/27/2024) for 30.

## 2024-11-22 NOTE — TELEPHONE ENCOUNTER
"  Caller: Anthony Vaughn \"REYES\"    Relationship: Self    Best call back number: [phone]     What was the call regarding: PATIENT IS REQUESTING TO SPEAK TO PROVIDER REGARDING TESTS THAT HE BELIEVES WERE NOT DONE. PLEASE CALL.     "

## 2024-12-09 ENCOUNTER — CLINICAL SUPPORT (OUTPATIENT)
Dept: INTERNAL MEDICINE | Facility: CLINIC | Age: 79
End: 2024-12-09
Payer: MEDICARE

## 2024-12-09 DIAGNOSIS — E53.8 B12 DEFICIENCY: Primary | ICD-10-CM

## 2024-12-09 PROCEDURE — 96372 THER/PROPH/DIAG INJ SC/IM: CPT | Performed by: NURSE PRACTITIONER

## 2024-12-09 RX ADMIN — CYANOCOBALAMIN 1000 MCG: 1000 INJECTION, SOLUTION INTRAMUSCULAR; SUBCUTANEOUS at 11:49

## 2024-12-16 ENCOUNTER — HOSPITAL ENCOUNTER (OUTPATIENT)
Dept: CARDIOLOGY | Facility: HOSPITAL | Age: 79
Discharge: HOME | End: 2024-12-16
Admitting: NURSE PRACTITIONER
Payer: MEDICARE

## 2024-12-16 VITALS
DIASTOLIC BLOOD PRESSURE: 58 MMHG | HEART RATE: 74 BPM | BODY MASS INDEX: 26.05 KG/M2 | SYSTOLIC BLOOD PRESSURE: 105 MMHG | WEIGHT: 182 LBS | HEIGHT: 70 IN

## 2024-12-16 LAB
AORTIC DIMENSIONLESS INDEX: 0.7 (DI)
ASCENDING AORTA: 3.6 CM
BH CV ECHO MEAS - ACS: 2.12 CM
BH CV ECHO MEAS - AO MAX PG: 5.8 MMHG
BH CV ECHO MEAS - AO MEAN PG: 3.6 MMHG
BH CV ECHO MEAS - AO ROOT DIAM: 3.9 CM
BH CV ECHO MEAS - AO V2 MAX: 120.8 CM/SEC
BH CV ECHO MEAS - AO V2 VTI: 27.7 CM
BH CV ECHO MEAS - AVA(I,D): 2.38 CM2
BH CV ECHO MEAS - EDV(CUBED): 103.8 ML
BH CV ECHO MEAS - EDV(MOD-SP2): 56 ML
BH CV ECHO MEAS - EDV(MOD-SP4): 107 ML
BH CV ECHO MEAS - EF(MOD-BP): 65 %
BH CV ECHO MEAS - EF(MOD-SP2): 60.7 %
BH CV ECHO MEAS - EF(MOD-SP4): 65.4 %
BH CV ECHO MEAS - ESV(CUBED): 26.1 ML
BH CV ECHO MEAS - ESV(MOD-SP2): 22 ML
BH CV ECHO MEAS - ESV(MOD-SP4): 37 ML
BH CV ECHO MEAS - FS: 36.9 %
BH CV ECHO MEAS - IVS/LVPW: 0.92 CM
BH CV ECHO MEAS - IVSD: 1.1 CM
BH CV ECHO MEAS - LA DIMENSION: 3.7 CM
BH CV ECHO MEAS - LAT PEAK E' VEL: 7 CM/SEC
BH CV ECHO MEAS - LV DIASTOLIC VOL/BSA (35-75): 53.4 CM2
BH CV ECHO MEAS - LV MASS(C)D: 199.6 GRAMS
BH CV ECHO MEAS - LV MAX PG: 4.1 MMHG
BH CV ECHO MEAS - LV MEAN PG: 2.19 MMHG
BH CV ECHO MEAS - LV SYSTOLIC VOL/BSA (12-30): 18.5 CM2
BH CV ECHO MEAS - LV V1 MAX: 100.9 CM/SEC
BH CV ECHO MEAS - LV V1 VTI: 21.1 CM
BH CV ECHO MEAS - LVIDD: 4.7 CM
BH CV ECHO MEAS - LVIDS: 3 CM
BH CV ECHO MEAS - LVOT AREA: 3.1 CM2
BH CV ECHO MEAS - LVOT DIAM: 2 CM
BH CV ECHO MEAS - LVPWD: 1.2 CM
BH CV ECHO MEAS - MED PEAK E' VEL: 6.3 CM/SEC
BH CV ECHO MEAS - MV A DUR: 0.16 SEC
BH CV ECHO MEAS - MV A MAX VEL: 99 CM/SEC
BH CV ECHO MEAS - MV DEC SLOPE: 462.9 CM/SEC2
BH CV ECHO MEAS - MV DEC TIME: 199 SEC
BH CV ECHO MEAS - MV E MAX VEL: 91.7 CM/SEC
BH CV ECHO MEAS - MV E/A: 0.93
BH CV ECHO MEAS - MV MAX PG: 3.9 MMHG
BH CV ECHO MEAS - MV MEAN PG: 1.65 MMHG
BH CV ECHO MEAS - MV P1/2T: 56.7 MSEC
BH CV ECHO MEAS - MV V2 VTI: 31.1 CM
BH CV ECHO MEAS - MVA(P1/2T): 3.9 CM2
BH CV ECHO MEAS - MVA(VTI): 2.13 CM2
BH CV ECHO MEAS - PA ACC TIME: 0.12 SEC
BH CV ECHO MEAS - PA V2 MAX: 96.4 CM/SEC
BH CV ECHO MEAS - PULM A REVS DUR: 0.12 SEC
BH CV ECHO MEAS - PULM A REVS VEL: 29.8 CM/SEC
BH CV ECHO MEAS - PULM DIAS VEL: 45.9 CM/SEC
BH CV ECHO MEAS - PULM S/D: 1.06
BH CV ECHO MEAS - PULM SYS VEL: 48.7 CM/SEC
BH CV ECHO MEAS - QP/QS: 2.23
BH CV ECHO MEAS - RV MAX PG: 2.46 MMHG
BH CV ECHO MEAS - RV V1 MAX: 78.4 CM/SEC
BH CV ECHO MEAS - RV V1 VTI: 16.2 CM
BH CV ECHO MEAS - RVDD: 2.26 CM
BH CV ECHO MEAS - RVOT DIAM: 3.4 CM
BH CV ECHO MEAS - SUP REN AO DIAM: 2.7 CM
BH CV ECHO MEAS - SV(LVOT): 66 ML
BH CV ECHO MEAS - SV(MOD-SP2): 34 ML
BH CV ECHO MEAS - SV(MOD-SP4): 70 ML
BH CV ECHO MEAS - SV(RVOT): 147.4 ML
BH CV ECHO MEAS - SVI(LVOT): 32.9 ML/M2
BH CV ECHO MEAS - SVI(MOD-SP2): 17 ML/M2
BH CV ECHO MEAS - SVI(MOD-SP4): 34.9 ML/M2
BH CV ECHO MEAS - TAPSE (>1.6): 1.84 CM
BH CV ECHO MEASUREMENTS AVERAGE E/E' RATIO: 13.79
BH CV XLRA - RV BASE: 3 CM
BH CV XLRA - RV LENGTH: 5.5 CM
BH CV XLRA - RV MID: 2.6 CM
BH CV XLRA - TDI S': 12.9 CM/SEC
LEFT ATRIUM VOLUME INDEX: 16 ML/M2
SINUS: 3.4 CM
STJ: 3.5 CM

## 2024-12-16 PROCEDURE — 93306 TTE W/DOPPLER COMPLETE: CPT

## 2024-12-16 PROCEDURE — 25510000001 PERFLUTREN 6.52 MG/ML SUSPENSION 2 ML VIAL: Performed by: NURSE PRACTITIONER

## 2024-12-16 RX ADMIN — PERFLUTREN 2 ML: 6.52 INJECTION, SUSPENSION INTRAVENOUS at 08:34

## 2024-12-16 NOTE — PROGRESS NOTES
"REASON FOR FOLLOW UP:  Leukocytosis for years, minimal erythrocytosis, normal platelet count, no splenomegaly by physical exam or ct abdomen.BCR/ABL AND JAK2 MUTATION NEGATIVE IN 2017.      INTERVAL HISTORY:  Anthony Vaughn is a 79 y.o. male who returns for follow up    He notes some mild fatigue but otherwise is doing very well.      PE:    Vitals:    12/18/24 1032   BP: 114/66   Pulse: 79   Resp: 16   Temp: 97.7 °F (36.5 °C)   TempSrc: Oral   SpO2: 97%   Weight: 81.7 kg (180 lb 3.2 oz)   Height: 177.8 cm (70\")   PainSc: 0-No pain           12/18/2024    10:30 AM   Current Status   ECOG score 0     Physical Exam   General:  No acute distress, awake, alert and oriented  Skin:  Warm and dry, no visible rash  HEENT:  Normocephalic/atraumatic.   Chest:  Normal respiratory effort  Extremities:  No visible clubbing, cyanosis, or edema  Neuro/psych:  Grossly nonfocal.  Normal mood and affect.         RECENT LABS:  Lactate Dehydrogenase (12/18/2024 10:04)  Comprehensive Metabolic Panel (12/18/2024 10:04)  CBC & Differential (12/18/2024 10:04)    ASSESSMENT/PLAN:    *Leukocytosis-Early stage myelodysplastic syndrome  Concern for myeloproliferate disorder, patient originally declined proceeding with bone marrow biopsy.  We have done BCR-ABL in the past that has been negative, JAK2 mutation that has been negative and we have run laboratory testing looking for inflammatory or infectious etiologies that have been negative.   Following annually with labs until  May 2024.  5/31/2024: WBC increased to 18,000.  Recommended peripheral blood analysis and bone marrow biopsy.  6/6/2024 bone marrow biopsy showing some element of excessive leukocytes in the bone marrow with minimal fibrosis and minimal dyserythropoiesis suggesting early stage of myelodysplastic syndrome. WBC 12.10, hemoglobin 15, platelets 334.  Continue every 3 month labs for now.  9/23/2024: WBC 11.99, hemoglobin 14.8, platelets 361,000. Feeling well without any new " concerns.  12/18/2024: White blood cell count normal at 10.16 with a normal-appearing differential    *Neurologic Symptoms  On 05/31/2024 there was concern about neurological symptoms patient has been experiencing a few weeks ago that are permanent with some numbness in the left side of the face and foot drop on the left lower extremity.   Dr. Galaviz discussed with patient's PCP Mary Anne ARREOLA who plans to pursue imaging for further work up.  PT referral for foot drop  Not discussed today    *Anticoagulated on Plavix and aspirin    PLAN:   I will see him back with labs in 6 months.  We are available sooner if needed.

## 2024-12-18 ENCOUNTER — OFFICE VISIT (OUTPATIENT)
Dept: ONCOLOGY | Facility: CLINIC | Age: 79
End: 2024-12-18
Payer: MEDICARE

## 2024-12-18 ENCOUNTER — LAB (OUTPATIENT)
Dept: LAB | Facility: HOSPITAL | Age: 79
End: 2024-12-18
Payer: MEDICARE

## 2024-12-18 VITALS
DIASTOLIC BLOOD PRESSURE: 66 MMHG | TEMPERATURE: 97.7 F | HEART RATE: 79 BPM | WEIGHT: 180.2 LBS | SYSTOLIC BLOOD PRESSURE: 114 MMHG | HEIGHT: 70 IN | RESPIRATION RATE: 16 BRPM | BODY MASS INDEX: 25.8 KG/M2 | OXYGEN SATURATION: 97 %

## 2024-12-18 DIAGNOSIS — D75.1 ERYTHROCYTOSIS: ICD-10-CM

## 2024-12-18 DIAGNOSIS — D72.825 BANDEMIA: ICD-10-CM

## 2024-12-18 DIAGNOSIS — D46.9 MYELODYSPLASTIC SYNDROME: Primary | ICD-10-CM

## 2024-12-18 DIAGNOSIS — M21.372 FOOT DROP, LEFT: ICD-10-CM

## 2024-12-18 LAB
ALBUMIN SERPL-MCNC: 4 G/DL (ref 3.5–5.2)
ALBUMIN/GLOB SERPL: 1.5 G/DL
ALP SERPL-CCNC: 58 U/L (ref 39–117)
ALT SERPL W P-5'-P-CCNC: 11 U/L (ref 1–41)
ANION GAP SERPL CALCULATED.3IONS-SCNC: 12.5 MMOL/L (ref 5–15)
AST SERPL-CCNC: 13 U/L (ref 1–40)
BASOPHILS # BLD AUTO: 0.06 10*3/MM3 (ref 0–0.2)
BASOPHILS NFR BLD AUTO: 0.6 % (ref 0–1.5)
BILIRUB SERPL-MCNC: 0.6 MG/DL (ref 0–1.2)
BUN SERPL-MCNC: 15 MG/DL (ref 8–23)
BUN/CREAT SERPL: 12.8 (ref 7–25)
CALCIUM SPEC-SCNC: 8.8 MG/DL (ref 8.6–10.5)
CHLORIDE SERPL-SCNC: 103 MMOL/L (ref 98–107)
CO2 SERPL-SCNC: 21.5 MMOL/L (ref 22–29)
CREAT SERPL-MCNC: 1.17 MG/DL (ref 0.76–1.27)
DEPRECATED RDW RBC AUTO: 41.5 FL (ref 37–54)
EGFRCR SERPLBLD CKD-EPI 2021: 63 ML/MIN/1.73
EOSINOPHIL # BLD AUTO: 0.15 10*3/MM3 (ref 0–0.4)
EOSINOPHIL NFR BLD AUTO: 1.5 % (ref 0.3–6.2)
ERYTHROCYTE [DISTWIDTH] IN BLOOD BY AUTOMATED COUNT: 12.8 % (ref 12.3–15.4)
GLOBULIN UR ELPH-MCNC: 2.6 GM/DL
GLUCOSE SERPL-MCNC: 157 MG/DL (ref 65–99)
HCT VFR BLD AUTO: 44.7 % (ref 37.5–51)
HGB BLD-MCNC: 14.7 G/DL (ref 13–17.7)
IMM GRANULOCYTES # BLD AUTO: 0.04 10*3/MM3 (ref 0–0.05)
IMM GRANULOCYTES NFR BLD AUTO: 0.4 % (ref 0–0.5)
LDH SERPL-CCNC: 127 U/L (ref 135–225)
LYMPHOCYTES # BLD AUTO: 2.37 10*3/MM3 (ref 0.7–3.1)
LYMPHOCYTES NFR BLD AUTO: 23.3 % (ref 19.6–45.3)
MCH RBC QN AUTO: 29.3 PG (ref 26.6–33)
MCHC RBC AUTO-ENTMCNC: 32.9 G/DL (ref 31.5–35.7)
MCV RBC AUTO: 89 FL (ref 79–97)
MONOCYTES # BLD AUTO: 0.96 10*3/MM3 (ref 0.1–0.9)
MONOCYTES NFR BLD AUTO: 9.4 % (ref 5–12)
NEUTROPHILS NFR BLD AUTO: 6.58 10*3/MM3 (ref 1.7–7)
NEUTROPHILS NFR BLD AUTO: 64.8 % (ref 42.7–76)
NRBC BLD AUTO-RTO: 0 /100 WBC (ref 0–0.2)
PLATELET # BLD AUTO: 330 10*3/MM3 (ref 140–450)
PMV BLD AUTO: 8.9 FL (ref 6–12)
POTASSIUM SERPL-SCNC: 4.1 MMOL/L (ref 3.5–5.2)
PROT SERPL-MCNC: 6.6 G/DL (ref 6–8.5)
RBC # BLD AUTO: 5.02 10*6/MM3 (ref 4.14–5.8)
SODIUM SERPL-SCNC: 137 MMOL/L (ref 136–145)
WBC NRBC COR # BLD AUTO: 10.16 10*3/MM3 (ref 3.4–10.8)

## 2024-12-18 PROCEDURE — 80053 COMPREHEN METABOLIC PANEL: CPT

## 2024-12-18 PROCEDURE — 83615 LACTATE (LD) (LDH) ENZYME: CPT

## 2024-12-18 PROCEDURE — 85025 COMPLETE CBC W/AUTO DIFF WBC: CPT

## 2024-12-18 PROCEDURE — 36415 COLL VENOUS BLD VENIPUNCTURE: CPT

## 2025-04-16 PROBLEM — Z86.0101 HX OF ADENOMATOUS POLYP OF COLON: Status: ACTIVE | Noted: 2024-05-19

## 2025-05-13 ENCOUNTER — TELEPHONE (OUTPATIENT)
Dept: CARDIOLOGY | Age: 80
End: 2025-05-13

## 2025-05-13 NOTE — TELEPHONE ENCOUNTER
"    Caller: Anthony Vaughn \"REYES\"    Relationship to patient: Self    Best call back number: [phone]      Type of visit: FOLLOW UP     Requested date: AS SOON AS POSSIBLE     If rescheduling, when is the original appointment: 07.21.25     Additional notes: PATIENT STATED THAT HE HAS BEEN HAVING A LITTLE CHEST PAIN ABOUT EVERY COUPLE OF WEEKS AND HE WOULD LIKE TO SEE DR. GONZALEZ TO DISCUSS HEART CATHETERIZATION. PATIENT STATED THAT HE WAS NOT HAVING CHEST PAIN AS HE WAS SPEAKING TO [facility].PATIENT HAS APPOINTMENT ON 07.21.25 @ 10 AM WITH RADHA LOOMIS BUT WOULD LIKE TO BE SEEN BY DR. GONZALEZ AS SOON AS POSSIBLE.PLEASE CONTACT PATIENT TO SCHEDULE. THANK YOU.        "

## 2025-05-16 ENCOUNTER — OFFICE VISIT (OUTPATIENT)
Dept: CARDIOLOGY | Age: 80
End: 2025-05-16
Payer: MEDICARE

## 2025-05-16 VITALS
HEIGHT: 70 IN | OXYGEN SATURATION: 97 % | WEIGHT: 180 LBS | BODY MASS INDEX: 25.77 KG/M2 | SYSTOLIC BLOOD PRESSURE: 112 MMHG | HEART RATE: 79 BPM | DIASTOLIC BLOOD PRESSURE: 60 MMHG

## 2025-05-16 DIAGNOSIS — I25.10 CORONARY ARTERY DISEASE INVOLVING NATIVE CORONARY ARTERY OF NATIVE HEART WITHOUT ANGINA PECTORIS: Primary | Chronic | ICD-10-CM

## 2025-05-16 DIAGNOSIS — I10 ESSENTIAL HYPERTENSION: Chronic | ICD-10-CM

## 2025-05-16 DIAGNOSIS — E78.5 HYPERLIPIDEMIA, UNSPECIFIED HYPERLIPIDEMIA TYPE: Chronic | ICD-10-CM

## 2025-05-16 PROCEDURE — 1160F RVW MEDS BY RX/DR IN RCRD: CPT | Performed by: NURSE PRACTITIONER

## 2025-05-16 PROCEDURE — 3078F DIAST BP <80 MM HG: CPT | Performed by: NURSE PRACTITIONER

## 2025-05-16 PROCEDURE — 99214 OFFICE O/P EST MOD 30 MIN: CPT | Performed by: NURSE PRACTITIONER

## 2025-05-16 PROCEDURE — 93000 ELECTROCARDIOGRAM COMPLETE: CPT | Performed by: NURSE PRACTITIONER

## 2025-05-16 PROCEDURE — 1159F MED LIST DOCD IN RCRD: CPT | Performed by: NURSE PRACTITIONER

## 2025-05-16 PROCEDURE — 3074F SYST BP LT 130 MM HG: CPT | Performed by: NURSE PRACTITIONER

## 2025-05-16 NOTE — PROGRESS NOTES
Date of Office Visit: 2025  Encounter Provider: ELBA Dos Santos  Place of Service: [facility] CARDIOLOGY  Patient Name: Anthony Vaughn  :1945    Chief Complaint   Patient presents with    Coronary Artery Disease   :     HPI: Anthony Vaughn is a 80 y.o. male patient of Dr. Wray's with coronary artery disease.     In , he underwent multivessel stenting of the LAD, RCA, and circumflex.     In , he had a stent to the JADEN. In , he had a questionable TIA leading to an echocardiogram and Holter which were unremarkable.     He was last seen in the office by Dr. Wray in 2024 at which time he was doing well.  No changes were made to his regimen, and he was advised to follow-up in 1 year.    Over the last 6 months, he has been experiencing intermittent chest tightness and associated shortness of breath.  There really is no pattern to it.  It happens about 2 or 3 times a week.  He had a severe episode 2 weeks ago while sitting at a restaurant.  It occurred prior to eating.  He developed chest tightness and associated weakness.  Symptoms generally last for about 10 minutes.  He denies any palpitations, edema, syncope, bleeding difficulties or melena.    Past Medical History:   Diagnosis Date    Carpal tunnel syndrome on right     Chest pain     Degenerative disc disease, lumbar     Diverticulitis of large intestine without perforation or abscess without bleeding 2018    ED (erectile dysfunction)     Erythrocytosis 2018    GERD (gastroesophageal reflux disease)     Glaucoma     Hyperlipidemia     Kidney stones     Pneumonia due to infectious organism 2017    S/P angioplasty with stent     Stroke     Trapezius muscle spasm 2018    Tubular adenoma of colon 2019    Removed by colonoscopy by Dr. Weems       Past Surgical History:   Procedure Laterality Date    CARDIAC CATHETERIZATION N/A 02/10/2011    Normal LV systolic function;  Kind of diffuse, small vessel, distal disease. Most of it is not amenable to PCI, but his JADEN lesion is and it is a large vessel; All of his prior durg-eluting stents are widely patent & look good, Dr. Anibal Oliveira    CATARACT EXTRACTION Bilateral     CATARACT EXTRACTION      COLONOSCOPY N/A 09/13/2004    Normal, Repeat in 5-8 years, Dr. Davion Sanon    COLONOSCOPY N/A 09/04/2009    Mutiple small & large-mouthed diverticual were found in the entire colon; Exam otherwise normal, Dr. Davion Sanon    COLONOSCOPY N/A 09/26/2019    Procedure: COLONOSCOPY to  cecum with cold biopsy polypectomy;  Surgeon: Haider Weems MD;  Location: [facility] ENDOSCOPY;  Service: General    COLONOSCOPY W/ POLYPECTOMY N/A [id number]    2 benign polyps, [facility], Dr. Davion Sanon    CORONARY ANGIOPLASTY WITH STENT PLACEMENT N/A 11/20/2007    Successful multivessel drug eluting stent implantation for restenosis above his prior stenting, Dr. Anibal Wray    CORONARY ANGIOPLASTY WITH STENT PLACEMENT N/A 06/05/2007    Successful multivessel drug eluting stent implantation in all 3 of the major coronary arteries, Dr. Anibal Wray    CORONARY ANGIOPLASTY WITH STENT PLACEMENT N/A 02/10/2011    Successful drug-stenting of the JADEN, Dr. Anibal Oliveira    ENDOSCOPY N/A 11/21/2017    Procedure: ESOPHAGOGASTRODUODENOSCOPY WITH COLD BIOPSIES AND 53 F MICHAEL DILATION;  Surgeon: Wisam Potter MD;  Location: [facility] ENDOSCOPY;  Service:     EYE SURGERY Left 2011    Retina    HYDROCELECTOMY Left 08/19/2011    Dr. Argueta Short    INCISION AND DRAINAGE PERIRECTAL ABSCESS N/A 11/30/2007    Dr. Davion Sanon    LUMBAR EPIDURAL INJECTION N/A 2012-2013    x 3    NEUROMA SURGERY Right 07/19/2005    Excision Neuroma Right Arm; PATH: CONSISTENT W/ LIPOMA,  Dr. Davion Sanon    ROTATOR CUFF REPAIR Right 2006    ROTATOR CUFF REPAIR Left 2000    UPPER GASTROINTESTINAL ENDOSCOPY N/A 09/06/2012    Normal esophagus; Normal Stomach, Normal  examinded nedra, Dr. Bruno Turk    WRIST FRACTURE SURGERY Right 06/03/2003    ORIF Right Distal Radius, Dr. Ryan Bravo       Social History     Socioeconomic History    Marital status:      Spouse name: Kerry    Years of education: College   Tobacco Use    Smoking status: Never    Smokeless tobacco: Never    Tobacco comments:     no caffeine   Vaping Use    Vaping status: Never Used   Substance and Sexual Activity    Alcohol use: No    Drug use: No    Sexual activity: Defer     Partners: Female       Family History   Problem Relation Age of Onset    Heart disease Mother     Prostate cancer Father     Heart disease Father     Colon polyps Daughter     Colon polyps Daughter        Review of Systems   Constitutional: Positive for malaise/fatigue.   Cardiovascular:  Positive for chest pain and dyspnea on exertion. Negative for leg swelling, orthopnea, paroxysmal nocturnal dyspnea and syncope.   Respiratory: Negative.     Hematologic/Lymphatic: Negative for bleeding problem.   Musculoskeletal:  Negative for falls.   Gastrointestinal:  Negative for melena.   Neurological:  Positive for light-headedness. Negative for dizziness.       No Known Allergies      Current Outpatient Medications:     acyclovir (ZOVIRAX) 400 MG tablet, Take 1 tablet by mouth As Needed. Take no more than 5 doses a day., Disp: , Rfl:     atorvastatin (LIPITOR) 10 MG tablet, Take 1 tablet by mouth Daily., Disp: , Rfl:     brimonidine (ALPHAGAN) 0.15 % ophthalmic solution, Apply 1 drop to eye 2 (two) times a day., Disp: , Rfl:     clopidogrel (PLAVIX) 75 MG tablet, Take 1 tablet by mouth Every Night., Disp: , Rfl:     Continuous Blood Gluc  (FreeStyle Juwan 2 Cornish) device, 1 each Daily., Disp: 1 each, Rfl: 1    Continuous Blood Gluc Sensor (FreeStyle Juwan 2 Sensor) misc, 1 each Every 14 (Fourteen) Days., Disp: 6 each, Rfl: 2    gabapentin (NEURONTIN) 100 MG capsule, Take 1 capsule by mouth., Disp: , Rfl:     Glucose 15  GM/32ML gel, Take  by mouth., Disp: , Rfl:     insulin aspart (novoLOG FLEXPEN) 100 UNIT/ML solution pen-injector sc pen, 40 to 45 units with each meal, Disp: , Rfl:     Insulin Glargine-yfgn 100 UNIT/ML solution pen-injector, Inject 20 Units under the skin into the appropriate area as directed Every Evening., Disp: , Rfl:     isosorbide mononitrate (IMDUR) 30 MG 24 hr tablet, Take 1 tablet by mouth Daily., Disp: , Rfl:     latanoprost (XALATAN) 0.005 % ophthalmic solution, Apply 1 drop to eye(s) as directed by provider Daily., Disp: , Rfl:     lisinopril (PRINIVIL,ZESTRIL) 40 MG tablet, Take 1 tablet by mouth Daily., Disp: 90 tablet, Rfl: 1    metFORMIN (GLUCOPHAGE) 1000 MG tablet, Take 1 tablet by mouth 2 (Two) Times a Day With Meals. Take 2 tablets bid, Disp: , Rfl:     metoprolol succinate XL (TOPROL-XL) 50 MG 24 hr tablet, Take 1.5 tablets by mouth Daily., Disp: , Rfl:     NIFEdipine XL (PROCARDIA XL) 60 MG 24 hr tablet, Take 30 mg by mouth Daily., Disp: , Rfl:     nitroglycerin (NITROSTAT) 0.4 MG SL tablet, Place 1 tablet under the tongue Every 5 (Five) Minutes As Needed for Chest Pain. Take no more than 3 doses in 15 minutes., Disp: , Rfl:     omeprazole (priLOSEC) 20 MG capsule, Take 1 capsule by mouth Daily., Disp: , Rfl:     pilocarpine (PILOCAR) 1 % ophthalmic solution, 1 drop 2 (Two) Times a Day., Disp: , Rfl:     Semaglutide, 1 MG/DOSE, (Ozempic, 1 MG/DOSE,) 4 MG/3ML solution pen-injector, Inject 1 mg under the skin into the appropriate area as directed 1 (One) Time Per Week. Discontinue Ozempic 0.5 mg, Disp: 9 mL, Rfl: 2    timolol (BETIMOL) 0.5 % ophthalmic solution, Apply  to eye(s) as directed by provider., Disp: , Rfl:     Current Facility-Administered Medications:     cyanocobalamin injection 1,000 mcg, 1,000 mcg, Intramuscular, Q28 Days, Mary Anne Ludwig APRN, 1,000 mcg at 12/09/24 1149      Objective:     Vitals:    05/16/25 1338   BP: 112/60   Pulse: 79   SpO2: 97%   Weight: 81.6 kg  "(180 lb)   Height: 177.8 cm (70\")     Body mass index is 25.83 kg/m².    PHYSICAL EXAM:    Neck:      Vascular: No JVD.   Pulmonary:      Effort: Pulmonary effort is normal.      Breath sounds: Normal breath sounds.   Cardiovascular:      Normal rate. Regular rhythm.      Murmurs: There is no murmur.      No gallop.  No click. No rub.   Pulses:     Intact distal pulses.           ECG 12 Lead    Date/Time: 5/16/2025 2:03 PM  Performed by: Josseline Garrett APRN    Authorized by: Josseline Garrett APRN  Comparison: compared with previous ECG from 7/17/2024  Similar to previous ECG  Rhythm: sinus rhythm  Rate: normal  BPM: 79            Assessment:       Diagnosis Plan   1. Coronary artery disease involving native coronary artery of native heart without angina pectoris  ECG 12 Lead    Stress Test With Myocardial Perfusion One Day      2. Essential hypertension        3. Hyperlipidemia, unspecified hyperlipidemia type          Orders Placed This Encounter   Procedures    Stress Test With Myocardial Perfusion One Day     Standing Status:   Future     Expected Date:   5/23/2025     Expiration Date:   5/16/2026     What stress agent will be used?:   Regadenoson (Lexiscan)     Difficulty walking criteria?:   Musculoskeletal (hips, knees, feet, back, amputee)     Reason for exam?:   Chest Pain     Reason for exam?:   Known Coronary Artery Disease     Release to patient:   Routine Release [[id number]]    ECG 12 Lead     This order was created via procedure documentation     Release to patient:   Routine Release [[id number]]          Plan:       1.  Coronary artery disease.  Overall his symptoms sound a little atypical for angina.  However, he has not undergone a recent ischemic evaluation which I think would be reasonable.  I ordered stress test.      2.  Hypertension.  His blood pressure is stable.  Continue current regimen including lisinopril, Toprol, and nifedipine.      3.  Hyperlipidemia.  Lipid panel from " November demonstrated an LDL of 56 and an HDL of 35.  Continue atorvastatin.      Overall I think he is stable.  Further recommendations will be made pending results of the above.      As always, it has been a pleasure to participate in your patient's care.      Sincerely,         ELBA Olmos

## 2025-05-20 ENCOUNTER — OFFICE VISIT (OUTPATIENT)
Dept: ENDOCRINOLOGY | Age: 80
End: 2025-05-20
Payer: MEDICARE

## 2025-05-20 VITALS
BODY MASS INDEX: 25 KG/M2 | HEART RATE: 74 BPM | HEIGHT: 70 IN | SYSTOLIC BLOOD PRESSURE: 112 MMHG | OXYGEN SATURATION: 97 % | DIASTOLIC BLOOD PRESSURE: 60 MMHG | TEMPERATURE: 97.5 F | WEIGHT: 174.6 LBS

## 2025-05-20 DIAGNOSIS — Z95.820 S/P ANGIOPLASTY WITH STENT: ICD-10-CM

## 2025-05-20 DIAGNOSIS — Z79.4 TYPE 2 DIABETES MELLITUS WITH MICROALBUMINURIA, WITH LONG-TERM CURRENT USE OF INSULIN: Primary | ICD-10-CM

## 2025-05-20 DIAGNOSIS — E78.5 HYPERLIPIDEMIA, UNSPECIFIED HYPERLIPIDEMIA TYPE: ICD-10-CM

## 2025-05-20 DIAGNOSIS — I10 ESSENTIAL HYPERTENSION: ICD-10-CM

## 2025-05-20 DIAGNOSIS — E11.29 TYPE 2 DIABETES MELLITUS WITH MICROALBUMINURIA, WITH LONG-TERM CURRENT USE OF INSULIN: Primary | ICD-10-CM

## 2025-05-20 DIAGNOSIS — R80.9 TYPE 2 DIABETES MELLITUS WITH MICROALBUMINURIA, WITH LONG-TERM CURRENT USE OF INSULIN: Primary | ICD-10-CM

## 2025-05-20 DIAGNOSIS — I25.10 CORONARY ARTERY DISEASE INVOLVING NATIVE CORONARY ARTERY OF NATIVE HEART WITHOUT ANGINA PECTORIS: ICD-10-CM

## 2025-05-20 DIAGNOSIS — Z86.0100 HISTORY OF COLON POLYPS: ICD-10-CM

## 2025-05-20 LAB
ALBUMIN SERPL-MCNC: 4.6 G/DL (ref 3.5–5.2)
ALBUMIN/GLOB SERPL: 2 G/DL
ALP SERPL-CCNC: 73 U/L (ref 39–117)
ALT SERPL-CCNC: 13 U/L (ref 1–41)
AST SERPL-CCNC: 14 U/L (ref 1–40)
BILIRUB SERPL-MCNC: 0.6 MG/DL (ref 0–1.2)
BUN SERPL-MCNC: 18 MG/DL (ref 8–23)
BUN/CREAT SERPL: 13.6 (ref 7–25)
CALCIUM SERPL-MCNC: 9.8 MG/DL (ref 8.6–10.5)
CHLORIDE SERPL-SCNC: 101 MMOL/L (ref 98–107)
CHOLEST SERPL-MCNC: 138 MG/DL (ref 0–200)
CO2 SERPL-SCNC: 24.5 MMOL/L (ref 22–29)
CREAT SERPL-MCNC: 1.32 MG/DL (ref 0.76–1.27)
EGFRCR SERPLBLD CKD-EPI 2021: 54.2 ML/MIN/1.73
GLOBULIN SER CALC-MCNC: 2.3 GM/DL
GLUCOSE SERPL-MCNC: 80 MG/DL (ref 65–99)
HBA1C MFR BLD: 5.7 % (ref 4.8–5.6)
HDLC SERPL-MCNC: 37 MG/DL (ref 40–60)
IMP & REVIEW OF LAB RESULTS: NORMAL
LDLC SERPL CALC-MCNC: 73 MG/DL (ref 0–100)
POTASSIUM SERPL-SCNC: 5 MMOL/L (ref 3.5–5.2)
PROT SERPL-MCNC: 6.9 G/DL (ref 6–8.5)
SODIUM SERPL-SCNC: 140 MMOL/L (ref 136–145)
TRIGL SERPL-MCNC: 164 MG/DL (ref 0–150)
TSH SERPL DL<=0.005 MIU/L-ACNC: 1.34 UIU/ML (ref 0.27–4.2)
VIT B12 SERPL-MCNC: 343 PG/ML (ref 211–946)
VLDLC SERPL CALC-MCNC: 28 MG/DL (ref 5–40)

## 2025-05-20 PROCEDURE — 3074F SYST BP LT 130 MM HG: CPT | Performed by: INTERNAL MEDICINE

## 2025-05-20 PROCEDURE — 3078F DIAST BP <80 MM HG: CPT | Performed by: INTERNAL MEDICINE

## 2025-05-20 PROCEDURE — 99214 OFFICE O/P EST MOD 30 MIN: CPT | Performed by: INTERNAL MEDICINE

## 2025-05-20 PROCEDURE — 95251 CONT GLUC MNTR ANALYSIS I&R: CPT | Performed by: INTERNAL MEDICINE

## 2025-05-20 NOTE — PROGRESS NOTES
Subjective   Anthony Vaughn is a 80 y.o. male.     History of Present Illness     Patient has known diabetes mellitus since 2006 and started on insulin in 2010. He is on insulin glargine (Semglee) 24 units every evening, NovoLog 26 units TID, Ozempic 1 mg weekly and metformin 1000 mg twice a day.  He has freestyle anya 2 sensor in place.  [facility]  He is usually eats 1 meal a day.  He has lost 8 lbs since 11/24.  His last meal was last night.     Sensor data from May 7, 2025 to May 20, 2025 reviewed.  Average glucose 125 mg per DL.  GMI 6.3%.  Time in target 88%.    Time above target 9%.  Time below target 3%     His last eye examination was in 3/24 and he has retinopathy. He has glaucoma.  He has microalbuminuria on urine sample taken last 5/24. He is on lisinopril. He denies numbness in his toes.     He has known coronary artery disease and had multiple angioplasty and stents in the past. He denies any previous history of myocardial infarction. He has not had a coronary artery bypass surgery. He denies any chest pains, exertional dyspnea or PND.   He follows with Dr. Wray. He is on Imdur, lisinopril, metoprolol succinate , nifedepine XL and nitroglycerin sublingual as needed.  He denies pedal edema.     He has hyperlipidemia and has been on atorvastatin 10 mg every PM.  He denies any myalgia.     He had tubular adenoma of colon removed by Dr. Weems by colonoscopy in September 2019.  He denies bowel complaints.  He is scheduled for colonoscopy next month.    The following portions of the patient's history were reviewed and updated as appropriate: allergies, current medications, past family history, past medical history, past social history, past surgical history, and problem list.    Review of Systems   Eyes:  Negative for visual disturbance.   Respiratory:  Negative for shortness of breath.    Cardiovascular:  Negative for chest pain,  "palpitations and leg swelling.   Gastrointestinal: Negative.    Genitourinary: Negative.    Musculoskeletal:  Negative for myalgias.   Neurological:  Negative for numbness.     Vitals:    05/20/25 0912   BP: 112/60   Pulse: 74   Temp: 97.5 °F (36.4 °C)   TempSrc: Oral   SpO2: 97%   Weight: 79.2 kg (174 lb 9.6 oz)   Height: 177.8 cm (70\")      Objective   Physical Exam  Constitutional:       General: He is not in acute distress.     Appearance: Normal appearance. He is not ill-appearing, toxic-appearing or diaphoretic.   Eyes:      General: No scleral icterus.        Right eye: No discharge.         Left eye: No discharge.      Extraocular Movements: Extraocular movements intact.   Neck:      Vascular: No carotid bruit.   Cardiovascular:      Rate and Rhythm: Normal rate and regular rhythm.      Heart sounds: Normal heart sounds. No murmur heard.     No friction rub. No gallop.   Pulmonary:      Breath sounds: Normal breath sounds. No rales.   Chest:      Chest wall: No tenderness.   Abdominal:      General: Bowel sounds are normal.      Palpations: Abdomen is soft.      Tenderness: There is no right CVA tenderness or left CVA tenderness.   Musculoskeletal:      Right lower leg: No edema.      Left lower leg: No edema.      Comments: Feet warm.  No cyanosis, pedal edema or clubbing.  No plantar ulcers.   Lymphadenopathy:      Cervical: No cervical adenopathy.   Neurological:      Mental Status: He is alert and oriented to person, place, and time.      Comments: Intact light touch in lower extremities.       Lab on 12/18/2024   Component Date Value Ref Range Status    Glucose 12/18/2024 157 (H)  65 - 99 mg/dL Final    BUN 12/18/2024 15  8 - 23 mg/dL Final    Creatinine 12/18/2024 1.17  0.76 - 1.27 mg/dL Final    Sodium 12/18/2024 137  136 - 145 mmol/L Final    Potassium 12/18/2024 4.1  3.5 - 5.2 mmol/L Final    Chloride 12/18/2024 103  98 - 107 mmol/L Final    CO2 12/18/2024 21.5 (L)  22.0 - 29.0 mmol/L Final    " Calcium 12/18/2024 8.8  8.6 - 10.5 mg/dL Final    Total Protein 12/18/2024 6.6  6.0 - 8.5 g/dL Final    Albumin 12/18/2024 4.0  3.5 - 5.2 g/dL Final    ALT (SGPT) 12/18/2024 11  1 - 41 U/L Final    AST (SGOT) 12/18/2024 13  1 - 40 U/L Final    Alkaline Phosphatase 12/18/2024 58  39 - 117 U/L Final    Total Bilirubin 12/18/2024 0.6  0.0 - 1.2 mg/dL Final    Globulin 12/18/2024 2.6  gm/dL Final    A/G Ratio 12/18/2024 1.5  g/dL Final    BUN/Creatinine Ratio 12/18/2024 12.8  7.0 - 25.0 Final    Anion Gap 12/18/2024 12.5  5.0 - 15.0 mmol/L Final    eGFR 12/18/2024 63.0  >60.0 mL/min/1.73 Final    LDH 12/18/2024 127 (L)  135 - 225 U/L Final    WBC 12/18/2024 10.16  3.40 - 10.80 10*3/mm3 Final    RBC 12/18/2024 5.02  4.14 - 5.80 10*6/mm3 Final    Hemoglobin 12/18/2024 14.7  13.0 - 17.7 g/dL Final    Hematocrit 12/18/2024 44.7  37.5 - 51.0 % Final    MCV 12/18/2024 89.0  79.0 - 97.0 fL Final    MCH 12/18/2024 29.3  26.6 - 33.0 pg Final    MCHC 12/18/2024 32.9  31.5 - 35.7 g/dL Final    RDW 12/18/2024 12.8  12.3 - 15.4 % Final    RDW-SD 12/18/2024 41.5  37.0 - 54.0 fl Final    MPV 12/18/2024 8.9  6.0 - 12.0 fL Final    Platelets 12/18/2024 330  140 - 450 10*3/mm3 Final    Neutrophil % 12/18/2024 64.8  42.7 - 76.0 % Final    Lymphocyte % 12/18/2024 23.3  19.6 - 45.3 % Final    Monocyte % 12/18/2024 9.4  5.0 - 12.0 % Final    Eosinophil % 12/18/2024 1.5  0.3 - 6.2 % Final    Basophil % 12/18/2024 0.6  0.0 - 1.5 % Final    Immature Grans % 12/18/2024 0.4  0.0 - 0.5 % Final    Neutrophils, Absolute 12/18/2024 6.58  1.70 - 7.00 10*3/mm3 Final    Lymphocytes, Absolute 12/18/2024 2.37  0.70 - 3.10 10*3/mm3 Final    Monocytes, Absolute 12/18/2024 0.96 (H)  0.10 - 0.90 10*3/mm3 Final    Eosinophils, Absolute 12/18/2024 0.15  0.00 - 0.40 10*3/mm3 Final    Basophils, Absolute 12/18/2024 0.06  0.00 - 0.20 10*3/mm3 Final    Immature Grans, Absolute 12/18/2024 0.04  0.00 - 0.05 10*3/mm3 Final    nRBC 12/18/2024 0.0  0.0 - 0.2 /100 WBC  Final     Assessment & Plan   Diagnoses and all orders for this visit:    1. Type 2 diabetes mellitus with microalbuminuria, with long-term current use of insulin (Primary)    2. Coronary artery disease involving native coronary artery of native heart without angina pectoris    3. S/P angioplasty with stent    4. Hyperlipidemia, unspecified hyperlipidemia type    5. History of colon polyps    6. Essential hypertension        Decrease Semglee to 20 units every evening.  Continue NovoLog 26 units with meals.  Continue Ozempic 1 mg weekly and metformin 1000 mg twice a day.  [facility]    Continue isosorbide mononitrate, lisinopril, metoprolol succinate, and nifedipine XL.  Will defer blood pressure control to cardiologist.    Continue atorvastatin 10 mg/day.    Copy my note sent to Mary Anne Ludwig NP.    RTC 6 mos.

## 2025-05-21 ENCOUNTER — TELEPHONE (OUTPATIENT)
Dept: CARDIOLOGY | Age: 80
End: 2025-05-21
Payer: MEDICARE

## 2025-05-23 ENCOUNTER — HOSPITAL ENCOUNTER (OUTPATIENT)
Dept: CARDIOLOGY | Facility: HOSPITAL | Age: 80
Discharge: HOME | End: 2025-05-23
Payer: MEDICARE

## 2025-05-23 VITALS — WEIGHT: 174.6 LBS | BODY MASS INDEX: 25 KG/M2 | HEIGHT: 70 IN

## 2025-05-23 DIAGNOSIS — I25.10 CORONARY ARTERY DISEASE INVOLVING NATIVE CORONARY ARTERY OF NATIVE HEART WITHOUT ANGINA PECTORIS: Chronic | ICD-10-CM

## 2025-05-23 LAB
BH CV NUCLEAR PRIOR STUDY: 2
BH CV REST NUCLEAR ISOTOPE DOSE: 10.8 MCI
BH CV STRESS BP STAGE 1: NORMAL
BH CV STRESS COMMENTS STAGE 1: NORMAL
BH CV STRESS DOSE REGADENOSON STAGE 1: 0.4
BH CV STRESS DURATION MIN STAGE 1: 0
BH CV STRESS DURATION SEC STAGE 1: 10
BH CV STRESS HR STAGE 1: 122
BH CV STRESS NUCLEAR ISOTOPE DOSE: 35.3 MCI
BH CV STRESS PROTOCOL 1: NORMAL
BH CV STRESS RECOVERY BP: NORMAL MMHG
BH CV STRESS RECOVERY HR: 94 BPM
BH CV STRESS STAGE 1: 1
MAXIMAL PREDICTED HEART RATE: 139 BPM
PERCENT MAX PREDICTED HR: 87.77 %
SPECT HRT GATED+EF W RNC IV: 73 %
STRESS BASELINE BP: NORMAL MMHG
STRESS BASELINE HR: 94 BPM
STRESS PERCENT HR: 103 %
STRESS POST EXERCISE DUR SEC: 10 SEC
STRESS POST PEAK BP: NORMAL MMHG
STRESS POST PEAK HR: 122 BPM
STRESS TARGET HR: 118 BPM

## 2025-05-23 PROCEDURE — 25010000002 REGADENOSON 0.4 MG/5ML SOLUTION: Performed by: NURSE PRACTITIONER

## 2025-05-23 PROCEDURE — 78452 HT MUSCLE IMAGE SPECT MULT: CPT

## 2025-05-23 PROCEDURE — 34310000005 TECHNETIUM TETROFOSMIN KIT: Performed by: NURSE PRACTITIONER

## 2025-05-23 PROCEDURE — A9502 TC99M TETROFOSMIN: HCPCS | Performed by: NURSE PRACTITIONER

## 2025-05-23 PROCEDURE — 93017 CV STRESS TEST TRACING ONLY: CPT

## 2025-05-23 RX ORDER — REGADENOSON 0.08 MG/ML
0.4 INJECTION, SOLUTION INTRAVENOUS
Status: COMPLETED | OUTPATIENT
Start: 2025-05-23 | End: 2025-05-23

## 2025-05-23 RX ADMIN — TETROFOSMIN 1 DOSE: 1.38 INJECTION, POWDER, LYOPHILIZED, FOR SOLUTION INTRAVENOUS at 08:28

## 2025-05-23 RX ADMIN — REGADENOSON 0.4 MG: 0.08 INJECTION, SOLUTION INTRAVENOUS at 08:28

## 2025-05-23 RX ADMIN — TETROFOSMIN 1 DOSE: 1.38 INJECTION, POWDER, LYOPHILIZED, FOR SOLUTION INTRAVENOUS at 07:48

## 2025-06-04 ENCOUNTER — ANESTHESIA (OUTPATIENT)
Dept: GASTROENTEROLOGY | Facility: HOSPITAL | Age: 80
End: 2025-06-04
Payer: MEDICARE

## 2025-06-04 ENCOUNTER — ANESTHESIA EVENT (OUTPATIENT)
Dept: GASTROENTEROLOGY | Facility: HOSPITAL | Age: 80
End: 2025-06-04
Payer: MEDICARE

## 2025-06-04 ENCOUNTER — HOSPITAL ENCOUNTER (OUTPATIENT)
Facility: HOSPITAL | Age: 80
Setting detail: HOSPITAL OUTPATIENT SURGERY
Discharge: HOME | End: 2025-06-04
Attending: SURGERY | Admitting: SURGERY
Payer: MEDICARE

## 2025-06-04 VITALS
HEIGHT: 70 IN | BODY MASS INDEX: 24.61 KG/M2 | RESPIRATION RATE: 16 BRPM | DIASTOLIC BLOOD PRESSURE: 74 MMHG | SYSTOLIC BLOOD PRESSURE: 143 MMHG | WEIGHT: 171.9 LBS | HEART RATE: 74 BPM | OXYGEN SATURATION: 98 %

## 2025-06-04 DIAGNOSIS — Z86.0101 HX OF ADENOMATOUS POLYP OF COLON: ICD-10-CM

## 2025-06-04 LAB — GLUCOSE BLDC GLUCOMTR-MCNC: 127 MG/DL (ref 70–130)

## 2025-06-04 PROCEDURE — 25010000002 LIDOCAINE 2% SOLUTION

## 2025-06-04 PROCEDURE — 88305 TISSUE EXAM BY PATHOLOGIST: CPT | Performed by: SURGERY

## 2025-06-04 PROCEDURE — 25010000002 PROPOFOL 200 MG/20ML EMULSION

## 2025-06-04 PROCEDURE — 25010000002 PROPOFOL 1000 MG/100ML EMULSION

## 2025-06-04 PROCEDURE — 82948 REAGENT STRIP/BLOOD GLUCOSE: CPT

## 2025-06-04 PROCEDURE — 25810000003 LACTATED RINGERS PER 1000 ML

## 2025-06-04 RX ORDER — NALOXONE HCL 0.4 MG/ML
0.2 VIAL (ML) INJECTION AS NEEDED
Status: DISCONTINUED | OUTPATIENT
Start: 2025-06-04 | End: 2025-06-04 | Stop reason: HOSPADM

## 2025-06-04 RX ORDER — DROPERIDOL 2.5 MG/ML
0.62 INJECTION, SOLUTION INTRAMUSCULAR; INTRAVENOUS ONCE AS NEEDED
Status: DISCONTINUED | OUTPATIENT
Start: 2025-06-04 | End: 2025-06-04 | Stop reason: HOSPADM

## 2025-06-04 RX ORDER — DIPHENHYDRAMINE HYDROCHLORIDE 50 MG/ML
12.5 INJECTION, SOLUTION INTRAMUSCULAR; INTRAVENOUS
Status: DISCONTINUED | OUTPATIENT
Start: 2025-06-04 | End: 2025-06-04 | Stop reason: HOSPADM

## 2025-06-04 RX ORDER — FLUMAZENIL 0.1 MG/ML
0.2 INJECTION INTRAVENOUS AS NEEDED
Status: DISCONTINUED | OUTPATIENT
Start: 2025-06-04 | End: 2025-06-04 | Stop reason: HOSPADM

## 2025-06-04 RX ORDER — FENTANYL CITRATE 50 UG/ML
50 INJECTION, SOLUTION INTRAMUSCULAR; INTRAVENOUS
Status: DISCONTINUED | OUTPATIENT
Start: 2025-06-04 | End: 2025-06-04 | Stop reason: HOSPADM

## 2025-06-04 RX ORDER — PROPOFOL 10 MG/ML
INJECTION, EMULSION INTRAVENOUS CONTINUOUS PRN
Status: DISCONTINUED | OUTPATIENT
Start: 2025-06-04 | End: 2025-06-04 | Stop reason: SURG

## 2025-06-04 RX ORDER — ONDANSETRON 2 MG/ML
4 INJECTION INTRAMUSCULAR; INTRAVENOUS ONCE AS NEEDED
Status: DISCONTINUED | OUTPATIENT
Start: 2025-06-04 | End: 2025-06-04 | Stop reason: HOSPADM

## 2025-06-04 RX ORDER — EPHEDRINE SULFATE 50 MG/ML
10 INJECTION, SOLUTION INTRAVENOUS ONCE AS NEEDED
Status: DISCONTINUED | OUTPATIENT
Start: 2025-06-04 | End: 2025-06-04 | Stop reason: HOSPADM

## 2025-06-04 RX ORDER — PROPOFOL 10 MG/ML
INJECTION, EMULSION INTRAVENOUS AS NEEDED
Status: DISCONTINUED | OUTPATIENT
Start: 2025-06-04 | End: 2025-06-04 | Stop reason: SURG

## 2025-06-04 RX ORDER — LABETALOL HYDROCHLORIDE 5 MG/ML
10 INJECTION, SOLUTION INTRAVENOUS
Status: DISCONTINUED | OUTPATIENT
Start: 2025-06-04 | End: 2025-06-04 | Stop reason: HOSPADM

## 2025-06-04 RX ORDER — HYDRALAZINE HYDROCHLORIDE 20 MG/ML
10 INJECTION INTRAMUSCULAR; INTRAVENOUS
Status: DISCONTINUED | OUTPATIENT
Start: 2025-06-04 | End: 2025-06-04 | Stop reason: HOSPADM

## 2025-06-04 RX ORDER — SODIUM CHLORIDE, SODIUM LACTATE, POTASSIUM CHLORIDE, CALCIUM CHLORIDE 600; 310; 30; 20 MG/100ML; MG/100ML; MG/100ML; MG/100ML
1000 INJECTION, SOLUTION INTRAVENOUS CONTINUOUS
Status: DISCONTINUED | OUTPATIENT
Start: 2025-06-04 | End: 2025-06-04 | Stop reason: HOSPADM

## 2025-06-04 RX ORDER — SODIUM CHLORIDE, SODIUM LACTATE, POTASSIUM CHLORIDE, CALCIUM CHLORIDE 600; 310; 30; 20 MG/100ML; MG/100ML; MG/100ML; MG/100ML
INJECTION, SOLUTION INTRAVENOUS CONTINUOUS PRN
Status: DISCONTINUED | OUTPATIENT
Start: 2025-06-04 | End: 2025-06-04 | Stop reason: SURG

## 2025-06-04 RX ORDER — LIDOCAINE HYDROCHLORIDE 20 MG/ML
INJECTION, SOLUTION INFILTRATION; PERINEURAL AS NEEDED
Status: DISCONTINUED | OUTPATIENT
Start: 2025-06-04 | End: 2025-06-04 | Stop reason: SURG

## 2025-06-04 RX ORDER — FENTANYL CITRATE 50 UG/ML
25 INJECTION, SOLUTION INTRAMUSCULAR; INTRAVENOUS
Status: DISCONTINUED | OUTPATIENT
Start: 2025-06-04 | End: 2025-06-04 | Stop reason: HOSPADM

## 2025-06-04 RX ADMIN — PROPOFOL 180 MCG/KG/MIN: 10 INJECTION, EMULSION INTRAVENOUS at 07:35

## 2025-06-04 RX ADMIN — SODIUM CHLORIDE, POTASSIUM CHLORIDE, SODIUM LACTATE AND CALCIUM CHLORIDE: 600; 310; 30; 20 INJECTION, SOLUTION INTRAVENOUS at 07:28

## 2025-06-04 RX ADMIN — LIDOCAINE HYDROCHLORIDE 80 MG: 20 INJECTION, SOLUTION INFILTRATION; PERINEURAL at 07:34

## 2025-06-04 RX ADMIN — PROPOFOL INJECTABLE EMULSION 70 MG: 10 INJECTION, EMULSION INTRAVENOUS at 07:34

## 2025-06-04 NOTE — ANESTHESIA PREPROCEDURE EVALUATION
Anesthesia Evaluation     no history of anesthetic complications:   NPO Solid Status: > 8 hours  NPO Liquid Status: > 2 hours           Airway   Mallampati: II  Neck ROM: full  no difficulty expected  Dental - normal exam     Pulmonary - normal exam   (+) pneumonia ,  (-) COPD, asthma, sleep apnea, not a smoker    PE comment: nonlabored  Cardiovascular - normal exam    Rhythm: regular  Rate: normal    (+) hypertension, CAD, cardiac stents (last ~10yrs ago but 13 total stents per pt) , hyperlipidemia,  carotid artery disease (Intracranial carotid stenosis--right; Stenosis of left carotid artery; Stenosis of left vertebral artery) carotid bilateral  (-) valvular problems/murmurs, past MI, dysrhythmias, angina      Neuro/Psych  (+) CVA, weakness (Left foot drop)  (-) seizures, TIA  GI/Hepatic/Renal/Endo    (+) GERD, renal disease- stones, diabetes mellitus type 2  (-) liver disease, no thyroid disorder    Musculoskeletal     (+) arthralgias  Abdominal    Substance History      OB/GYN          Other   arthritis, blood dyscrasia (myelodysplastic syndrome),   history of cancer (Myelodysplastic syndrome)    ROS/Med Hx Other: Glaucoma                  Anesthesia Plan    ASA 3     MAC       Anesthetic plan, risks, benefits, and alternatives have been provided, discussed and informed consent has been obtained with: patient.    CODE STATUS:

## 2025-06-04 NOTE — ANESTHESIA POSTPROCEDURE EVALUATION
"Patient: Anthony Vaughn    Procedure Summary       Date: 06/04/25 Room / Location: [facility] ENDOSCOPY 1 /  [facility]    Anesthesia Start: 0728 Anesthesia Stop: 0759    Procedure: COLONOSCOPY to cecum with cold polypectomies Diagnosis:       Hx of adenomatous polyp of colon      (Hx of adenomatous polyp of colon [Z86.010])    Surgeons: Haider Weems MD Provider: Augusto Blakely MD    Anesthesia Type: MAC ASA Status: 3            Anesthesia Type: MAC    Vitals  Vitals Value Taken Time   /74 06/04/25 08:21   Temp     Pulse 74 06/04/25 08:26   Resp 17 06/04/25 08:07   SpO2 96 % 06/04/25 08:26   Vitals shown include unfiled device data.        Post Anesthesia Care and Evaluation    Patient location during evaluation: bedside  Pain management: adequate    Airway patency: patent  Anesthetic complications: No anesthetic complications    Cardiovascular status: acceptable  Respiratory status: acceptable  Hydration status: acceptable    Comments: */72   Pulse 62   Resp 17   Ht 177.8 cm (70\")   Wt 78 kg (171 lb 14.4 oz)   SpO2 100%   BMI 24.67 kg/m²       "

## 2025-06-04 NOTE — DISCHARGE INSTRUCTIONS

## 2025-06-04 NOTE — H&P
CC: Surveillance    HPI: 80-year-old gentleman presents for surveillance colonoscopy secondary to personal history of adenomatous polyps    PMH, PSH, MEDS AND ALLERGIES reviewed and reconciled in  EPIC    PHYSICAL EXAM:  Constitutional:  awake, alert, no acute distress  VS: afebrile, VSS  Respiratory:  normal inspiratory effort  Cardiovascular: regular rate  Gastrointestinal: Soft    ROS:  relevant systems negative other than any presenting complaints    ASSESSMENT:    Surveillance    PLAN:  Colonoscopy    Haider Weems M.D.

## 2025-06-04 NOTE — OP NOTE
PREOPERATIVE DIAGNOSIS:  High risk screening secondary to personal history of adenomatous polyps    POSTOPERATIVE DIAGNOSIS AND FINDINGS:  Small transverse colon polyp  Subcentimeter sigmoid polyp  Diverticulosis    PROCEDURE:  Colonoscopy to cecum with:  Cold biopsy removal of transverse colon polyp  Cold snare removal of sigmoid polyp    SURGEON:  Haider Weems MD    ANESTHESIA:  MAC    SPECIMEN(S):  Transverse colon polyp  Sigmoid polyp    DESCRIPTION:  In decubitus position digital rectal exam was normal. Colonoscope inserted under direct visualization of lumen to cecum confirmed by visualization of ileocecal valve and appendiceal orifice.  Scope slowly withdrawn circumferentially examining all mucosal surfaces.  Bowel preparation was good.  There was a small transverse colon polyp removed completely with cold biopsy forceps, good hemostasis at the site.  Subcentimeter sigmoid polyp removed with cold snare and retrieved, good hemostasis at the site.  Diverticulosis noted.  Tolerated well.    RECOMMENDATION FOR FUTURE SURVEILLANCE:  To be determined based on polyp pathology and issued as separate report    Haider Weems M.D.

## 2025-06-05 ENCOUNTER — RESULTS FOLLOW-UP (OUTPATIENT)
Dept: GASTROENTEROLOGY | Facility: HOSPITAL | Age: 80
End: 2025-06-05
Payer: MEDICARE

## 2025-06-05 NOTE — TELEPHONE ENCOUNTER
ENDOSCOPY FOLLOW UP NOTE    Colonoscopy 6/4/2025     Indication:  Personal history of adenomatous polyps    Findings:  Small transverse colon polyp  Subcentimeter sigmoid polyp  Diverticulosis    Recommendations:  Five years    Haider Weems M.D.

## 2025-06-19 ENCOUNTER — TELEPHONE (OUTPATIENT)
Dept: ONCOLOGY | Facility: CLINIC | Age: 80
End: 2025-06-19
Payer: MEDICARE

## 2025-06-24 NOTE — PROGRESS NOTES
"REASON FOR FOLLOW UP:  Leukocytosis for years, minimal erythrocytosis, normal platelet count, no splenomegaly by physical exam or ct abdomen.BCR/ABL AND JAK2 MUTATION NEGATIVE IN 2017.      INTERVAL HISTORY:  Anthony Vaughn is a 80 y.o. male who returns for follow up    Returns today for 6 month lab review and follow up.  Over the last 2-3 weeks has started to notice feeling more fatigued.  He has also had some intermittent nausea without vomiting around the same time the fatigue started.  Over the last month his weight has remained stable, however we did review that over the last 6 months he is lost a little over 5 pounds.  He does report over the last several months he has been eating less because he does not feel as hungry.  Denies early satiety.  Denies fever, chills, night sweats.      PE:    Vitals:    06/30/25 1003   BP: 124/77   Pulse: 68   Temp: 98.5 °F (36.9 °C)   TempSrc: Oral   SpO2: 96%   Weight: 78.7 kg (173 lb 9.6 oz)   Height: 177.8 cm (70\")   PainSc: 0-No pain             6/30/2025    10:05 AM   Current Status   ECOG score 0     Physical Exam   General:  No acute distress, awake, alert and oriented  Skin:  Warm and dry, no visible rash  HEENT:  Normocephalic/atraumatic.   Chest:  Normal respiratory effort  Extremities:  No visible clubbing, cyanosis, or edema  Neuro/psych:  Grossly nonfocal.  Normal mood and affect.         RECENT LABS:   CBC & Differential (06/30/2025 09:22)  Comprehensive Metabolic Panel (06/30/2025 09:22)  Lactate Dehydrogenase (06/30/2025 09:22)    ASSESSMENT/PLAN:    *Leukocytosis-Early stage myelodysplastic syndrome  Concern for myeloproliferate disorder, patient originally declined proceeding with bone marrow biopsy.  We have done BCR-ABL in the past that has been negative, JAK2 mutation that has been negative and we have run laboratory testing looking for inflammatory or infectious etiologies that have been negative.   Following annually with labs until  May " 2024.  5/31/2024: WBC increased to 18,000.  Recommended peripheral blood analysis and bone marrow biopsy.  6/6/2024 bone marrow biopsy showing some element of excessive leukocytes in the bone marrow with minimal fibrosis and minimal dyserythropoiesis suggesting early stage of myelodysplastic syndrome. WBC 12.10, hemoglobin 15, platelets 334.  Continue every 3 month labs for now.  9/23/2024: WBC 11.99, hemoglobin 14.8, platelets 361,000. Feeling well without any new concerns.  12/18/2024: White blood cell count normal at 10.16 with a normal-appearing differential  6/30/2025: WBC normal at 10.78 with normal-appearing differential.  Has had a little over 5 pound weight loss over the last 6 months, does note decreased appetite and therefore has not been eating as much.  Weight over the last 3 months has remained pretty stable.      *Neurologic Symptoms  On 05/31/2024 there was concern about neurological symptoms patient has been experiencing a few weeks ago that are permanent with some numbness in the left side of the face and foot drop on the left lower extremity.   Dr. Galaviz discussed with patient's PCP Mary Anne ARREOLA who plans to pursue imaging for further work up.  PT referral for foot drop  Not discussed today    *Anticoagulated on Plavix and aspirin    *Fatigue, nausea-developed 2 weeks ago.  Still able to perform all ADLs and perform all of his regular activities, just feeling more fatigued.  Intermittent nausea without vomiting.  Stable CBC labs today are all stable.  Recommend if symptoms persist he follow-up with PCP.    PLAN:   Dr. Serrano will see him back with labs in 6 months.  We are available sooner if needed.  If nausea and fatigue persist recommend he reach back out to his PCP or our office for additional recommendations.

## 2025-06-30 ENCOUNTER — LAB (OUTPATIENT)
Dept: LAB | Facility: HOSPITAL | Age: 80
End: 2025-06-30
Payer: MEDICARE

## 2025-06-30 ENCOUNTER — CLINICAL SUPPORT (OUTPATIENT)
Dept: INTERNAL MEDICINE | Facility: CLINIC | Age: 80
End: 2025-06-30
Payer: MEDICARE

## 2025-06-30 ENCOUNTER — OFFICE VISIT (OUTPATIENT)
Dept: ONCOLOGY | Facility: CLINIC | Age: 80
End: 2025-06-30
Payer: MEDICARE

## 2025-06-30 VITALS
TEMPERATURE: 98.5 F | HEART RATE: 68 BPM | WEIGHT: 173.6 LBS | DIASTOLIC BLOOD PRESSURE: 77 MMHG | HEIGHT: 70 IN | OXYGEN SATURATION: 96 % | BODY MASS INDEX: 24.85 KG/M2 | SYSTOLIC BLOOD PRESSURE: 124 MMHG

## 2025-06-30 DIAGNOSIS — D46.9 MYELODYSPLASTIC SYNDROME: Primary | ICD-10-CM

## 2025-06-30 DIAGNOSIS — E53.8 B12 DEFICIENCY: Primary | ICD-10-CM

## 2025-06-30 DIAGNOSIS — D46.9 MYELODYSPLASTIC SYNDROME: ICD-10-CM

## 2025-06-30 LAB
ALBUMIN SERPL-MCNC: 4.4 G/DL (ref 3.5–5.2)
ALBUMIN/GLOB SERPL: 2.1 G/DL
ALP SERPL-CCNC: 71 U/L (ref 39–117)
ALT SERPL W P-5'-P-CCNC: 14 U/L (ref 1–41)
ANION GAP SERPL CALCULATED.3IONS-SCNC: 11.6 MMOL/L (ref 5–15)
AST SERPL-CCNC: 15 U/L (ref 1–40)
BASOPHILS # BLD AUTO: 0.08 10*3/MM3 (ref 0–0.2)
BASOPHILS NFR BLD AUTO: 0.7 % (ref 0–1.5)
BILIRUB SERPL-MCNC: 0.7 MG/DL (ref 0–1.2)
BUN SERPL-MCNC: 15.5 MG/DL (ref 8–23)
BUN/CREAT SERPL: 14.1 (ref 7–25)
CALCIUM SPEC-SCNC: 9 MG/DL (ref 8.6–10.5)
CHLORIDE SERPL-SCNC: 101 MMOL/L (ref 98–107)
CO2 SERPL-SCNC: 24.4 MMOL/L (ref 22–29)
CREAT SERPL-MCNC: 1.1 MG/DL (ref 0.76–1.27)
DEPRECATED RDW RBC AUTO: 40.7 FL (ref 37–54)
EGFRCR SERPLBLD CKD-EPI 2021: 67.4 ML/MIN/1.73
EOSINOPHIL # BLD AUTO: 0.1 10*3/MM3 (ref 0–0.4)
EOSINOPHIL NFR BLD AUTO: 0.9 % (ref 0.3–6.2)
ERYTHROCYTE [DISTWIDTH] IN BLOOD BY AUTOMATED COUNT: 12.8 % (ref 12.3–15.4)
GLOBULIN UR ELPH-MCNC: 2.1 GM/DL
GLUCOSE SERPL-MCNC: 123 MG/DL (ref 65–99)
HCT VFR BLD AUTO: 43.8 % (ref 37.5–51)
HGB BLD-MCNC: 14.4 G/DL (ref 13–17.7)
IMM GRANULOCYTES # BLD AUTO: 0.04 10*3/MM3 (ref 0–0.05)
IMM GRANULOCYTES NFR BLD AUTO: 0.4 % (ref 0–0.5)
LDH SERPL-CCNC: 122 U/L (ref 135–225)
LYMPHOCYTES # BLD AUTO: 2.2 10*3/MM3 (ref 0.7–3.1)
LYMPHOCYTES NFR BLD AUTO: 20.4 % (ref 19.6–45.3)
MCH RBC QN AUTO: 29 PG (ref 26.6–33)
MCHC RBC AUTO-ENTMCNC: 32.9 G/DL (ref 31.5–35.7)
MCV RBC AUTO: 88.1 FL (ref 79–97)
MONOCYTES # BLD AUTO: 0.92 10*3/MM3 (ref 0.1–0.9)
MONOCYTES NFR BLD AUTO: 8.5 % (ref 5–12)
NEUTROPHILS NFR BLD AUTO: 69.1 % (ref 42.7–76)
NEUTROPHILS NFR BLD AUTO: 7.44 10*3/MM3 (ref 1.7–7)
NRBC BLD AUTO-RTO: 0 /100 WBC (ref 0–0.2)
PLATELET # BLD AUTO: 364 10*3/MM3 (ref 140–450)
PMV BLD AUTO: 8.5 FL (ref 6–12)
POTASSIUM SERPL-SCNC: 3.7 MMOL/L (ref 3.5–5.2)
PROT SERPL-MCNC: 6.5 G/DL (ref 6–8.5)
RBC # BLD AUTO: 4.97 10*6/MM3 (ref 4.14–5.8)
SODIUM SERPL-SCNC: 137 MMOL/L (ref 136–145)
WBC NRBC COR # BLD AUTO: 10.78 10*3/MM3 (ref 3.4–10.8)

## 2025-06-30 PROCEDURE — 3074F SYST BP LT 130 MM HG: CPT | Performed by: NURSE PRACTITIONER

## 2025-06-30 PROCEDURE — G2211 COMPLEX E/M VISIT ADD ON: HCPCS | Performed by: NURSE PRACTITIONER

## 2025-06-30 PROCEDURE — 80053 COMPREHEN METABOLIC PANEL: CPT

## 2025-06-30 PROCEDURE — 1159F MED LIST DOCD IN RCRD: CPT | Performed by: NURSE PRACTITIONER

## 2025-06-30 PROCEDURE — 36415 COLL VENOUS BLD VENIPUNCTURE: CPT

## 2025-06-30 PROCEDURE — 1126F AMNT PAIN NOTED NONE PRSNT: CPT | Performed by: NURSE PRACTITIONER

## 2025-06-30 PROCEDURE — 3078F DIAST BP <80 MM HG: CPT | Performed by: NURSE PRACTITIONER

## 2025-06-30 PROCEDURE — 99213 OFFICE O/P EST LOW 20 MIN: CPT | Performed by: NURSE PRACTITIONER

## 2025-06-30 PROCEDURE — 83615 LACTATE (LD) (LDH) ENZYME: CPT

## 2025-06-30 PROCEDURE — 1160F RVW MEDS BY RX/DR IN RCRD: CPT | Performed by: NURSE PRACTITIONER

## 2025-06-30 PROCEDURE — 96372 THER/PROPH/DIAG INJ SC/IM: CPT | Performed by: NURSE PRACTITIONER

## 2025-06-30 PROCEDURE — 85025 COMPLETE CBC W/AUTO DIFF WBC: CPT

## 2025-06-30 RX ADMIN — CYANOCOBALAMIN 1000 MCG: 1000 INJECTION, SOLUTION INTRAMUSCULAR; SUBCUTANEOUS at 13:31

## 2025-07-18 NOTE — PROGRESS NOTES
Subjective   The ABCs of the Annual Wellness Visit  Medicare Wellness Visit      Anthony Vaughn is a 80 y.o. patient who presents for a Medicare Wellness Visit.    The following portions of the patient's history were reviewed and   updated as appropriate: allergies, current medications, past family history, past medical history, past social history, past surgical history, and problem list.    Compared to one year ago, the patient's physical   health is the same.  Compared to one year ago, the patient's mental   health is the same.    Recent Hospitalizations:  He was not admitted to the hospital during the last year.     Current Medical Providers:  Patient Care Team:  Mary Anne Ludwig APRN as PCP - General (Family Medicine)  Gricel Diaz MD as PCP - Family Medicine  Anibal Wray MD as Consulting Physician (Cardiology)  Guillermo Live MD as Consulting Physician  Norman Mahmood MD as Consulting Physician (Ophthalmology)  Jeannie Wheeler APRN as Nurse Practitioner (Neurology)  Cesar Galaviz MD as Consulting Physician (Hematology and Oncology)  Gricel Diaz MD as Referring Physician (Internal Medicine)  Augusto Serrano MD as Consulting Physician (Hematology and Oncology)  Cristino Mckeon MD as Consulting Physician (Endocrinology)    Outpatient Medications Prior to Visit   Medication Sig Dispense Refill    acyclovir (ZOVIRAX) 400 MG tablet Take 1 tablet by mouth As Needed. Take no more than 5 doses a day.      atorvastatin (LIPITOR) 10 MG tablet Take 1 tablet by mouth Daily.      brimonidine (ALPHAGAN) 0.15 % ophthalmic solution Apply 1 drop to eye 2 (two) times a day.      clopidogrel (PLAVIX) 75 MG tablet Take 1 tablet by mouth Every Night.      Continuous Blood Gluc  (FreeStyle Juwan 2 [place]) device 1 each Daily. 1 each 1    gabapentin (NEURONTIN) 100 MG capsule Take 1 capsule by mouth.      Glucose 15 GM/32ML gel Take  by mouth.      insulin aspart (novoLOG FLEXPEN)  100 UNIT/ML solution pen-injector sc pen 40 to 45 units with each meal (Patient taking differently: 26 units with each meal)      Insulin Glargine-yfgn 100 UNIT/ML solution pen-injector Inject 20 Units under the skin into the appropriate area as directed Every Evening. (Patient taking differently: Inject 24 Units under the skin into the appropriate area as directed Every Evening.)      isosorbide mononitrate (IMDUR) 30 MG 24 hr tablet Take 1 tablet by mouth Daily.      latanoprost (XALATAN) 0.005 % ophthalmic solution Apply 1 drop to eye(s) as directed by provider Daily.      lisinopril (PRINIVIL,ZESTRIL) 40 MG tablet Take 1 tablet by mouth Daily. 90 tablet 1    metFORMIN (GLUCOPHAGE) 1000 MG tablet Take 1 tablet by mouth 2 (Two) Times a Day With Meals. Take 2 tablets bid      metoprolol succinate XL (TOPROL-XL) 50 MG 24 hr tablet Take 1.5 tablets by mouth Daily.      NIFEdipine XL (PROCARDIA XL) 60 MG 24 hr tablet Take 30 mg by mouth Daily.      nitroglycerin (NITROSTAT) 0.4 MG SL tablet Place 1 tablet under the tongue Every 5 (Five) Minutes As Needed for Chest Pain. Take no more than 3 doses in 15 minutes.      omeprazole (priLOSEC) 20 MG capsule Take 1 capsule by mouth Daily.      pilocarpine (PILOCAR) 1 % ophthalmic solution 1 drop 2 (Two) Times a Day.      Semaglutide, 1 MG/DOSE, (Ozempic, 1 MG/DOSE,) 4 MG/3ML solution pen-injector Inject 1 mg under the skin into the appropriate area as directed 1 (One) Time Per Week. Discontinue Ozempic 0.5 mg 9 mL 2    timolol (BETIMOL) 0.5 % ophthalmic solution Apply  to eye(s) as directed by provider.      Continuous Blood Gluc Sensor (FreeStyle Juwan 2 Sensor) misc 1 each Every 14 (Fourteen) Days. (Patient not taking: Reported on 6/30/2025) 6 each 2     Facility-Administered Medications Prior to Visit   Medication Dose Route Frequency Provider Last Rate Last Admin    cyanocobalamin injection 1,000 mcg  1,000 mcg Intramuscular Q28 Days Mary Anne Ludwig, APRN   1,000  "mcg at 06/30/25 1331     No opioid medication identified on active medication list. I have reviewed chart for other potential  high risk medication/s and harmful drug interactions in the elderly.      Aspirin is not on active medication list.  Aspirin use is contraindicated for this patient due to: current use of clopidogrel.  .    Patient Active Problem List   Diagnosis    Coronary artery disease involving native coronary artery without angina pectoris    Hyperlipidemia    Essential hypertension    Type 2 diabetes mellitus with microalbuminuria, with long-term current use of insulin    Microalbuminuria    DM type 2 with diabetic peripheral neuropathy    Bandemia    Erythrocytosis    Primary osteoarthritis involving multiple joints    Stenosis of left vertebral artery    Intracranial carotid stenosis, right    Myelodysplastic syndrome    Stenosis of left carotid artery    Left foot drop    B12 deficiency    Hx of adenomatous polyp of colon     Advance Care Planning Advance Directive is on file.  ACP discussion was held with the patient during this visit. Patient has an advance directive in EMR which is still valid.             Objective   Vitals:    07/21/25 1105   Weight: 78.1 kg (172 lb 3.2 oz)   Height: 177.8 cm (70\")       Estimated body mass index is 24.71 kg/m² as calculated from the following:    Height as of this encounter: 177.8 cm (70\").    Weight as of this encounter: 78.1 kg (172 lb 3.2 oz).    BMI is within normal parameters. No other follow-up for BMI required.           Does the patient have evidence of cognitive impairment? No  Lab Results   Component Value Date    CHLPL 138 05/20/2025    TRIG 164 (H) 05/20/2025    HDL 37 (L) 05/20/2025    LDL 73 05/20/2025    VLDL 28 05/20/2025    HGBA1C 5.70 (H) 05/20/2025                                                                                                Health  Risk Assessment    Smoking Status:  Social History     Tobacco Use   Smoking Status Never "   Smokeless Tobacco Never   Tobacco Comments    no caffeine     Alcohol Consumption:  Social History     Substance and Sexual Activity   Alcohol Use No       Fall Risk Screen  SITAADI Fall Risk Assessment was completed, and patient is at LOW risk for falls.Assessment completed on:2025    Depression Screening   Little interest or pleasure in doing things? Not at all   Feeling down, depressed, or hopeless? Not at all   PHQ-2 Total Score 0      Health Habits and Functional and Cognitive Screenin/2/2023    12:44 PM   Functional & Cognitive Status   Do you have difficulty preparing food and eating? No    Do you have difficulty bathing yourself, getting dressed or grooming yourself? No    Do you have difficulty using the toilet? No    Do you have difficulty moving around from place to place? No    Do you have trouble with steps or getting out of a bed or a chair? No   Current Diet Well Balanced Diet   Dental Exam Up to date   Eye Exam Up to date   Exercise (times per week) 2 times per week   Current Exercises Include Walking   Do you need help using the phone?  No   Are you deaf or do you have serious difficulty hearing?  No   Do you need help to go to places out of walking distance? No   Do you need help shopping? No   Do you need help preparing meals?  No   Do you need help with housework?  No   Do you need help with laundry? No   Do you need help taking your medications? No   Do you need help managing money? No   Do you ever drive or ride in a car without wearing a seat belt? No   Have you felt unusual fatigue (could be tiredness), stress, anger or loneliness in the last month? No    Who do you live with? Spouse   If you need help, do you have trouble finding someone available to you? No   Have you been bothered in the last four weeks by sexual problems? No   Do you have difficulty concentrating, remembering or making decisions? No       Data saved with a previous flowsheet row definition            Age-appropriate Screening Schedule:  Refer to the list below for future screening recommendations based on patient's age, sex and/or medical conditions. Orders for these recommended tests are listed in the plan section. The patient has been provided with a written plan.    Health Maintenance List  Health Maintenance   Topic Date Due    RSV Vaccine - Adults (1 - 1-dose 75+ series) Never done    COVID-19 Vaccine (3 - 2024-25 season) 09/01/2024    URINE MICROALBUMIN-CREATININE RATIO (uACR)  05/16/2025    INFLUENZA VACCINE  10/01/2025    HEMOGLOBIN A1C  11/20/2025    DIABETIC EYE EXAM  02/28/2026    DIABETIC FOOT EXAM  05/20/2026    LIPID PANEL  05/20/2026    ANNUAL WELLNESS VISIT  07/21/2026    COLORECTAL CANCER SCREENING  06/04/2030    TDAP/TD VACCINES (3 - Td or Tdap) 04/03/2033    Pneumococcal Vaccine 50+  Completed    ZOSTER VACCINE  Completed                                                                                                                                                CMS Preventative Services Quick Reference  Risk Factors Identified During Encounter  Immunizations Discussed/Encouraged: Influenza and RSV (Respiratory Syncytial Virus)    The above risks/problems have been discussed with the patient.  Pertinent information has been shared with the patient in the After Visit Summary.  An After Visit Summary and PPPS were made available to the patient.    Follow Up:   Next Medicare Wellness visit to be scheduled in 1 year.         Additional E&M Note during same encounter follows:  Patient has additional, significant, and separately identifiable condition(s)/problem(s) that require work above and beyond the Medicare Wellness Visit     Chief Complaint  Diabetes    Subjective   HPI  REYES is also being seen today for HTN. Feeling good without complaints. Denies chest pain or dyspnea. Weight is stable. Eats one meal a day; this has been the case for many years. Weight stable.     LILIANA UTD with   "Dank in June revealed 2 benign polyps; typical recall 5Y.     DM2 managed by Dr. Mckeon with revent A1C 5.7. Semglee reduced by 20 units at night. Continues Ozempic 1 mg weekly, Metformin and Novolog 26 units with meals.     Stress test in May after reporting chest tightness to cardiology revealed low risk study. Symptoms improved. LDL 73 with Atorvastatin 10 mg daily.       Objective   Vital Signs:  Ht 177.8 cm (70\")   Wt 78.1 kg (172 lb 3.2 oz)   BMI 24.71 kg/m²   Physical Exam  Vitals reviewed.   Constitutional:       Appearance: Normal appearance. He is well-developed.   HENT:      Head: Normocephalic and atraumatic.      Right Ear: Tympanic membrane, ear canal and external ear normal.      Left Ear: Tympanic membrane, ear canal and external ear normal.      Nose: Nose normal.      Mouth/Throat:      Mouth: Mucous membranes are moist.      Pharynx: Oropharynx is clear. No oropharyngeal exudate or posterior oropharyngeal erythema.   Eyes:      General: No scleral icterus.     Extraocular Movements: Extraocular movements intact.      Conjunctiva/sclera: Conjunctivae normal.      Pupils: Pupils are equal, round, and reactive to light.   Neck:      Thyroid: No thyromegaly.      Vascular: No carotid bruit.   Cardiovascular:      Rate and Rhythm: Normal rate and regular rhythm.      Heart sounds: Normal heart sounds. No murmur heard.  Pulmonary:      Effort: Pulmonary effort is normal.      Breath sounds: Normal breath sounds.   Abdominal:      General: Bowel sounds are normal. There is no distension.      Palpations: Abdomen is soft.      Tenderness: There is no abdominal tenderness.   Genitourinary:     Prostate: Normal.      Rectum: Normal.   Musculoskeletal:      Cervical back: Neck supple.      Comments: Ambulates without assistance; no clubbing, cyanosis or edema.    Lymphadenopathy:      Cervical: No cervical adenopathy.   Skin:     General: Skin is warm and dry.   Neurological:      Mental Status: He is " alert.   Psychiatric:         Mood and Affect: Mood normal.         Behavior: Behavior normal.              Assessment and Plan         Medicare annual wellness visit, subsequent    Essential hypertension    B12 deficiency    Diagnoses and all orders for this visit:    1. Medicare annual wellness visit, subsequent (Primary)  Comments:  Encouraged to remain active walking 150 minutes weekly. Flu shot and RSV vaccination in the fall.    2. Essential hypertension  Comments:  Controlled. Continue current medication. RTO 6M.    3. B12 deficiency  Comments:  B12 shot today.          Records reviewed include previous OV with myself, Dr. Mckeon, Josseline Garrett and Delmy Hampton as well as labs and stress test.      Follow Up   Return in about 6 months (around 1/21/2026).  Patient was given instructions and counseling regarding his condition or for health maintenance advice. Please see specific information pulled into the AVS if appropriate.

## 2025-07-21 ENCOUNTER — OFFICE VISIT (OUTPATIENT)
Dept: INTERNAL MEDICINE | Facility: CLINIC | Age: 80
End: 2025-07-21
Payer: MEDICARE

## 2025-07-21 VITALS — WEIGHT: 172.2 LBS | HEIGHT: 70 IN | BODY MASS INDEX: 24.65 KG/M2

## 2025-07-21 DIAGNOSIS — Z00.00 MEDICARE ANNUAL WELLNESS VISIT, SUBSEQUENT: Primary | ICD-10-CM

## 2025-07-21 DIAGNOSIS — I10 ESSENTIAL HYPERTENSION: Chronic | ICD-10-CM

## 2025-07-21 DIAGNOSIS — E53.8 B12 DEFICIENCY: ICD-10-CM

## 2025-07-21 PROCEDURE — 96372 THER/PROPH/DIAG INJ SC/IM: CPT | Performed by: NURSE PRACTITIONER

## 2025-07-21 PROCEDURE — 99213 OFFICE O/P EST LOW 20 MIN: CPT | Performed by: NURSE PRACTITIONER

## 2025-07-21 PROCEDURE — 1126F AMNT PAIN NOTED NONE PRSNT: CPT | Performed by: NURSE PRACTITIONER

## 2025-07-21 PROCEDURE — G0439 PPPS, SUBSEQ VISIT: HCPCS | Performed by: NURSE PRACTITIONER

## 2025-07-21 RX ADMIN — CYANOCOBALAMIN 1000 MCG: 1000 INJECTION, SOLUTION INTRAMUSCULAR; SUBCUTANEOUS at 11:37

## 2025-08-21 ENCOUNTER — CLINICAL SUPPORT (OUTPATIENT)
Dept: INTERNAL MEDICINE | Facility: CLINIC | Age: 80
End: 2025-08-21
Payer: MEDICARE

## 2025-08-21 DIAGNOSIS — E53.8 B12 DEFICIENCY: Primary | ICD-10-CM

## 2025-08-21 PROCEDURE — 96372 THER/PROPH/DIAG INJ SC/IM: CPT | Performed by: NURSE PRACTITIONER

## 2025-08-21 RX ADMIN — CYANOCOBALAMIN 1000 MCG: 1000 INJECTION, SOLUTION INTRAMUSCULAR; SUBCUTANEOUS at 09:36

## 2025-08-25 ENCOUNTER — OFFICE VISIT (OUTPATIENT)
Dept: ENDOCRINOLOGY | Age: 80
End: 2025-08-25
Payer: MEDICARE

## 2025-08-25 VITALS
HEIGHT: 70 IN | HEART RATE: 69 BPM | WEIGHT: 171.8 LBS | DIASTOLIC BLOOD PRESSURE: 64 MMHG | BODY MASS INDEX: 24.6 KG/M2 | OXYGEN SATURATION: 99 % | SYSTOLIC BLOOD PRESSURE: 118 MMHG | TEMPERATURE: 97.8 F

## 2025-08-25 DIAGNOSIS — E78.5 HYPERLIPIDEMIA, UNSPECIFIED HYPERLIPIDEMIA TYPE: ICD-10-CM

## 2025-08-25 DIAGNOSIS — E11.29 TYPE 2 DIABETES MELLITUS WITH MICROALBUMINURIA, WITH LONG-TERM CURRENT USE OF INSULIN: Primary | Chronic | ICD-10-CM

## 2025-08-25 DIAGNOSIS — Z86.0100 HISTORY OF COLON POLYPS: ICD-10-CM

## 2025-08-25 DIAGNOSIS — I25.10 CORONARY ARTERY DISEASE INVOLVING NATIVE CORONARY ARTERY OF NATIVE HEART WITHOUT ANGINA PECTORIS: ICD-10-CM

## 2025-08-25 DIAGNOSIS — E11.21 TYPE 2 DIABETES WITH NEPHROPATHY: Chronic | ICD-10-CM

## 2025-08-25 DIAGNOSIS — I10 ESSENTIAL HYPERTENSION: ICD-10-CM

## 2025-08-25 DIAGNOSIS — Z79.4 TYPE 2 DIABETES MELLITUS WITH MICROALBUMINURIA, WITH LONG-TERM CURRENT USE OF INSULIN: Primary | Chronic | ICD-10-CM

## 2025-08-25 DIAGNOSIS — R80.9 TYPE 2 DIABETES MELLITUS WITH MICROALBUMINURIA, WITH LONG-TERM CURRENT USE OF INSULIN: Primary | Chronic | ICD-10-CM

## 2025-08-25 DIAGNOSIS — R80.9 MICROALBUMINURIA: ICD-10-CM

## 2025-08-25 DIAGNOSIS — Z95.820 S/P ANGIOPLASTY WITH STENT: ICD-10-CM

## 2025-08-25 DIAGNOSIS — E11.42 DM TYPE 2 WITH DIABETIC PERIPHERAL NEUROPATHY: Chronic | ICD-10-CM

## 2025-08-25 PROCEDURE — 99214 OFFICE O/P EST MOD 30 MIN: CPT | Performed by: INTERNAL MEDICINE

## 2025-08-25 PROCEDURE — 3078F DIAST BP <80 MM HG: CPT | Performed by: INTERNAL MEDICINE

## 2025-08-25 PROCEDURE — 3074F SYST BP LT 130 MM HG: CPT | Performed by: INTERNAL MEDICINE

## 2025-08-25 PROCEDURE — 95251 CONT GLUC MNTR ANALYSIS I&R: CPT | Performed by: INTERNAL MEDICINE

## 2025-08-25 RX ORDER — INSULIN GLARGINE-YFGN 100 [IU]/ML
16 INJECTION, SOLUTION SUBCUTANEOUS EVERY EVENING
Status: SHIPPED
Start: 2025-08-25

## 2025-08-25 RX ORDER — ACYCLOVIR 800 MG/1
TABLET ORAL
COMMUNITY

## 2025-08-26 LAB
ALBUMIN SERPL-MCNC: 4.4 G/DL (ref 3.7–4.7)
ALBUMIN/CREAT UR: 15 MG/G CREAT (ref 0–29)
ALP SERPL-CCNC: 73 IU/L (ref 44–121)
ALT SERPL-CCNC: 10 IU/L (ref 0–44)
AST SERPL-CCNC: 14 IU/L (ref 0–40)
BILIRUB SERPL-MCNC: 0.4 MG/DL (ref 0–1.2)
BUN SERPL-MCNC: 25 MG/DL (ref 8–27)
BUN/CREAT SERPL: 15 (ref 10–24)
CALCIUM SERPL-MCNC: 9.5 MG/DL (ref 8.6–10.2)
CHLORIDE SERPL-SCNC: 101 MMOL/L (ref 96–106)
CHOLEST SERPL-MCNC: 119 MG/DL (ref 100–199)
CO2 SERPL-SCNC: 19 MMOL/L (ref 20–29)
CREAT SERPL-MCNC: 1.65 MG/DL (ref 0.76–1.27)
CREAT UR-MCNC: 220.6 MG/DL
EGFRCR SERPLBLD CKD-EPI 2021: 41 ML/MIN/1.73
GLOBULIN SER CALC-MCNC: 2.1 G/DL (ref 1.5–4.5)
GLUCOSE SERPL-MCNC: 124 MG/DL (ref 70–99)
HBA1C MFR BLD: 6.1 % (ref 4.8–5.6)
HDLC SERPL-MCNC: 33 MG/DL
IMP & REVIEW OF LAB RESULTS: NORMAL
LDLC SERPL CALC-MCNC: 63 MG/DL (ref 0–99)
MICROALBUMIN UR-MCNC: 32.6 UG/ML
POTASSIUM SERPL-SCNC: 5.8 MMOL/L (ref 3.5–5.2)
PROT SERPL-MCNC: 6.5 G/DL (ref 6–8.5)
REPORT: NORMAL
SODIUM SERPL-SCNC: 138 MMOL/L (ref 134–144)
TRIGL SERPL-MCNC: 129 MG/DL (ref 0–149)
TSH SERPL DL<=0.005 MIU/L-ACNC: 1.36 UIU/ML (ref 0.45–4.5)
VLDLC SERPL CALC-MCNC: 23 MG/DL (ref 5–40)

## (undated) DEVICE — SINGLE-USE BIOPSY FORCEPS: Brand: RADIAL JAW 4

## (undated) DEVICE — BITEBLOCK OMNI BLOC

## (undated) DEVICE — SENSR O2 OXIMAX FNGR A/ 18IN NONSTR

## (undated) DEVICE — FRCP BX RADJAW4 NDL 2.8 240CM LG OG BX40

## (undated) DEVICE — ADAPT CLN BIOGUARD AIR/H2O DISP

## (undated) DEVICE — CANN NASL CO2 TRULINK W/O2 A/

## (undated) DEVICE — TUBING, SUCTION, 1/4" X 10', STRAIGHT: Brand: MEDLINE

## (undated) DEVICE — MSK ENDO PORT O2 POM ELITE CURAPLEX A/

## (undated) DEVICE — THE TORRENT IRRIGATION SCOPE CONNECTOR IS USED WITH THE TORRENT IRRIGATION TUBING TO PROVIDE IRRIGATION FLUIDS SUCH AS STERILE WATER DURING GASTROINTESTINAL ENDOSCOPIC PROCEDURES WHEN USED IN CONJUNCTION WITH AN IRRIGATION PUMP (OR ELECTROSURGICAL UNIT).: Brand: TORRENT

## (undated) DEVICE — THE SINGLE USE ETRAP – POLYP TRAP IS USED FOR SUCTION RETRIEVAL OF ENDOSCOPICALLY REMOVED POLYPS.: Brand: ETRAP

## (undated) DEVICE — KT ORCA ORCAPOD DISP STRL

## (undated) DEVICE — LN SMPL CO2 SHTRM SD STREAM W/M LUER

## (undated) DEVICE — TBG 02 CRUSH RESIST LF CLR 7FT

## (undated) DEVICE — SNAR POLYP CAPTIVATOR RND STFF 2.4 240CM 10MM 1P/U

## (undated) DEVICE — Device: Brand: DEFENDO AIR/WATER/SUCTION AND BIOPSY VALVE